# Patient Record
Sex: FEMALE | Race: WHITE | Employment: FULL TIME | URBAN - METROPOLITAN AREA
[De-identification: names, ages, dates, MRNs, and addresses within clinical notes are randomized per-mention and may not be internally consistent; named-entity substitution may affect disease eponyms.]

---

## 2017-05-09 ENCOUNTER — APPOINTMENT (OUTPATIENT)
Dept: ULTRASOUND IMAGING | Age: 67
End: 2017-05-09
Payer: COMMERCIAL

## 2017-05-09 ENCOUNTER — APPOINTMENT (OUTPATIENT)
Dept: MRI IMAGING | Age: 67
End: 2017-05-09
Payer: COMMERCIAL

## 2017-05-09 ENCOUNTER — OFFICE VISIT (OUTPATIENT)
Dept: PEDIATRICS | Age: 67
End: 2017-05-09

## 2017-05-09 ENCOUNTER — APPOINTMENT (OUTPATIENT)
Dept: CT IMAGING | Age: 67
End: 2017-05-09
Payer: COMMERCIAL

## 2017-05-09 ENCOUNTER — APPOINTMENT (OUTPATIENT)
Dept: GENERAL RADIOLOGY | Age: 67
End: 2017-05-09
Payer: COMMERCIAL

## 2017-05-09 ENCOUNTER — HOSPITAL ENCOUNTER (OUTPATIENT)
Age: 67
Setting detail: OBSERVATION
Discharge: HOME | End: 2017-05-10
Attending: STUDENT IN AN ORGANIZED HEALTH CARE EDUCATION/TRAINING PROGRAM | Admitting: FAMILY MEDICINE
Payer: COMMERCIAL

## 2017-05-09 VITALS
DIASTOLIC BLOOD PRESSURE: 80 MMHG | TEMPERATURE: 97.3 F | SYSTOLIC BLOOD PRESSURE: 120 MMHG | HEART RATE: 84 BPM | WEIGHT: 184 LBS | BODY MASS INDEX: 35.94 KG/M2 | OXYGEN SATURATION: 98 %

## 2017-05-09 DIAGNOSIS — H53.9 TRANSIENT VISION DISTURBANCE OF RIGHT EYE: Primary | ICD-10-CM

## 2017-05-09 DIAGNOSIS — H53.9 MONOCULAR VISUAL DISTURBANCE: Primary | ICD-10-CM

## 2017-05-09 DIAGNOSIS — G45.3 AMAUROSIS FUGAX: ICD-10-CM

## 2017-05-09 DIAGNOSIS — Z86.73 HISTORY OF TIA (TRANSIENT ISCHEMIC ATTACK): ICD-10-CM

## 2017-05-09 LAB
ABO/RH: NORMAL
ACETAMINOPHEN LEVEL: <15 UG/ML (ref 10–30)
ALBUMIN SERPL-MCNC: 4.4 G/DL (ref 3.9–4.9)
ALP BLD-CCNC: 136 U/L (ref 40–130)
ALT SERPL-CCNC: 14 U/L (ref 0–33)
AMPHETAMINE SCREEN, URINE: NORMAL
ANION GAP SERPL CALCULATED.3IONS-SCNC: 11 MEQ/L (ref 7–13)
ANTIBODY IDENTIFICATION: NORMAL
ANTIBODY SCREEN: NORMAL
APTT: 26.1 SEC (ref 21.6–35.4)
AST SERPL-CCNC: 19 U/L (ref 0–35)
BARBITURATE SCREEN URINE: NORMAL
BASOPHILS ABSOLUTE: 0 K/UL (ref 0–0.2)
BASOPHILS RELATIVE PERCENT: 0.3 %
BENZODIAZEPINE SCREEN, URINE: NORMAL
BILIRUB SERPL-MCNC: 0.4 MG/DL (ref 0–1.2)
BILIRUBIN URINE: NEGATIVE
BLOOD, URINE: NEGATIVE
BUN BLDV-MCNC: 15 MG/DL (ref 8–23)
C-REACTIVE PROTEIN, HIGH SENSITIVITY: 2 MG/L (ref 0–5)
CALCIUM SERPL-MCNC: 9.3 MG/DL (ref 8.6–10.2)
CANNABINOID SCREEN URINE: NORMAL
CHLORIDE BLD-SCNC: 105 MEQ/L (ref 98–107)
CHOLESTEROL, TOTAL: 238 MG/DL (ref 0–199)
CHP ED QC CHECK: YES
CLARITY: CLEAR
CO2: 24 MEQ/L (ref 22–29)
COCAINE METABOLITE SCREEN URINE: NORMAL
COLOR: YELLOW
CREAT SERPL-MCNC: 0.8 MG/DL (ref 0.5–0.9)
DAT POLYSPECIFIC: NORMAL
EOSINOPHILS ABSOLUTE: 0.1 K/UL (ref 0–0.7)
EOSINOPHILS RELATIVE PERCENT: 1 %
ETHANOL PERCENT: NORMAL G/DL
ETHANOL: <10 MG/DL (ref 0–0.08)
GFR AFRICAN AMERICAN: >60
GFR NON-AFRICAN AMERICAN: >60
GLOBULIN: 2.7 G/DL (ref 2.3–3.5)
GLUCOSE BLD-MCNC: 95 MG/DL
GLUCOSE BLD-MCNC: 95 MG/DL (ref 60–115)
GLUCOSE BLD-MCNC: 95 MG/DL (ref 74–109)
GLUCOSE URINE: NEGATIVE MG/DL
HCT VFR BLD CALC: 44.8 % (ref 37–47)
HDLC SERPL-MCNC: 40 MG/DL (ref 40–59)
HEMOGLOBIN: 15.1 G/DL (ref 12–16)
INR BLD: 0.9
KETONES, URINE: NEGATIVE MG/DL
LDL CHOLESTEROL CALCULATED: 157 MG/DL (ref 0–129)
LEUKOCYTE ESTERASE, URINE: NEGATIVE
LV EF: 60 %
LVEF MODALITY: NORMAL
LYMPHOCYTES ABSOLUTE: 3.1 K/UL (ref 1–4.8)
LYMPHOCYTES RELATIVE PERCENT: 41.1 %
Lab: NORMAL
MAGNESIUM: 2.4 MG/DL (ref 1.7–2.3)
MCH RBC QN AUTO: 29.1 PG (ref 27–31.3)
MCHC RBC AUTO-ENTMCNC: 33.6 % (ref 33–37)
MCV RBC AUTO: 86.6 FL (ref 82–100)
MONOCYTES ABSOLUTE: 0.5 K/UL (ref 0.2–0.8)
MONOCYTES RELATIVE PERCENT: 7 %
NEUTROPHILS ABSOLUTE: 3.8 K/UL (ref 1.4–6.5)
NEUTROPHILS RELATIVE PERCENT: 50.6 %
NITRITE, URINE: NEGATIVE
OPIATE SCREEN URINE: NORMAL
PDW BLD-RTO: 13.8 % (ref 11.5–14.5)
PERFORMED ON: NORMAL
PH UA: 6.5 (ref 5–9)
PHENCYCLIDINE SCREEN URINE: NORMAL
PLATELET # BLD: 256 K/UL (ref 130–400)
POTASSIUM SERPL-SCNC: 3.8 MEQ/L (ref 3.5–5.1)
PRO-BNP: 53 PG/ML
PROTEIN UA: NEGATIVE MG/DL
PROTHROMBIN TIME: 10.1 SEC (ref 8.1–13.7)
RBC # BLD: 5.17 M/UL (ref 4.2–5.4)
SALICYLATE, SERUM: <0.3 MG/DL (ref 15–30)
SODIUM BLD-SCNC: 140 MEQ/L (ref 132–144)
SPECIFIC GRAVITY UA: 1 (ref 1–1.03)
TOTAL CK: 125 U/L (ref 0–170)
TOTAL PROTEIN: 7.1 G/DL (ref 6.4–8.1)
TRIGL SERPL-MCNC: 207 MG/DL (ref 0–200)
TROPONIN: <0.01 NG/ML (ref 0–0.01)
URINE REFLEX TO CULTURE: NORMAL
UROBILINOGEN, URINE: 0.2 E.U./DL
WBC # BLD: 7.5 K/UL (ref 4.8–10.8)

## 2017-05-09 PROCEDURE — 6370000000 HC RX 637 (ALT 250 FOR IP): Performed by: PHYSICIAN ASSISTANT

## 2017-05-09 PROCEDURE — 80053 COMPREHEN METABOLIC PANEL: CPT

## 2017-05-09 PROCEDURE — G0378 HOSPITAL OBSERVATION PER HR: HCPCS

## 2017-05-09 PROCEDURE — 83880 ASSAY OF NATRIURETIC PEPTIDE: CPT

## 2017-05-09 PROCEDURE — 6370000000 HC RX 637 (ALT 250 FOR IP): Performed by: STUDENT IN AN ORGANIZED HEALTH CARE EDUCATION/TRAINING PROGRAM

## 2017-05-09 PROCEDURE — 86850 RBC ANTIBODY SCREEN: CPT

## 2017-05-09 PROCEDURE — 96372 THER/PROPH/DIAG INJ SC/IM: CPT

## 2017-05-09 PROCEDURE — 84484 ASSAY OF TROPONIN QUANT: CPT

## 2017-05-09 PROCEDURE — 82550 ASSAY OF CK (CPK): CPT

## 2017-05-09 PROCEDURE — 93005 ELECTROCARDIOGRAM TRACING: CPT

## 2017-05-09 PROCEDURE — 93880 EXTRACRANIAL BILAT STUDY: CPT

## 2017-05-09 PROCEDURE — 83735 ASSAY OF MAGNESIUM: CPT

## 2017-05-09 PROCEDURE — G0480 DRUG TEST DEF 1-7 CLASSES: HCPCS

## 2017-05-09 PROCEDURE — 85610 PROTHROMBIN TIME: CPT

## 2017-05-09 PROCEDURE — 86901 BLOOD TYPING SEROLOGIC RH(D): CPT

## 2017-05-09 PROCEDURE — 99213 OFFICE O/P EST LOW 20 MIN: CPT | Performed by: NURSE PRACTITIONER

## 2017-05-09 PROCEDURE — 2580000003 HC RX 258: Performed by: PHYSICIAN ASSISTANT

## 2017-05-09 PROCEDURE — 99285 EMERGENCY DEPT VISIT HI MDM: CPT

## 2017-05-09 PROCEDURE — 85730 THROMBOPLASTIN TIME PARTIAL: CPT

## 2017-05-09 PROCEDURE — A9579 GAD-BASE MR CONTRAST NOS,1ML: HCPCS | Performed by: RADIOLOGY

## 2017-05-09 PROCEDURE — 70450 CT HEAD/BRAIN W/O DYE: CPT

## 2017-05-09 PROCEDURE — 86141 C-REACTIVE PROTEIN HS: CPT

## 2017-05-09 PROCEDURE — 80307 DRUG TEST PRSMV CHEM ANLYZR: CPT

## 2017-05-09 PROCEDURE — 86870 RBC ANTIBODY IDENTIFICATION: CPT

## 2017-05-09 PROCEDURE — 86900 BLOOD TYPING SEROLOGIC ABO: CPT

## 2017-05-09 PROCEDURE — 70553 MRI BRAIN STEM W/O & W/DYE: CPT

## 2017-05-09 PROCEDURE — 80061 LIPID PANEL: CPT

## 2017-05-09 PROCEDURE — 81003 URINALYSIS AUTO W/O SCOPE: CPT

## 2017-05-09 PROCEDURE — 36415 COLL VENOUS BLD VENIPUNCTURE: CPT

## 2017-05-09 PROCEDURE — 93306 TTE W/DOPPLER COMPLETE: CPT

## 2017-05-09 PROCEDURE — 70544 MR ANGIOGRAPHY HEAD W/O DYE: CPT

## 2017-05-09 PROCEDURE — 85025 COMPLETE CBC W/AUTO DIFF WBC: CPT

## 2017-05-09 PROCEDURE — 6360000002 HC RX W HCPCS: Performed by: PHYSICIAN ASSISTANT

## 2017-05-09 PROCEDURE — 6360000004 HC RX CONTRAST MEDICATION: Performed by: RADIOLOGY

## 2017-05-09 PROCEDURE — 71010 XR CHEST PORTABLE: CPT

## 2017-05-09 PROCEDURE — 86880 COOMBS TEST DIRECT: CPT

## 2017-05-09 RX ORDER — ATORVASTATIN CALCIUM 20 MG/1
20 TABLET, FILM COATED ORAL NIGHTLY
Status: DISCONTINUED | OUTPATIENT
Start: 2017-05-09 | End: 2017-05-10

## 2017-05-09 RX ORDER — ASPIRIN 81 MG/1
324 TABLET, CHEWABLE ORAL ONCE
Status: COMPLETED | OUTPATIENT
Start: 2017-05-09 | End: 2017-05-09

## 2017-05-09 RX ORDER — ATORVASTATIN CALCIUM 40 MG/1
80 TABLET, FILM COATED ORAL NIGHTLY
Status: DISCONTINUED | OUTPATIENT
Start: 2017-05-09 | End: 2017-05-09 | Stop reason: SDUPTHER

## 2017-05-09 RX ORDER — SODIUM CHLORIDE 0.9 % (FLUSH) 0.9 %
10 SYRINGE (ML) INJECTION 2 TIMES DAILY
Status: DISCONTINUED | OUTPATIENT
Start: 2017-05-09 | End: 2017-05-10

## 2017-05-09 RX ORDER — TETRACAINE HYDROCHLORIDE 5 MG/ML
2 SOLUTION OPHTHALMIC ONCE
Status: COMPLETED | OUTPATIENT
Start: 2017-05-09 | End: 2017-05-09

## 2017-05-09 RX ORDER — SODIUM CHLORIDE 0.9 % (FLUSH) 0.9 %
10 SYRINGE (ML) INJECTION PRN
Status: DISCONTINUED | OUTPATIENT
Start: 2017-05-09 | End: 2017-05-10 | Stop reason: HOSPADM

## 2017-05-09 RX ORDER — ONDANSETRON 2 MG/ML
4 INJECTION INTRAMUSCULAR; INTRAVENOUS EVERY 6 HOURS PRN
Status: DISCONTINUED | OUTPATIENT
Start: 2017-05-09 | End: 2017-05-10 | Stop reason: HOSPADM

## 2017-05-09 RX ORDER — ASPIRIN 325 MG
325 TABLET ORAL DAILY
Status: DISCONTINUED | OUTPATIENT
Start: 2017-05-09 | End: 2017-05-10 | Stop reason: HOSPADM

## 2017-05-09 RX ORDER — ATORVASTATIN CALCIUM 40 MG/1
80 TABLET, FILM COATED ORAL NIGHTLY
Status: DISCONTINUED | OUTPATIENT
Start: 2017-05-09 | End: 2017-05-09

## 2017-05-09 RX ORDER — SODIUM CHLORIDE 0.9 % (FLUSH) 0.9 %
10 SYRINGE (ML) INJECTION EVERY 12 HOURS SCHEDULED
Status: DISCONTINUED | OUTPATIENT
Start: 2017-05-09 | End: 2017-05-10 | Stop reason: HOSPADM

## 2017-05-09 RX ORDER — ACETAMINOPHEN 325 MG/1
650 TABLET ORAL EVERY 4 HOURS PRN
Status: DISCONTINUED | OUTPATIENT
Start: 2017-05-09 | End: 2017-05-10 | Stop reason: HOSPADM

## 2017-05-09 RX ADMIN — GADOPENTETATE DIMEGLUMINE 15 ML: 469.01 INJECTION INTRAVENOUS at 19:22

## 2017-05-09 RX ADMIN — ENOXAPARIN SODIUM 40 MG: 40 INJECTION SUBCUTANEOUS at 16:02

## 2017-05-09 RX ADMIN — TETRACAINE HYDROCHLORIDE 2 DROP: 25 LIQUID OPHTHALMIC at 10:56

## 2017-05-09 RX ADMIN — ATORVASTATIN CALCIUM 20 MG: 20 TABLET, FILM COATED ORAL at 21:49

## 2017-05-09 RX ADMIN — ASPIRIN 81 MG 324 MG: 81 TABLET ORAL at 12:33

## 2017-05-09 RX ADMIN — ATORVASTATIN CALCIUM 80 MG: 40 TABLET, FILM COATED ORAL at 12:45

## 2017-05-09 RX ADMIN — SODIUM CHLORIDE, PRESERVATIVE FREE 10 ML: 5 INJECTION INTRAVENOUS at 21:57

## 2017-05-09 RX ADMIN — ASPIRIN 325 MG ORAL TABLET 325 MG: 325 PILL ORAL at 16:02

## 2017-05-09 ASSESSMENT — ENCOUNTER SYMPTOMS
PHOTOPHOBIA: 0
SINUS PRESSURE: 0
ABDOMINAL PAIN: 0
PHOTOPHOBIA: 0
WHEEZING: 0
FACIAL SWELLING: 0
ABDOMINAL PAIN: 0
EYE DISCHARGE: 0
VOMITING: 0
EYE REDNESS: 0
COUGH: 0
VOMITING: 0
SINUS PRESSURE: 0
BACK PAIN: 0
SORE THROAT: 0
EYE PAIN: 0
SHORTNESS OF BREATH: 0
EYE PAIN: 0
RHINORRHEA: 0
NAUSEA: 0
TROUBLE SWALLOWING: 0
COUGH: 0
DIARRHEA: 0
TROUBLE SWALLOWING: 0
SHORTNESS OF BREATH: 0
CHEST TIGHTNESS: 0

## 2017-05-09 ASSESSMENT — VISUAL ACUITY
OS: 20/50
OU: 20/25
OD: 20/25

## 2017-05-09 ASSESSMENT — PAIN SCALES - GENERAL: PAINLEVEL_OUTOF10: 0

## 2017-05-10 ENCOUNTER — APPOINTMENT (OUTPATIENT)
Dept: GENERAL RADIOLOGY | Age: 67
End: 2017-05-10
Payer: COMMERCIAL

## 2017-05-10 VITALS
BODY MASS INDEX: 36.32 KG/M2 | HEIGHT: 60 IN | RESPIRATION RATE: 16 BRPM | HEART RATE: 68 BPM | WEIGHT: 185 LBS | TEMPERATURE: 97.5 F | OXYGEN SATURATION: 97 % | DIASTOLIC BLOOD PRESSURE: 68 MMHG | SYSTOLIC BLOOD PRESSURE: 124 MMHG

## 2017-05-10 LAB
ALBUMIN SERPL-MCNC: 3.8 G/DL (ref 3.9–4.9)
ALP BLD-CCNC: 124 U/L (ref 40–130)
ALT SERPL-CCNC: 12 U/L (ref 0–33)
ANION GAP SERPL CALCULATED.3IONS-SCNC: 13 MEQ/L (ref 7–13)
AST SERPL-CCNC: 19 U/L (ref 0–35)
BASOPHILS ABSOLUTE: 0 K/UL (ref 0–0.2)
BASOPHILS RELATIVE PERCENT: 0.7 %
BILIRUB SERPL-MCNC: 0.4 MG/DL (ref 0–1.2)
BILIRUBIN DIRECT: 0 MG/DL (ref 0–0.3)
BILIRUBIN, INDIRECT: 0.4 MG/DL (ref 0–0.6)
BUN BLDV-MCNC: 16 MG/DL (ref 8–23)
CALCIUM SERPL-MCNC: 9.1 MG/DL (ref 8.6–10.2)
CHLORIDE BLD-SCNC: 106 MEQ/L (ref 98–107)
CHOLESTEROL, TOTAL: 226 MG/DL (ref 0–199)
CO2: 23 MEQ/L (ref 22–29)
CREAT SERPL-MCNC: 0.68 MG/DL (ref 0.5–0.9)
EOSINOPHILS ABSOLUTE: 0.1 K/UL (ref 0–0.7)
EOSINOPHILS RELATIVE PERCENT: 1.7 %
GFR AFRICAN AMERICAN: >60
GFR NON-AFRICAN AMERICAN: >60
GLUCOSE BLD-MCNC: 98 MG/DL (ref 74–109)
HCT VFR BLD CALC: 42.5 % (ref 37–47)
HDLC SERPL-MCNC: 38 MG/DL (ref 40–59)
HEMOGLOBIN: 14.4 G/DL (ref 12–16)
LDL CHOLESTEROL CALCULATED: 150 MG/DL (ref 0–129)
LYMPHOCYTES ABSOLUTE: 2.9 K/UL (ref 1–4.8)
LYMPHOCYTES RELATIVE PERCENT: 42.7 %
MAGNESIUM: 2.5 MG/DL (ref 1.7–2.3)
MCH RBC QN AUTO: 29.5 PG (ref 27–31.3)
MCHC RBC AUTO-ENTMCNC: 33.9 % (ref 33–37)
MCV RBC AUTO: 87.1 FL (ref 82–100)
MONOCYTES ABSOLUTE: 0.6 K/UL (ref 0.2–0.8)
MONOCYTES RELATIVE PERCENT: 8.2 %
NEUTROPHILS ABSOLUTE: 3.2 K/UL (ref 1.4–6.5)
NEUTROPHILS RELATIVE PERCENT: 46.7 %
PDW BLD-RTO: 14 % (ref 11.5–14.5)
PLATELET # BLD: 234 K/UL (ref 130–400)
POTASSIUM SERPL-SCNC: 4.1 MEQ/L (ref 3.5–5.1)
RBC # BLD: 4.88 M/UL (ref 4.2–5.4)
SODIUM BLD-SCNC: 142 MEQ/L (ref 132–144)
TOTAL PROTEIN: 6.3 G/DL (ref 6.4–8.1)
TRIGL SERPL-MCNC: 192 MG/DL (ref 0–200)
WBC # BLD: 6.8 K/UL (ref 4.8–10.8)

## 2017-05-10 PROCEDURE — G0378 HOSPITAL OBSERVATION PER HR: HCPCS

## 2017-05-10 PROCEDURE — 85025 COMPLETE CBC W/AUTO DIFF WBC: CPT

## 2017-05-10 PROCEDURE — 73030 X-RAY EXAM OF SHOULDER: CPT

## 2017-05-10 PROCEDURE — 83735 ASSAY OF MAGNESIUM: CPT

## 2017-05-10 PROCEDURE — 96372 THER/PROPH/DIAG INJ SC/IM: CPT

## 2017-05-10 PROCEDURE — 80076 HEPATIC FUNCTION PANEL: CPT

## 2017-05-10 PROCEDURE — 36415 COLL VENOUS BLD VENIPUNCTURE: CPT

## 2017-05-10 PROCEDURE — 80048 BASIC METABOLIC PNL TOTAL CA: CPT

## 2017-05-10 PROCEDURE — 2580000003 HC RX 258: Performed by: PHYSICIAN ASSISTANT

## 2017-05-10 PROCEDURE — 6360000002 HC RX W HCPCS: Performed by: PHYSICIAN ASSISTANT

## 2017-05-10 PROCEDURE — 80061 LIPID PANEL: CPT

## 2017-05-10 PROCEDURE — 6370000000 HC RX 637 (ALT 250 FOR IP): Performed by: PHYSICIAN ASSISTANT

## 2017-05-10 RX ORDER — ATORVASTATIN CALCIUM 40 MG/1
40 TABLET, FILM COATED ORAL NIGHTLY
Status: DISCONTINUED | OUTPATIENT
Start: 2017-05-10 | End: 2017-05-10 | Stop reason: HOSPADM

## 2017-05-10 RX ORDER — SIMVASTATIN 40 MG
40 TABLET ORAL EVERY EVENING
Qty: 90 TABLET | Refills: 3 | Status: SHIPPED | OUTPATIENT
Start: 2017-05-10 | End: 2017-05-10

## 2017-05-10 RX ORDER — LORAZEPAM 2 MG/ML
1 INJECTION INTRAMUSCULAR
Status: DISCONTINUED | OUTPATIENT
Start: 2017-05-10 | End: 2017-05-10 | Stop reason: HOSPADM

## 2017-05-10 RX ORDER — ASPIRIN 325 MG
325 TABLET ORAL DAILY
Qty: 30 TABLET | Refills: 3 | Status: SHIPPED | OUTPATIENT
Start: 2017-05-10 | End: 2019-01-09

## 2017-05-10 RX ORDER — SIMVASTATIN 40 MG
40 TABLET ORAL EVERY EVENING
Qty: 90 TABLET | Refills: 3 | Status: SHIPPED | OUTPATIENT
Start: 2017-05-10 | End: 2018-06-15 | Stop reason: SDUPTHER

## 2017-05-10 RX ORDER — MELOXICAM 15 MG/1
15 TABLET ORAL DAILY
Qty: 90 TABLET | Refills: 3 | Status: SHIPPED | OUTPATIENT
Start: 2017-05-10 | End: 2018-09-28 | Stop reason: SDUPTHER

## 2017-05-10 RX ORDER — MELOXICAM 15 MG/1
15 TABLET ORAL DAILY
Qty: 90 TABLET | Refills: 3 | Status: SHIPPED | OUTPATIENT
Start: 2017-05-10 | End: 2017-05-10

## 2017-05-10 RX ADMIN — ASPIRIN 325 MG ORAL TABLET 325 MG: 325 PILL ORAL at 07:51

## 2017-05-10 RX ADMIN — SODIUM CHLORIDE, PRESERVATIVE FREE 10 ML: 5 INJECTION INTRAVENOUS at 07:53

## 2017-05-10 RX ADMIN — ENOXAPARIN SODIUM 40 MG: 40 INJECTION SUBCUTANEOUS at 07:51

## 2017-05-10 ASSESSMENT — PAIN SCALES - GENERAL: PAINLEVEL_OUTOF10: 0

## 2017-05-11 LAB
EKG ATRIAL RATE: 79 BPM
EKG P AXIS: 45 DEGREES
EKG P-R INTERVAL: 144 MS
EKG Q-T INTERVAL: 394 MS
EKG QRS DURATION: 96 MS
EKG QTC CALCULATION (BAZETT): 451 MS
EKG R AXIS: 91 DEGREES
EKG T AXIS: 20 DEGREES
EKG VENTRICULAR RATE: 79 BPM

## 2017-05-13 LAB
BLOOD BANK DISPENSE STATUS: NORMAL
BLOOD BANK DISPENSE STATUS: NORMAL
BLOOD BANK PRODUCT CODE: NORMAL
BLOOD BANK PRODUCT CODE: NORMAL
BPU ID: NORMAL
BPU ID: NORMAL
DESCRIPTION BLOOD BANK: NORMAL
DESCRIPTION BLOOD BANK: NORMAL

## 2017-08-11 ENCOUNTER — HOSPITAL ENCOUNTER (OUTPATIENT)
Dept: GENERAL RADIOLOGY | Age: 67
Discharge: HOME | End: 2017-08-11

## 2017-08-11 DIAGNOSIS — R52 PAIN: ICD-10-CM

## 2017-08-11 PROCEDURE — 73562 X-RAY EXAM OF KNEE 3: CPT

## 2017-08-11 PROCEDURE — 73130 X-RAY EXAM OF HAND: CPT

## 2018-01-27 ENCOUNTER — OFFICE VISIT (OUTPATIENT)
Dept: FAMILY MEDICINE CLINIC | Age: 68
End: 2018-01-27
Payer: COMMERCIAL

## 2018-01-27 VITALS
RESPIRATION RATE: 16 BRPM | WEIGHT: 188 LBS | BODY MASS INDEX: 36.91 KG/M2 | SYSTOLIC BLOOD PRESSURE: 134 MMHG | TEMPERATURE: 98.1 F | HEART RATE: 89 BPM | DIASTOLIC BLOOD PRESSURE: 74 MMHG | HEIGHT: 60 IN

## 2018-01-27 DIAGNOSIS — M54.32 SCIATICA OF LEFT SIDE: Primary | ICD-10-CM

## 2018-01-27 PROCEDURE — 99213 OFFICE O/P EST LOW 20 MIN: CPT | Performed by: FAMILY MEDICINE

## 2018-01-27 RX ORDER — MELOXICAM 15 MG/1
15 TABLET ORAL DAILY
Qty: 30 TABLET | Refills: 3 | Status: SHIPPED | OUTPATIENT
Start: 2018-01-27 | End: 2018-09-28 | Stop reason: SDUPTHER

## 2018-01-27 RX ORDER — TIZANIDINE HYDROCHLORIDE 4 MG/1
4 CAPSULE, GELATIN COATED ORAL 3 TIMES DAILY
Qty: 40 CAPSULE | Refills: 1 | Status: SHIPPED | OUTPATIENT
Start: 2018-01-27 | End: 2018-01-30

## 2018-01-27 ASSESSMENT — ENCOUNTER SYMPTOMS
CHEST TIGHTNESS: 0
BACK PAIN: 1
EYES NEGATIVE: 1
RHINORRHEA: 0
GASTROINTESTINAL NEGATIVE: 1
RESPIRATORY NEGATIVE: 1
COUGH: 0

## 2018-01-27 NOTE — PROGRESS NOTES
Dispense Refill    aspirin 325 MG tablet Take 1 tablet by mouth daily 30 tablet 3    meloxicam (MOBIC) 15 MG tablet Take 1 tablet by mouth daily 90 tablet 3    simvastatin (ZOCOR) 40 MG tablet Take 1 tablet by mouth every evening 90 tablet 3    albuterol (PROVENTIL HFA) 108 (90 BASE) MCG/ACT inhaler Inhale 2 puffs into the lungs every 6 hours as needed for Wheezing. 1 Inhaler 0    ibuprofen (ADVIL;MOTRIN) 800 MG tablet Take 1 tablet by mouth 3 times daily as needed for Pain. 90 tablet 6    meclizine (ANTIVERT) 25 MG tablet Take 1 tablet by mouth every 6 hours as needed. 60 tablet 2     No current facility-administered medications on file prior to visit. Allergies   Allergen Reactions    Morphine Itching     Health Maintenance   Topic Date Due    Hepatitis C screen  1950    HIV screen  08/25/1967    DTaP/Tdap/Td vaccine (1 - Tdap) 08/25/1971    Colon cancer screen colonoscopy  08/25/2002    Breast cancer screen  06/13/2015    Cervical cancer screen  06/13/2016    Pneumococcal low/med risk (1 of 2 - PCV13) 08/25/2017    Flu vaccine (1) 09/01/2017    Lipid screen  05/10/2022    Zostavax vaccine  Addressed    DEXA (modify frequency per FRAX score)  Completed       Review of Systems    Review of Systems   Constitutional: Negative for activity change, appetite change, fatigue and fever. HENT: Negative for congestion and rhinorrhea. Eyes: Negative. Respiratory: Negative. Negative for cough and chest tightness. Cardiovascular: Negative. Gastrointestinal: Negative. Endocrine: Negative. Genitourinary: Negative. Musculoskeletal: Positive for back pain and myalgias. Skin: Negative. Neurological: Negative for dizziness, light-headedness and numbness. Hematological: Negative. Psychiatric/Behavioral: Negative.         Physical Exam  Vitals:    01/27/18 1146   BP: 134/74   Pulse: 89   Resp: 16   Temp: 98.1 °F (36.7 °C)   TempSrc: Tympanic   Weight: 188 lb (85.3 kg)

## 2018-01-30 RX ORDER — TIZANIDINE 4 MG/1
4 TABLET ORAL 3 TIMES DAILY
Qty: 40 TABLET | Refills: 1 | Status: SHIPPED | OUTPATIENT
Start: 2018-01-30 | End: 2022-07-15 | Stop reason: ALTCHOICE

## 2018-06-15 RX ORDER — SIMVASTATIN 40 MG
40 TABLET ORAL EVERY EVENING
Qty: 90 TABLET | Refills: 3 | Status: SHIPPED | OUTPATIENT
Start: 2018-06-15 | End: 2018-09-28 | Stop reason: SDUPTHER

## 2018-09-28 RX ORDER — SIMVASTATIN 40 MG
40 TABLET ORAL EVERY EVENING
Qty: 90 TABLET | Refills: 3 | Status: SHIPPED | OUTPATIENT
Start: 2018-09-28 | End: 2019-08-28 | Stop reason: SDUPTHER

## 2018-09-28 RX ORDER — MELOXICAM 15 MG/1
15 TABLET ORAL DAILY
Qty: 90 TABLET | Refills: 3 | Status: SHIPPED | OUTPATIENT
Start: 2018-09-28 | End: 2019-08-28 | Stop reason: SDUPTHER

## 2019-01-09 ENCOUNTER — OFFICE VISIT (OUTPATIENT)
Dept: INTERNAL MEDICINE CLINIC | Age: 69
End: 2019-01-09
Payer: COMMERCIAL

## 2019-01-09 VITALS
HEART RATE: 84 BPM | RESPIRATION RATE: 16 BRPM | OXYGEN SATURATION: 97 % | SYSTOLIC BLOOD PRESSURE: 134 MMHG | HEIGHT: 60 IN | TEMPERATURE: 97.6 F | BODY MASS INDEX: 36.99 KG/M2 | DIASTOLIC BLOOD PRESSURE: 82 MMHG | WEIGHT: 188.4 LBS

## 2019-01-09 DIAGNOSIS — J40 BRONCHITIS: Primary | ICD-10-CM

## 2019-01-09 DIAGNOSIS — Z12.11 SCREEN FOR COLON CANCER: ICD-10-CM

## 2019-01-09 DIAGNOSIS — Z12.31 VISIT FOR SCREENING MAMMOGRAM: ICD-10-CM

## 2019-01-09 PROCEDURE — G8417 CALC BMI ABV UP PARAM F/U: HCPCS | Performed by: NURSE PRACTITIONER

## 2019-01-09 PROCEDURE — G8399 PT W/DXA RESULTS DOCUMENT: HCPCS | Performed by: NURSE PRACTITIONER

## 2019-01-09 PROCEDURE — 3017F COLORECTAL CA SCREEN DOC REV: CPT | Performed by: NURSE PRACTITIONER

## 2019-01-09 PROCEDURE — G8484 FLU IMMUNIZE NO ADMIN: HCPCS | Performed by: NURSE PRACTITIONER

## 2019-01-09 PROCEDURE — 1101F PT FALLS ASSESS-DOCD LE1/YR: CPT | Performed by: NURSE PRACTITIONER

## 2019-01-09 PROCEDURE — 1090F PRES/ABSN URINE INCON ASSESS: CPT | Performed by: NURSE PRACTITIONER

## 2019-01-09 PROCEDURE — 1036F TOBACCO NON-USER: CPT | Performed by: NURSE PRACTITIONER

## 2019-01-09 PROCEDURE — G8427 DOCREV CUR MEDS BY ELIG CLIN: HCPCS | Performed by: NURSE PRACTITIONER

## 2019-01-09 PROCEDURE — 1123F ACP DISCUSS/DSCN MKR DOCD: CPT | Performed by: NURSE PRACTITIONER

## 2019-01-09 PROCEDURE — 4040F PNEUMOC VAC/ADMIN/RCVD: CPT | Performed by: NURSE PRACTITIONER

## 2019-01-09 PROCEDURE — 99214 OFFICE O/P EST MOD 30 MIN: CPT | Performed by: NURSE PRACTITIONER

## 2019-01-09 RX ORDER — ALBUTEROL SULFATE 90 UG/1
2 AEROSOL, METERED RESPIRATORY (INHALATION) EVERY 6 HOURS PRN
Qty: 1 INHALER | Refills: 0 | Status: SHIPPED | OUTPATIENT
Start: 2019-01-09 | End: 2022-07-15 | Stop reason: ALTCHOICE

## 2019-01-09 RX ORDER — DOXYCYCLINE HYCLATE 100 MG
100 TABLET ORAL 2 TIMES DAILY
Qty: 14 TABLET | Refills: 0 | Status: SHIPPED | OUTPATIENT
Start: 2019-01-09 | End: 2019-01-16

## 2019-01-09 ASSESSMENT — PATIENT HEALTH QUESTIONNAIRE - PHQ9
2. FEELING DOWN, DEPRESSED OR HOPELESS: 0
1. LITTLE INTEREST OR PLEASURE IN DOING THINGS: 0
SUM OF ALL RESPONSES TO PHQ QUESTIONS 1-9: 0
SUM OF ALL RESPONSES TO PHQ QUESTIONS 1-9: 0
SUM OF ALL RESPONSES TO PHQ9 QUESTIONS 1 & 2: 0

## 2019-01-09 ASSESSMENT — ENCOUNTER SYMPTOMS
WHEEZING: 1
SWOLLEN GLANDS: 0
SORE THROAT: 1
SHORTNESS OF BREATH: 0
COUGH: 1
DIARRHEA: 0
SINUS PAIN: 0
VOMITING: 0
ABDOMINAL PAIN: 0
FACIAL SWELLING: 0
TROUBLE SWALLOWING: 0
CHEST TIGHTNESS: 0
SINUS PRESSURE: 1
NAUSEA: 0
RHINORRHEA: 1

## 2019-08-28 ENCOUNTER — OFFICE VISIT (OUTPATIENT)
Dept: FAMILY MEDICINE CLINIC | Age: 69
End: 2019-08-28
Payer: COMMERCIAL

## 2019-08-28 ENCOUNTER — HOSPITAL ENCOUNTER (OUTPATIENT)
Dept: GENERAL RADIOLOGY | Age: 69
Discharge: HOME | End: 2019-08-30
Payer: COMMERCIAL

## 2019-08-28 VITALS
BODY MASS INDEX: 37.89 KG/M2 | SYSTOLIC BLOOD PRESSURE: 120 MMHG | HEIGHT: 60 IN | TEMPERATURE: 98.1 F | DIASTOLIC BLOOD PRESSURE: 86 MMHG | OXYGEN SATURATION: 98 % | WEIGHT: 193 LBS | RESPIRATION RATE: 14 BRPM | HEART RATE: 82 BPM

## 2019-08-28 DIAGNOSIS — M25.511 ACUTE PAIN OF RIGHT SHOULDER: Primary | ICD-10-CM

## 2019-08-28 DIAGNOSIS — M25.511 ACUTE PAIN OF RIGHT SHOULDER: ICD-10-CM

## 2019-08-28 PROCEDURE — 73030 X-RAY EXAM OF SHOULDER: CPT

## 2019-08-28 PROCEDURE — G8427 DOCREV CUR MEDS BY ELIG CLIN: HCPCS | Performed by: NURSE PRACTITIONER

## 2019-08-28 PROCEDURE — 1123F ACP DISCUSS/DSCN MKR DOCD: CPT | Performed by: NURSE PRACTITIONER

## 2019-08-28 PROCEDURE — 1036F TOBACCO NON-USER: CPT | Performed by: NURSE PRACTITIONER

## 2019-08-28 PROCEDURE — G8399 PT W/DXA RESULTS DOCUMENT: HCPCS | Performed by: NURSE PRACTITIONER

## 2019-08-28 PROCEDURE — 99213 OFFICE O/P EST LOW 20 MIN: CPT | Performed by: NURSE PRACTITIONER

## 2019-08-28 PROCEDURE — 3017F COLORECTAL CA SCREEN DOC REV: CPT | Performed by: NURSE PRACTITIONER

## 2019-08-28 PROCEDURE — 4040F PNEUMOC VAC/ADMIN/RCVD: CPT | Performed by: NURSE PRACTITIONER

## 2019-08-28 PROCEDURE — G8417 CALC BMI ABV UP PARAM F/U: HCPCS | Performed by: NURSE PRACTITIONER

## 2019-08-28 PROCEDURE — 1090F PRES/ABSN URINE INCON ASSESS: CPT | Performed by: NURSE PRACTITIONER

## 2019-08-28 RX ORDER — SIMVASTATIN 40 MG
40 TABLET ORAL EVERY EVENING
Qty: 90 TABLET | Refills: 3 | Status: SHIPPED | OUTPATIENT
Start: 2019-08-28 | End: 2020-09-03 | Stop reason: SDUPTHER

## 2019-08-28 RX ORDER — MELOXICAM 15 MG/1
15 TABLET ORAL DAILY
Qty: 90 TABLET | Refills: 3 | Status: SHIPPED | OUTPATIENT
Start: 2019-08-28 | End: 2020-09-03 | Stop reason: SDUPTHER

## 2019-08-28 ASSESSMENT — ENCOUNTER SYMPTOMS
VOMITING: 0
EYES NEGATIVE: 1
NAUSEA: 0
WHEEZING: 0
ABDOMINAL PAIN: 0
COLOR CHANGE: 0
SHORTNESS OF BREATH: 0
COUGH: 0

## 2019-08-28 NOTE — PROGRESS NOTES
name: Not on file    Number of children: Not on file    Years of education: Not on file    Highest education level: Not on file   Occupational History    Not on file   Social Needs    Financial resource strain: Not on file    Food insecurity:     Worry: Not on file     Inability: Not on file    Transportation needs:     Medical: Not on file     Non-medical: Not on file   Tobacco Use    Smoking status: Never Smoker    Smokeless tobacco: Never Used   Substance and Sexual Activity    Alcohol use: No    Drug use: No    Sexual activity: Not on file   Lifestyle    Physical activity:     Days per week: Not on file     Minutes per session: Not on file    Stress: Not on file   Relationships    Social connections:     Talks on phone: Not on file     Gets together: Not on file     Attends Rastafarian service: Not on file     Active member of club or organization: Not on file     Attends meetings of clubs or organizations: Not on file     Relationship status: Not on file    Intimate partner violence:     Fear of current or ex partner: Not on file     Emotionally abused: Not on file     Physically abused: Not on file     Forced sexual activity: Not on file   Other Topics Concern    Not on file   Social History Narrative    Not on file     Allergies:  Morphine    Review of Systems   Constitutional: Negative for chills, diaphoresis and fever. HENT: Negative. Eyes: Negative. Respiratory: Negative for cough, shortness of breath and wheezing. Cardiovascular: Negative for chest pain, palpitations and leg swelling. Gastrointestinal: Negative for abdominal pain, nausea and vomiting. Musculoskeletal: Positive for arthralgias, joint swelling and stiffness. Negative for gait problem. Skin: Negative for color change and wound. Neurological: Negative for dizziness, syncope, weakness, light-headedness and headaches.        Objective:    /86 (Site: Left Upper Arm, Position: Sitting, Cuff Size: Medium

## 2019-08-30 DIAGNOSIS — M25.511 ACUTE PAIN OF RIGHT SHOULDER: Primary | ICD-10-CM

## 2019-11-23 ENCOUNTER — HOSPITAL ENCOUNTER (OUTPATIENT)
Dept: WOMENS IMAGING | Age: 69
Discharge: HOME | End: 2019-11-25
Payer: COMMERCIAL

## 2019-11-23 DIAGNOSIS — Z12.31 VISIT FOR SCREENING MAMMOGRAM: ICD-10-CM

## 2019-11-23 PROCEDURE — 77067 SCR MAMMO BI INCL CAD: CPT

## 2019-12-05 ENCOUNTER — TELEPHONE (OUTPATIENT)
Dept: FAMILY MEDICINE CLINIC | Age: 69
End: 2019-12-05

## 2019-12-05 DIAGNOSIS — C80.1 CANCER (HCC): ICD-10-CM

## 2019-12-05 DIAGNOSIS — M25.511 ACUTE PAIN OF RIGHT SHOULDER: Primary | ICD-10-CM

## 2020-09-03 NOTE — TELEPHONE ENCOUNTER
Pt is requesting the following refills:    · meloxicam (MOBIC) 15 MG tablet  · Simvastatin (ZOCOR) 40 MG tablet    [place] [id number]  P - [phone]

## 2020-09-04 RX ORDER — MELOXICAM 15 MG/1
15 TABLET ORAL DAILY
Qty: 90 TABLET | Refills: 0 | Status: SHIPPED | OUTPATIENT
Start: 2020-09-04 | End: 2021-06-04 | Stop reason: SDUPTHER

## 2020-09-04 RX ORDER — SIMVASTATIN 40 MG
40 TABLET ORAL EVERY EVENING
Qty: 90 TABLET | Refills: 0 | Status: SHIPPED | OUTPATIENT
Start: 2020-09-04 | End: 2021-01-15 | Stop reason: SDUPTHER

## 2020-12-14 ENCOUNTER — VIRTUAL VISIT (OUTPATIENT)
Dept: FAMILY MEDICINE CLINIC | Age: 70
End: 2020-12-14
Payer: COMMERCIAL

## 2020-12-14 DIAGNOSIS — R05.9 COUGH: ICD-10-CM

## 2020-12-14 DIAGNOSIS — Z20.822 EXPOSURE TO COVID-19 VIRUS: ICD-10-CM

## 2020-12-14 PROCEDURE — 99441 PR PHYS/QHP TELEPHONE EVALUATION 5-10 MIN: CPT | Performed by: PHYSICIAN ASSISTANT

## 2020-12-14 RX ORDER — AMOXICILLIN AND CLAVULANATE POTASSIUM 875; 125 MG/1; MG/1
1 TABLET, FILM COATED ORAL 2 TIMES DAILY
Qty: 14 TABLET | Refills: 0 | Status: SHIPPED | OUTPATIENT
Start: 2020-12-14 | End: 2020-12-21

## 2020-12-14 ASSESSMENT — PATIENT HEALTH QUESTIONNAIRE - PHQ9
2. FEELING DOWN, DEPRESSED OR HOPELESS: NOT AT ALL
SUM OF ALL RESPONSES TO PHQ9 QUESTIONS 1 & 2: 0
1. LITTLE INTEREST OR PLEASURE IN DOING THINGS: NOT AT ALL
DEPRESSION UNABLE TO ASSESS: YES

## 2020-12-14 ASSESSMENT — ENCOUNTER SYMPTOMS
BACK PAIN: 0
CHEST TIGHTNESS: 0
COUGH: 0
DIARRHEA: 0
TROUBLE SWALLOWING: 0
SINUS PRESSURE: 0
ABDOMINAL PAIN: 0
VOMITING: 0
SHORTNESS OF BREATH: 0

## 2020-12-14 NOTE — PROGRESS NOTES
TELEHEALTH EVALUATION -- Audio/Visual (During ZPO-84 public health emergency)    -   Alysia Clark is a 78 y.o. female being evaluated by a Virtual Visit (video visit) encounter to address concerns as mentioned above. A caregiver was present when appropriate. Due to this being a TeleHealth encounter (During PRO-50 public health emergency), evaluation of the following organ systems was limited: Vitals/Constitutional/EENT/Resp/CV/GI//MS/Neuro/Skin/Heme-Lymph-Imm. Pursuant to the emergency declaration under the [address] [place] [address], [address] [place] [address] authority and the [employer] Act, this Virtual Visit was conducted with patient's (and/or legal guardian's) consent, to reduce the patient's risk of exposure to COVID-19 and provide necessary medical care. The patient (and/or legal guardian) has also been advised to contact this office for worsening conditions or problems, and seek emergency medical treatment and/or call 911 if deemed necessary. Patient was contacted and agreed to proceed with a virtual visit via Telephone Visit  The risks and benefits of converting to a virtual visit were discussed in light of the current infectious disease epidemic. Patient also understood that insurance coverage and co-pays are up to their individual insurance plans. Patient was located at their home. Provider was located at their office. 2020  Alysia Clark (:  1950) has requested an audio/video evaluation for the following concern(s):    HPI  78year old female who complains of a non productive dry cough for the past few days. She has had rhinorrhea. She feels body aches. The patient has no known exposure for COVID-19      Review of Systems   Constitutional: Negative for activity change, appetite change, chills, fever and unexpected weight change. HENT: Negative for drooling, ear pain, nosebleeds, sinus pressure and trouble swallowing. Respiratory: Negative for cough, chest tightness and shortness of breath. Cardiovascular: Negative for chest pain and leg swelling. Gastrointestinal: Negative for abdominal pain, diarrhea and vomiting. Endocrine: Negative for polydipsia and polyphagia. Genitourinary: Negative for dysuria, flank pain and frequency. Musculoskeletal: Negative for back pain and myalgias. Skin: Negative for pallor and rash. Neurological: Negative for syncope, weakness and headaches. Hematological: Does not bruise/bleed easily. All other systems reviewed and are negative. Prior to Visit Medications    Medication Sig Taking? Authorizing Provider   simvastatin (ZOCOR) 40 MG tablet Take 1 tablet by mouth every evening  SHARAD Russell CNP   meloxicam (MOBIC) 15 MG tablet Take 1 tablet by mouth daily  SHARAD Russell CNP   Handicap Placard MISC by Does not apply route Good for 5 years  Arnaud Ledbetter MD   albuterol sulfate  (90 Base) MCG/ACT inhaler Inhale 2 puffs into the lungs every 6 hours as needed for Wheezing  SHARAD Russell CNP   tiZANidine (ZANAFLEX) 4 MG tablet Take 1 tablet by mouth 3 times daily  Arnaud Ledbetter MD   ibuprofen (ADVIL;MOTRIN) 800 MG tablet Take 1 tablet by mouth 3 times daily as needed for Pain.   Arnaud Ledbetter MD       Past Medical History:   Diagnosis Date    Cancer [facility])     SKIN    Hyperlipidemia      Past Surgical History:   Procedure Laterality Date    CATARACT REMOVAL      CHOLECYSTECTOMY      SKIN BIOPSY      R LOWER LEG    TUBAL LIGATION       Social History     Socioeconomic History    Marital status:      Spouse name: Not on file    Number of children: Not on file    Years of education: Not on file    Highest education level: Not on file   Occupational History    Not on file   Social Needs    Financial resource strain: Not on file [x] Mouth/Throat: Mucous membranes are moist.     External Ears [x] Normal  [] Abnormal-     Neck: [x] No visualized mass     Pulmonary/Chest: [x] Respiratory effort normal.  [x] No visualized signs of difficulty breathing or respiratory distress        [] Abnormal-      Musculoskeletal:   [x] Normal gait with no signs of ataxia         [x] Normal range of motion of neck        [] Abnormal-       Neurological:       [x] No Facial Asymmetry (Cranial nerve 7 motor function) (limited exam to video visit)          [x] No gaze palsy        [] Abnormal-         Skin:        [x] No significant exanthematous lesions or discoloration noted on facial skin         [] Abnormal-            Psychiatric:       [x] Normal Affect [x] No Hallucinations        [] Abnormal-     Other pertinent observable physical exam findings-   Results for orders placed or performed during the hospital encounter of 05/09/17   Ethanol   Result Value Ref Range    Ethanol Lvl <10 mg/dL    Ethanol percent Not indicated G/dL   Drug screen multi urine   Result Value Ref Range    Amphetamine Screen, Urine Neg Negative <1000 ng/mL    Barbiturate Screen, Ur Neg Negative < 200 ng/mL    Benzodiazepine Screen, Urine Neg Negative < 200 ng/mL    Cannabinoid Scrn, Ur Neg Negative < 50 ng/mL    Cocaine Metabolite Screen, Urine Neg Negative < 300 ng/mL    Opiate Scrn, Ur Neg Negative < 300 ng/mL    PCP Screen, Urine Neg Negative < 25 ng/mL    Drug Screen Comment: see below    High sensitivity CRP   Result Value Ref Range    CRP High Sensitivity 2.0 0.0 - 5.0 mg/L   Comprehensive Metabolic Panel   Result Value Ref Range    Sodium 140 132 - 144 mEq/L    Potassium 3.8 3.5 - 5.1 mEq/L    Chloride 105 98 - 107 mEq/L    CO2 24 22 - 29 mEq/L    Anion Gap 11 7 - 13 mEq/L    Glucose 95 74 - 109 mg/dL    BUN 15 8 - 23 mg/dL    CREATININE 0.80 0.50 - 0.90 mg/dL    GFR Non-African American >60.0 >60    GFR  >60.0 >60    Calcium 9.3 8.6 - 10.2 mg/dL Total Protein 7.1 6.4 - 8.1 g/dL    Alb 4.4 3.9 - 4.9 g/dL    Total Bilirubin 0.4 0.0 - 1.2 mg/dL    Alkaline Phosphatase 136 (H) 40 - 130 U/L    ALT 14 0 - 33 U/L    AST 19 0 - 35 U/L    Globulin 2.7 2.3 - 3.5 g/dL   CK   Result Value Ref Range    Total  0 - 170 U/L   CBC Auto Differential   Result Value Ref Range    WBC 7.5 4.8 - 10.8 K/uL    RBC 5.17 4.20 - 5.40 M/uL    Hemoglobin 15.1 12.0 - 16.0 g/dL    Hematocrit 44.8 37.0 - 47.0 %    MCV 86.6 82.0 - 100.0 fL    MCH 29.1 27.0 - 31.3 pg    MCHC 33.6 33.0 - 37.0 %    RDW 13.8 11.5 - 14.5 %    Platelets 096 889 - 165 K/uL    Neutrophils % 50.6 %    Lymphocytes % 41.1 %    Monocytes % 7.0 %    Eosinophils % 1.0 %    Basophils % 0.3 %    Neutrophils Absolute 3.8 1.4 - 6.5 K/uL    Lymphocytes Absolute 3.1 1.0 - 4.8 K/uL    Monocytes Absolute 0.5 0.2 - 0.8 K/uL    Eosinophils Absolute 0.1 0.0 - 0.7 K/uL    Basophils Absolute 0.0 0.0 - 0.2 K/uL   Brain Natriuretic Peptide   Result Value Ref Range    Pro-BNP 53 pg/mL   APTT   Result Value Ref Range    aPTT 26.1 21.6 - 35.4 sec   Lipid Panel   Result Value Ref Range    Cholesterol, Total 238 (H) 0 - 199 mg/dL    Triglycerides 207 (H) 0 - 200 mg/dL    HDL 40 40 - 59 mg/dL    LDL Calculated 157 (H) 0 - 129 mg/dL   Magnesium   Result Value Ref Range    Magnesium 2.4 (H) 1.7 - 2.3 mg/dL   Protime-INR   Result Value Ref Range    Protime 10.1 8.1 - 13.7 sec    INR 0.9    Troponin   Result Value Ref Range    Troponin <0.010 0.000 - 0.010 ng/mL   Urine reflex to culture    Specimen: Urine, clean catch   Result Value Ref Range    Color, UA Yellow Straw/Yellow    Clarity, UA Clear Clear    Glucose, Ur Negative Negative mg/dL    Bilirubin Urine Negative Negative    Ketones, Urine Negative Negative mg/dL    Specific Gravity, UA 1.003 1.005 - 1.030    Blood, Urine Negative Negative    pH, UA 6.5 5.0 - 9.0    Protein, UA Negative Negative mg/dL    Urobilinogen, Urine 0.2 <2.0 E.U./dL    Nitrite, Urine Negative Negative Leukocyte Esterase, Urine Negative Negative    Urine Reflex to Culture Not Indicated    Acetaminophen level   Result Value Ref Range    Acetaminophen Level <15 10 - 30 ug/mL   Salicylate   Result Value Ref Range    Salicylate, Serum <3.1 (L) 15.0 - 30.0 mg/dL   Basic metabolic panel   Result Value Ref Range    Sodium 142 132 - 144 mEq/L    Potassium 4.1 3.5 - 5.1 mEq/L    Chloride 106 98 - 107 mEq/L    CO2 23 22 - 29 mEq/L    Anion Gap 13 7 - 13 mEq/L    Glucose 98 74 - 109 mg/dL    BUN 16 8 - 23 mg/dL    CREATININE 0.68 0.50 - 0.90 mg/dL    GFR Non-African American >60.0 >60    GFR  >60.0 >60    Calcium 9.1 8.6 - 10.2 mg/dL   Lipid panel - fasting   Result Value Ref Range    Cholesterol, Total 226 (H) 0 - 199 mg/dL    Triglycerides 192 0 - 200 mg/dL    HDL 38 (L) 40 - 59 mg/dL    LDL Calculated 150 (H) 0 - 129 mg/dL   CBC auto differential   Result Value Ref Range    WBC 6.8 4.8 - 10.8 K/uL    RBC 4.88 4.20 - 5.40 M/uL    Hemoglobin 14.4 12.0 - 16.0 g/dL    Hematocrit 42.5 37.0 - 47.0 %    MCV 87.1 82.0 - 100.0 fL    MCH 29.5 27.0 - 31.3 pg    MCHC 33.9 33.0 - 37.0 %    RDW 14.0 11.5 - 14.5 %    Platelets 123 014 - 303 K/uL    Neutrophils % 46.7 %    Lymphocytes % 42.7 %    Monocytes % 8.2 %    Eosinophils % 1.7 %    Basophils % 0.7 %    Neutrophils Absolute 3.2 1.4 - 6.5 K/uL    Lymphocytes Absolute 2.9 1.0 - 4.8 K/uL    Monocytes Absolute 0.6 0.2 - 0.8 K/uL    Eosinophils Absolute 0.1 0.0 - 0.7 K/uL    Basophils Absolute 0.0 0.0 - 0.2 K/uL   Hepatic function panel   Result Value Ref Range    Total Protein 6.3 (L) 6.4 - 8.1 g/dL    Alb 3.8 (L) 3.9 - 4.9 g/dL    Alkaline Phosphatase 124 40 - 130 U/L    ALT 12 0 - 33 U/L    AST 19 0 - 35 U/L    Total Bilirubin 0.4 0.0 - 1.2 mg/dL    Bilirubin, Direct 0.0 0.0 - 0.3 mg/dL    Bilirubin, Indirect 0.4 0.0 - 0.6 mg/dL   Magnesium   Result Value Ref Range    Magnesium 2.5 (H) 1.7 - 2.3 mg/dL   POCT Glucose   Result Value Ref Range    Glucose 95 mg/dL QC OK? yes    POCT Glucose   Result Value Ref Range    POC Glucose 95 60 - 115 mg/dl    Performed on ACCU-CHEK    EKG 12 Lead   Result Value Ref Range    Ventricular Rate 79 BPM    Atrial Rate 79 BPM    P-R Interval 144 ms    QRS Duration 96 ms    Q-T Interval 394 ms    QTc Calculation (Bazett) 451 ms    P Axis 45 degrees    R Axis 91 degrees    T Axis 20 degrees   Echocardiogram complete 2D with doppler with color   Result Value Ref Range    Left Ventricular Ejection Fraction 60     LVEF MODALITY ECHO    TYPE AND SCREEN   Result Value Ref Range    ABO/Rh A POS     Antibody Screen POS    Direct Antiglobulin Test   Result Value Ref Range    Polyspecific Denny NEG    Prepare RBC Crossmatch   Result Value Ref Range    Product Code Blood Bank      Description Blood Bank Red Blood Cells, Leuko-reduced     Unit Number [id number]     Dispense Status Blood Bank released     Product Code Blood Bank      Description Blood Bank Red Blood Cells, Leuko-reduced     Unit Number [id number]     Dispense Status Blood Bank released    Antibody Identification   Result Value Ref Range    Antibody ID POS, Cold Auto Agglutinin        ASSESSMENT/PLAN:  Assessment & Plan   There are no diagnoses linked to this encounter. No orders of the defined types were placed in this encounter. No orders of the defined types were placed in this encounter. There are no discontinued medications. No follow-ups on file. Reviewed with the patient: current clinical status, medications, activities and diet. Side effects, adverse effects of the medication prescribed today, as well as treatment plan/ rationale and result expectations have been discussed with the patient who expresses understanding and desires to proceed. Close follow up to evaluate treatment results and for coordination of care. I have reviewed the patient's medical history in detail and updated the computerized patient record. Patient identification was verified at the start of the visit: Yes    Total time spent on this encounter: Not billed by time      --SANDEEP Junior on 12/14/2020 at 11:47 AM    An electronic signature was used to authenticate this note.

## 2020-12-15 ENCOUNTER — TELEPHONE (OUTPATIENT)
Dept: FAMILY MEDICINE CLINIC | Age: 70
End: 2020-12-15

## 2020-12-15 NOTE — TELEPHONE ENCOUNTER
Pt called in asking for a letter notifying her job that she has to quarantine until Covid results come back.  She was tested yesterday 12/14/2020    Thanks

## 2020-12-17 LAB
SARS-COV-2: DETECTED
SOURCE: ABNORMAL

## 2020-12-29 ENCOUNTER — TELEPHONE (OUTPATIENT)
Dept: FAMILY MEDICINE CLINIC | Age: 70
End: 2020-12-29

## 2020-12-29 NOTE — LETTER
[facility]  [address] [id number]  Phone: [phone]  Fax: [phone]    Tess Garcia MD        2020    [facility] [address]   54    To whom it may concern:    Peterson Meredith may return to work 21. If you have any questions or concerns, please don't hesitate to call.     Sincerely,        Tess Garcia MD

## 2020-12-29 NOTE — TELEPHONE ENCOUNTER
Patient called because she wanted you to know that she tested positive for covid on 12/14. She said she is feeling much better. She wants to know if you think she should make a follow visit with you. She is also asking if you can provide a rtw letter for her. She would like to go back on 1/4.   The letter can be faxed to [phone]- Attn: Sunny Ames

## 2021-01-15 RX ORDER — SIMVASTATIN 40 MG
40 TABLET ORAL EVERY EVENING
Qty: 90 TABLET | Refills: 0 | Status: SHIPPED | OUTPATIENT
Start: 2021-01-15 | End: 2021-06-04 | Stop reason: SDUPTHER

## 2021-01-15 NOTE — TELEPHONE ENCOUNTER
Patient requesting medication refill. Please approve or deny this request.    Rx requested:  Requested Prescriptions     Pending Prescriptions Disp Refills    simvastatin (ZOCOR) 40 MG tablet 90 tablet 0     Sig: Take 1 tablet by mouth every evening         Last Office Visit:   Visit date not found      Next Visit Date:  No future appointments.

## 2021-01-22 ENCOUNTER — TELEPHONE (OUTPATIENT)
Dept: FAMILY MEDICINE CLINIC | Age: 71
End: 2021-01-22

## 2021-01-22 NOTE — TELEPHONE ENCOUNTER
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 Pt tested pos for COVID 12/14/2020 and unless  recommends, no test needed at this time. Her HR dept is requesting a letter stating what Dr Hao Hill recommends To quarantine. Her office policy is 14 days unless  recommends differently.      If for 14 days, RTW date 2/5/2021   Mikael Souza would like a returned call if possible to confirm 's approval date of RTW  Letter may be faxed to [phone]  Attn to [facility]

## 2021-06-04 DIAGNOSIS — M25.511 ACUTE PAIN OF RIGHT SHOULDER: ICD-10-CM

## 2021-06-04 RX ORDER — SIMVASTATIN 40 MG
40 TABLET ORAL EVERY EVENING
Qty: 90 TABLET | Refills: 0 | Status: SHIPPED | OUTPATIENT
Start: 2021-06-04 | End: 2021-09-28 | Stop reason: SDUPTHER

## 2021-06-04 RX ORDER — MELOXICAM 15 MG/1
15 TABLET ORAL DAILY
Qty: 90 TABLET | Refills: 0 | Status: SHIPPED | OUTPATIENT
Start: 2021-06-04 | End: 2021-09-28 | Stop reason: SDUPTHER

## 2021-06-04 NOTE — TELEPHONE ENCOUNTER
Pt calling in requesting the following medication refills:    PENDED PRESCRIPTION    · meloxicam (MOBIC) 15 MG tablet  · Simvastatin (ZOCOR) 40 MG tablet    PREFERRED PHARMACY    [facility] [place] on [address]      LOV:    12/19/2020

## 2021-09-22 DIAGNOSIS — J40 BRONCHITIS: ICD-10-CM

## 2021-09-22 RX ORDER — SIMVASTATIN 40 MG
40 TABLET ORAL EVERY EVENING
Qty: 90 TABLET | Refills: 0 | OUTPATIENT
Start: 2021-09-22

## 2021-09-22 RX ORDER — ALBUTEROL SULFATE 90 UG/1
2 AEROSOL, METERED RESPIRATORY (INHALATION) EVERY 6 HOURS PRN
Qty: 1 EACH | Refills: 3 | OUTPATIENT
Start: 2021-09-22

## 2021-09-22 NOTE — TELEPHONE ENCOUNTER
Joesph Desouza [facility] Clinical Staff  Subject: Refill Request     QUESTIONS   Name of Medication? simvastatin (ZOCOR) 40 MG tablet   Patient-reported dosage and instructions? 40mg tablet once daily   How many days do you have left? 0   Preferred Pharmacy? [facility]   Pharmacy phone number (if available)? [phone]   ---------------------------------------------------------------------------   --------------,   Name of Medication? albuterol sulfate  (90 Base) MCG/ACT inhaler   Patient-reported dosage and instructions? As needed   How many days do you have left? 0   Preferred Pharmacy? [facility]   Pharmacy phone number (if available)? [phone]   ---------------------------------------------------------------------------   --------------   Radha Carrier INFO   What is the best way for the office to contact you? OK to leave message on   voicemail   Preferred Call Back Phone Number?  [phone]

## 2021-09-27 NOTE — TELEPHONE ENCOUNTER
Patient calling to check on status of this medication request.    Patient has not been seen since 2019 and nothing was scheduled.     Virtual Appointment made for tomorrow 9/28 at 7:30 AM

## 2021-09-28 ENCOUNTER — VIRTUAL VISIT (OUTPATIENT)
Dept: FAMILY MEDICINE CLINIC | Age: 71
End: 2021-09-28
Payer: COMMERCIAL

## 2021-09-28 DIAGNOSIS — E78.2 MIXED HYPERLIPIDEMIA: Primary | ICD-10-CM

## 2021-09-28 DIAGNOSIS — M15.9 PRIMARY OSTEOARTHRITIS INVOLVING MULTIPLE JOINTS: ICD-10-CM

## 2021-09-28 PROCEDURE — 3017F COLORECTAL CA SCREEN DOC REV: CPT | Performed by: NURSE PRACTITIONER

## 2021-09-28 PROCEDURE — 99214 OFFICE O/P EST MOD 30 MIN: CPT | Performed by: NURSE PRACTITIONER

## 2021-09-28 PROCEDURE — G8399 PT W/DXA RESULTS DOCUMENT: HCPCS | Performed by: NURSE PRACTITIONER

## 2021-09-28 PROCEDURE — 4040F PNEUMOC VAC/ADMIN/RCVD: CPT | Performed by: NURSE PRACTITIONER

## 2021-09-28 PROCEDURE — G8427 DOCREV CUR MEDS BY ELIG CLIN: HCPCS | Performed by: NURSE PRACTITIONER

## 2021-09-28 PROCEDURE — 1090F PRES/ABSN URINE INCON ASSESS: CPT | Performed by: NURSE PRACTITIONER

## 2021-09-28 PROCEDURE — 1123F ACP DISCUSS/DSCN MKR DOCD: CPT | Performed by: NURSE PRACTITIONER

## 2021-09-28 RX ORDER — SIMVASTATIN 40 MG
40 TABLET ORAL EVERY EVENING
Qty: 90 TABLET | Refills: 3 | Status: SHIPPED | OUTPATIENT
Start: 2021-09-28 | End: 2022-10-25

## 2021-09-28 RX ORDER — MELOXICAM 15 MG/1
15 TABLET ORAL DAILY
Qty: 90 TABLET | Refills: 3 | Status: SHIPPED | OUTPATIENT
Start: 2021-09-28

## 2021-09-28 ASSESSMENT — PATIENT HEALTH QUESTIONNAIRE - PHQ9
SUM OF ALL RESPONSES TO PHQ QUESTIONS 1-9: 0
SUM OF ALL RESPONSES TO PHQ QUESTIONS 1-9: 0
2. FEELING DOWN, DEPRESSED OR HOPELESS: 0
1. LITTLE INTEREST OR PLEASURE IN DOING THINGS: 0
SUM OF ALL RESPONSES TO PHQ9 QUESTIONS 1 & 2: 0
SUM OF ALL RESPONSES TO PHQ QUESTIONS 1-9: 0

## 2021-09-28 ASSESSMENT — ENCOUNTER SYMPTOMS
RESPIRATORY NEGATIVE: 1
EYES NEGATIVE: 1
GASTROINTESTINAL NEGATIVE: 1

## 2021-09-28 NOTE — PROGRESS NOTES
Subjective:     Patient ID: Libby Gonzalez is a 73 y.o. female who presentstoday for:  Chief Complaint   Patient presents with    Hyperlipidemia    Medication Refill         TELEHEALTH EVALUATION -- Audio/Visual (During [facility]- public [facility] emergency)    -   Libby Gonzalez is a 73 y.o. female being evaluated by a Virtual Visit (video visit) encounter to address concerns as mentioned above. A caregiver was present when appropriate. Due to this being a TeleHealth encounter (During [facility]), evaluation of the following organ systems was limited: Vitals/Constitutional/EENT/Resp/CV/GI//MS/Neuro/Skin/Heme-Lymph-Imm. Pursuant to the emergency declaration under the [address] [place] [address], [address] [place] [address] authority and the [employer] Act, this Virtual Visit was conducted with patient's (and/or legal guardian's) consent, to reduce the patient's risk of exposure to COVID-19 and provide necessary medical care. The patient (and/or legal guardian) has also been advised to contact this office for worsening conditions or problems, and seek emergency medical treatment and/or call 911 if deemed necessary. Services were provided through a video synchronous discussion virtually to substitute for in-person clinic visit. Type of encounter was _x_ Doxy __ MyChart ___Facetime    Patient was located at their home. Provider was located at their ___ home or        __x__ office. --SHARAD Riojas - CNP on 9/28/2021 at 7:56 AM    An electronic signature was used to authenticate this note. Hyperlipidemia  This is a chronic problem. The current episode started more than 1 year ago. The problem is controlled. She has no history of chronic renal disease, diabetes, hypothyroidism, liver disease, obesity or nephrotic syndrome. Current antihyperlipidemic treatment includes statins.  The current treatment provides significant improvement of lipids. There are no compliance problems. Past Medical History:   Diagnosis Date    Cancer [facility])     SKIN    Hyperlipidemia      Current Outpatient Medications on File Prior to Visit   Medication Sig Dispense Refill    Handicap Placard MISC by Does not apply route Good for 5 years 1 each 0    albuterol sulfate  (90 Base) MCG/ACT inhaler Inhale 2 puffs into the lungs every 6 hours as needed for Wheezing 1 Inhaler 0    tiZANidine (ZANAFLEX) 4 MG tablet Take 1 tablet by mouth 3 times daily 40 tablet 1    ibuprofen (ADVIL;MOTRIN) 800 MG tablet Take 1 tablet by mouth 3 times daily as needed for Pain. 90 tablet 6     No current facility-administered medications on file prior to visit. Past Surgical History:   Procedure Laterality Date    CATARACT REMOVAL      CHOLECYSTECTOMY      SKIN BIOPSY      R LOWER LEG    TUBAL LIGATION          Family History   Problem Relation Age of Onset    Diabetes Mother      Social History     Socioeconomic History    Marital status:      Spouse name: Not on file    Number of children: Not on file    Years of education: Not on file    Highest education level: Not on file   Occupational History    Not on file   Tobacco Use    Smoking status: Never Smoker    Smokeless tobacco: Never Used   Substance and Sexual Activity    Alcohol use: No    Drug use: No    Sexual activity: Not on file   Other Topics Concern    Not on file   Social History Narrative    Not on file     Social Determinants of Health     Financial Resource Strain:     Difficulty of Paying Living Expenses:    Food Insecurity:     Worried About Running Out of Food in the Last Year:     Ran Out of Food in the Last Year:    Transportation Needs:     Lack of Transportation (Medical):      Lack of Transportation (Non-Medical):    Physical Activity:     Days of Exercise per Week:     Minutes of Exercise per Session:    Stress:     Feeling of Stress :    Social Connections:     Frequency of Communication with Friends and Family:     Frequency of Social Gatherings with Friends and Family:     Attends Scientologist Services:     Active Member of Clubs or Organizations:     Attends Club or Organization Meetings:     Marital Status:    Intimate Partner Violence:     Fear of Current or Ex-Partner:     Emotionally Abused:     Physically Abused:     Sexually Abused: Allergies:  Morphine    Review of Systems   Constitutional: Negative for chills, diaphoresis, fatigue and fever. HENT: Negative. Eyes: Negative. Respiratory: Negative. Cardiovascular: Negative. Gastrointestinal: Negative. Genitourinary: Negative. Musculoskeletal: Positive for arthralgias. Negative for gait problem, joint swelling, neck pain and neck stiffness. Skin: Negative. Neurological: Negative for dizziness, syncope, weakness, light-headedness and headaches. Psychiatric/Behavioral: Negative. Objective: There were no vitals taken for this visit. Physical Exam  Constitutional:       General: She is not in acute distress. Pulmonary:      Effort: No respiratory distress. Neurological:      Mental Status: She is alert and oriented to person, place, and time. Psychiatric:         Mood and Affect: Mood normal.         Behavior: Behavior normal.         Assessment & Plan:       Diagnosis Orders   1. Mixed hyperlipidemia  simvastatin (ZOCOR) 40 MG tablet    CBC Auto Differential    Comprehensive Metabolic Panel    Lipid Panel   2.  Primary osteoarthritis involving multiple joints  meloxicam (MOBIC) 15 MG tablet     Orders Placed This Encounter   Procedures    CBC Auto Differential     Standing Status:   Future     Standing Expiration Date:   9/28/2022    Comprehensive Metabolic Panel     Standing Status:   Future     Standing Expiration Date:   9/28/2022    Lipid Panel     Standing Status:   Future     Standing Expiration Date:   9/28/2022     Order Specific Question:   Is Patient Fasting?/# of Hours     Answer:   yes     Orders Placed This Encounter   Medications    simvastatin (ZOCOR) 40 MG tablet     Sig: Take 1 tablet by mouth every evening     Dispense:  90 tablet     Refill:  3    meloxicam (MOBIC) 15 MG tablet     Sig: Take 1 tablet by mouth daily     Dispense:  90 tablet     Refill:  3     Medications Discontinued During This Encounter   Medication Reason    simvastatin (ZOCOR) 40 MG tablet REORDER    meloxicam (MOBIC) 15 MG tablet REORDER     Return in about 6 months (around 3/28/2022). Reviewed with the patient: currentclinical status, medications, activities and diet. Side effects, adverse effects of the medicationprescribed today, as well as treatment plan/ rationale and result expectations havebeen discussed with the patient who expresses understanding and desires to proceed. Pt instructions reviewed and given to patient.     Close follow up to evaluate treatment resultsand for coordination of care. I have reviewed the patient's medical historyin detail and updated the computerized patient record.     Reji Canela, APRN - CNP

## 2021-11-08 ENCOUNTER — OFFICE VISIT (OUTPATIENT)
Dept: FAMILY MEDICINE CLINIC | Age: 71
End: 2021-11-08
Payer: COMMERCIAL

## 2021-11-08 VITALS
TEMPERATURE: 98.9 F | DIASTOLIC BLOOD PRESSURE: 88 MMHG | RESPIRATION RATE: 16 BRPM | HEIGHT: 60 IN | SYSTOLIC BLOOD PRESSURE: 130 MMHG | OXYGEN SATURATION: 98 % | HEART RATE: 80 BPM | WEIGHT: 192 LBS | BODY MASS INDEX: 37.69 KG/M2

## 2021-11-08 DIAGNOSIS — Z71.89 ADVICE GIVEN ABOUT COVID-19 VIRUS INFECTION: Primary | ICD-10-CM

## 2021-11-08 PROCEDURE — 1036F TOBACCO NON-USER: CPT | Performed by: NURSE PRACTITIONER

## 2021-11-08 PROCEDURE — G8399 PT W/DXA RESULTS DOCUMENT: HCPCS | Performed by: NURSE PRACTITIONER

## 2021-11-08 PROCEDURE — G8484 FLU IMMUNIZE NO ADMIN: HCPCS | Performed by: NURSE PRACTITIONER

## 2021-11-08 PROCEDURE — 4040F PNEUMOC VAC/ADMIN/RCVD: CPT | Performed by: NURSE PRACTITIONER

## 2021-11-08 PROCEDURE — G8427 DOCREV CUR MEDS BY ELIG CLIN: HCPCS | Performed by: NURSE PRACTITIONER

## 2021-11-08 PROCEDURE — G8417 CALC BMI ABV UP PARAM F/U: HCPCS | Performed by: NURSE PRACTITIONER

## 2021-11-08 PROCEDURE — 1090F PRES/ABSN URINE INCON ASSESS: CPT | Performed by: NURSE PRACTITIONER

## 2021-11-08 PROCEDURE — 99213 OFFICE O/P EST LOW 20 MIN: CPT | Performed by: NURSE PRACTITIONER

## 2021-11-08 PROCEDURE — 3017F COLORECTAL CA SCREEN DOC REV: CPT | Performed by: NURSE PRACTITIONER

## 2021-11-08 PROCEDURE — 1123F ACP DISCUSS/DSCN MKR DOCD: CPT | Performed by: NURSE PRACTITIONER

## 2021-11-08 SDOH — ECONOMIC STABILITY: FOOD INSECURITY: WITHIN THE PAST 12 MONTHS, YOU WORRIED THAT YOUR FOOD WOULD RUN OUT BEFORE YOU GOT MONEY TO BUY MORE.: NEVER TRUE

## 2021-11-08 SDOH — ECONOMIC STABILITY: FOOD INSECURITY: WITHIN THE PAST 12 MONTHS, THE FOOD YOU BOUGHT JUST DIDN'T LAST AND YOU DIDN'T HAVE MONEY TO GET MORE.: NEVER TRUE

## 2021-11-08 ASSESSMENT — SOCIAL DETERMINANTS OF HEALTH (SDOH): HOW HARD IS IT FOR YOU TO PAY FOR THE VERY BASICS LIKE FOOD, HOUSING, MEDICAL CARE, AND HEATING?: NOT HARD AT ALL

## 2021-11-08 NOTE — PROGRESS NOTES
Subjective:     Patient ID: Zeinab Sifuentes is a 73 y.o. female who presentstoday for:  Chief Complaint   Patient presents with    Immunizations     Pt. would like get to an opinion on the covid vaccine. Pt. states she got an exemption letter from work and doesn't know if she'd qualify. HPI  Patient here with some questions about covid and covid vaccine    Past Medical History:   Diagnosis Date    Cancer [facility])     SKIN    Hyperlipidemia      Current Outpatient Medications on File Prior to Visit   Medication Sig Dispense Refill    simvastatin (ZOCOR) 40 MG tablet Take 1 tablet by mouth every evening 90 tablet 3    meloxicam (MOBIC) 15 MG tablet Take 1 tablet by mouth daily 90 tablet 3    Handicap Placard MISC by Does not apply route Good for 5 years 1 each 0    albuterol sulfate  (90 Base) MCG/ACT inhaler Inhale 2 puffs into the lungs every 6 hours as needed for Wheezing 1 Inhaler 0    tiZANidine (ZANAFLEX) 4 MG tablet Take 1 tablet by mouth 3 times daily 40 tablet 1    ibuprofen (ADVIL;MOTRIN) 800 MG tablet Take 1 tablet by mouth 3 times daily as needed for Pain. 90 tablet 6     No current facility-administered medications on file prior to visit.      Past Surgical History:   Procedure Laterality Date    CATARACT REMOVAL      CHOLECYSTECTOMY      SKIN BIOPSY      R LOWER LEG    TUBAL LIGATION          Family History   Problem Relation Age of Onset    Diabetes Mother      Social History     Socioeconomic History    Marital status:      Spouse name: Not on file    Number of children: Not on file    Years of education: Not on file    Highest education level: Not on file   Occupational History    Not on file   Tobacco Use    Smoking status: Never Smoker    Smokeless tobacco: Never Used   Substance and Sexual Activity    Alcohol use: No    Drug use: No    Sexual activity: Not on file   Other Topics Concern    Not on file   Social History Narrative    Not on file     Social Determinants of Health     Financial Resource Strain: Low Risk     Difficulty of Paying Living Expenses: Not hard at all   Food Insecurity: No Food Insecurity    Worried About Running Out of Food in the Last Year: Never true    Ashlee of Food in the Last Year: Never true   Transportation Needs:     Lack of Transportation (Medical): Not on file    Lack of Transportation (Non-Medical): Not on file   Physical Activity:     Days of Exercise per Week: Not on file    Minutes of Exercise per Session: Not on file   Stress:     Feeling of Stress : Not on file   Social Connections:     Frequency of Communication with Friends and Family: Not on file    Frequency of Social Gatherings with Friends and Family: Not on file    Attends Buddhism Services: Not on file    Active Member of [employer] or Organizations: Not on file    Attends Club or Organization Meetings: Not on file    Marital Status: Not on file   Intimate Partner Violence:     Fear of Current or Ex-Partner: Not on file    Emotionally Abused: Not on file    Physically Abused: Not on file    Sexually Abused: Not on file   Housing Stability:     Unable to Pay for Housing in the Last Year: Not on file    Number of [place] in the Last Year: Not on file    Unstable Housing in the Last Year: Not on file     Allergies:  Morphine    Review of Systems    Objective:    /88 (Site: Left Upper Arm, Position: Sitting, Cuff Size: Large Adult)   Pulse 80   Temp 98.9 °F (37.2 °C) (Infrared)   Resp 16   Ht 5' (1.524 m)   Wt 192 lb (87.1 kg)   SpO2 98%   Breastfeeding No   BMI 37.50 kg/m²     Physical Exam  Constitutional:       General: She is not in acute distress. Cardiovascular:      Rate and Rhythm: Normal rate. Pulmonary:      Effort: Pulmonary effort is normal.   Neurological:      Mental Status: She is alert and oriented to person, place, and time. Assessment & Plan:       Diagnosis Orders   1.  Advice given about COVID-19 virus infection       I spent greater than 15 minutes in this visit, with more than 50 % of the time devoted to the patient counseling regarding patients concerns, evaluation, prognosis, explanation of diagnosis, risks, and benefits of treatments. No orders of the defined types were placed in this encounter. No orders of the defined types were placed in this encounter. There are no discontinued medications. No follow-ups on file. Reviewed with the patient: currentclinical status, medications, activities and diet. Side effects, adverse effects of the medicationprescribed today, as well as treatment plan/ rationale and result expectations havebeen discussed with the patient who expresses understanding and desires to proceed. Pt instructions reviewed and given to patient.     Close follow up to evaluate treatment resultsand for coordination of care. I have reviewed the patient's medical historyin detail and updated the computerized patient record.     SHARAD Kc - CNP

## 2022-06-20 ENCOUNTER — TELEPHONE (OUTPATIENT)
Dept: GASTROENTEROLOGY | Age: 72
End: 2022-06-20

## 2022-07-15 ENCOUNTER — OFFICE VISIT (OUTPATIENT)
Dept: FAMILY MEDICINE CLINIC | Age: 72
End: 2022-07-15
Payer: COMMERCIAL

## 2022-07-15 VITALS
HEIGHT: 60 IN | WEIGHT: 197.4 LBS | BODY MASS INDEX: 38.76 KG/M2 | SYSTOLIC BLOOD PRESSURE: 146 MMHG | HEART RATE: 93 BPM | OXYGEN SATURATION: 98 % | DIASTOLIC BLOOD PRESSURE: 90 MMHG | TEMPERATURE: 97 F

## 2022-07-15 DIAGNOSIS — Z12.31 SCREENING MAMMOGRAM FOR BREAST CANCER: ICD-10-CM

## 2022-07-15 DIAGNOSIS — R03.0 ELEVATED BLOOD PRESSURE READING: ICD-10-CM

## 2022-07-15 DIAGNOSIS — Z12.11 SCREENING FOR COLON CANCER: ICD-10-CM

## 2022-07-15 DIAGNOSIS — Z00.00 ANNUAL PHYSICAL EXAM: Primary | ICD-10-CM

## 2022-07-15 DIAGNOSIS — E78.2 MIXED HYPERLIPIDEMIA: ICD-10-CM

## 2022-07-15 PROCEDURE — 99397 PER PM REEVAL EST PAT 65+ YR: CPT | Performed by: STUDENT IN AN ORGANIZED HEALTH CARE EDUCATION/TRAINING PROGRAM

## 2022-07-15 RX ORDER — ASPIRIN 325 MG
325 TABLET ORAL DAILY
COMMUNITY

## 2022-07-15 ASSESSMENT — PATIENT HEALTH QUESTIONNAIRE - PHQ9
SUM OF ALL RESPONSES TO PHQ9 QUESTIONS 1 & 2: 0
SUM OF ALL RESPONSES TO PHQ QUESTIONS 1-9: 0
SUM OF ALL RESPONSES TO PHQ QUESTIONS 1-9: 0
2. FEELING DOWN, DEPRESSED OR HOPELESS: 0
SUM OF ALL RESPONSES TO PHQ QUESTIONS 1-9: 0
1. LITTLE INTEREST OR PLEASURE IN DOING THINGS: 0
SUM OF ALL RESPONSES TO PHQ QUESTIONS 1-9: 0

## 2022-07-15 ASSESSMENT — ENCOUNTER SYMPTOMS
COUGH: 0
ABDOMINAL PAIN: 0
WHEEZING: 0
VOMITING: 0
SORE THROAT: 0
RHINORRHEA: 0
SHORTNESS OF BREATH: 0
NAUSEA: 0
DIARRHEA: 0
EYE DISCHARGE: 0

## 2022-07-15 NOTE — PROGRESS NOTES
Subjective  Griselda Flores, 73 y.o. female presents today with:  Chief Complaint   Patient presents with    Annual Exam     States she is here for a physical today. Has results from [address] [employer] she had done with her today to review. Would like to know what kind of a diet she should follow for HTN. HPI    Patient with appointment for annual physical exam.  She is due for mammogram and colonoscopy. She has a history of mixed hyperlipidemia. She is on simvastatin. Patient was noted to have elevated blood pressure reading on recent screening test done through her insurance. She is slightly elevated in the office today. No chest pain, shortness of breath or palpitations. She is not currently on any medications for blood pressure. She does have a blood pressure cuff at home that she has not used. She states she will start doing that. She is interested in doing dietary and exercise changes first.  She has no other concerns at this time. Review of Systems   Constitutional:  Negative for chills, fatigue, fever and unexpected weight change. HENT:  Negative for congestion, rhinorrhea and sore throat. Eyes:  Negative for discharge. Respiratory:  Negative for cough, shortness of breath and wheezing. Cardiovascular:  Negative for chest pain, palpitations and leg swelling. Gastrointestinal:  Negative for abdominal pain, diarrhea, nausea and vomiting. Genitourinary:  Negative for difficulty urinating. Musculoskeletal:  Negative for arthralgias and joint swelling. Skin:  Negative for rash. Neurological:  Negative for weakness, light-headedness, numbness and headaches. Psychiatric/Behavioral:  Negative for confusion and suicidal ideas. The patient is not nervous/anxious.       Past Medical History:   Diagnosis Date    Cancer ([facility] [address]     SKIN    Hyperlipidemia      Past Surgical History:   Procedure Laterality Date    CATARACT REMOVAL      CHOLECYSTECTOMY      SKIN BIOPSY      R LOWER LEG    TUBAL LIGATION       Current Outpatient Medications   Medication Sig Dispense Refill    aspirin 325 MG tablet Take 325 mg by mouth in the morning. simvastatin (ZOCOR) 40 MG tablet Take 1 tablet by mouth every evening 90 tablet 3    meloxicam (MOBIC) 15 MG tablet Take 1 tablet by mouth daily 90 tablet 3    Handicap Placard MISC by Does not apply route Good for 5 years 1 each 0     No current facility-administered medications for this visit. PMH, Surgical Hx, Family Hx, and Social Hx reviewed and updated. Health Maintenance reviewed. Objective    Vitals:    07/15/22 1020 07/15/22 1106   BP: (!) 152/81 (!) 146/90   Pulse: (!) 104 93   Temp: 97 °F (36.1 °C)    SpO2: 98%    Weight: 197 lb 6.4 oz (89.5 kg)    Height: 5' (1.524 m)        Physical Exam  Vitals reviewed. Constitutional:       General: She is not in acute distress. HENT:      Head: Normocephalic and atraumatic. Eyes:      Conjunctiva/sclera: Conjunctivae normal.   Neck:      Thyroid: No thyromegaly or thyroid tenderness. Cardiovascular:      Rate and Rhythm: Normal rate and regular rhythm. Heart sounds: No murmur heard. No friction rub. No gallop. Pulmonary:      Effort: Pulmonary effort is normal.      Breath sounds: Normal breath sounds. No wheezing, rhonchi or rales. Abdominal:      General: Bowel sounds are normal. There is no distension. Palpations: Abdomen is soft. Tenderness: There is no abdominal tenderness. Musculoskeletal:         General: No swelling or deformity. Cervical back: Normal range of motion and neck supple. Right lower leg: No edema. Left lower leg: No edema. Lymphadenopathy:      Cervical: No cervical adenopathy. Skin:     General: Skin is warm and dry. Findings: No rash. Neurological:      General: No focal deficit present. Mental Status: She is alert. Gait: Gait is intact.    Psychiatric:         Mood and Affect: Mood normal.         Behavior: Behavior normal.          Assessment & Plan       1. Annual physical exam  Patient with appointment for annual physical exam.  She does have elevated blood pressure today. She is going to implement dietary changes. Labs were reviewed. Follow-up in 3 months. 2. Elevated blood pressure reading  Patient with elevated blood pressure reading today. We discussed dietary and lifestyle changes. She is going to implement these over the next 3 months. We discussed different ways she can incorporate healthy eating into her diet. She is going to try to do this. If she remains elevated at next visit, would start medication. Patient voiced understanding. All questions answered. Follow-up as scheduled. 3. Mixed hyperlipidemia  Stable, chronic. Continue simvastatin 40 mg daily. 4. Screening mammogram for breast cancer  Mammogram ordered. Call with results when returned  - [facility] DIGITAL SCREEN W OR WO CAD BILATERAL; Future    5. Screening for colon cancer  Colonoscopy ordered. - [facility] Yonatan Bess    Orders Placed This Encounter   Procedures    [facility] DIGITAL SCREEN W OR WO CAD BILATERAL     Standing Status:   Future     Standing Expiration Date:   9/15/2023     Order Specific Question:   Reason for exam:     Answer:   screening mammogram    [facility] Joan Bess     Referral Priority:   Routine     Referral Type:   Eval and Treat     Referral Reason:   Specialty Services Required     Requested Specialty:   Gastroenterology     Number of Visits Requested:   1       No orders of the defined types were placed in this encounter. Medications Discontinued During This Encounter   Medication Reason    albuterol sulfate  (90 Base) MCG/ACT inhaler Therapy completed    ibuprofen (ADVIL;MOTRIN) 800 MG tablet Therapy completed    tiZANidine (ZANAFLEX) 4 MG tablet Therapy completed     Return in about 3 months (around 10/15/2022) for follow up.       Reviewed with the patient: current clinical status,medications, activities and diet. Side effects, adverse effects of themedication prescribed today, as well as treatment plan/ rationale and result expectations have been discussed with the patient who expresses understanding and desires to proceed. Close follow up to evaluate treatment results and for coordination of care. I have reviewed the patient's medical history in detail and updated the computerized patient record. Please note, this report has been partially produced using speech recognition software and may cause  and /or contain errors related to that system including grammar, punctuation and spelling as well as words and phrases that may seem inappropriate. If there are questions or concerns please feel free to contact me to clarify.     Kamila Pal, DO

## 2022-07-19 RX ORDER — SOD SULF/POT CHLORIDE/MAG SULF 1.479 G
1 TABLET ORAL ONCE
Qty: 24 TABLET | Refills: 0 | Status: SHIPPED | OUTPATIENT
Start: 2022-07-19 | End: 2022-07-19

## 2022-08-03 ENCOUNTER — COMMUNITY OUTREACH (OUTPATIENT)
Dept: FAMILY MEDICINE CLINIC | Age: 72
End: 2022-08-03

## 2022-08-03 NOTE — PROGRESS NOTES
Patient's HM shows they are overdue for Colorectal Screening. Care Everywhere and  files searched. No results to attach to order nor HM updated.    Colonoscopy looks to be scheduled for 09-16-22

## 2022-08-04 ENCOUNTER — COMMUNITY OUTREACH (OUTPATIENT)
Dept: FAMILY MEDICINE CLINIC | Age: 72
End: 2022-08-04

## 2022-08-04 NOTE — PROGRESS NOTES
Patient's HM shows they are overdue for Mammogram Screening. [employer] and  files searched. No results to attach to order nor HM updated.

## 2022-09-15 ENCOUNTER — HOSPITAL ENCOUNTER (OUTPATIENT)
Dept: WOMENS IMAGING | Age: 72
Discharge: HOME | End: 2022-09-17
Payer: COMMERCIAL

## 2022-09-15 DIAGNOSIS — Z12.31 SCREENING MAMMOGRAM FOR BREAST CANCER: ICD-10-CM

## 2022-09-15 PROCEDURE — 77067 SCR MAMMO BI INCL CAD: CPT

## 2022-09-16 ENCOUNTER — HOSPITAL ENCOUNTER (OUTPATIENT)
Age: 72
Setting detail: OUTPATIENT SURGERY
Discharge: HOME | End: 2022-09-16
Attending: INTERNAL MEDICINE | Admitting: INTERNAL MEDICINE
Payer: COMMERCIAL

## 2022-09-16 ENCOUNTER — ANESTHESIA (OUTPATIENT)
Dept: ENDOSCOPY | Age: 72
End: 2022-09-16
Payer: COMMERCIAL

## 2022-09-16 ENCOUNTER — OFFICE VISIT (OUTPATIENT)
Dept: GASTROENTEROLOGY | Age: 72
End: 2022-09-16
Payer: COMMERCIAL

## 2022-09-16 ENCOUNTER — ANESTHESIA EVENT (OUTPATIENT)
Dept: ENDOSCOPY | Age: 72
End: 2022-09-16
Payer: COMMERCIAL

## 2022-09-16 VITALS
HEIGHT: 60 IN | OXYGEN SATURATION: 95 % | SYSTOLIC BLOOD PRESSURE: 138 MMHG | TEMPERATURE: 98.3 F | HEART RATE: 69 BPM | RESPIRATION RATE: 18 BRPM | DIASTOLIC BLOOD PRESSURE: 68 MMHG | WEIGHT: 186 LBS | BODY MASS INDEX: 36.52 KG/M2

## 2022-09-16 DIAGNOSIS — K63.89 COLONIC MASS: Primary | ICD-10-CM

## 2022-09-16 DIAGNOSIS — Z12.11 SPECIAL SCREENING FOR MALIGNANT NEOPLASMS, COLON: ICD-10-CM

## 2022-09-16 PROCEDURE — 45380 COLONOSCOPY AND BIOPSY: CPT | Performed by: INTERNAL MEDICINE

## 2022-09-16 PROCEDURE — 3700000001 HC ADD 15 MINUTES (ANESTHESIA): Performed by: INTERNAL MEDICINE

## 2022-09-16 PROCEDURE — 2709999900 HC NON-CHARGEABLE SUPPLY: Performed by: INTERNAL MEDICINE

## 2022-09-16 PROCEDURE — 1123F ACP DISCUSS/DSCN MKR DOCD: CPT | Performed by: NURSE PRACTITIONER

## 2022-09-16 PROCEDURE — 7100000010 HC PHASE II RECOVERY - FIRST 15 MIN: Performed by: INTERNAL MEDICINE

## 2022-09-16 PROCEDURE — 2500000003 HC RX 250 WO HCPCS: Performed by: NURSE ANESTHETIST, CERTIFIED REGISTERED

## 2022-09-16 PROCEDURE — 3609027000 HC COLONOSCOPY: Performed by: INTERNAL MEDICINE

## 2022-09-16 PROCEDURE — 7100000011 HC PHASE II RECOVERY - ADDTL 15 MIN: Performed by: INTERNAL MEDICINE

## 2022-09-16 PROCEDURE — 2580000003 HC RX 258: Performed by: INTERNAL MEDICINE

## 2022-09-16 PROCEDURE — 99213 OFFICE O/P EST LOW 20 MIN: CPT | Performed by: NURSE PRACTITIONER

## 2022-09-16 PROCEDURE — 88341 IMHCHEM/IMCYTCHM EA ADD ANTB: CPT

## 2022-09-16 PROCEDURE — 2580000003 HC RX 258

## 2022-09-16 PROCEDURE — 88305 TISSUE EXAM BY PATHOLOGIST: CPT

## 2022-09-16 PROCEDURE — 3700000000 HC ANESTHESIA ATTENDED CARE: Performed by: INTERNAL MEDICINE

## 2022-09-16 PROCEDURE — G8427 DOCREV CUR MEDS BY ELIG CLIN: HCPCS | Performed by: NURSE PRACTITIONER

## 2022-09-16 PROCEDURE — G8417 CALC BMI ABV UP PARAM F/U: HCPCS | Performed by: NURSE PRACTITIONER

## 2022-09-16 PROCEDURE — 88342 IMHCHEM/IMCYTCHM 1ST ANTB: CPT

## 2022-09-16 PROCEDURE — G8399 PT W/DXA RESULTS DOCUMENT: HCPCS | Performed by: NURSE PRACTITIONER

## 2022-09-16 PROCEDURE — 6360000002 HC RX W HCPCS: Performed by: NURSE ANESTHETIST, CERTIFIED REGISTERED

## 2022-09-16 PROCEDURE — 1036F TOBACCO NON-USER: CPT | Performed by: NURSE PRACTITIONER

## 2022-09-16 PROCEDURE — 3017F COLORECTAL CA SCREEN DOC REV: CPT | Performed by: NURSE PRACTITIONER

## 2022-09-16 PROCEDURE — 1090F PRES/ABSN URINE INCON ASSESS: CPT | Performed by: NURSE PRACTITIONER

## 2022-09-16 RX ORDER — LIDOCAINE HYDROCHLORIDE 20 MG/ML
INJECTION, SOLUTION EPIDURAL; INFILTRATION; INTRACAUDAL; PERINEURAL PRN
Status: DISCONTINUED | OUTPATIENT
Start: 2022-09-16 | End: 2022-09-16 | Stop reason: SDUPTHER

## 2022-09-16 RX ORDER — PROPOFOL 10 MG/ML
INJECTION, EMULSION INTRAVENOUS PRN
Status: DISCONTINUED | OUTPATIENT
Start: 2022-09-16 | End: 2022-09-16 | Stop reason: SDUPTHER

## 2022-09-16 RX ORDER — SODIUM CHLORIDE 9 MG/ML
INJECTION, SOLUTION INTRAVENOUS
Status: COMPLETED
Start: 2022-09-16 | End: 2022-09-16

## 2022-09-16 RX ORDER — SODIUM CHLORIDE 9 MG/ML
INJECTION, SOLUTION INTRAVENOUS
Status: DISCONTINUED
Start: 2022-09-16 | End: 2022-09-16 | Stop reason: HOSPADM

## 2022-09-16 RX ORDER — MAGNESIUM HYDROXIDE 1200 MG/15ML
LIQUID ORAL PRN
Status: DISCONTINUED | OUTPATIENT
Start: 2022-09-16 | End: 2022-09-16 | Stop reason: ALTCHOICE

## 2022-09-16 RX ORDER — SODIUM CHLORIDE 9 MG/ML
INJECTION, SOLUTION INTRAVENOUS CONTINUOUS
Status: DISCONTINUED | OUTPATIENT
Start: 2022-09-16 | End: 2022-09-16 | Stop reason: HOSPADM

## 2022-09-16 RX ADMIN — PROPOFOL 50 MG: 10 INJECTION, EMULSION INTRAVENOUS at 08:11

## 2022-09-16 RX ADMIN — SODIUM CHLORIDE: 9 INJECTION, SOLUTION INTRAVENOUS at 07:38

## 2022-09-16 RX ADMIN — PROPOFOL 130 MG: 10 INJECTION, EMULSION INTRAVENOUS at 08:02

## 2022-09-16 RX ADMIN — LIDOCAINE HYDROCHLORIDE 50 MG: 20 INJECTION, SOLUTION EPIDURAL; INFILTRATION; INTRACAUDAL; PERINEURAL at 08:02

## 2022-09-16 RX ADMIN — PROPOFOL 50 MG: 10 INJECTION, EMULSION INTRAVENOUS at 08:06

## 2022-09-16 ASSESSMENT — ENCOUNTER SYMPTOMS
SHORTNESS OF BREATH: 0
CONSTIPATION: 1
WHEEZING: 0
ANAL BLEEDING: 1
VOMITING: 0
EYE PAIN: 0
PHOTOPHOBIA: 0
CHEST TIGHTNESS: 0
DIARRHEA: 0
ABDOMINAL PAIN: 0
NAUSEA: 0
BLOOD IN STOOL: 0
RECTAL PAIN: 0
ABDOMINAL DISTENTION: 0
TROUBLE SWALLOWING: 0
EYE REDNESS: 0
VOICE CHANGE: 0
COLOR CHANGE: 0

## 2022-09-16 ASSESSMENT — PAIN - FUNCTIONAL ASSESSMENT: PAIN_FUNCTIONAL_ASSESSMENT: NONE - DENIES PAIN

## 2022-09-16 NOTE — ANESTHESIA PRE PROCEDURE
Department of Anesthesiology  Preprocedure Note       Name:  Cristhian Ojeda   Age:  81 y.o.  :  1950                                          MRN:  [id number]         Date:  2022      Surgeon: Opal Montero):  Darrick Carter MD    Procedure: Procedure(s):  COLORECTAL CANCER SCREENING, NOT HIGH RISK    Medications prior to admission:   Prior to Admission medications    Medication Sig Start Date End Date Taking? Authorizing Provider   aspirin 325 MG tablet Take 325 mg by mouth in the morning. Historical Provider, MD   simvastatin (ZOCOR) 40 MG tablet Take 1 tablet by mouth every evening 21   Waldo Duverney, APRN - CNP   meloxicam [facility]) 15 MG tablet Take 1 tablet by mouth daily 21   Waldo Duverney, APRN - CNP   Handicap Placard MISC by Does not apply route Good for 5 years 19   Rosanna Garcia MD       Current medications:    Current Facility-Administered Medications   Medication Dose Route Frequency Provider Last Rate Last Admin    sodium chloride 0.9 % infusion             sodium chloride 0.9 % infusion             0.9 % sodium chloride infusion   IntraVENous Continuous Darrick Carter MD        sterile water for irrigation    PRN Darrick Carter MD   1,000 mL at 22 0735       Allergies: Allergies   Allergen Reactions    Morphine Itching       Problem List:    Patient Active Problem List   Diagnosis Code    [facility] C[facility]    Hyperlipidemia E78.5    Elevated blood pressure reading R03.0       Past Medical History:        Diagnosis Date    Arthritis     [facility]     SKIN    Hyperlipidemia        Past Surgical History:        Procedure Laterality Date    CATARACT REMOVAL      CHOLECYSTECTOMY      SKIN BIOPSY      R LOWER LEG    TUBAL LIGATION         Social History:    Social History     Tobacco Use    Smoking status: Never    Smokeless tobacco: Never   Substance Use Topics    Alcohol use:  No                                Counseling given: Not Answered      Vital Signs (Current):   Vitals:    09/16/22 0733   BP: 138/67   Pulse: 82   Resp: 18   Temp: 36.8 °C (98.3 °F)   TempSrc: Temporal   SpO2: 95%   Weight: 186 lb (84.4 kg)   Height: 5' (1.524 m)                                              BP Readings from Last 3 Encounters:   09/16/22 138/67   07/15/22 (!) 146/90   11/08/21 130/88       NPO Status: Time of last liquid consumption: 0030                        Time of last solid consumption: 1600                        Date of last liquid consumption: 09/16/22                        Date of last solid food consumption: 09/14/22    BMI:   Wt Readings from Last 3 Encounters:   09/16/22 186 lb (84.4 kg)   07/15/22 197 lb 6.4 oz (89.5 kg)   11/08/21 192 lb (87.1 kg)     Body mass index is 36.33 kg/m². CBC:   Lab Results   Component Value Date/Time    WBC 6.8 05/10/2017 05:57 AM    RBC 4.88 05/10/2017 05:57 AM    HGB 14.4 05/10/2017 05:57 AM    HCT 42.5 05/10/2017 05:57 AM    MCV 87.1 05/10/2017 05:57 AM    RDW 14.0 05/10/2017 05:57 AM     05/10/2017 05:57 AM       CMP:   Lab Results   Component Value Date/Time     05/10/2017 05:57 AM    K 4.1 05/10/2017 05:57 AM     05/10/2017 05:57 AM    CO2 23 05/10/2017 05:57 AM    BUN 16 05/10/2017 05:57 AM    CREATININE 0.68 05/10/2017 05:57 AM    GFRAA >60.0 05/10/2017 05:57 AM    LABGLOM >60.0 05/10/2017 05:57 AM    GLUCOSE 98 05/10/2017 05:57 AM    PROT 6.3 05/10/2017 05:57 AM    CALCIUM 9.1 05/10/2017 05:57 AM    BILITOT 0.4 05/10/2017 05:57 AM    ALKPHOS 124 05/10/2017 05:57 AM    AST 19 05/10/2017 05:57 AM    ALT 12 05/10/2017 05:57 AM       POC Tests: No results for input(s): POCGLU, POCNA, POCK, POCCL, POCBUN, POCHEMO, POCHCT in the last 72 hours.     Coags:   Lab Results   Component Value Date/Time    PROTIME 10.1 05/09/2017 10:30 AM    INR 0.9 05/09/2017 10:30 AM    APTT 26.1 05/09/2017 10:30 AM       HCG (If Applicable): No results found for: PREGTESTUR, PREGSERUM, HCG, HCGQUANT     ABGs:

## 2022-09-16 NOTE — PROGRESS NOTES
Subjective:      Patient ID: Balwinder Rojas is a 81 y.o. female who presents today for:  Chief Complaint   Patient presents with    Follow-up       HPI  Patient came in as follow-up to colonoscopy, had out patient open access colonoscopy today notable for sigmoid mass. Likely Malignant tumor in the recto-sigmoid colon and at 15 cm proximal to the anus extending up to 18 cm. Biopsied. The mass is  infiltrative, ulcerated and non-obstructing . The mass was non-circumferential. The mass measured 3 cm (in length). The mass was friable and spontaneously oozing with blood noted on scope introduction Biopsies were taken with a cold forceps for histology. An 18 mm polyp was found at 10-12 cm form the anal verge not removed.(patient on high dose ASA)- blood noted on scope introduction related to the mass The polyp was semi-pedunculated. She reports recent unquantifiable wt loss, no abd pain, reports constipation and rectal bleeding.      Past Medical History:   Diagnosis Date    Arthritis     Cancer ([address]     SKIN    Hyperlipidemia      Past Surgical History:   Procedure Laterality Date    CATARACT REMOVAL      CHOLECYSTECTOMY      COLONOSCOPY N/A 9/16/2022    COLORECTAL CANCER SCREENING, NOT HIGH RISK performed by Bhargavi Ladd MD at [address]       Social History     Socioeconomic History    Marital status:      Spouse name: Not on file    Number of children: Not on file    Years of education: Not on file    Highest education level: Not on file   Occupational History    Not on file   Tobacco Use    Smoking status: Never    Smokeless tobacco: Never   Vaping Use    Vaping Use: Never used   Substance and Sexual Activity    Alcohol use: No    Drug use: No    Sexual activity: Not on file   Other Topics Concern    Not on file   Social History Narrative    Not on file     Social Determinants of Health     Financial Resource Strain: Low Risk     Difficulty of Paying Living Expenses: Not hard at all   Food Insecurity: No Food Insecurity    Worried About Running Out of Food in the Last Year: Never true    Ran Out of Food in the Last Year: Never true   Transportation Needs: Not on file   Physical Activity: Not on file   Stress: Not on file   Social Connections: Not on file   Intimate Partner Violence: Not on file   Housing Stability: Not on file     Family History   Problem Relation Age of Onset    Diabetes Mother     Breast Cancer Maternal Aunt     Colon Cancer Neg Hx      Allergies   Allergen Reactions    Morphine Itching         Review of Systems   Constitutional:  Positive for unexpected weight change. Negative for appetite change, chills and fever. HENT:  Negative for nosebleeds, tinnitus, trouble swallowing and voice change. Eyes:  Negative for photophobia, pain and redness. Respiratory:  Negative for chest tightness, shortness of breath and wheezing. Cardiovascular:  Negative for chest pain, palpitations and leg swelling. Gastrointestinal:  Positive for anal bleeding and constipation. Negative for abdominal distention, abdominal pain, blood in stool, diarrhea, nausea, rectal pain and vomiting. Endocrine: Negative for polydipsia, polyphagia and polyuria. Genitourinary:  Negative for difficulty urinating and hematuria. Skin:  Negative for color change, pallor and rash. Neurological:  Negative for dizziness, speech difficulty and headaches. Psychiatric/Behavioral:  Negative for confusion and suicidal ideas. Objective: There were no vitals taken for this visit. Physical Exam  Vitals reviewed. Constitutional:       General: She is not in acute distress. Appearance: Normal appearance. She is well-developed and well-groomed. HENT:      Head: Normocephalic and atraumatic. Nose: Nose normal.   Eyes:      General: No scleral icterus. Extraocular Movements: Extraocular movements intact.       Conjunctiva/sclera: Conjunctivae normal.      Pupils: Pupils are equal, round, and reactive to light. Cardiovascular:      Rate and Rhythm: Normal rate and regular rhythm. Pulses: Normal pulses. Heart sounds: Normal heart sounds. Pulmonary:      Effort: Pulmonary effort is normal. No respiratory distress. Breath sounds: Normal breath sounds. No wheezing or rales. Abdominal:      General: Abdomen is flat. Bowel sounds are normal. There is no distension. Palpations: Abdomen is soft. There is no hepatomegaly, splenomegaly or mass. Tenderness: There is no abdominal tenderness. There is no guarding or rebound. Musculoskeletal:         General: No tenderness or deformity. Normal range of motion. Cervical back: Neck supple. Right lower leg: No edema. Left lower leg: No edema. Skin:     General: Skin is warm and dry. Capillary Refill: Capillary refill takes less than 2 seconds. Coloration: Skin is not jaundiced. Findings: No erythema or rash. Neurological:      General: No focal deficit present. Mental Status: She is alert and oriented to person, place, and time.    Psychiatric:         Mood and Affect: Mood normal.         Behavior: Behavior normal.       Laboratory, Pathology, Radiology reviewed in detail with relevantimportant investigations summarized below:  Lab Results   Component Value Date/Time    WBC 6.8 05/10/2017 05:57 AM    WBC 7.5 05/09/2017 10:30 AM    WBC 9.2 10/28/2014 05:58 AM    WBC 6.7 10/27/2014 09:24 AM    WBC 10.9 10/26/2014 07:19 PM    HGB 14.4 05/10/2017 05:57 AM    HGB 15.1 05/09/2017 10:30 AM    HGB 13.9 10/28/2014 05:58 AM    HGB 14.5 10/27/2014 09:24 AM    HGB 14.6 10/26/2014 07:19 PM    HCT 42.5 05/10/2017 05:57 AM    HCT 44.8 05/09/2017 10:30 AM    HCT 42.1 10/28/2014 05:58 AM    HCT 42.8 10/27/2014 09:24 AM    HCT 44.8 10/26/2014 07:19 PM    MCV 87.1 05/10/2017 05:57 AM    MCV 86.6 05/09/2017 10:30 AM    MCV 91.1 10/28/2014 05:58 AM    MCV 90.2 10/27/2014 09:24 AM    MCV 90.1 10/26/2014 07:19 PM     05/10/2017 05:57 AM     05/09/2017 10:30 AM     10/28/2014 05:58 AM     10/27/2014 09:24 AM     10/26/2014 07:19 PM    .  Lab Results   Component Value Date/Time    ALT 12 05/10/2017 05:57 AM    ALT 14 05/09/2017 10:30 AM    ALT 83 10/27/2014 09:24 AM    AST 19 05/10/2017 05:57 AM    AST 19 05/09/2017 10:30 AM    AST 88 10/27/2014 09:24 AM    ALKPHOS 124 05/10/2017 05:57 AM    ALKPHOS 136 05/09/2017 10:30 AM    ALKPHOS 121 10/27/2014 09:24 AM    BILITOT 0.4 05/10/2017 05:57 AM    BILITOT 0.4 05/09/2017 10:30 AM    BILITOT 0.6 10/27/2014 09:24 AM       No results found. No results found for: IRON, TIBC, FERRITIN  Lab Results   Component Value Date/Time    INR 0.9 05/09/2017 10:30 AM    INR 0.9 10/26/2014 07:19 PM     No components found for: ACUTEHEPATITISSCREEN  No components found for: CELIACPANEL  No components found for: STOOLCULTURE, C.DIFF, STOOLOVAPARASITE, STOOLLEUCOCYTE        Assessment:       Diagnosis Orders   1.  Colonic mass  CT ABDOMEN PELVIS W IV CONTRAST Additional Contrast? None    CT CHEST W CONTRAST    CBC    Comprehensive Metabolic Panel    Protime-INR    CEA    Cintia Valdez MD, Colorectal Surgery, Evelia Vazquez MD, Oncology, Kiersten Damon            Plan:      Orders Placed This Encounter   Procedures    CT ABDOMEN PELVIS W IV CONTRAST Additional Contrast? None     Standing Status:   Future     Standing Expiration Date:   9/16/2023     Order Specific Question:   Additional Contrast?     Answer:   None     Order Specific Question:   STAT Creatinine as needed:     Answer:   No     Order Specific Question:   Reason for exam:     Answer:   colon mass evaluate mets    CT CHEST W CONTRAST     Standing Status:   Future     Standing Expiration Date:   9/16/2023     Order Specific Question:   STAT Creatinine as needed:     Answer:   No     Order Specific Question:   Reason for exam:     Answer: colon mass evaluate mets    CBC     Standing Status:   Future     Standing Expiration Date:   9/16/2023    Comprehensive Metabolic Panel     Standing Status:   Future     Standing Expiration Date:   9/16/2023    Protime-INR     Standing Status:   Future     Standing Expiration Date:   9/16/2023     Order Specific Question:   Daily Coumadin Dose? Answer:   none    CEA     Standing Status:   Future     Standing Expiration Date:   9/16/2023    Blaine Sauceda MD, Colorectal Surgery, [place]     Referral Priority:   Routine     Referral Type:   Eval and Treat     Referral Reason:   Specialty Services Required     Referred to Provider:   Venita Scott MD     Requested Specialty:   Colon and Rectal Surgery     Number of Visits Requested:   1    MIGUE - Leopold Crome, MD, Oncology, [place]     Referral Priority:   Routine     Referral Type:   Eval and Treat     Referral Reason:   Specialty Services Required     Referred to Provider:   Sneha Francisco MD     Requested Specialty:   Hematology and Oncology     Number of Visits Requested:   1     No orders of the defined types were placed in this encounter. Sigmoid mass  Colonoscopy noted friable sigmoid mass at 15 cm highly suggestive of malignancy, no recent blood work or imaging. Reports associated unquantifiable weight loss, rectal bleeding, and constipation.  -Referral to Dr. Elias Ramirez  -Referral to hematology oncology  -Obtain blood work, CBC, CMP, INR, tumor markers  -CT of the abdomen pelvis and chest  2. Associated medical conditions include but are not limited to. .  Chronic pain, hyperlipidemia      Return in about 8 weeks (around 11/11/2022), or if symptoms worsen or fail to improve.       Devendra Argueta, APRN - CNP

## 2022-09-16 NOTE — ANESTHESIA POSTPROCEDURE EVALUATION
Department of Anesthesiology  Postprocedure Note    Patient: Karen Thompson  MRN: [id number]  YOB: 1950  Date of evaluation: 9/16/2022      Procedure Summary     Date: 09/16/22 Room / Location: [facility] OR  / [facility]    Anesthesia Start: 3870 Anesthesia Stop: 8650    Procedure: COLORECTAL CANCER SCREENING, NOT HIGH RISK Diagnosis:       Special screening for malignant neoplasms, colon      (Screening)    Surgeons: Darlene Aguilar MD Responsible Provider: Madison Hatchet, APRN - CRNA    Anesthesia Type: MAC ASA Status: 2          Anesthesia Type: No value filed.     Jessi Phase I:      Jessi Phase II:        Anesthesia Post Evaluation    Patient location during evaluation: PACU  Level of consciousness: awake  Pain score: 0  Airway patency: patent  Nausea & Vomiting: no vomiting and no nausea  Complications: no  Cardiovascular status: hemodynamically stable  Respiratory status: acceptable  Hydration status: stable

## 2022-09-16 NOTE — H&P
Patient Name: Saloni Morley  : 1950  MRN: [id number]  DATE: 22      ENDOSCOPY  History and Physical    Procedure:    [] Diagnostic Colonoscopy       [x] Screening Colonoscopy  [] EGD      [] ERCP      [] EUS       [] Other    [x] Previous office notes/History and Physical reviewed from the patients chart. Please see EMR for further details of HPI. I have examined the patient's status immediately prior to the procedure and:      Indications/HPI:    []Abdominal Pain   []Cancer- GI/Lung  []Fhx of colon CA/polyps  []History of Polyps   []Hills   []Melena  []Abnormal Imaging   []Dysphagia    []Persistent Pneumonia  []Anemia   []Food Impaction  []History of Polyps  []GI Bleed   []Pulmonary nodule/Mass  []Change in bowel habits  []Heartburn/Reflux  []Rectal Bleed (BRBPR)  []Chest Pain - Non Cardiac  []Heme (+) Stool  []Ulcers  []Constipation   []Hemoptysis   []Varices  []Diarrhea   []Hypoxemia  []Nausea/Vomiting   [x]Screening   []Crohns/Colitis  []Other:    Anesthesia:   [x] MAC [] Moderate Sedation   [] General   [] None     ROS: 12 pt Review of Symptoms was negative unless mentioned above    Medications:   Prior to Admission medications    Medication Sig Start Date End Date Taking? Authorizing Provider   aspirin 325 MG tablet Take 325 mg by mouth in the morning. Historical Provider, MD   simvastatin (ZOCOR) 40 MG tablet Take 1 tablet by mouth every evening 21   SHARAD Hdz CNP   meloxicam [facility]) 15 MG tablet Take 1 tablet by mouth daily 21   SHARAD Hdz CNP   Handicap Placard MISC by Does not apply route Good for 5 years 19   Shaista Gibson MD       Allergies:    Allergies   Allergen Reactions    Morphine Itching        History of allergic reaction to anesthesia:  No    Past Medical History:  Past Medical History:   Diagnosis Date    Cancer ([address]     SKIN    Hyperlipidemia        Past Surgical History:  Past Surgical History:   Procedure Laterality Date CATARACT REMOVAL      CHOLECYSTECTOMY      SKIN BIOPSY      R LOWER LEG    TUBAL LIGATION         Social History:  Social History     Tobacco Use    Smoking status: Never    Smokeless tobacco: Never   Substance Use Topics    Alcohol use: No    Drug use: No       Vital Signs: There were no vitals filed for this visit. Physical Exam:  Cardiac:  [x]WNL  []Comments:  Pulmonary:  [x]WNL   []Comments:   Neuro/Mental Status:  [x]WNL  []Comments:  Abdominal:  [x]WNL    []Comments:  Other:   []WNL  []Comments:    Informed Consent:  The risks and benefits of the procedure have been discussed with either the patient or if they cannot consent, their representative. Assessment:  Patient examined and appropriate for planned sedation and procedure. Plan:  Proceed with planned sedation and procedure as above.     Darlene Aguilar MD  7:20 AM

## 2022-09-17 LAB
ALBUMIN SERPL-MCNC: 4.2 G/DL (ref 3.5–4.6)
ALP BLD-CCNC: 129 U/L (ref 40–130)
ALT SERPL-CCNC: 16 U/L (ref 0–33)
ANION GAP SERPL CALCULATED.3IONS-SCNC: 11 MEQ/L (ref 9–15)
AST SERPL-CCNC: 27 U/L (ref 0–35)
BILIRUB SERPL-MCNC: 0.4 MG/DL (ref 0.2–0.7)
BUN BLDV-MCNC: 14 MG/DL (ref 8–23)
CALCIUM SERPL-MCNC: 9.2 MG/DL (ref 8.5–9.9)
CARCINOEMBRYONIC ANTIGEN: 1.1 NG/ML
CHLORIDE BLD-SCNC: 105 MEQ/L (ref 95–107)
CO2: 27 MEQ/L (ref 20–31)
CREAT SERPL-MCNC: 0.77 MG/DL (ref 0.5–0.9)
GFR AFRICAN AMERICAN: >60
GFR NON-AFRICAN AMERICAN: >60
GLOBULIN: 2.8 G/DL (ref 2.3–3.5)
GLUCOSE BLD-MCNC: 94 MG/DL (ref 70–99)
HCT VFR BLD CALC: 43.2 % (ref 37–47)
HEMOGLOBIN: 14.2 G/DL (ref 12–16)
INR BLD: 1
MCH RBC QN AUTO: 28.9 PG (ref 27–31.3)
MCHC RBC AUTO-ENTMCNC: 32.9 % (ref 33–37)
MCV RBC AUTO: 87.6 FL (ref 82–100)
PDW BLD-RTO: 14.5 % (ref 11.5–14.5)
PLATELET # BLD: 302 K/UL (ref 130–400)
POTASSIUM SERPL-SCNC: 3.3 MEQ/L (ref 3.4–4.9)
PROTHROMBIN TIME: 12.9 SEC (ref 12.3–14.9)
RBC # BLD: 4.92 M/UL (ref 4.2–5.4)
SODIUM BLD-SCNC: 143 MEQ/L (ref 135–144)
TOTAL PROTEIN: 7 G/DL (ref 6.3–8)
WBC # BLD: 7.5 K/UL (ref 4.8–10.8)

## 2022-09-20 ENCOUNTER — OFFICE VISIT (OUTPATIENT)
Dept: SURGERY | Age: 72
End: 2022-09-20
Payer: COMMERCIAL

## 2022-09-20 VITALS
HEIGHT: 60 IN | BODY MASS INDEX: 36.52 KG/M2 | WEIGHT: 186 LBS | TEMPERATURE: 97.5 F | OXYGEN SATURATION: 98 % | HEART RATE: 75 BPM

## 2022-09-20 DIAGNOSIS — C19 RECTOSIGMOID CANCER (HCC): Primary | ICD-10-CM

## 2022-09-20 DIAGNOSIS — K62.1 RECTAL POLYP: ICD-10-CM

## 2022-09-20 PROCEDURE — G8417 CALC BMI ABV UP PARAM F/U: HCPCS | Performed by: COLON & RECTAL SURGERY

## 2022-09-20 PROCEDURE — 1036F TOBACCO NON-USER: CPT | Performed by: COLON & RECTAL SURGERY

## 2022-09-20 PROCEDURE — 1090F PRES/ABSN URINE INCON ASSESS: CPT | Performed by: COLON & RECTAL SURGERY

## 2022-09-20 PROCEDURE — 99204 OFFICE O/P NEW MOD 45 MIN: CPT | Performed by: COLON & RECTAL SURGERY

## 2022-09-20 PROCEDURE — 1123F ACP DISCUSS/DSCN MKR DOCD: CPT | Performed by: COLON & RECTAL SURGERY

## 2022-09-20 PROCEDURE — 3017F COLORECTAL CA SCREEN DOC REV: CPT | Performed by: COLON & RECTAL SURGERY

## 2022-09-20 PROCEDURE — G8399 PT W/DXA RESULTS DOCUMENT: HCPCS | Performed by: COLON & RECTAL SURGERY

## 2022-09-20 PROCEDURE — G8427 DOCREV CUR MEDS BY ELIG CLIN: HCPCS | Performed by: COLON & RECTAL SURGERY

## 2022-09-20 ASSESSMENT — ENCOUNTER SYMPTOMS
BLOOD IN STOOL: 1
CONSTIPATION: 0
DIARRHEA: 0
VOMITING: 0
ABDOMINAL PAIN: 0
COLOR CHANGE: 0
SHORTNESS OF BREATH: 0
CHEST TIGHTNESS: 0

## 2022-09-20 NOTE — PROGRESS NOTES
Subjective:      Patient ID: Romi Boles is a 81 y.o. female who presents for:  Chief Complaint   Patient presents with    New Patient       This is a 74-year-old female who recently had a screening colonoscopy. 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 There was a polyp in her mid to distal rectum which was not removed at the time of colonoscopy. She has had a CEA which is normal.    She has a CAT scan of the abdomen and chest scheduled for Thursday. Patient complained of some rectal bleeding intermittently over the past few months. I reviewed her colonoscopy report as well as the pathology reports.       Past Medical History:   Diagnosis Date    Arthritis     Cancer ([address]     SKIN    Hyperlipidemia      Past Surgical History:   Procedure Laterality Date    CATARACT REMOVAL      CHOLECYSTECTOMY      COLONOSCOPY N/A 9/16/2022    COLORECTAL CANCER SCREENING, NOT HIGH RISK performed by Sunitha Olsen MD at [address]       Social History     Socioeconomic History    Marital status:      Spouse name: Not on file    Number of children: Not on file    Years of education: Not on file    Highest education level: Not on file   Occupational History    Not on file   Tobacco Use    Smoking status: Never    Smokeless tobacco: Never   Vaping Use    Vaping Use: Never used   Substance and Sexual Activity    Alcohol use: No    Drug use: No    Sexual activity: Not on file   Other Topics Concern    Not on file   Social History Narrative    Not on file     Social Determinants of Health     Financial Resource Strain: Low Risk     Difficulty of Paying Living Expenses: Not hard at all   Food Insecurity: No Food Insecurity    Worried About Running Out of Food in the Last Year: Never true    Ran Out of Food in the Last Year: Never true   Transportation Needs: Not on file   Physical Activity: Not on file   Stress: Not on file Social Connections: Not on file   Intimate Partner Violence: Not on file   Housing Stability: Not on file     Family History   Problem Relation Age of Onset    Diabetes Mother     Breast Cancer Maternal Aunt     Colon Cancer Neg Hx      Allergies:  Morphine    Review of Systems   Constitutional:  Negative for activity change, appetite change, fatigue and fever. HENT:  Negative for congestion. Respiratory:  Negative for chest tightness and shortness of breath. Cardiovascular:  Negative for chest pain and palpitations. Gastrointestinal:  Positive for blood in stool. Negative for abdominal pain, constipation, diarrhea and vomiting. Genitourinary:  Negative for difficulty urinating. Musculoskeletal:  Negative for arthralgias. Skin:  Negative for color change. Neurological:  Negative for dizziness and headaches. Hematological:  Does not bruise/bleed easily. Psychiatric/Behavioral:  Negative for agitation. Objective:    Pulse 75   Temp 97.5 °F (36.4 °C) (Temporal)   Ht 5' (1.524 m)   Wt 186 lb (84.4 kg)   SpO2 98%   BMI 36.33 kg/m²     Physical Exam  Constitutional:       Appearance: She is well-developed. HENT:      Head: Normocephalic and atraumatic. Eyes:      Pupils: Pupils are equal, round, and reactive to light. Cardiovascular:      Rate and Rhythm: Normal rate and regular rhythm. Heart sounds: Normal heart sounds. Pulmonary:      Effort: Pulmonary effort is normal. No respiratory distress. Breath sounds: Normal breath sounds. No wheezing. Abdominal:      General: There is no distension. Palpations: There is no mass. Tenderness: There is no abdominal tenderness. There is no guarding or rebound. Hernia: No hernia is present. Comments: Previous laparoscopic cholecystectomy incisions healed. Abdomen nondistended. Musculoskeletal:         General: Normal range of motion. Cervical back: Normal range of motion and neck supple.    Skin: General: Skin is warm and dry. Coloration: Skin is not pale. Findings: No erythema or rash. Neurological:      Mental Status: She is alert and oriented to person, place, and time. Psychiatric:         Behavior: Behavior normal.         Judgment: Judgment normal.            Assessment/Plan:          Diagnosis Orders   1. Rectosigmoid cancer ([facility] [address]        2. Rectal polyp          I discussed reviewing her CT scan of the chest and abdomen once its performed. Important things to look for include potential metastatic disease. If none evident, can proceed with surgical treatment. I do need to remove that mid to distal rectal polyp prior to proceeding with definitive surgery. I have scheduled flexible sigmoidoscopy and snare polypectomy for Monday. Following completion of that polypectomy, risks and benefits of low anterior resection described. Risks of the surgery including infection, bleeding, anastomotic leak reoperation along with other complications such as arrhythmia heart attack stroke and death described. Perioperative course outlined. She is well-informed regarding risks. She wishes to proceed with all aspects of care discussed above. Proceed Monday with snare polypectomy in anticipation of low anterior resection based on imaging to be performed later this week. All questions answered. Time spent with patient including reviewing all pertinent data was 50 minutes. Further discussions will be included next week following completion of imaging. Please note this report has beenpartially produced using speech recognition software and may cause contain errors related to that system including grammar, punctuation and spelling as well as words and phrases that may seem inappropriate.  If there arequestions or concerns please feel free to contact me to clarify

## 2022-09-21 ENCOUNTER — TELEPHONE (OUTPATIENT)
Dept: SURGERY | Age: 72
End: 2022-09-21

## 2022-09-21 NOTE — TELEPHONE ENCOUNTER
Called [facility] and spoke with Guero Benson.     No Prior Auth is required for this surgery on 9/26/2022.     Reference #    [id number]

## 2022-09-22 ENCOUNTER — HOSPITAL ENCOUNTER (OUTPATIENT)
Dept: CT IMAGING | Age: 72
Discharge: HOME | End: 2022-09-24
Payer: COMMERCIAL

## 2022-09-22 DIAGNOSIS — K63.89 COLONIC MASS: ICD-10-CM

## 2022-09-22 PROCEDURE — 6360000004 HC RX CONTRAST MEDICATION: Performed by: NURSE PRACTITIONER

## 2022-09-22 PROCEDURE — A4641 RADIOPHARM DX AGENT NOC: HCPCS | Performed by: NURSE PRACTITIONER

## 2022-09-22 PROCEDURE — 74177 CT ABD & PELVIS W/CONTRAST: CPT

## 2022-09-22 PROCEDURE — 71260 CT THORAX DX C+: CPT

## 2022-09-22 RX ADMIN — BARIUM SULFATE 450 ML: 20 SUSPENSION ORAL at 15:08

## 2022-09-22 RX ADMIN — IOPAMIDOL 75 ML: 612 INJECTION, SOLUTION INTRAVENOUS at 15:08

## 2022-09-26 ENCOUNTER — ANESTHESIA (OUTPATIENT)
Dept: OPERATING ROOM | Age: 72
End: 2022-09-26
Payer: COMMERCIAL

## 2022-09-26 ENCOUNTER — HOSPITAL ENCOUNTER (OUTPATIENT)
Age: 72
Setting detail: OUTPATIENT SURGERY
Discharge: HOME | End: 2022-09-26
Attending: COLON & RECTAL SURGERY | Admitting: COLON & RECTAL SURGERY
Payer: COMMERCIAL

## 2022-09-26 ENCOUNTER — ANESTHESIA EVENT (OUTPATIENT)
Dept: OPERATING ROOM | Age: 72
End: 2022-09-26
Payer: COMMERCIAL

## 2022-09-26 VITALS
SYSTOLIC BLOOD PRESSURE: 147 MMHG | WEIGHT: 186 LBS | TEMPERATURE: 97.6 F | DIASTOLIC BLOOD PRESSURE: 67 MMHG | BODY MASS INDEX: 36.52 KG/M2 | RESPIRATION RATE: 17 BRPM | OXYGEN SATURATION: 96 % | HEART RATE: 71 BPM | HEIGHT: 60 IN

## 2022-09-26 DIAGNOSIS — K62.1 RECTAL POLYP: ICD-10-CM

## 2022-09-26 PROCEDURE — 6370000000 HC RX 637 (ALT 250 FOR IP): Performed by: COLON & RECTAL SURGERY

## 2022-09-26 PROCEDURE — 2580000003 HC RX 258: Performed by: ANESTHESIOLOGY

## 2022-09-26 PROCEDURE — 3700000000 HC ANESTHESIA ATTENDED CARE: Performed by: COLON & RECTAL SURGERY

## 2022-09-26 PROCEDURE — 6360000002 HC RX W HCPCS: Performed by: REGISTERED NURSE

## 2022-09-26 PROCEDURE — 3609027000 HC COLONOSCOPY: Performed by: COLON & RECTAL SURGERY

## 2022-09-26 PROCEDURE — 7100000010 HC PHASE II RECOVERY - FIRST 15 MIN: Performed by: COLON & RECTAL SURGERY

## 2022-09-26 PROCEDURE — 7100000001 HC PACU RECOVERY - ADDTL 15 MIN: Performed by: COLON & RECTAL SURGERY

## 2022-09-26 PROCEDURE — 2580000003 HC RX 258: Performed by: COLON & RECTAL SURGERY

## 2022-09-26 PROCEDURE — 7100000011 HC PHASE II RECOVERY - ADDTL 15 MIN: Performed by: COLON & RECTAL SURGERY

## 2022-09-26 PROCEDURE — 7100000000 HC PACU RECOVERY - FIRST 15 MIN: Performed by: COLON & RECTAL SURGERY

## 2022-09-26 PROCEDURE — 2709999900 HC NON-CHARGEABLE SUPPLY: Performed by: COLON & RECTAL SURGERY

## 2022-09-26 PROCEDURE — 88305 TISSUE EXAM BY PATHOLOGIST: CPT

## 2022-09-26 PROCEDURE — 45338 SIGMOIDOSCOPY W/TUMR REMOVE: CPT | Performed by: COLON & RECTAL SURGERY

## 2022-09-26 RX ORDER — ONDANSETRON 2 MG/ML
4 INJECTION INTRAMUSCULAR; INTRAVENOUS
Status: DISCONTINUED | OUTPATIENT
Start: 2022-09-26 | End: 2022-09-26 | Stop reason: HOSPADM

## 2022-09-26 RX ORDER — MAGNESIUM HYDROXIDE 1200 MG/15ML
LIQUID ORAL PRN
Status: DISCONTINUED | OUTPATIENT
Start: 2022-09-26 | End: 2022-09-26 | Stop reason: ALTCHOICE

## 2022-09-26 RX ORDER — SODIUM CHLORIDE 0.9 % (FLUSH) 0.9 %
5-40 SYRINGE (ML) INJECTION EVERY 12 HOURS SCHEDULED
Status: DISCONTINUED | OUTPATIENT
Start: 2022-09-26 | End: 2022-09-26 | Stop reason: HOSPADM

## 2022-09-26 RX ORDER — SODIUM CHLORIDE 9 MG/ML
25 INJECTION, SOLUTION INTRAVENOUS PRN
Status: DISCONTINUED | OUTPATIENT
Start: 2022-09-26 | End: 2022-09-26 | Stop reason: HOSPADM

## 2022-09-26 RX ORDER — SODIUM CHLORIDE 0.9 % (FLUSH) 0.9 %
5-40 SYRINGE (ML) INJECTION PRN
Status: DISCONTINUED | OUTPATIENT
Start: 2022-09-26 | End: 2022-09-26 | Stop reason: HOSPADM

## 2022-09-26 RX ORDER — PROPOFOL 10 MG/ML
INJECTION, EMULSION INTRAVENOUS PRN
Status: DISCONTINUED | OUTPATIENT
Start: 2022-09-26 | End: 2022-09-26 | Stop reason: SDUPTHER

## 2022-09-26 RX ORDER — SODIUM CHLORIDE, SODIUM LACTATE, POTASSIUM CHLORIDE, CALCIUM CHLORIDE 600; 310; 30; 20 MG/100ML; MG/100ML; MG/100ML; MG/100ML
INJECTION, SOLUTION INTRAVENOUS CONTINUOUS
Status: DISCONTINUED | OUTPATIENT
Start: 2022-09-26 | End: 2022-09-26 | Stop reason: HOSPADM

## 2022-09-26 RX ORDER — FENTANYL CITRATE 50 UG/ML
25 INJECTION, SOLUTION INTRAMUSCULAR; INTRAVENOUS EVERY 5 MIN PRN
Status: DISCONTINUED | OUTPATIENT
Start: 2022-09-26 | End: 2022-09-26 | Stop reason: HOSPADM

## 2022-09-26 RX ORDER — SODIUM PHOSPHATE, DIBASIC AND SODIUM PHOSPHATE, MONOBASIC 7; 19 G/133ML; G/133ML
1 ENEMA RECTAL
Status: DISCONTINUED | OUTPATIENT
Start: 2022-09-26 | End: 2022-09-26 | Stop reason: ALTCHOICE

## 2022-09-26 RX ORDER — LABETALOL HYDROCHLORIDE 5 MG/ML
10 INJECTION, SOLUTION INTRAVENOUS
Status: DISCONTINUED | OUTPATIENT
Start: 2022-09-26 | End: 2022-09-26 | Stop reason: HOSPADM

## 2022-09-26 RX ORDER — HYDRALAZINE HYDROCHLORIDE 20 MG/ML
10 INJECTION INTRAMUSCULAR; INTRAVENOUS
Status: DISCONTINUED | OUTPATIENT
Start: 2022-09-26 | End: 2022-09-26 | Stop reason: HOSPADM

## 2022-09-26 RX ADMIN — PROPOFOL 100 MG: 10 INJECTION, EMULSION INTRAVENOUS at 10:22

## 2022-09-26 RX ADMIN — PROPOFOL 100 MG: 10 INJECTION, EMULSION INTRAVENOUS at 10:19

## 2022-09-26 RX ADMIN — SODIUM PHOSPHATE, DIBASIC AND SODIUM PHOSPHATE, MONOBASIC 1 ENEMA: 7; 19 ENEMA RECTAL at 09:11

## 2022-09-26 RX ADMIN — PROPOFOL 50 MG: 10 INJECTION, EMULSION INTRAVENOUS at 10:28

## 2022-09-26 RX ADMIN — SODIUM CHLORIDE, POTASSIUM CHLORIDE, SODIUM LACTATE AND CALCIUM CHLORIDE: 600; 310; 30; 20 INJECTION, SOLUTION INTRAVENOUS at 10:17

## 2022-09-26 NOTE — FLOWSHEET NOTE
Discharge instruction given to family and patient. Verbalizes understanding with no further questions. VSS. Patient ambulated to bathroom. IV out, patient being discharged.

## 2022-09-26 NOTE — ANESTHESIA PRE PROCEDURE
Department of Anesthesiology  Preprocedure Note       Name:  Sonali Camargo   Age:  81 y.o.  :  1950                                          MRN:  [id number]         Date:  2022      Surgeon: Vic Cisneros):  Mary Beth Cummings MD    Procedure: Procedure(s): FLEXIBLE SIGMOIDOSCOPY WITH SNARE POLYPECTOMY FLEETS ENEMA ON ARRIVAL    Medications prior to admission:   Prior to Admission medications    Medication Sig Start Date End Date Taking? Authorizing Provider   aspirin 325 MG tablet Take 325 mg by mouth daily    Historical Provider, MD   simvastatin (ZOCOR) 40 MG tablet Take 1 tablet by mouth every evening 21   SHARAD Robbins CNP   meloxicam HEATHERLJ PRUITT [facility]) 15 MG tablet Take 1 tablet by mouth daily 21   SHARAD Robbins CNP   Handicap Placard MISC by Does not apply route Good for 5 years 19   Azeb Lewis MD       Current medications:    No current facility-administered medications for this visit. No current outpatient medications on file. Facility-Administered Medications Ordered in Other Visits   Medication Dose Route Frequency Provider Last Rate Last Admin    lactated ringers infusion   IntraVENous Continuous Yan Fatima MD           Allergies:     Allergies   Allergen Reactions    Morphine Itching       Problem List:    Patient Active Problem List   Diagnosis Code    Cancer ([facility] [address] C80.1    Hyperlipidemia E78.5    Elevated blood pressure reading R03.0    Special screening for malignant neoplasms, colon Z12.11    Colonic mass K63.89       Past Medical History:        Diagnosis Date    Arthritis     Cancer ([facility] [address]     SKIN    Hyperlipidemia        Past Surgical History:        Procedure Laterality Date    CATARACT REMOVAL      CHOLECYSTECTOMY      COLONOSCOPY N/A 2022    COLORECTAL CANCER SCREENING, NOT HIGH RISK performed by Nakia Morton MD at [address]    TUBAL LIGATION         Social History:    Social History     Tobacco Use    Smoking status: Never    Smokeless tobacco: Never   Substance Use Topics    Alcohol use: No                                Counseling given: Not Answered      Vital Signs (Current): There were no vitals filed for this visit. BP Readings from Last 3 Encounters:   09/26/22 136/62   09/16/22 138/68   07/15/22 (!) 146/90       NPO Status:                                                                                 BMI:   Wt Readings from Last 3 Encounters:   09/26/22 186 lb (84.4 kg)   09/20/22 186 lb (84.4 kg)   09/16/22 186 lb (84.4 kg)     There is no height or weight on file to calculate BMI.    CBC:   Lab Results   Component Value Date/Time    WBC 7.5 09/17/2022 10:13 AM    RBC 4.92 09/17/2022 10:13 AM    HGB 14.2 09/17/2022 10:13 AM    HCT 43.2 09/17/2022 10:13 AM    MCV 87.6 09/17/2022 10:13 AM    RDW 14.5 09/17/2022 10:13 AM     09/17/2022 10:13 AM       CMP:   Lab Results   Component Value Date/Time     09/17/2022 10:13 AM    K 3.3 09/17/2022 10:13 AM     09/17/2022 10:13 AM    CO2 27 09/17/2022 10:13 AM    BUN 14 09/17/2022 10:13 AM    CREATININE 0.77 09/17/2022 10:13 AM    GFRAA >60.0 09/17/2022 10:13 AM    LABGLOM >60.0 09/17/2022 10:13 AM    GLUCOSE 94 09/17/2022 10:13 AM    PROT 7.0 09/17/2022 10:13 AM    CALCIUM 9.2 09/17/2022 10:13 AM    BILITOT 0.4 09/17/2022 10:13 AM    ALKPHOS 129 09/17/2022 10:13 AM    AST 27 09/17/2022 10:13 AM    ALT 16 09/17/2022 10:13 AM       POC Tests: No results for input(s): POCGLU, POCNA, POCK, POCCL, POCBUN, POCHEMO, POCHCT in the last 72 hours.     Coags:   Lab Results   Component Value Date/Time    PROTIME 12.9 09/17/2022 10:13 AM    INR 1.0 09/17/2022 10:13 AM    APTT 26.1 05/09/2017 10:30 AM       HCG (If Applicable): No results found for: PREGTESTUR, PREGSERUM, HCG, HCGQUANT     ABGs: No results found for: PHART, PO2ART, ZVY5ABB, UNU4JCP, BEART, A1YSURVI     Type & Screen (If Applicable):  No results found for: LABABO, LABRH    Drug/Infectious Status (If Applicable):  No results found for: HIV, HEPCAB    COVID-19 Screening (If Applicable):   Lab Results   Component Value Date/Time    COVID19 Detected 12/14/2020 09:16 PM           Anesthesia Evaluation  Patient summary reviewed  Airway: Mallampati: II  TM distance: >3 FB   Neck ROM: full  Mouth opening: > = 3 FB   Dental:          Pulmonary:Negative Pulmonary ROS and normal exam                               Cardiovascular:  Exercise tolerance: no interval change,   (+) hyperlipidemia         Beta Blocker:  Not on Beta Blocker         Neuro/Psych:   Negative Neuro/Psych ROS  (+) TIA,             GI/Hepatic/Renal: Neg GI/Hepatic/Renal ROS            Endo/Other: Negative Endo/Other ROS                    Abdominal:             Vascular: negative vascular ROS. Other Findings:             Anesthesia Plan      MAC     ASA 2       Induction: intravenous. Anesthetic plan and risks discussed with patient.       Plan discussed with surgical team.                    Abbie Payne MD   9/26/2022

## 2022-09-28 ENCOUNTER — TELEPHONE (OUTPATIENT)
Dept: SURGERY | Age: 72
End: 2022-09-28

## 2022-09-28 NOTE — TELEPHONE ENCOUNTER
No Prior Auth Required for 10/3/22    [facility] and talked to Rosanne RAY    Ref # [id number]

## 2022-10-03 ENCOUNTER — ANESTHESIA (OUTPATIENT)
Dept: OPERATING ROOM | Age: 72
DRG: 330 | End: 2022-10-03
Payer: MEDICARE

## 2022-10-03 ENCOUNTER — ANESTHESIA EVENT (OUTPATIENT)
Dept: OPERATING ROOM | Age: 72
DRG: 330 | End: 2022-10-03
Payer: MEDICARE

## 2022-10-03 ENCOUNTER — HOSPITAL ENCOUNTER (INPATIENT)
Age: 72
LOS: 7 days | Discharge: NURSING, REHAB OR LONG-TERM CARE | DRG: 330 | End: 2022-10-10
Attending: COLON & RECTAL SURGERY | Admitting: COLON & RECTAL SURGERY
Payer: MEDICARE

## 2022-10-03 DIAGNOSIS — C20 RECTAL CANCER (HCC): ICD-10-CM

## 2022-10-03 LAB
ANION GAP SERPL CALCULATED.3IONS-SCNC: 10 MEQ/L (ref 9–15)
BUN BLDV-MCNC: 7 MG/DL (ref 8–23)
CALCIUM SERPL-MCNC: 8.6 MG/DL (ref 8.5–9.9)
CHLORIDE BLD-SCNC: 103 MEQ/L (ref 95–107)
CO2: 24 MEQ/L (ref 20–31)
CREAT SERPL-MCNC: 0.65 MG/DL (ref 0.5–0.9)
GFR AFRICAN AMERICAN: >60
GFR NON-AFRICAN AMERICAN: >60
GLUCOSE BLD-MCNC: 148 MG/DL (ref 70–99)
POTASSIUM REFLEX MAGNESIUM: 4.3 MEQ/L (ref 3.4–4.9)
SODIUM BLD-SCNC: 137 MEQ/L (ref 135–144)

## 2022-10-03 PROCEDURE — 80048 BASIC METABOLIC PNL TOTAL CA: CPT

## 2022-10-03 PROCEDURE — 6360000002 HC RX W HCPCS: Performed by: COLON & RECTAL SURGERY

## 2022-10-03 PROCEDURE — 88304 TISSUE EXAM BY PATHOLOGIST: CPT

## 2022-10-03 PROCEDURE — 2500000003 HC RX 250 WO HCPCS: Performed by: COLON & RECTAL SURGERY

## 2022-10-03 PROCEDURE — C1729 CATH, DRAINAGE: HCPCS | Performed by: COLON & RECTAL SURGERY

## 2022-10-03 PROCEDURE — 3600000004 HC SURGERY LEVEL 4 BASE: Performed by: COLON & RECTAL SURGERY

## 2022-10-03 PROCEDURE — 6360000002 HC RX W HCPCS: Performed by: STUDENT IN AN ORGANIZED HEALTH CARE EDUCATION/TRAINING PROGRAM

## 2022-10-03 PROCEDURE — 0DBP0ZZ EXCISION OF RECTUM, OPEN APPROACH: ICD-10-PCS | Performed by: COLON & RECTAL SURGERY

## 2022-10-03 PROCEDURE — 3700000001 HC ADD 15 MINUTES (ANESTHESIA): Performed by: COLON & RECTAL SURGERY

## 2022-10-03 PROCEDURE — 2580000003 HC RX 258: Performed by: COLON & RECTAL SURGERY

## 2022-10-03 PROCEDURE — 62325 NJX INTERLAMINAR CRV/THRC: CPT | Performed by: STUDENT IN AN ORGANIZED HEALTH CARE EDUCATION/TRAINING PROGRAM

## 2022-10-03 PROCEDURE — 2580000003 HC RX 258: Performed by: STUDENT IN AN ORGANIZED HEALTH CARE EDUCATION/TRAINING PROGRAM

## 2022-10-03 PROCEDURE — 3700000000 HC ANESTHESIA ATTENDED CARE: Performed by: COLON & RECTAL SURGERY

## 2022-10-03 PROCEDURE — 0D1B0Z4 BYPASS ILEUM TO CUTANEOUS, OPEN APPROACH: ICD-10-PCS | Performed by: COLON & RECTAL SURGERY

## 2022-10-03 PROCEDURE — 7100000001 HC PACU RECOVERY - ADDTL 15 MIN: Performed by: COLON & RECTAL SURGERY

## 2022-10-03 PROCEDURE — 0DJD8ZZ INSPECTION OF LOWER INTESTINAL TRACT, VIA NATURAL OR ARTIFICIAL OPENING ENDOSCOPIC: ICD-10-PCS | Performed by: COLON & RECTAL SURGERY

## 2022-10-03 PROCEDURE — 36415 COLL VENOUS BLD VENIPUNCTURE: CPT

## 2022-10-03 PROCEDURE — 2500000003 HC RX 250 WO HCPCS: Performed by: NURSE ANESTHETIST, CERTIFIED REGISTERED

## 2022-10-03 PROCEDURE — 44310 ILEOSTOMY/JEJUNOSTOMY: CPT | Performed by: COLON & RECTAL SURGERY

## 2022-10-03 PROCEDURE — 6370000000 HC RX 637 (ALT 250 FOR IP)

## 2022-10-03 PROCEDURE — 45111 PARTIAL REMOVAL OF RECTUM: CPT | Performed by: COLON & RECTAL SURGERY

## 2022-10-03 PROCEDURE — 1210000000 HC MED SURG R&B

## 2022-10-03 PROCEDURE — 2720000010 HC SURG SUPPLY STERILE: Performed by: COLON & RECTAL SURGERY

## 2022-10-03 PROCEDURE — 88309 TISSUE EXAM BY PATHOLOGIST: CPT

## 2022-10-03 PROCEDURE — 6360000002 HC RX W HCPCS: Performed by: NURSE ANESTHETIST, CERTIFIED REGISTERED

## 2022-10-03 PROCEDURE — 88342 IMHCHEM/IMCYTCHM 1ST ANTB: CPT

## 2022-10-03 PROCEDURE — 6370000000 HC RX 637 (ALT 250 FOR IP): Performed by: COLON & RECTAL SURGERY

## 2022-10-03 PROCEDURE — 3600000014 HC SURGERY LEVEL 4 ADDTL 15MIN: Performed by: COLON & RECTAL SURGERY

## 2022-10-03 PROCEDURE — 7100000000 HC PACU RECOVERY - FIRST 15 MIN: Performed by: COLON & RECTAL SURGERY

## 2022-10-03 PROCEDURE — 2709999900 HC NON-CHARGEABLE SUPPLY: Performed by: COLON & RECTAL SURGERY

## 2022-10-03 RX ORDER — OXYCODONE HYDROCHLORIDE 5 MG/1
10 TABLET ORAL PRN
Status: DISCONTINUED | OUTPATIENT
Start: 2022-10-03 | End: 2022-10-03 | Stop reason: HOSPADM

## 2022-10-03 RX ORDER — INDOCYANINE GREEN AND WATER 25 MG
KIT INJECTION PRN
Status: DISCONTINUED | OUTPATIENT
Start: 2022-10-03 | End: 2022-10-03 | Stop reason: SDUPTHER

## 2022-10-03 RX ORDER — HYDRALAZINE HYDROCHLORIDE 20 MG/ML
10 INJECTION INTRAMUSCULAR; INTRAVENOUS
Status: DISCONTINUED | OUTPATIENT
Start: 2022-10-03 | End: 2022-10-03 | Stop reason: HOSPADM

## 2022-10-03 RX ORDER — PROPOFOL 10 MG/ML
INJECTION, EMULSION INTRAVENOUS PRN
Status: DISCONTINUED | OUTPATIENT
Start: 2022-10-03 | End: 2022-10-03 | Stop reason: SDUPTHER

## 2022-10-03 RX ORDER — PROCHLORPERAZINE EDISYLATE 5 MG/ML
5 INJECTION INTRAMUSCULAR; INTRAVENOUS
Status: DISCONTINUED | OUTPATIENT
Start: 2022-10-03 | End: 2022-10-03 | Stop reason: HOSPADM

## 2022-10-03 RX ORDER — OXYCODONE HYDROCHLORIDE 5 MG/1
5 TABLET ORAL PRN
Status: DISCONTINUED | OUTPATIENT
Start: 2022-10-03 | End: 2022-10-03 | Stop reason: HOSPADM

## 2022-10-03 RX ORDER — SODIUM CHLORIDE 9 MG/ML
INJECTION, SOLUTION INTRAVENOUS CONTINUOUS
Status: DISCONTINUED | OUTPATIENT
Start: 2022-10-03 | End: 2022-10-10 | Stop reason: HOSPADM

## 2022-10-03 RX ORDER — SODIUM CHLORIDE 9 MG/ML
INJECTION, SOLUTION INTRAVENOUS PRN
Status: DISCONTINUED | OUTPATIENT
Start: 2022-10-03 | End: 2022-10-10 | Stop reason: HOSPADM

## 2022-10-03 RX ORDER — ONDANSETRON 2 MG/ML
4 INJECTION INTRAMUSCULAR; INTRAVENOUS
Status: DISCONTINUED | OUTPATIENT
Start: 2022-10-03 | End: 2022-10-03 | Stop reason: HOSPADM

## 2022-10-03 RX ORDER — DIPHENHYDRAMINE HYDROCHLORIDE 50 MG/ML
12.5 INJECTION INTRAMUSCULAR; INTRAVENOUS
Status: DISCONTINUED | OUTPATIENT
Start: 2022-10-03 | End: 2022-10-03 | Stop reason: HOSPADM

## 2022-10-03 RX ORDER — LIDOCAINE HYDROCHLORIDE 20 MG/ML
INJECTION, SOLUTION INTRAVENOUS PRN
Status: DISCONTINUED | OUTPATIENT
Start: 2022-10-03 | End: 2022-10-03 | Stop reason: SDUPTHER

## 2022-10-03 RX ORDER — SODIUM CHLORIDE 0.9 % (FLUSH) 0.9 %
5-40 SYRINGE (ML) INJECTION EVERY 12 HOURS SCHEDULED
Status: DISCONTINUED | OUTPATIENT
Start: 2022-10-03 | End: 2022-10-03 | Stop reason: HOSPADM

## 2022-10-03 RX ORDER — ONDANSETRON 2 MG/ML
INJECTION INTRAMUSCULAR; INTRAVENOUS PRN
Status: DISCONTINUED | OUTPATIENT
Start: 2022-10-03 | End: 2022-10-03 | Stop reason: SDUPTHER

## 2022-10-03 RX ORDER — ONDANSETRON 4 MG/1
4 TABLET, ORALLY DISINTEGRATING ORAL EVERY 8 HOURS PRN
Status: DISCONTINUED | OUTPATIENT
Start: 2022-10-03 | End: 2022-10-10 | Stop reason: HOSPADM

## 2022-10-03 RX ORDER — SODIUM CHLORIDE 0.9 % (FLUSH) 0.9 %
5-40 SYRINGE (ML) INJECTION EVERY 12 HOURS SCHEDULED
Status: DISCONTINUED | OUTPATIENT
Start: 2022-10-03 | End: 2022-10-10 | Stop reason: HOSPADM

## 2022-10-03 RX ORDER — METRONIDAZOLE 500 MG/100ML
500 INJECTION, SOLUTION INTRAVENOUS
Status: COMPLETED | OUTPATIENT
Start: 2022-10-03 | End: 2022-10-03

## 2022-10-03 RX ORDER — SODIUM CHLORIDE 0.9 % (FLUSH) 0.9 %
5-40 SYRINGE (ML) INJECTION PRN
Status: DISCONTINUED | OUTPATIENT
Start: 2022-10-03 | End: 2022-10-10 | Stop reason: HOSPADM

## 2022-10-03 RX ORDER — ROCURONIUM BROMIDE 10 MG/ML
INJECTION, SOLUTION INTRAVENOUS PRN
Status: DISCONTINUED | OUTPATIENT
Start: 2022-10-03 | End: 2022-10-03 | Stop reason: SDUPTHER

## 2022-10-03 RX ORDER — SODIUM CHLORIDE, SODIUM LACTATE, POTASSIUM CHLORIDE, CALCIUM CHLORIDE 600; 310; 30; 20 MG/100ML; MG/100ML; MG/100ML; MG/100ML
INJECTION, SOLUTION INTRAVENOUS CONTINUOUS
Status: DISCONTINUED | OUTPATIENT
Start: 2022-10-03 | End: 2022-10-06

## 2022-10-03 RX ORDER — ENOXAPARIN SODIUM 100 MG/ML
40 INJECTION SUBCUTANEOUS DAILY
Status: DISCONTINUED | OUTPATIENT
Start: 2022-10-03 | End: 2022-10-03 | Stop reason: CLARIF

## 2022-10-03 RX ORDER — ONDANSETRON 2 MG/ML
4 INJECTION INTRAMUSCULAR; INTRAVENOUS EVERY 6 HOURS PRN
Status: DISCONTINUED | OUTPATIENT
Start: 2022-10-03 | End: 2022-10-10 | Stop reason: HOSPADM

## 2022-10-03 RX ORDER — DEXAMETHASONE SODIUM PHOSPHATE 10 MG/ML
INJECTION INTRAMUSCULAR; INTRAVENOUS PRN
Status: DISCONTINUED | OUTPATIENT
Start: 2022-10-03 | End: 2022-10-03 | Stop reason: SDUPTHER

## 2022-10-03 RX ORDER — FENTANYL CITRATE 50 UG/ML
INJECTION, SOLUTION INTRAMUSCULAR; INTRAVENOUS PRN
Status: DISCONTINUED | OUTPATIENT
Start: 2022-10-03 | End: 2022-10-03 | Stop reason: SDUPTHER

## 2022-10-03 RX ORDER — LABETALOL HYDROCHLORIDE 5 MG/ML
10 INJECTION, SOLUTION INTRAVENOUS
Status: DISCONTINUED | OUTPATIENT
Start: 2022-10-03 | End: 2022-10-03 | Stop reason: HOSPADM

## 2022-10-03 RX ORDER — NALOXONE HYDROCHLORIDE 0.4 MG/ML
INJECTION, SOLUTION INTRAMUSCULAR; INTRAVENOUS; SUBCUTANEOUS PRN
Status: DISCONTINUED | OUTPATIENT
Start: 2022-10-03 | End: 2022-10-10 | Stop reason: HOSPADM

## 2022-10-03 RX ORDER — ENOXAPARIN SODIUM 100 MG/ML
40 INJECTION SUBCUTANEOUS DAILY
Status: DISCONTINUED | OUTPATIENT
Start: 2022-10-04 | End: 2022-10-10 | Stop reason: HOSPADM

## 2022-10-03 RX ORDER — SODIUM CHLORIDE 0.9 % (FLUSH) 0.9 %
5-40 SYRINGE (ML) INJECTION PRN
Status: DISCONTINUED | OUTPATIENT
Start: 2022-10-03 | End: 2022-10-03 | Stop reason: HOSPADM

## 2022-10-03 RX ORDER — SODIUM CHLORIDE 9 MG/ML
25 INJECTION, SOLUTION INTRAVENOUS PRN
Status: DISCONTINUED | OUTPATIENT
Start: 2022-10-03 | End: 2022-10-03 | Stop reason: HOSPADM

## 2022-10-03 RX ORDER — FENTANYL CITRATE 50 UG/ML
50 INJECTION, SOLUTION INTRAMUSCULAR; INTRAVENOUS EVERY 5 MIN PRN
Status: DISCONTINUED | OUTPATIENT
Start: 2022-10-03 | End: 2022-10-03 | Stop reason: HOSPADM

## 2022-10-03 RX ORDER — LIDOCAINE HYDROCHLORIDE 20 MG/ML
INJECTION, SOLUTION INFILTRATION; PERINEURAL PRN
Status: DISCONTINUED | OUTPATIENT
Start: 2022-10-03 | End: 2022-10-03 | Stop reason: SDUPTHER

## 2022-10-03 RX ORDER — MAGNESIUM HYDROXIDE 1200 MG/15ML
LIQUID ORAL CONTINUOUS PRN
Status: DISCONTINUED | OUTPATIENT
Start: 2022-10-03 | End: 2022-10-03 | Stop reason: HOSPADM

## 2022-10-03 RX ORDER — SODIUM CHLORIDE, SODIUM LACTATE, POTASSIUM CHLORIDE, CALCIUM CHLORIDE 600; 310; 30; 20 MG/100ML; MG/100ML; MG/100ML; MG/100ML
INJECTION, SOLUTION INTRAVENOUS CONTINUOUS
Status: DISCONTINUED | OUTPATIENT
Start: 2022-10-03 | End: 2022-10-07

## 2022-10-03 RX ORDER — FENTANYL CITRATE 50 UG/ML
25 INJECTION, SOLUTION INTRAMUSCULAR; INTRAVENOUS EVERY 5 MIN PRN
Status: DISCONTINUED | OUTPATIENT
Start: 2022-10-03 | End: 2022-10-03 | Stop reason: HOSPADM

## 2022-10-03 RX ORDER — MEPERIDINE HYDROCHLORIDE 25 MG/ML
12.5 INJECTION INTRAMUSCULAR; INTRAVENOUS; SUBCUTANEOUS EVERY 5 MIN PRN
Status: DISCONTINUED | OUTPATIENT
Start: 2022-10-03 | End: 2022-10-03 | Stop reason: HOSPADM

## 2022-10-03 RX ADMIN — ROPIVACAINE HYDROCHLORIDE: 5 INJECTION EPIDURAL; INFILTRATION; PERINEURAL at 22:35

## 2022-10-03 RX ADMIN — ROCURONIUM BROMIDE 50 MG: 10 INJECTION, SOLUTION INTRAVENOUS at 12:06

## 2022-10-03 RX ADMIN — SODIUM CHLORIDE, POTASSIUM CHLORIDE, SODIUM LACTATE AND CALCIUM CHLORIDE: 600; 310; 30; 20 INJECTION, SOLUTION INTRAVENOUS at 10:26

## 2022-10-03 RX ADMIN — ROPIVACAINE HYDROCHLORIDE 5 ML: 5 INJECTION EPIDURAL; INFILTRATION; PERINEURAL at 12:49

## 2022-10-03 RX ADMIN — METRONIDAZOLE 500 MG: 500 INJECTION, SOLUTION INTRAVENOUS at 12:13

## 2022-10-03 RX ADMIN — SUGAMMADEX 200 MG: 100 INJECTION, SOLUTION INTRAVENOUS at 14:24

## 2022-10-03 RX ADMIN — DEXAMETHASONE SODIUM PHOSPHATE 10 MG: 10 INJECTION INTRAMUSCULAR; INTRAVENOUS at 12:19

## 2022-10-03 RX ADMIN — CEFAZOLIN 2000 MG: 10 INJECTION, POWDER, FOR SOLUTION INTRAVENOUS at 11:58

## 2022-10-03 RX ADMIN — Medication 7.5 MG: at 13:18

## 2022-10-03 RX ADMIN — FENTANYL CITRATE 50 MCG: 50 INJECTION, SOLUTION INTRAMUSCULAR; INTRAVENOUS at 12:06

## 2022-10-03 RX ADMIN — SODIUM CHLORIDE: 9 INJECTION, SOLUTION INTRAVENOUS at 22:37

## 2022-10-03 RX ADMIN — FENTANYL CITRATE 50 MCG: 50 INJECTION, SOLUTION INTRAMUSCULAR; INTRAVENOUS at 12:20

## 2022-10-03 RX ADMIN — SODIUM CHLORIDE, POTASSIUM CHLORIDE, SODIUM LACTATE AND CALCIUM CHLORIDE 1000 ML: 600; 310; 30; 20 INJECTION, SOLUTION INTRAVENOUS at 15:50

## 2022-10-03 RX ADMIN — PROPOFOL 140 MG: 10 INJECTION, EMULSION INTRAVENOUS at 12:06

## 2022-10-03 RX ADMIN — ROPIVACAINE HYDROCHLORIDE 5 ML: 5 INJECTION EPIDURAL; INFILTRATION; PERINEURAL at 12:17

## 2022-10-03 RX ADMIN — SODIUM CHLORIDE, POTASSIUM CHLORIDE, SODIUM LACTATE AND CALCIUM CHLORIDE: 600; 310; 30; 20 INJECTION, SOLUTION INTRAVENOUS at 12:30

## 2022-10-03 RX ADMIN — LIDOCAINE HYDROCHLORIDE 70 MG: 20 INJECTION, SOLUTION INTRAVENOUS at 12:06

## 2022-10-03 RX ADMIN — ROCURONIUM BROMIDE 20 MG: 10 INJECTION, SOLUTION INTRAVENOUS at 13:07

## 2022-10-03 RX ADMIN — ONDANSETRON 4 MG: 2 INJECTION INTRAMUSCULAR; INTRAVENOUS at 12:19

## 2022-10-03 RX ADMIN — NALOXEGOL OXALATE 25 MG: 12.5 TABLET, FILM COATED ORAL at 17:03

## 2022-10-03 RX ADMIN — ROPIVACAINE HYDROCHLORIDE 500 ML: 5 INJECTION EPIDURAL; INFILTRATION; PERINEURAL at 14:37

## 2022-10-03 RX ADMIN — SODIUM CHLORIDE, POTASSIUM CHLORIDE, SODIUM LACTATE AND CALCIUM CHLORIDE: 600; 310; 30; 20 INJECTION, SOLUTION INTRAVENOUS at 13:40

## 2022-10-03 RX ADMIN — LIDOCAINE HYDROCHLORIDE 5 ML: 20 INJECTION, SOLUTION INFILTRATION; PERINEURAL at 13:01

## 2022-10-03 ASSESSMENT — PAIN DESCRIPTION - DESCRIPTORS: DESCRIPTORS: ACHING;SORE;THROBBING

## 2022-10-03 ASSESSMENT — PAIN SCALES - GENERAL
PAINLEVEL_OUTOF10: 5
PAINLEVEL_OUTOF10: 1
PAINLEVEL_OUTOF10: 3
PAINLEVEL_OUTOF10: 3

## 2022-10-03 ASSESSMENT — PAIN DESCRIPTION - LOCATION: LOCATION: ABDOMEN

## 2022-10-03 ASSESSMENT — PAIN DESCRIPTION - ORIENTATION: ORIENTATION: LEFT;MID

## 2022-10-03 ASSESSMENT — PAIN DESCRIPTION - PAIN TYPE: TYPE: SURGICAL PAIN

## 2022-10-03 ASSESSMENT — PAIN DESCRIPTION - FREQUENCY: FREQUENCY: INTERMITTENT

## 2022-10-03 ASSESSMENT — PAIN DESCRIPTION - ONSET: ONSET: PROGRESSIVE

## 2022-10-03 NOTE — PLAN OF CARE
Problem: Discharge Planning  Goal: Discharge to home or other facility with appropriate resources  Outcome: Progressing  Flowsheets (Taken 10/3/2022 1650)  Discharge to home or other facility with appropriate resources: Identify barriers to discharge with patient and caregiver     Problem: Pain  Goal: Verbalizes/displays adequate comfort level or baseline comfort level  Outcome: Progressing     Problem: Safety - Adult  Goal: Free from fall injury  Outcome: Progressing     Problem: ABCDS Injury Assessment  Goal: Absence of physical injury  Outcome: Progressing

## 2022-10-03 NOTE — ANESTHESIA POSTPROCEDURE EVALUATION
Department of Anesthesiology  Postprocedure Note    Patient: Ryne Boles  MRN: [id number]  YOB: 1950  Date of evaluation: 10/3/2022      Procedure Summary     Date: 10/03/22 Room / Location: [address]    Anesthesia Start: 5636 Anesthesia Stop: 8235    Procedure: LOW ANTERIOR RESECTION WITH LOOP ILEOSTOMY (Abdomen) Diagnosis:       Rectal cancer ([facility] [address]      (RECTAL CANCER)    Surgeons: Alejandra Jiménez MD Responsible Provider: Sunny Ayers MD    Anesthesia Type: general ASA Status: 2          Anesthesia Type: No value filed.     Jessi Phase I:      Jessi Phase II:        Anesthesia Post Evaluation    Patient location during evaluation: PACU  Patient participation: complete - patient participated  Level of consciousness: awake  Pain score: 0  Airway patency: patent  Nausea & Vomiting: no nausea and no vomiting  Complications: no  Cardiovascular status: hemodynamically stable  Respiratory status: acceptable  Hydration status: euvolemic

## 2022-10-03 NOTE — ANESTHESIA PRE PROCEDURE
Department of Anesthesiology  Preprocedure Note       Name:  Trish Prior   Age:  81 y.o.  :  1950                                          MRN:  [id number]         Date:  10/3/2022      Surgeon: Pérez Agustin):  Puma Olvera MD    Procedure: Procedure(s):  LOW ANTERIOR RESECTION POSSIBLE ILEOSTOMY (PAT ON ADMIT)  EPIDURAL    Medications prior to admission:   Prior to Admission medications    Medication Sig Start Date End Date Taking? Authorizing Provider   aspirin 325 MG tablet Take 325 mg by mouth daily    Historical Provider, MD   simvastatin (ZOCOR) 40 MG tablet Take 1 tablet by mouth every evening 21   SHARAD Rinaldi - CNP   meloxicam HEATHERLJ PRUITT [facility]) 15 MG tablet Take 1 tablet by mouth daily 21   SHARAD Rinaldi CNP   Handicap Placard MISC by Does not apply route Good for 5 years 19   Jose G Vicente MD       Current medications:    No current facility-administered medications for this encounter. Allergies:     Allergies   Allergen Reactions    Morphine Itching       Problem List:    Patient Active Problem List   Diagnosis Code    Cancer ([facility] [address] C80.1    Hyperlipidemia E78.5    Elevated blood pressure reading R03.0    Special screening for malignant neoplasms, colon Z12.11    Colonic mass K63.89    Rectal polyp K62.1       Past Medical History:        Diagnosis Date    Arthritis     Cancer ([facility] [address]     SKIN    Hyperlipidemia        Past Surgical History:        Procedure Laterality Date    CATARACT REMOVAL      CHOLECYSTECTOMY      COLONOSCOPY N/A 2022    COLORECTAL CANCER SCREENING, NOT HIGH RISK performed by Jenny James MD at [address] [facility] COLONOSCOPY N/A 2022    FLEXIBLE SIGMOIDOSCOPY WITH SNARE POLYPECTOMY performed by Puma Olvera MD at [address] LE[address]    TUBAL LIGATION         Social History:    Social History     Tobacco Use    Smoking status: Never    Smokeless tobacco: Never   Substance Use Topics  Alcohol use: No                                Counseling given: Not Answered      Vital Signs (Current):   Vitals:    10/03/22 0930   BP: (!) 143/65   Pulse: 80   Temp: 99 °F (37.2 °C)   TempSrc: Temporal   SpO2: 98%                                              BP Readings from Last 3 Encounters:   10/03/22 (!) 143/65   09/26/22 (!) 147/67   09/16/22 138/68       NPO Status: Time of last liquid consumption: 2300                        Time of last solid consumption: 1100                        Date of last liquid consumption: 10/02/22                        Date of last solid food consumption: 10/01/22    BMI:   Wt Readings from Last 3 Encounters:   09/26/22 186 lb (84.4 kg)   09/20/22 186 lb (84.4 kg)   09/16/22 186 lb (84.4 kg)     There is no height or weight on file to calculate BMI.    CBC:   Lab Results   Component Value Date/Time    WBC 7.5 09/17/2022 10:13 AM    RBC 4.92 09/17/2022 10:13 AM    HGB 14.2 09/17/2022 10:13 AM    HCT 43.2 09/17/2022 10:13 AM    MCV 87.6 09/17/2022 10:13 AM    RDW 14.5 09/17/2022 10:13 AM     09/17/2022 10:13 AM       CMP:   Lab Results   Component Value Date/Time     09/17/2022 10:13 AM    K 3.3 09/17/2022 10:13 AM     09/17/2022 10:13 AM    CO2 27 09/17/2022 10:13 AM    BUN 14 09/17/2022 10:13 AM    CREATININE 0.77 09/17/2022 10:13 AM    GFRAA >60.0 09/17/2022 10:13 AM    LABGLOM >60.0 09/17/2022 10:13 AM    GLUCOSE 94 09/17/2022 10:13 AM    PROT 7.0 09/17/2022 10:13 AM    CALCIUM 9.2 09/17/2022 10:13 AM    BILITOT 0.4 09/17/2022 10:13 AM    ALKPHOS 129 09/17/2022 10:13 AM    AST 27 09/17/2022 10:13 AM    ALT 16 09/17/2022 10:13 AM       POC Tests: No results for input(s): POCGLU, POCNA, POCK, POCCL, POCBUN, POCHEMO, POCHCT in the last 72 hours.     Coags:   Lab Results   Component Value Date/Time    PROTIME 12.9 09/17/2022 10:13 AM    INR 1.0 09/17/2022 10:13 AM    APTT 26.1 05/09/2017 10:30 AM       HCG (If Applicable): No results found for: PREGTESTUR, PREGSERUM, HCG, HCGQUANT     ABGs: No results found for: PHART, PO2ART, KRR9FYL, ROY4ENV, BEART, E1AIUOWK     Type & Screen (If Applicable):  No results found for: LABABO, LABRH    Drug/Infectious Status (If Applicable):  No results found for: HIV, HEPCAB    COVID-19 Screening (If Applicable):   Lab Results   Component Value Date/Time    COVID19 Detected 12/14/2020 09:16 PM           Anesthesia Evaluation  Patient summary reviewed and Nursing notes reviewed no history of anesthetic complications:   Airway: Mallampati: II  TM distance: >3 FB   Neck ROM: full  Mouth opening: > = 3 FB   Dental: normal exam         Pulmonary:Negative Pulmonary ROS and normal exam                               Cardiovascular:Negative CV ROS  Exercise tolerance: good (>4 METS),   (+) hypertension:, hyperlipidemia      ECG reviewed               Beta Blocker:  Not on Beta Blocker         Neuro/Psych:   Negative Neuro/Psych ROS              GI/Hepatic/Renal: Neg GI/Hepatic/Renal ROS            Endo/Other: Negative Endo/Other ROS   (+) malignancy/cancer. Pt had PAT visit. ROS comment: Colon CA Abdominal:             Vascular: negative vascular ROS. Other Findings:           Anesthesia Plan      general     ASA 2     (ETT)  Induction: intravenous. MIPS: Postoperative opioids intended and Prophylactic antiemetics administered. Anesthetic plan and risks discussed with patient. Plan discussed with CRNA.     Attending anesthesiologist reviewed and agrees with Pre Eval content      Post-op pain plan if not by surgeon: continuous epidural            Emilia Zamarripa MD   10/3/2022

## 2022-10-03 NOTE — PROGRESS NOTES
Patient admitted into room 485. VSS. A&Ox4. Patient tolerated sip of liquid and eating ice chips. Pain 3 out of 10. On epidural for pain. Abd dressing CDI, Ileostomy present. Ken drain. Compressed. SCDs on. Fall precautions are in place. Family in the room. Call light within reach. Will continue to monitor.

## 2022-10-03 NOTE — ANESTHESIA PROCEDURE NOTES
Epidural Block    Patient location during procedure: pre-op  Start time: 10/3/2022 11:01 AM  End time: 10/3/2022 11:09 AM  Reason for block: post-op pain management  Staffing  Performed: anesthesiologist   Anesthesiologist: Flo Kasper MD  Epidural  Patient position: sitting  Prep: Betadine  Patient monitoring: cardiac monitor, continuous pulse ox and frequent blood pressure checks  Approach: midline  Location: T8-9  Injection technique: ETHAN air and ETHAN saline  Provider prep: mask and sterile gloves  Needle  Needle type: Tuohy   Needle gauge: 17 G  Needle length: 3.5 in  Needle insertion depth: 9 cm  Catheter type: end hole  Catheter size: 18 G  Catheter at skin depth: 17 cm  Test dose: negativeCatheter Secured: tegaderm and tape  Assessment  Hemodynamics: stable  Attempts: 1  Outcomes: uncomplicated  Preanesthetic Checklist  Completed: patient identified, IV checked, site marked, risks and benefits discussed, surgical/procedural consents, equipment checked, pre-op evaluation, timeout performed, anesthesia consent given, oxygen available and monitors applied/VS acknowledged

## 2022-10-03 NOTE — PROGRESS NOTES
Dr. Jason Vasquez made aware of pts. Low blood pressure of 83/49.  Instructed to open IV wide open and give bolus of 300 ml. RH

## 2022-10-03 NOTE — BRIEF OP NOTE
Brief Postoperative Note      Patient: Oscar York  YOB: 1950  MRN: [id number]    Date of Procedure: 10/3/2022    Pre-Op Diagnosis: RECTAL CANCER    Post-Op Diagnosis: Same       Procedure(s):  LOW ANTERIOR RESECTION, diverting loop ileostomy    Surgeon(s):  Oscar Chaparro MD    Assistant:  First Assistant: Alvarez Gonzalez    Anesthesia: General    Estimated Blood Loss (mL): 595     Complications: None    Specimens:   ID Type Source Tests Collected by Time Destination   A : RECTO-SIGMOID COLON Tissue Sigmoid Colon SURGICAL PATHOLOGY Oscar Chaparro MD 10/3/2022 1314    B : PROXIMAL DONUT Tissue Sigmoid Colon SURGICAL PATHOLOGY Oscar Chaparro MD 10/3/2022 1332        Implants:  * No implants in log *      Drains:   Closed/Suction Drain Medial;Left LLQ Bulb (Active)       NG/OG/NJ/NE Tube Orogastric 16 fr Left mouth (Active)       Ileostomy/Jejunostomy RUQ Loop ileostomy (Active)       Urinary Catheter 10/03/22 Balbuena-Temperature (Active)       Findings: Proximal rectal cancer, low anterior resection with diverting loop ileostomy    Electronically signed by Sarah Valverde MD on 10/3/2022 at 2:31 PM

## 2022-10-04 LAB
GLUCOSE BLD-MCNC: 112 MG/DL (ref 70–99)
GLUCOSE BLD-MCNC: 120 MG/DL (ref 70–99)
PERFORMED ON: ABNORMAL
PERFORMED ON: ABNORMAL

## 2022-10-04 PROCEDURE — 99024 POSTOP FOLLOW-UP VISIT: CPT | Performed by: COLON & RECTAL SURGERY

## 2022-10-04 PROCEDURE — 51702 INSERT TEMP BLADDER CATH: CPT

## 2022-10-04 PROCEDURE — 6360000002 HC RX W HCPCS: Performed by: COLON & RECTAL SURGERY

## 2022-10-04 PROCEDURE — 1210000000 HC MED SURG R&B

## 2022-10-04 PROCEDURE — 6370000000 HC RX 637 (ALT 250 FOR IP): Performed by: FAMILY MEDICINE

## 2022-10-04 PROCEDURE — 99212 OFFICE O/P EST SF 10 MIN: CPT

## 2022-10-04 PROCEDURE — 2580000003 HC RX 258: Performed by: COLON & RECTAL SURGERY

## 2022-10-04 PROCEDURE — 6370000000 HC RX 637 (ALT 250 FOR IP): Performed by: COLON & RECTAL SURGERY

## 2022-10-04 RX ORDER — ATORVASTATIN CALCIUM 20 MG/1
20 TABLET, FILM COATED ORAL DAILY
Refills: 3 | Status: DISCONTINUED | OUTPATIENT
Start: 2022-10-04 | End: 2022-10-10 | Stop reason: HOSPADM

## 2022-10-04 RX ADMIN — ATORVASTATIN CALCIUM 20 MG: 20 TABLET, FILM COATED ORAL at 08:43

## 2022-10-04 RX ADMIN — ONDANSETRON 4 MG: 2 INJECTION INTRAMUSCULAR; INTRAVENOUS at 22:57

## 2022-10-04 RX ADMIN — NALOXEGOL OXALATE 25 MG: 12.5 TABLET, FILM COATED ORAL at 08:43

## 2022-10-04 RX ADMIN — ONDANSETRON 4 MG: 2 INJECTION INTRAMUSCULAR; INTRAVENOUS at 16:41

## 2022-10-04 RX ADMIN — SODIUM CHLORIDE: 9 INJECTION, SOLUTION INTRAVENOUS at 06:03

## 2022-10-04 RX ADMIN — ENOXAPARIN SODIUM 40 MG: 100 INJECTION SUBCUTANEOUS at 21:05

## 2022-10-04 RX ADMIN — ROPIVACAINE HYDROCHLORIDE: 5 INJECTION EPIDURAL; INFILTRATION; PERINEURAL at 11:36

## 2022-10-04 RX ADMIN — SODIUM CHLORIDE, POTASSIUM CHLORIDE, SODIUM LACTATE AND CALCIUM CHLORIDE: 600; 310; 30; 20 INJECTION, SOLUTION INTRAVENOUS at 16:02

## 2022-10-04 ASSESSMENT — PAIN - FUNCTIONAL ASSESSMENT: PAIN_FUNCTIONAL_ASSESSMENT: PREVENTS OR INTERFERES SOME ACTIVE ACTIVITIES AND ADLS

## 2022-10-04 ASSESSMENT — PAIN SCALES - GENERAL
PAINLEVEL_OUTOF10: 3
PAINLEVEL_OUTOF10: 3

## 2022-10-04 ASSESSMENT — PAIN DESCRIPTION - PAIN TYPE: TYPE: ACUTE PAIN

## 2022-10-04 ASSESSMENT — PAIN DESCRIPTION - LOCATION: LOCATION: ABDOMEN

## 2022-10-04 ASSESSMENT — PAIN DESCRIPTION - ORIENTATION: ORIENTATION: LOWER;LEFT

## 2022-10-04 ASSESSMENT — PAIN DESCRIPTION - DESCRIPTORS: DESCRIPTORS: DISCOMFORT

## 2022-10-04 NOTE — PROGRESS NOTES
Patient resting in bed. No complaints of pain. Mild discomfort in lower abdomen. Writer heard flatus being expelled. Patient felt a little less discomfort. Tolerating ice chips. No further needs. Call light in reach.

## 2022-10-04 NOTE — OP NOTE
[address]                      [facility], [address] [place] [address]                                OPERATIVE REPORT    PATIENT NAME: Cedric Hudson                     :        1950  MED REC NO:   [id number]                            ROOM:       O068  ACCOUNT NO:   [de-identified]                           ADMIT DATE: 10/03/2022  PROVIDER:     Boris Posada MD    DATE OF PROCEDURE:  10/03/2022    PREOPERATIVE DIAGNOSIS:  Proximal rectal carcinoma. POSTOPERATIVE DIAGNOSIS:  Proximal rectal carcinoma. PROCEDURES:  1. Partial proctectomy with total mesorectal excision and primary  anastomosis. 2.  Diverting loop ileostomy. SURGEON:  Boris Posada MD    ASSISTANT:  Ms. Dougie Mckeon. ANESTHESIA:  1. General endotracheal anesthesia. 2.  Epidural.    SPECIMENS:  1. Rectosigmoid. 2.  Proximal anastomotic ring. ESTIMATED BLOOD LOSS:  200 mL. COMPLICATIONS:  None. INDICATIONS:  A 72-year-old female who has a proximal rectal carcinoma,  biopsy proven endoscopically. Risks and benefits of resection with diverting ileostomy possible  described. Risks of the surgery including infection, bleeding,  anastomotic leak, reoperation, and ostomy complications all discussed. Other complications such as arrhythmia, heart attack, stroke, and death  described as well. CT scans preoperatively showed no evidence of metastatic disease. Bowel  prep given. OPERATIVE PROCEDURE:  She was taken to the operating room, placed in the  supine position. General endotracheal anesthesia was administered. Epidural placed preoperatively. She was placed in [address] [place] [address]. All  pressure points properly padded. Balbuena catheter and orogastric tube  were placed. Ancef and Flagyl given preoperatively. A time-out was  taken for appropriate verification. An incision was made from her umbilicus to her symphysis pubis.    Subcutaneous tissues were incised to the fascia which was divided in the  midline. The posterior fascia and peritoneum were entered without  difficulty. A Gayla retractor was used for exposure. The sigmoid colon was freed up along the line of Toldt with cautery. It  was freed up to the splenic flexure. The left ureter was identified. The presacral space was entered. The midsigmoid was transected with a  contour stapler. The mesentery was clamped, cut and ligated using a  combination of LigaSure as well as 0 Vicryl suture for the superior  hemorrhoidal vessels in a high fashion. I was able to get to the lower portion of the rectum on all four sides  including incising Denonvilliers' fascia. I was able to feel the tumor  and get a contour stapler below a 3 cm. After the stapling, the  rectosigmoid was removed. It was opened on the back table with an  adequate distal margin. ICG was given intravenously and using the [employer] AIM perfusion  equipment, the entire proximal stump was well perfused. A 65  pursestring device was used to anchor a 29 EEA anvil. The stapler was  inserted transanally to the distal staple line. The spike was opened,  the connection made, closed and fired. The anastomotic rings were  intact. The proximal was sent as specimen. There was no tension. The corners were reinforced with 3-0 silk suture. Flexible sigmoidoscopy was performed three times with the anastomosis  submerged and there was no leak present. The ring was intact  circumferentially. A 19-Marshallese round Wynema Horse drain was placed in the pelvis and brought out  laterally and sutured to the skin. The small bowel was run from the  ligament of Treitz to the ileocecal valve. Given the low nature of the  anastomosis, I elected to proceed with a diverting loop ileostomy. A site was created on the right abdominal wall down through the fascia  in a cruciate manner. The distal ileum was brought up and suspended  with a 20-Marshallese chest tube.   Afferent and efferent limbs were marked. The omentum was draped back over the intestine. Lap, needle,  instrument, and towel counts were all correct. The fascia was closed with a combination of 0 Vicryl and 0 Prolene  suture. Skin was closed with staples. The ileostomy was matured in a Ellen fashion using 3-0 chromic suture. An ostomy appliance was placed. Dressings were applied on the midline. Following closure, lap, needle, instrument, and towel counts were all  correct. The resection was done for cure. A total mesorectal excision  was performed down to the pelvic floor. The patient was placed back in supine position. She was extubated and  taken to recovery for postop monitoring.         Mark De La Rosa MD    D: 10/03/2022 14:42:10       T: 10/03/2022 14:45:59     [id number]  Job#: [id number]     Doc#: [id number]    CC:

## 2022-10-04 NOTE — PROGRESS NOTES
Wound Ostomy Continence Nurse                                                                                Ostomy Referral Progress Note      NAME:  [address] RECORD NUMBER:  [id number]  AGE: 81 y.o. GENDER:  female  :  1950  TODAY'S DATE:  10/4/2022    Subjective     Wally Catrina is a 81 y.o. female referred by:   [x] Physician  [] Nursing  [] Other:      Diverting loop ileostomy and New    PAST MEDICAL HISTORY:        Diagnosis Date    Arthritis     Cancer ([facility] [address]     SKIN    Hyperlipidemia        MEDICATIONS:    No current facility-administered medications on file prior to encounter. Current Outpatient Medications on File Prior to Encounter   Medication Sig Dispense Refill    aspirin 325 MG tablet Take 325 mg by mouth daily      simvastatin (ZOCOR) 40 MG tablet Take 1 tablet by mouth every evening 90 tablet 3    meloxicam (MOBIC) 15 MG tablet Take 1 tablet by mouth daily 90 tablet 3    Handicap Placard MISC by Does not apply route Good for 5 years 1 each 0       ALLERGIES:    Allergies   Allergen Reactions    Morphine Itching       PAST SURGICAL HISTORY:    Past Surgical History:   Procedure Laterality Date    CATARACT REMOVAL      CHOLECYSTECTOMY      COLONOSCOPY N/A 2022    COLORECTAL CANCER SCREENING, NOT HIGH RISK performed by Isaias Castro MD at [facility]    COLONOSCOPY N/A 2022    FLEXIBLE SIGMOIDOSCOPY WITH SNARE POLYPECTOMY performed by Fiona Burroughs MD at [facility] LEG    TUBAL LIGATION         FAMILY HISTORY:    family history includes Breast Cancer in her maternal aunt; Diabetes in her mother.     SOCIAL HISTORY:    Social History     Tobacco Use    Smoking status: Never    Smokeless tobacco: Never   Vaping Use    Vaping Use: Never used   Substance Use Topics    Alcohol use: No    Drug use: No       LABS:  WBC:    Lab Results   Component Value Date/Time    WBC 7.5 2022 10:13 AM morning. Intake/Output Summary (Last 24 hours) at 10/4/2022 1230  Last data filed at 10/4/2022 1013  Gross per 24 hour   Intake 2353.2 ml   Output 2810 ml   Net -456.8 ml       DATE OF LAST BM=  pre-operatively     Plan   Plan for Ostomy Care: New ileostomy education lesson tomorrow morning      Ostomy Plan of Care  [] Supplies/Instructions left in room  [] Patient using home supplies  [x] Brand/supplies at bedside - will be provided tomorrow    Current Diet: ADULT DIET;  Clear Liquid  Dietician consult:  N/A    Discharge Plan:  Placement for patient upon discharge: home with support    Outpatient visit plan No  Supplies given N/A   Samples requested N/A    Referrals:  []   [] [address] [place] [facility]  [] Supplies  [] Other      Patient/Caregiver Teaching:  Written Instructions given to patient/family - Patient  Teaching provided:  [x] Reviewed GI and A&P        [] Supplies  [] Pouch emptying      [] Manipulate closure  [] Routine Care         [] Comment  [] Pouch maintenance           Level of patient/caregiver understanding able to:  [] Indicates understanding       [x] Needs reinforcement  [] Unsuccessful      [x] Verbal Understanding  [] Demonstrated understanding       [] No evidence of learning  [] Refused teaching         [] N/A    Electronically signed by ROSALIA Villalta, RN, [facility] on 10/4/2022 at 12:57 PM

## 2022-10-04 NOTE — PROGRESS NOTES
Pharmacy Note    Sunni Chau is a 81 y.o. female on epidural drip started earlier today. New order received for Lovenox 40mg/day . Patient Active Problem List   Diagnosis    Cancer ([address]    Hyperlipidemia    Elevated blood pressure reading    Special screening for malignant neoplasms, colon    Colonic mass    Rectal polyp    Rectal cancer (HCC)       Allergies:  Morphine      Height: 5' (152.4 cm), Weight: 194 lb (88 kg), Body mass index is 37.89 kg/m². Per Roxana Maier [facility], [place] to start Lovenox 40mg/day in AM on 10-4-22.     Osmani Pitts [facility]  10/03/22  9:13 PM

## 2022-10-04 NOTE — PROGRESS NOTES
Pt Name: Kandace Urrutia Record Number: [id number]  Date of Birth 1950   Admit date 10/3/2022  9:19 AM  Today's Date: 10/4/2022     ASSESSMENT  1. Hospital day # 1  2 postop day #1 low anterior resection with diverting ileostomy  3. Epidural working well  4.  BRENDAN serosanguineous  5. Ileostomy beginning to function    PLAN  1. Ms. Merchant Sadiqsalvador consultation for ostomy care and education  2. Continue epidural  3. Clear liquids  4. Out of bed    SUBJECTIVE  Chief complaint: Follow-up low anterior resection  Afebrile, vital signs are stable. She denies any nausea or vomiting, has not passed flatus or had a bowel movement. She is tolerating a Diet NPO Exceptions are: Ice Chips, Sips of Water with Meds, Popsicles. Her pain is well controlled on current medications. has a past medical history of Arthritis, Cancer ([address] and Hyperlipidemia. CURRENT MEDS  Scheduled Meds:   atorvastatin  20 mg Oral Daily    sodium chloride flush  5-40 mL IntraVENous 2 times per day    naloxegol  25 mg Oral Daily    enoxaparin  40 mg SubCUTAneous Daily     Continuous Infusions:   lactated ringers      fentanyl epidural builder 6 mL/hr at 10/03/22 2235    lactated ringers 1,000 mL (10/03/22 1550)    sodium chloride      sodium chloride 100 mL/hr at 10/04/22 0603     PRN Meds:.naloxone, ondansetron, sodium chloride flush, sodium chloride, ondansetron **OR** ondansetron    OBJECTIVE  CURRENT VITALS:  height is 5' (1.524 m) and weight is 194 lb (88 kg). Her axillary temperature is 99.5 °F (37.5 °C). Her blood pressure is 126/66 and her pulse is 96. Her respiration is 17 and oxygen saturation is 96%.    Temperature Range (24h):Temp: 99.5 °F (37.5 °C) Temp  Av.5 °F (36.9 °C)  Min: 97.4 °F (36.3 °C)  Max: 99.5 °F (37.5 °C)  BP Range (82J): Systolic (27BFE), RBD:561 , Min:68 , LXK:469     Diastolic (08WSG), ERE:37, Min:42, Max:71    Pulse Range (24h): Pulse  Av.8  Min: 56  Max: 100  Respiration Range (24h): Resp  Avg: 15.3 Min: 9  Max: 20    GENERAL: alert, no distress  LUNGS: clear to ausculation, without wheezes, rales or rhonci  HEART: normal rate and regular rhythm  ABDOMEN: non-distended, ileostomy patent and pink, BRENDAN serosanguineous, dressings dry, bowel sounds present in all 4 quadrants, and no guarding or peritoneal signs  EXTERMITY: no cyanosis, clubbing or edema  In: 4178.2 [P.O.:75; I.V.:4103.2]  Out: 2660 [Urine:2350; Drains:110]  Date 10/04/22 0000 - 10/04/22 2359   Shift 4384-4435 0341-6008 4976-8658 24 Hour Total   INTAKE   P.O.(mL/kg/hr) 75   75   I. V.(mL/kg) 1603. 2(18.2)   1603. 2(18.2)   Shift Total(mL/kg) 1678.2(19.1)   1678.2(19.1)   OUTPUT   Urine(mL/kg/hr) 450   450   Drains(mL/kg) 20(0.2)   20(0.2)   Shift Total(mL/kg) 470(5.3)   470(5.3)   Weight (kg) 88 88 88 88       LABS  Recent Labs     10/03/22  1901      K 4.3      CO2 24   BUN 7*   CREATININE 0.65   CALCIUM 8.6      No results for input(s): PTT, INR in the last 72 hours. Invalid input(s): PT  No results for input(s): AST, ALT, BILITOT, BILIDIR, AMYLASE, LIPASE, LDH, LACTA in the last 72 hours. RADIOLOGY  CT CHEST W CONTRAST    Result Date: 9/26/2022  EXAMINATION: CT OF THE CHEST WITH CONTRAST 9/22/2022 3:06 pm TECHNIQUE: CT of the chest was performed with the administration of intravenous contrast. Multiplanar reformatted images are provided for review. Automated exposure control, iterative reconstruction, and/or weight based adjustment of the mA/kV was utilized to reduce the radiation dose to as low as reasonably achievable. COMPARISON: None. HISTORY: ORDERING SYSTEM PROVIDED HISTORY: Colonic mass TECHNOLOGIST PROVIDED HISTORY: STAT Creatinine as needed:->No Reason for exam:->colon mass evaluate mets What reading provider will be dictating this exam?->CRC FINDINGS: The lung fields reveal no evidence of any nodules, consolidation or atelectasis. Minimal lymph nodes noted adjacent to the aortic arch and the pretracheal area.   No pleural effusion or pneumothorax. No definitive filling defects seen within the primary trunk or pulmonary arteries including the segmental and subsegmental branches to suggest PE. The aorta is unremarkable. Negative CT scan of the chest except for minimal lymph nodes adjacent to the aorta of aortic arch and pretracheal area. CT ABDOMEN PELVIS W IV CONTRAST Additional Contrast? None    Result Date: 9/23/2022  EXAMINATION: CT OF THE ABDOMEN AND PELVIS WITH CONTRAST 9/22/2022 3:06 pm TECHNIQUE: CT of the abdomen and pelvis was performed with the administration of intravenous contrast.  PO contrast was administered. Multiplanar reformatted images are provided for review. Automated exposure control, iterative reconstruction, and/or weight based adjustment of the mA/kV was utilized to reduce the radiation dose to as low as reasonably achievable. COMPARISON: None. HISTORY: ORDERING SYSTEM PROVIDED HISTORY: Colonic mass TECHNOLOGIST PROVIDED HISTORY: Additional Contrast?->None STAT Creatinine as needed:->No Reason for exam:->colon mass evaluate mets What reading provider will be dictating this exam?->CRC FINDINGS: Evaluation for metastases is limited on non IV contrast study. Lower Chest: Bilateral lung bases are clear. Organs: Evaluation of solid organs limited by lack of intravenous contrast liver, spleen, pancreas, bilateral adrenal gland, gallbladder are within normal limits. No evidence of obstructive nephrolithiasis or hydronephrosis. GI/Bowel: Evaluation of bowel limited by lack of oral contrast.  No pathological dilatation of small-bowel loops. Appendix is within normal limits. There is diverticulosis without diverticulitis. Pelvis: Urinary bladder is minimally distended and appears grossly unremarkable. An intrauterine device is in place. Uterus and adnexa are grossly unremarkable. Peritoneum/Retroperitoneum: Abdominal aorta is normal in course and caliber. No evidence of lymphadenopathy.   No free fluid or free intra-abdominal air. Bones/Soft Tissues: Normal     Limited non IV contrast study. 1. No imaging evidence of acute intra-abdominal or pelvic process. PEDRO DIGITAL SCREEN W OR WO CAD BILATERAL    Result Date: 9/18/2022  EXAMINATION: SCREENING DIGITAL BILATERAL MAMMOGRAM WITH TOMOSYNTHESIS, 9/15/2022 1:54 pm TECHNIQUE: Screening mammography of the bilateral breasts was performed with tomosynthesis. 2D standard and 3D tomosynthesis combination imaging performed through both breasts in the MLO and CC projection. Computer aided detection was utilized in the interpretation of this exam. COMPARISON: 11/23/2019 HISTORY: Screening. FINDINGS: There are scattered areas of fibroglandular density. There is no new dominant mass, suspicious microcalcification, or area of architectural distortion. No mammographic evidence for malignancy. BIRADS: BIRADS - CATEGORY 1 Negative, no evidence of malignancy. Normal interval follow-up is recommended in 12 months. OVERALL ASSESSMENT - NEGATIVE A letter of notification will be sent to the patient regarding the results. The [address] [place] [address] St of [facility] recommends annual mammograms for women 40 years and older.     Electronically signed by Anjali Alexander MD on 10/4/2022 at 7:40 AM

## 2022-10-04 NOTE — CONSULTS
Hospitalist Consult    Admit Date: 10/3/2022  PCP: Bess Posey DO    CHIEF COMPLAINT:  abnormal colonoscopy    HISTORY OF PRESENT ILLNESS:    The patient is a 81 y.o. 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 She was admitted for scheduled polypectomy and partial resection. She is post op day 0, resting in bed. She denies cp, sob.     Past Medical History:        Diagnosis Date    Arthritis     Cancer ([address]     SKIN    Hyperlipidemia      Past Surgical History:        Procedure Laterality Date    CATARACT REMOVAL      CHOLECYSTECTOMY      COLONOSCOPY N/A 9/16/2022    COLORECTAL CANCER SCREENING, NOT HIGH RISK performed by Mariangel East MD at [facility]    COLONOSCOPY N/A 9/26/2022    FLEXIBLE SIGMOIDOSCOPY WITH SNARE POLYPECTOMY performed by Daquan Payton MD at [address] [place] [address] LOWER LEG    TUBAL LIGATION       Social History:   Social History     Socioeconomic History    Marital status:      Spouse name: Not on file    Number of children: Not on file    Years of education: Not on file    Highest education level: Not on file   Occupational History    Not on file   Tobacco Use    Smoking status: Never    Smokeless tobacco: Never   Vaping Use    Vaping Use: Never used   Substance and Sexual Activity    Alcohol use: No    Drug use: No    Sexual activity: Not on file   Other Topics Concern    Not on file   Social History Narrative    Not on file     Social Determinants of Health     Financial Resource Strain: Low Risk     Difficulty of Paying Living Expenses: Not hard at all   Food Insecurity: No Food Insecurity    Worried About Running Out of Food in the Last Year: Never true    Ran Out of Food in the Last Year: Never true   Transportation Needs: Not on file   Physical Activity: Not on file   Stress: Not on file   Social Connections: Not on file   Intimate Partner Violence: Not on file   Housing Stability: Not on file     Family History:   Family History   Problem Relation Age of Onset    Diabetes Mother     Breast Cancer Maternal Aunt     Colon Cancer Neg Hx      Medications Prior to Admission:    Prior to Admission medications    Medication Sig Start Date End Date Taking? Authorizing Provider   aspirin 325 MG tablet Take 325 mg by mouth daily    Historical Provider, MD   simvastatin (ZOCOR) 40 MG tablet Take 1 tablet by mouth every evening 21   Jose Tino, APRN - CNP   meloxicam [facility]) 15 MG tablet Take 1 tablet by mouth daily 21   Jose Tino, APRN - CNP   Handicap Placard MISC by Does not apply route Good for 5 years 19   Gus Speaker, MD       Allergies:  Morphine    REVIEW OF SYSTEMS:  Negative except for what is in HPI and 10 pt systems reviewed and negative. PHYSICAL EXAM:  Vitals:  /66   Pulse 96   Temp 99.5 °F (37.5 °C) (Axillary)   Resp 17   Ht 5' (1.524 m)   Wt 194 lb (88 kg)   SpO2 96%   BMI 37.89 kg/m²   Pulse Ox: SpO2  Av.4 %  Min: 96 %  Max: 100 %  Supplemental O2: O2 Flow Rate (L/min): 0 L/min    CONSTITUTIONAL:  awake, alert, cooperative, no apparent distress, and appears stated age  HEENT: Normocephalic, PERRLA  NECK: no JVD, no LAD  HEART: RRR, no murmurs, gallops, or rubs  LUNGS: clear to auscultation bilaterally, no wheezes, crackles, or rhonchi.   ABDOMEN: soft/NT/ND, positive BS  MUSCULOSKELETAL: negative for edema, +2 pulses  SKIN: intact without rash or jaundice  NEURO:  CN ii-xii intact and no focal deficits    DATA:  Recent Results (from the past 24 hour(s))   Basic Metabolic Panel w/ Reflex to MG    Collection Time: 10/03/22  7:01 PM   Result Value Ref Range    Sodium 137 135 - 144 mEq/L    Potassium reflex Magnesium 4.3 3.4 - 4.9 mEq/L    Chloride 103 95 - 107 mEq/L    CO2 24 20 - 31 mEq/L    Anion Gap 10 9 - 15 mEq/L    Glucose 148 (H) 70 - 99 mg/dL    BUN 7 (L) 8 - 23 mg/dL    Creatinine 0. 65 0.50 - 0.90 mg/dL    GFR Non-African American >60.0 >60    GFR  >60.0 >60    Calcium 8.6 8.5 - 9.9 mg/dL         ASSESSMENT AND PLAN:  1)  HLD       Cont statin when taking po    2)  Rectosigmoid cancer       Post op day 0, post op care, pt/ot, dvt proph, pain control.         Code status: Full Code    Electronically signed by Jeremy Morin MD on 10/4/22 at 1:56 AM EDT

## 2022-10-04 NOTE — PROGRESS NOTES
Hospitalist Daily Progress Note  Name: Wally Fritz  Age: 81 y.o. Gender: female  CodeStatus: Full Code  Allergies: Morphine    Chief Complaint:No chief complaint on file. Primary Care Provider: Telly Morgan DO    InpatientTreatment Team: Treatment Team: Attending Provider: Fiona Burroughs MD; Surgeon: Fiona Burroughs MD; Consulting Physician: Reeta Frankel, MD; Patient Care Tech: Mukundtrish Fabian; Registered Nurse: Letitia Cervantes RN; : Hilda Whitt RN; Utilization Reviewer: Kathleen Resendiz RN; Registered Nurse: Aiden Shipley RN    Admission Date: 10/3/2022      Subjective: No chest pain, sob, nausea. Resting in bed. Physical Exam  Vitals and nursing note reviewed. Constitutional:       Appearance: Normal appearance. Cardiovascular:      Rate and Rhythm: Normal rate and regular rhythm. Pulmonary:      Effort: Pulmonary effort is normal.      Breath sounds: Normal breath sounds. Abdominal:      General: Bowel sounds are normal.      Palpations: Abdomen is soft. Musculoskeletal:         General: Normal range of motion. Skin:     General: Skin is warm and dry. Neurological:      Mental Status: She is alert and oriented to person, place, and time. Mental status is at baseline. Medications:  Reviewed    Infusion Medications:    lactated ringers      fentanyl epidural builder 6 mL/hr at 10/03/22 2235    lactated ringers 1,000 mL (10/03/22 1550)    sodium chloride      sodium chloride 100 mL/hr at 10/04/22 0603     Scheduled Medications:    atorvastatin  20 mg Oral Daily    sodium chloride flush  5-40 mL IntraVENous 2 times per day    naloxegol  25 mg Oral Daily    enoxaparin  40 mg SubCUTAneous Daily     PRN Meds: naloxone, ondansetron, sodium chloride flush, sodium chloride, ondansetron **OR** ondansetron    Labs:   No results for input(s): WBC, HGB, HCT, PLT in the last 72 hours.   Recent Labs     10/03/22  1901      K 4.3      CO2 24 BUN 7*   CREATININE 0.65   CALCIUM 8.6     No results for input(s): AST, ALT, BILIDIR, BILITOT, ALKPHOS in the last 72 hours. No results for input(s): INR in the last 72 hours. No results for input(s): Nory Fuse in the last 72 hours. Urinalysis:   Lab Results   Component Value Date/Time    NITRU Negative 05/09/2017 10:30 AM    BLOODU Negative 05/09/2017 10:30 AM    SPECGRAV 1.003 05/09/2017 10:30 AM    GLUCOSEU Negative 05/09/2017 10:30 AM       Radiology:   Most recent    Chest CT      WITH CONTRAST:Results for orders placed during the hospital encounter of 09/22/22    CT CHEST W CONTRAST    Narrative  EXAMINATION:  CT OF THE CHEST WITH CONTRAST 9/22/2022 3:06 pm    TECHNIQUE:  CT of the chest was performed with the administration of intravenous  contrast. Multiplanar reformatted images are provided for review. Automated  exposure control, iterative reconstruction, and/or weight based adjustment of  the mA/kV was utilized to reduce the radiation dose to as low as reasonably  achievable. COMPARISON:  None. HISTORY:  ORDERING SYSTEM PROVIDED HISTORY: Colonic mass  TECHNOLOGIST PROVIDED HISTORY:  STAT Creatinine as needed:->No  Reason for exam:->colon mass evaluate mets  What reading provider will be dictating this exam?->CRC    FINDINGS:  The lung fields reveal no evidence of any nodules, consolidation or  atelectasis. Minimal lymph nodes noted adjacent to the aortic arch and the  pretracheal area. No pleural effusion or pneumothorax. No definitive  filling defects seen within the primary trunk or pulmonary arteries including  the segmental and subsegmental branches to suggest PE. The aorta is  unremarkable. Impression  Negative CT scan of the chest except for minimal lymph nodes adjacent to the  aorta of aortic arch and pretracheal area. WITHOUT CONTRAST: No results found for this or any previous visit.       CXR      2-view: No results found for this or any previous visit. Portable: Results for orders placed during the hospital encounter of 05/09/17    XR Chest Portable    Narrative  PORTABLE CHEST: 5/9/2017    CLINICAL HISTORY: Vision loss in right eye. COMPARISON: 10/27/2014. A portable upright AP radiograph of the chest was obtained. FINDINGS:    The lungs are clear. The cardiac and mediastinal silhouettes are within normal limits for the patient's age group and technique. There is no significant pleural effusion, vascular congestion, pneumothorax, or displaced fractures identified. Impression  NO EVIDENCE OF ACTIVE CARDIOPULMONARY DISEASE. Echo No results found for this or any previous visit. Assessment/Plan:    Active Hospital Problems    Diagnosis Date Noted    Rectal cancer ([facility] [C20] 10/03/2022     Priority: Medium     ASSESSMENT AND PLAN:  1)  HLD       Cont statin when taking po     2)  Rectosigmoid cancer       Post op day 0, post op care, pt/ot, dvt proph, pain control.     Electronically signed by Juan C Yeager MD on 10/4/2022 at 11:21 AM

## 2022-10-04 NOTE — PROGRESS NOTES
1020: Assessment complete. Alert and oriented, calm and cooperative. VSS 2L NC for comfort. Epidural providing adequate pain relief per pt-- dressing SDI. Attempt to get pt out of bed at this time, stating she wished to try again later. Education provided regarding ambulation benefits, with pt stating she will sit in chair for lunch. Dressing to midline abd CDI. Ostomy red and moist with scant liquid brown stool present in bag. Ken drain to compressed bulb. Repositioned. No complaints. Resting comfortably at this time. SCDs in place. Bed alarm on.     1145: IS provided for pt. Epidural bag changed--waste with [employer]. 1345: Pt resting in bed at this time with no complaints. Repositioned. Spoke with pt regarding ambulation stating \"I dont have it in me today. \" Education provided regarding bnefits of ambulation post-op with no evidence of learning. 1650: Pt states she is hot and nauseous-- Zofran given per MAR. Temp taken 98.2. Pt request SCDs be removed. Cool rag applied to forehead for comfort. Dr. Gina Johnson aware of episode of nausea. Pt reports adequate pain relief from epidural. 300mL brown, liquid stool emptied form ostomy. 10mL red/bloody output emptied from drain. 700mL clear/yellow urine from farris this shift. 1800: Pt had bilious ~10mL episode of emesis. Pt educated to stick to ice chips to keep mucous membranes moist and to keep RN notified if nausea persists. Pt states she feels better following small episode of emesis. Family at bedside with update given.      Electronically signed by Starr Colby RN on 10/4/2022 at 10:25 AM

## 2022-10-05 PROCEDURE — 6370000000 HC RX 637 (ALT 250 FOR IP): Performed by: FAMILY MEDICINE

## 2022-10-05 PROCEDURE — 2580000003 HC RX 258: Performed by: COLON & RECTAL SURGERY

## 2022-10-05 PROCEDURE — 6370000000 HC RX 637 (ALT 250 FOR IP): Performed by: COLON & RECTAL SURGERY

## 2022-10-05 PROCEDURE — 1210000000 HC MED SURG R&B

## 2022-10-05 PROCEDURE — 6360000002 HC RX W HCPCS: Performed by: COLON & RECTAL SURGERY

## 2022-10-05 PROCEDURE — 99024 POSTOP FOLLOW-UP VISIT: CPT | Performed by: COLON & RECTAL SURGERY

## 2022-10-05 PROCEDURE — 99213 OFFICE O/P EST LOW 20 MIN: CPT

## 2022-10-05 PROCEDURE — 2700000000 HC OXYGEN THERAPY PER DAY

## 2022-10-05 RX ADMIN — NALOXEGOL OXALATE 25 MG: 12.5 TABLET, FILM COATED ORAL at 09:28

## 2022-10-05 RX ADMIN — ROPIVACAINE HYDROCHLORIDE: 5 INJECTION EPIDURAL; INFILTRATION; PERINEURAL at 16:09

## 2022-10-05 RX ADMIN — ATORVASTATIN CALCIUM 20 MG: 20 TABLET, FILM COATED ORAL at 09:28

## 2022-10-05 RX ADMIN — ENOXAPARIN SODIUM 40 MG: 100 INJECTION SUBCUTANEOUS at 20:35

## 2022-10-05 RX ADMIN — ROPIVACAINE HYDROCHLORIDE: 5 INJECTION EPIDURAL; INFILTRATION; PERINEURAL at 01:34

## 2022-10-05 RX ADMIN — SODIUM CHLORIDE: 9 INJECTION, SOLUTION INTRAVENOUS at 01:37

## 2022-10-05 RX ADMIN — SODIUM CHLORIDE: 9 INJECTION, SOLUTION INTRAVENOUS at 14:24

## 2022-10-05 RX ADMIN — ONDANSETRON 4 MG: 2 INJECTION INTRAMUSCULAR; INTRAVENOUS at 19:14

## 2022-10-05 RX ADMIN — ONDANSETRON 4 MG: 2 INJECTION INTRAMUSCULAR; INTRAVENOUS at 06:11

## 2022-10-05 ASSESSMENT — PAIN SCALES - GENERAL
PAINLEVEL_OUTOF10: 0

## 2022-10-05 NOTE — PROGRESS NOTES
Wound Ostomy Continence Nurse  Ostomy Referral Follow-up Progress Note      NAME:  [address] RECORD NUMBER:  [id number]  AGE: 81 y.o. GENDER:  female  :  1950  TODAY'S DATE:  10/5/2022    Subjective: Nelson Gomez is a 81 y.o. female referred by:   [x] Physician  [] Nursing  [] Other:     Loop ileostomy and New    Objective    /74   Pulse 95   Temp 97.4 °F (36.3 °C) (Oral)   Resp 18   Ht 5' (1.524 m)   Wt 194 lb (88 kg)   SpO2 99%   BMI 37.89 kg/m²     Kenneth Risk Score Kenneth Scale Score: 20    Assessment   Surgeon - Dr. Cole Pickard       Ileostomy/Jejunostomy RLQ Loop ileostomy (Active)   Stomal Appliance 1 piece;Clean, dry & intact 10/05/22 1000   Flange Size (inches) 3 Inches 10/05/22 1000   Stoma  Assessment Red;Moist;Protrudes 10/05/22 1000   Peristomal Assessment GERA 10/05/22 1000   Stool Appearance Watery 10/05/22 1000   Stool Color Brown 10/05/22 1000   Stool Amount Medium 10/05/22 1000   Output (mL) 80 ml 10/05/22 0944   Number of days: 1     Patient sitting up in chair at this time with her cousin Raad Warner at the bedside. Patient's sister is unable to come in for new ileostomy education, but patient's cousin Raad Ibrahimalmita, a retired nurse, is going to be help patient when she is discharged home. Patient had some episodes of emesis yesterday evening and is having a lot nausea this morning - this [id number]  Nurse came in earlier this morning and stressed importance of ambulating and getting up to chair, which patient did. Patient still having a lot of nausea, but agrees for some education this morning. No change in stoma appearance, stoma aby still has tensions, not ready to be removed today. Ileostomy is functioning, emptied earlier today for about 200 ml of brown watery effluent. New ileostomy education lesson provided at this time. Patient and patient's cousin asked appropriate questions.  Towards the end of the lesson, patient looked very tired, agreed for hands on portion of the lesson tomorrow morning and patient's cousin is able to come in for that portion as well. Intake/Output Summary (Last 24 hours) at 10/5/2022 1058  Last data filed at 10/5/2022 0944  Gross per 24 hour   Intake 3456.83 ml   Output 2980 ml   Net 476.83 ml       Plan:   Plan for Ostomy Care: See above    Ostomy Plan of Care  [x] Supplies/Instructions left in room  [] Patient using home supplies  [x] Brand/supplies at bedside - [employer]    Current Diet: ADULT DIET;  Clear Liquid  Dietician consult:  N/A    Discharge Plan:  Placement for patient upon discharge: home with support    Outpatient visit plan N/A  Supplies given N/A   Samples requested N/A    Referrals:  []   [] [address] [place] [address]  [] Supplies  [] Other      Patient/Caregiver Teaching:  Written Instructions given to patient/family: patient and patient's cousin   Teaching provided:  [x] Reviewed GI and A&P        [x] Supplies  [] Pouch emptying      [x] Manipulate closure  [] Routine Care         [] Comment  [x] Pouch maintenance           Level of patient/caregiver understanding able to:  [] Indicates understanding       [] Needs reinforcement  [] Unsuccessful      [x] Verbal Understanding  [] Demonstrated understanding       [] No evidence of learning  [] Refused teaching         [] N/A    Electronically signed by GÓMEZ ChaidezN, RN, CWOCN on 10/5/2022 at 11:15 AM

## 2022-10-05 NOTE — PROGRESS NOTES
Pt Name: Ash Keita Record Number: [id number]  Date of Birth 1950   Admit date 10/3/2022  9:19 AM  Today's Date: 10/5/2022     ASSESSMENT  1. Hospital day # 2  2 postop day #2 low anterior resection with diverting ileostomy    PLAN  1. Clear liquids  2. Out of bed  3. Ostomy education    SUBJECTIVE  Chief complaint: Follow-up laparotomy  Afebrile, vital signs are stable. Vomiting last night, none since has ileostomy work. She is tolerating a ADULT DIET; Clear Liquid. Her pain is well controlled on current medications. has a past medical history of Arthritis, Cancer ([address] and Hyperlipidemia. CURRENT MEDS  Scheduled Meds:   atorvastatin  20 mg Oral Daily    sodium chloride flush  5-40 mL IntraVENous 2 times per day    naloxegol  25 mg Oral Daily    enoxaparin  40 mg SubCUTAneous Daily     Continuous Infusions:   lactated ringers      fentanyl epidural builder 6 mL/hr at 10/05/22 0134    lactated ringers 100 mL/hr at 10/04/22 1602    sodium chloride      sodium chloride 100 mL/hr at 10/05/22 1424     PRN Meds:.naloxone, ondansetron, sodium chloride flush, sodium chloride, ondansetron **OR** ondansetron    OBJECTIVE  CURRENT VITALS:  height is 5' (1.524 m) and weight is 194 lb (88 kg). Her oral temperature is 97.4 °F (36.3 °C). Her blood pressure is 107/74 and her pulse is 95. Her respiration is 18 and oxygen saturation is 99%.    Temperature Range (24h):Temp: 97.4 °F (36.3 °C) Temp  Av.1 °F (36.7 °C)  Min: 97.4 °F (36.3 °C)  Max: 99 °F (37.2 °C)  BP Range (27H): Systolic (16ENI), PDD:854 , Min:107 , EFU:685     Diastolic (83KCZ), RKF:48, Min:49, Max:74    Pulse Range (24h): Pulse  Av.3  Min: 80  Max: 95  Respiration Range (24h): Resp  Av  Min: 16  Max: 18    GENERAL: alert, no distress  LUNGS: clear to ausculation, without wheezes, rales or rhonci  HEART: normal rate and regular rhythm  ABDOMEN: non-distended, BRENDAN serosanguineous, ileostomy functioning dressings dry, bowel sounds present in all 4 quadrants, and no guarding or peritoneal signs  EXTERMITY: no cyanosis, clubbing or edema  In: 3631.8 [P.O.:565; I.V.:3066.8]  Out: 3130 [Urine:1050; Drains:290]  Date 10/05/22 0000 - 10/05/22 2359   Shift 9452-2782 7326-5774 7774-9090 24 Hour Total   INTAKE   P.O.(mL/kg/hr) 40(0.1)   40   I. V.(mL/kg) 1342. 8(15.3)   1342. 8(15.3)   Shift Total(mL/kg) 1382. 8(15.7)   1382. 8(15.7)   OUTPUT   Urine(mL/kg/hr) 350(0.5)   350   Emesis/NG output(mL/kg) 500(5.7)   500(5.7)   Drains(mL/kg) 120(1.4) 60(0.7)  180(2)   Stool(mL/kg) 200(2.3) 80(0.9)  280(3.2)   Shift Total(mL/kg) 1170(13.3) 140(1.6)  1310(14.9)   Weight (kg) 88 88 88 88       LABS  Recent Labs     10/03/22  1901      K 4.3      CO2 24   BUN 7*   CREATININE 0.65   CALCIUM 8.6      No results for input(s): PTT, INR in the last 72 hours. Invalid input(s): PT  No results for input(s): AST, ALT, BILITOT, BILIDIR, AMYLASE, LIPASE, LDH, LACTA in the last 72 hours. RADIOLOGY  CT CHEST W CONTRAST    Result Date: 9/26/2022  EXAMINATION: CT OF THE CHEST WITH CONTRAST 9/22/2022 3:06 pm TECHNIQUE: CT of the chest was performed with the administration of intravenous contrast. Multiplanar reformatted images are provided for review. Automated exposure control, iterative reconstruction, and/or weight based adjustment of the mA/kV was utilized to reduce the radiation dose to as low as reasonably achievable. COMPARISON: None. HISTORY: ORDERING SYSTEM PROVIDED HISTORY: Colonic mass TECHNOLOGIST PROVIDED HISTORY: STAT Creatinine as needed:->No Reason for exam:->colon mass evaluate mets What reading provider will be dictating this exam?->CRC FINDINGS: The lung fields reveal no evidence of any nodules, consolidation or atelectasis. Minimal lymph nodes noted adjacent to the aortic arch and the pretracheal area. No pleural effusion or pneumothorax.   No definitive filling defects seen within the primary trunk or pulmonary arteries including the segmental and subsegmental branches to suggest PE. The aorta is unremarkable. Negative CT scan of the chest except for minimal lymph nodes adjacent to the aorta of aortic arch and pretracheal area. CT ABDOMEN PELVIS W IV CONTRAST Additional Contrast? None    Result Date: 9/23/2022  EXAMINATION: CT OF THE ABDOMEN AND PELVIS WITH CONTRAST 9/22/2022 3:06 pm TECHNIQUE: CT of the abdomen and pelvis was performed with the administration of intravenous contrast.  PO contrast was administered. Multiplanar reformatted images are provided for review. Automated exposure control, iterative reconstruction, and/or weight based adjustment of the mA/kV was utilized to reduce the radiation dose to as low as reasonably achievable. COMPARISON: None. HISTORY: ORDERING SYSTEM PROVIDED HISTORY: Colonic mass TECHNOLOGIST PROVIDED HISTORY: Additional Contrast?->None STAT Creatinine as needed:->No Reason for exam:->colon mass evaluate mets What reading provider will be dictating this exam?->CRC FINDINGS: Evaluation for metastases is limited on non IV contrast study. Lower Chest: Bilateral lung bases are clear. Organs: Evaluation of solid organs limited by lack of intravenous contrast liver, spleen, pancreas, bilateral adrenal gland, gallbladder are within normal limits. No evidence of obstructive nephrolithiasis or hydronephrosis. GI/Bowel: Evaluation of bowel limited by lack of oral contrast.  No pathological dilatation of small-bowel loops. Appendix is within normal limits. There is diverticulosis without diverticulitis. Pelvis: Urinary bladder is minimally distended and appears grossly unremarkable. An intrauterine device is in place. Uterus and adnexa are grossly unremarkable. Peritoneum/Retroperitoneum: Abdominal aorta is normal in course and caliber. No evidence of lymphadenopathy. No free fluid or free intra-abdominal air. Bones/Soft Tissues: Normal     Limited non IV contrast study.  1. No imaging evidence of acute intra-abdominal or pelvic process. PEDRO DIGITAL SCREEN W OR WO CAD BILATERAL    Result Date: 9/18/2022  EXAMINATION: SCREENING DIGITAL BILATERAL MAMMOGRAM WITH TOMOSYNTHESIS, 9/15/2022 1:54 pm TECHNIQUE: Screening mammography of the bilateral breasts was performed with tomosynthesis. 2D standard and 3D tomosynthesis combination imaging performed through both breasts in the MLO and CC projection. Computer aided detection was utilized in the interpretation of this exam. COMPARISON: 11/23/2019 HISTORY: Screening. FINDINGS: There are scattered areas of fibroglandular density. There is no new dominant mass, suspicious microcalcification, or area of architectural distortion. No mammographic evidence for malignancy. BIRADS: BIRADS - CATEGORY 1 Negative, no evidence of malignancy. Normal interval follow-up is recommended in 12 months. OVERALL ASSESSMENT - NEGATIVE A letter of notification will be sent to the patient regarding the results. The [employer] recommends annual mammograms for women 40 years and older.     Electronically signed by Sheridan King MD on 10/5/2022 at 3:16 PM

## 2022-10-05 NOTE — CARE COORDINATION
MET WITH PATIENT, FREEDOM OF CHOICE OFFERED. PLAN IS DC HOME WHEN STABLE. PATIENT C/O NAUSEA. ENCOURAGED TO AMBULATE IN HALLWAY. EPIDURAL MAY ALSO CAUSE NAUSEA. WILL FOLLOW.

## 2022-10-05 NOTE — PROGRESS NOTES
Patient resting in bed. Had complaints of discomfort. Vomited around 2100. Patient stated she was feeling better afterwards. Writer medicated with Zofran. Patient instructed to call writer if she had anymore vomit. Patient stated that her pain was under control. Tolerating ice chips well. No further needs. Call light in reach. 0600 patient vomited 500cc. Medicated per MAR.

## 2022-10-06 PROCEDURE — 99214 OFFICE O/P EST MOD 30 MIN: CPT

## 2022-10-06 PROCEDURE — 1210000000 HC MED SURG R&B

## 2022-10-06 PROCEDURE — 6370000000 HC RX 637 (ALT 250 FOR IP): Performed by: COLON & RECTAL SURGERY

## 2022-10-06 PROCEDURE — 6360000002 HC RX W HCPCS: Performed by: COLON & RECTAL SURGERY

## 2022-10-06 PROCEDURE — 99024 POSTOP FOLLOW-UP VISIT: CPT | Performed by: COLON & RECTAL SURGERY

## 2022-10-06 PROCEDURE — 2700000000 HC OXYGEN THERAPY PER DAY

## 2022-10-06 PROCEDURE — 2580000003 HC RX 258: Performed by: COLON & RECTAL SURGERY

## 2022-10-06 PROCEDURE — 6370000000 HC RX 637 (ALT 250 FOR IP): Performed by: FAMILY MEDICINE

## 2022-10-06 RX ADMIN — SODIUM CHLORIDE: 9 INJECTION, SOLUTION INTRAVENOUS at 12:02

## 2022-10-06 RX ADMIN — ENOXAPARIN SODIUM 40 MG: 100 INJECTION SUBCUTANEOUS at 20:49

## 2022-10-06 RX ADMIN — NALOXEGOL OXALATE 25 MG: 12.5 TABLET, FILM COATED ORAL at 09:07

## 2022-10-06 RX ADMIN — HYDROMORPHONE HYDROCHLORIDE 1 MG: 1 INJECTION, SOLUTION INTRAMUSCULAR; INTRAVENOUS; SUBCUTANEOUS at 18:37

## 2022-10-06 RX ADMIN — HYDROMORPHONE HYDROCHLORIDE 0.5 MG: 1 INJECTION, SOLUTION INTRAMUSCULAR; INTRAVENOUS; SUBCUTANEOUS at 12:01

## 2022-10-06 RX ADMIN — ATORVASTATIN CALCIUM 20 MG: 20 TABLET, FILM COATED ORAL at 09:07

## 2022-10-06 RX ADMIN — ONDANSETRON 4 MG: 2 INJECTION INTRAMUSCULAR; INTRAVENOUS at 12:01

## 2022-10-06 ASSESSMENT — PAIN SCALES - GENERAL
PAINLEVEL_OUTOF10: 7
PAINLEVEL_OUTOF10: 2
PAINLEVEL_OUTOF10: 5
PAINLEVEL_OUTOF10: 0
PAINLEVEL_OUTOF10: 0
PAINLEVEL_OUTOF10: 5

## 2022-10-06 ASSESSMENT — PAIN DESCRIPTION - LOCATION
LOCATION: ABDOMEN

## 2022-10-06 NOTE — PROGRESS NOTES
Wound Ostomy Continence Nurse  Ostomy Referral Follow-up Progress Note      NAME:  [address] RECORD NUMBER:  [id number]  AGE: 81 y.o. GENDER:  female  :  1950  TODAY'S DATE:  10/6/2022    Subjective: Lilliana Boone is a 81 y.o. female referred by:   [x] Physician  [] Nursing  [] Other:     Loop ileostomy and New    Objective    /66   Pulse 85   Temp 97.4 °F (36.3 °C) (Oral)   Resp 18   Ht 5' (1.524 m)   Wt 194 lb (88 kg)   SpO2 98%   BMI 37.89 kg/m²     Kenneth Risk Score Kenneth Scale Score: 19    Assessment   Surgeon - Dr. Rosamaria Craft       Ileostomy/Jejunostomy RLQ Loop ileostomy (Active)   Stomal Appliance 1 piece; Changed 10/06/22 0800   Flange Size (inches) 3 Inches 10/06/22 0800   Stoma  Assessment Red;Retracted;Protrudes 10/06/22 0800   Peristomal Assessment Intact; Clean 10/06/22 0800   Treatment Bag change;Site care 10/06/22 0800   Stool Appearance Watery 10/06/22 0800   Stool Color Green 10/06/22 0800   Stool Amount Medium 10/06/22 0800   Output (mL) 400 ml 10/06/22 0800   Number of days: 2     Patient is sitting in bed at this time with [facility]. Patient states she is still having nausea and dry heaves, no vomiting since yesterday. Ileostomy has good output. Patient still on clear liquid diet. Patient was out of bed ambulating in the room, which she states she feels \"very weak. \"     Completed new ileostomy education lesson with patient and patient's cousin at this time. Current appliance removed. Stoma aby with no tension, so removed without incident. Stoma is red, moist and protrudes. Mucocutaneous junction is intact. Stoma is located directly in a skin fold, which may cause issues with appliance wear time in the future. One piece, Sensura Dominic appliance with light convexity appliance and barrier ring.  Will use a one piece with light convexity in the immediate post-op period, but patient will need deep convex appliance when discharge home - this will help improve wear time with the stoma location in a skin fold. Demonstration of appliance change provided - patient's cousin participated, but patient did not. Patient would benefit from [facility] at discharge. After new appliance was applied. Demonstration of emptying her appliance performed and a return demonstration from patient and patient's cousin were successful. Patient encouraged to empty her appliance with the supervision of nursing staff during the remainder of this hospitalization. Patient specific supplies, supplies list and ordering of samples will be provided by the end of this day. The Covering Wound Ostomy nurse will be in tomorrow to check on patient and answer any questions. Intake/Output Summary (Last 24 hours) at 10/6/2022 1001  Last data filed at 10/6/2022 0800  Gross per 24 hour   Intake --   Output 2435 ml   Net -2435 ml       Plan:   Plan for Ostomy Care: See above      Ostomy Plan of Care  [x] Supplies/Instructions left in room  [] Patient using home supplies  [x] Brand/supplies at bedside - Coloplast Sensura [place] light convex with protective seal     Current Diet: ADULT DIET;  Clear Liquid  Dietician consult:  N/A    Discharge Plan:  Placement for patient upon discharge: home with support    Outpatient visit plan No  Supplies given Yes   Samples requested Yes    Referrals:  []   [x] [facility]  [] Supplies  [] Other    Patient/Caregiver Teaching:  Written Instructions given to patient/family- patient and patient's cousin  Teaching provided:  [] Reviewed GI and A&P        [x] Supplies  [x] Pouch emptying      [x] Manipulate closure  [x] Routine Care         [] Comment  [x] Pouch maintenance           Level of patient/caregiver understanding able to:  [] Indicates understanding       [x] Needs reinforcement  [] Unsuccessful      [x] Verbal Understanding  [x] Demonstrated understanding       [] No evidence of learning  [] Refused teaching         [] N/A    Electronically signed by GÓMEZ RileyN, RN, CWOCN on 10/6/2022 at 10:15 AM

## 2022-10-06 NOTE — PROGRESS NOTES
Patient seen and examined    Present with her daughter    Tolerating liquids with minimal nausea. Ileostomy working well. BRENDAN drain serosanguineous    Epidural stopped today DC Balbuena in a.m. Continue with physical therapy and ostomy education.     Pathology pending

## 2022-10-06 NOTE — PROGRESS NOTES
Hospitalist Daily Progress Note  Name: Branden De La Torre  Age: 81 y.o. Gender: female  CodeStatus: Full Code  Allergies: Morphine    Chief Complaint:No chief complaint on file. Primary Care Provider: Agustin Fowler DO    InpatientTreatment Team: Treatment Team: Attending Provider: Val Delgado MD; Surgeon: Val Delgado MD; Consulting Physician: Promise Anthony MD; Haydee Barahona Nurse: Oliverio Tinoco, RN; Nursing Student: Siva Jain; Utilization Reviewer: Ambrocio Dahl RN; Registered Nurse: Baron Busch RN    Admission Date: 10/3/2022      Subjective: No chest pain, sob, nausea. Resting in bed. Physical Exam  Vitals and nursing note reviewed. Constitutional:       Appearance: Normal appearance. Cardiovascular:      Rate and Rhythm: Normal rate and regular rhythm. Pulmonary:      Effort: Pulmonary effort is normal.      Breath sounds: Normal breath sounds. Abdominal:      General: Bowel sounds are normal.      Palpations: Abdomen is soft. Musculoskeletal:         General: Normal range of motion. Skin:     General: Skin is warm and dry. Neurological:      Mental Status: She is alert and oriented to person, place, and time. Mental status is at baseline. Medications:  Reviewed    Infusion Medications:    lactated ringers      fentanyl epidural builder 6 mL/hr at 10/05/22 1609    lactated ringers 100 mL/hr at 10/04/22 1602    sodium chloride      sodium chloride 100 mL/hr at 10/05/22 1424     Scheduled Medications:    atorvastatin  20 mg Oral Daily    sodium chloride flush  5-40 mL IntraVENous 2 times per day    naloxegol  25 mg Oral Daily    enoxaparin  40 mg SubCUTAneous Daily     PRN Meds: naloxone, ondansetron, sodium chloride flush, sodium chloride, ondansetron **OR** ondansetron    Labs:   No results for input(s): WBC, HGB, HCT, PLT in the last 72 hours.   Recent Labs     10/03/22  1901      K 4.3      CO2 24   BUN 7*   CREATININE 0.65   CALCIUM 8.6 No results for input(s): AST, ALT, BILIDIR, BILITOT, ALKPHOS in the last 72 hours. No results for input(s): INR in the last 72 hours. No results for input(s): Seymour Jevon in the last 72 hours. Urinalysis:   Lab Results   Component Value Date/Time    NITRU Negative 05/09/2017 10:30 AM    BLOODU Negative 05/09/2017 10:30 AM    SPECGRAV 1.003 05/09/2017 10:30 AM    GLUCOSEU Negative 05/09/2017 10:30 AM       Radiology:   Most recent    Chest CT      WITH CONTRAST:Results for orders placed during the hospital encounter of 09/22/22    CT CHEST W CONTRAST    Narrative  EXAMINATION:  CT OF THE CHEST WITH CONTRAST 9/22/2022 3:06 pm    TECHNIQUE:  CT of the chest was performed with the administration of intravenous  contrast. Multiplanar reformatted images are provided for review. Automated  exposure control, iterative reconstruction, and/or weight based adjustment of  the mA/kV was utilized to reduce the radiation dose to as low as reasonably  achievable. COMPARISON:  None. HISTORY:  ORDERING SYSTEM PROVIDED HISTORY: Colonic mass  TECHNOLOGIST PROVIDED HISTORY:  STAT Creatinine as needed:->No  Reason for exam:->colon mass evaluate mets  What reading provider will be dictating this exam?->CRC    FINDINGS:  The lung fields reveal no evidence of any nodules, consolidation or  atelectasis. Minimal lymph nodes noted adjacent to the aortic arch and the  pretracheal area. No pleural effusion or pneumothorax. No definitive  filling defects seen within the primary trunk or pulmonary arteries including  the segmental and subsegmental branches to suggest PE. The aorta is  unremarkable. Impression  Negative CT scan of the chest except for minimal lymph nodes adjacent to the  aorta of aortic arch and pretracheal area. WITHOUT CONTRAST: No results found for this or any previous visit. CXR      2-view: No results found for this or any previous visit.        Portable: Results for orders placed during the hospital encounter of 05/09/17    XR Chest Portable    Narrative  PORTABLE CHEST: 5/9/2017    CLINICAL HISTORY: Vision loss in right eye. COMPARISON: 10/27/2014. A portable upright AP radiograph of the chest was obtained. FINDINGS:    The lungs are clear. The cardiac and mediastinal silhouettes are within normal limits for the patient's age group and technique. There is no significant pleural effusion, vascular congestion, pneumothorax, or displaced fractures identified. Impression  NO EVIDENCE OF ACTIVE CARDIOPULMONARY DISEASE. Echo No results found for this or any previous visit. Assessment/Plan:    Active Hospital Problems    Diagnosis Date Noted    Rectal cancer ([facility] [address] [C20] 10/03/2022     Priority: Medium     ASSESSMENT AND PLAN:  1)  HLD       Cont statin when taking po     2)  Rectosigmoid cancer       Post op day 0, post op care, pt/ot, dvt proph, pain control. 10/5 - cont post op care, pod 1, diet per general surgery.     Electronically signed by Timo Martinez MD on 10/6/2022 at 12:54 AM

## 2022-10-07 PROCEDURE — 6370000000 HC RX 637 (ALT 250 FOR IP): Performed by: COLON & RECTAL SURGERY

## 2022-10-07 PROCEDURE — 6370000000 HC RX 637 (ALT 250 FOR IP): Performed by: NURSE PRACTITIONER

## 2022-10-07 PROCEDURE — 1210000000 HC MED SURG R&B

## 2022-10-07 PROCEDURE — 2700000000 HC OXYGEN THERAPY PER DAY

## 2022-10-07 PROCEDURE — 99024 POSTOP FOLLOW-UP VISIT: CPT | Performed by: COLON & RECTAL SURGERY

## 2022-10-07 PROCEDURE — 6360000002 HC RX W HCPCS: Performed by: COLON & RECTAL SURGERY

## 2022-10-07 PROCEDURE — 6370000000 HC RX 637 (ALT 250 FOR IP): Performed by: FAMILY MEDICINE

## 2022-10-07 RX ORDER — TRAMADOL HYDROCHLORIDE 50 MG/1
50 TABLET ORAL EVERY 6 HOURS PRN
Status: DISCONTINUED | OUTPATIENT
Start: 2022-10-07 | End: 2022-10-10 | Stop reason: HOSPADM

## 2022-10-07 RX ADMIN — ENOXAPARIN SODIUM 40 MG: 100 INJECTION SUBCUTANEOUS at 21:57

## 2022-10-07 RX ADMIN — ATORVASTATIN CALCIUM 20 MG: 20 TABLET, FILM COATED ORAL at 09:30

## 2022-10-07 RX ADMIN — NALOXEGOL OXALATE 25 MG: 12.5 TABLET, FILM COATED ORAL at 09:31

## 2022-10-07 RX ADMIN — TRAMADOL HYDROCHLORIDE 50 MG: 50 TABLET, COATED ORAL at 21:59

## 2022-10-07 RX ADMIN — ONDANSETRON 4 MG: 2 INJECTION INTRAMUSCULAR; INTRAVENOUS at 12:45

## 2022-10-07 RX ADMIN — HYDROMORPHONE HYDROCHLORIDE 1 MG: 1 INJECTION, SOLUTION INTRAMUSCULAR; INTRAVENOUS; SUBCUTANEOUS at 12:44

## 2022-10-07 RX ADMIN — HYDROMORPHONE HYDROCHLORIDE 1 MG: 1 INJECTION, SOLUTION INTRAMUSCULAR; INTRAVENOUS; SUBCUTANEOUS at 17:09

## 2022-10-07 RX ADMIN — HYDROMORPHONE HYDROCHLORIDE 1 MG: 1 INJECTION, SOLUTION INTRAMUSCULAR; INTRAVENOUS; SUBCUTANEOUS at 09:30

## 2022-10-07 RX ADMIN — ONDANSETRON 4 MG: 4 TABLET, ORALLY DISINTEGRATING ORAL at 21:59

## 2022-10-07 RX ADMIN — HYDROMORPHONE HYDROCHLORIDE 1 MG: 1 INJECTION, SOLUTION INTRAMUSCULAR; INTRAVENOUS; SUBCUTANEOUS at 03:03

## 2022-10-07 ASSESSMENT — PAIN DESCRIPTION - DESCRIPTORS
DESCRIPTORS: ACHING;SHOOTING
DESCRIPTORS: ACHING;SORE
DESCRIPTORS: ACHING
DESCRIPTORS: ACHING;CRAMPING

## 2022-10-07 ASSESSMENT — PAIN DESCRIPTION - LOCATION
LOCATION: ABDOMEN

## 2022-10-07 ASSESSMENT — PAIN - FUNCTIONAL ASSESSMENT
PAIN_FUNCTIONAL_ASSESSMENT: PREVENTS OR INTERFERES SOME ACTIVE ACTIVITIES AND ADLS
PAIN_FUNCTIONAL_ASSESSMENT: PREVENTS OR INTERFERES WITH MANY ACTIVE NOT PASSIVE ACTIVITIES
PAIN_FUNCTIONAL_ASSESSMENT: PREVENTS OR INTERFERES SOME ACTIVE ACTIVITIES AND ADLS

## 2022-10-07 ASSESSMENT — PAIN SCALES - GENERAL
PAINLEVEL_OUTOF10: 6
PAINLEVEL_OUTOF10: 7
PAINLEVEL_OUTOF10: 6
PAINLEVEL_OUTOF10: 7

## 2022-10-07 ASSESSMENT — PAIN DESCRIPTION - ORIENTATION
ORIENTATION: MID
ORIENTATION: LOWER
ORIENTATION: OTHER (COMMENT)

## 2022-10-07 NOTE — PROGRESS NOTES
Assessment completed as documented. VSS Ileostomy appliance intact with a small amount of liquid dark green stool. BS active x 4 quadrants. Balbuena catheter removed without difficulty. Will monitor urinary status. Patient reports that she was continent prior to surgery. IVF infusing as ordered, peripheral site without complications. Encouraged patient to get out of bed and ambulate this am.  She declines at this time and states she will after lunch. Education provided regarding high risk for DVT.

## 2022-10-07 NOTE — PROGRESS NOTES
Hospitalist Daily Progress Note  Name: Miguel Landry  Age: 81 y.o. Gender: female  CodeStatus: Full Code  Allergies: Morphine    Chief Complaint:No chief complaint on file. Primary Care Provider: Keely Morataya DO    InpatientTreatment Team: Treatment Team: Attending Provider: Jimenez Maloney MD; Surgeon: Jimenez Maloney MD; Consulting Physician: Herber Campa MD; Mikayla Rojas Nurse: Sandrine Martins, RN; Nursing Student: Antonella Youssef; Utilization Reviewer: Kiel Ma RN; : Мария Smith, [place]; Registered Nurse: Malgorzata Thompson RN    Admission Date: 10/3/2022      Subjective: No chest pain, sob, nausea. Resting in bed. Physical Exam  Vitals and nursing note reviewed. Constitutional:       Appearance: Normal appearance. Cardiovascular:      Rate and Rhythm: Normal rate and regular rhythm. Pulmonary:      Effort: Pulmonary effort is normal.      Breath sounds: Normal breath sounds. Abdominal:      General: Bowel sounds are normal.      Palpations: Abdomen is soft. Musculoskeletal:         General: Normal range of motion. Skin:     General: Skin is warm and dry. Neurological:      Mental Status: She is alert and oriented to person, place, and time. Mental status is at baseline. Medications:  Reviewed    Infusion Medications:    lactated ringers 100 mL/hr at 10/04/22 1602    sodium chloride      sodium chloride 100 mL/hr at 10/06/22 1202     Scheduled Medications:    atorvastatin  20 mg Oral Daily    sodium chloride flush  5-40 mL IntraVENous 2 times per day    naloxegol  25 mg Oral Daily    enoxaparin  40 mg SubCUTAneous Daily     PRN Meds: HYDROmorphone **OR** HYDROmorphone, naloxone, ondansetron, sodium chloride flush, sodium chloride, ondansetron **OR** ondansetron    Labs:   No results for input(s): WBC, HGB, HCT, PLT in the last 72 hours. No results for input(s): NA, K, CL, CO2, BUN, CREATININE, CALCIUM, PHOS in the last 72 hours.     Invalid input(s): MAGNES    No results for input(s): AST, ALT, BILIDIR, BILITOT, ALKPHOS in the last 72 hours. No results for input(s): INR in the last 72 hours. No results for input(s): [place] in the last 72 hours. Urinalysis:   Lab Results   Component Value Date/Time    NITRU Negative 05/09/2017 10:30 AM    BLOODU Negative 05/09/2017 10:30 AM    SPECGRAV 1.003 05/09/2017 10:30 AM    GLUCOSEU Negative 05/09/2017 10:30 AM       Radiology:   Most recent    Chest CT      WITH CONTRAST:Results for orders placed during the hospital encounter of 09/22/22    CT CHEST W CONTRAST    Narrative  EXAMINATION:  CT OF THE CHEST WITH CONTRAST 9/22/2022 3:06 pm    TECHNIQUE:  CT of the chest was performed with the administration of intravenous  contrast. Multiplanar reformatted images are provided for review. Automated  exposure control, iterative reconstruction, and/or weight based adjustment of  the mA/kV was utilized to reduce the radiation dose to as low as reasonably  achievable. COMPARISON:  None. HISTORY:  ORDERING SYSTEM PROVIDED HISTORY: Colonic mass  TECHNOLOGIST PROVIDED HISTORY:  STAT Creatinine as needed:->No  Reason for exam:->colon mass evaluate mets  What reading provider will be dictating this exam?->CRC    FINDINGS:  The lung fields reveal no evidence of any nodules, consolidation or  atelectasis. Minimal lymph nodes noted adjacent to the aortic arch and the  pretracheal area. No pleural effusion or pneumothorax. No definitive  filling defects seen within the primary trunk or pulmonary arteries including  the segmental and subsegmental branches to suggest PE. The aorta is  unremarkable. Impression  Negative CT scan of the chest except for minimal lymph nodes adjacent to the  aorta of aortic arch and pretracheal area. WITHOUT CONTRAST: No results found for this or any previous visit. CXR      2-view: No results found for this or any previous visit.        Portable: Results for orders placed during the hospital encounter of 05/09/17    XR Chest Portable    Narrative  PORTABLE CHEST: 5/9/2017    CLINICAL HISTORY: Vision loss in right eye. COMPARISON: 10/27/2014. A portable upright AP radiograph of the chest was obtained. FINDINGS:    The lungs are clear. The cardiac and mediastinal silhouettes are within normal limits for the patient's age group and technique. There is no significant pleural effusion, vascular congestion, pneumothorax, or displaced fractures identified. Impression  NO EVIDENCE OF ACTIVE CARDIOPULMONARY DISEASE. Echo No results found for this or any previous visit. Assessment/Plan:    Active Hospital Problems    Diagnosis Date Noted    Rectal cancer ([facility] [address] [C20] 10/03/2022     Priority: Medium     ASSESSMENT AND PLAN:  1)  HLD       Cont statin when taking po     2)  Rectosigmoid cancer       Post op day 0, post op care, pt/ot, dvt proph, pain control. 10/5 - cont post op care, pod 1, diet per general surgery. 10/6 - pod2, tolerating clears, epidural out today, cont post op care      10/7 - pod3, diet advancing, tolerating.     Electronically signed by Rudi eWathers MD on 10/7/2022 at 9:02 AM

## 2022-10-07 NOTE — PROGRESS NOTES
Patient seen and examined. Postop day #4 low anterior resection/diverting ileostomy    Nausea improved. She wants to start on a full liquid diet. Ileostomy working. BRENDAN drain remains serosanguineous. Incision looks fine in the midline. Current management.

## 2022-10-07 NOTE — CARE COORDINATION
Met with pt at bedside to discuss discharge planning. D/C plan remains home with son and sister. Pt still nauseated on clear liquid diet, per nurse pt has only ambulated in room today and needs to more in [place].

## 2022-10-07 NOTE — FLOWSHEET NOTE
Covering for Wound/Ostomy RN    Followed up with patient for ostomy teaching. Patient alert, oriented, sitting up in a chair for this visit. Patient reporting some intermittent nausea but agreeable to this visit. Patients sister was present for this visit. Patients sister states that she has emptied the ostomy recently and is comfortable with the procedure. She states also that she watched the nurse change the bag and was confident she could repeat that procedure as well if needed. She states that she and patients son will be providing 24/7 care for patient after she is discharged and that she feels confident she can teach patients son how to empty the ostomy at home. Reviewed process of pouch emptying verbally with patient and sister. Current appliance is intact with small amount of dark green liquid drainage. Patient does not feel up to attempting to empty pouch at this time due to persistent nausea.     Matt Salcido RN (covering Wound/Ostomy Nurse)

## 2022-10-07 NOTE — PROGRESS NOTES
Hospitalist Daily Progress Note  Name: Curtistine Brittle  Age: 81 y.o. Gender: female  CodeStatus: Full Code  Allergies: Morphine    Chief Complaint:No chief complaint on file. Primary Care Provider: Yomaira Clarke DO    InpatientTreatment Team: Treatment Team: Attending Provider: Michaela Yancey MD; Surgeon: Michaela Yancey MD; Consulting Physician: Ramsey Mendez MD; Peter Zaldivar Nurse: Ciera Cast, RN; Nursing Student: Galo Jensen; Utilization Reviewer: Salima Villagran RN; : Tejinder Reyes RN; Registered Nurse: Jarad Rutherford RN    Admission Date: 10/3/2022      Subjective: No chest pain, sob, nausea. Resting in bed. Physical Exam  Vitals and nursing note reviewed. Constitutional:       Appearance: Normal appearance. Cardiovascular:      Rate and Rhythm: Normal rate and regular rhythm. Pulmonary:      Effort: Pulmonary effort is normal.      Breath sounds: Normal breath sounds. Abdominal:      General: Bowel sounds are normal.      Palpations: Abdomen is soft. Musculoskeletal:         General: Normal range of motion. Skin:     General: Skin is warm and dry. Neurological:      Mental Status: She is alert and oriented to person, place, and time. Mental status is at baseline. Medications:  Reviewed    Infusion Medications:    lactated ringers 100 mL/hr at 10/04/22 1602    sodium chloride      sodium chloride 100 mL/hr at 10/06/22 1202     Scheduled Medications:    atorvastatin  20 mg Oral Daily    sodium chloride flush  5-40 mL IntraVENous 2 times per day    naloxegol  25 mg Oral Daily    enoxaparin  40 mg SubCUTAneous Daily     PRN Meds: HYDROmorphone **OR** HYDROmorphone, naloxone, ondansetron, sodium chloride flush, sodium chloride, ondansetron **OR** ondansetron    Labs:   No results for input(s): WBC, HGB, HCT, PLT in the last 72 hours. No results for input(s): NA, K, CL, CO2, BUN, CREATININE, CALCIUM, PHOS in the last 72 hours.     Invalid input(s): MAGNES    No results for input(s): AST, ALT, BILIDIR, BILITOT, ALKPHOS in the last 72 hours. No results for input(s): INR in the last 72 hours. No results for input(s): Aj Beets in the last 72 hours. Urinalysis:   Lab Results   Component Value Date/Time    NITRU Negative 05/09/2017 10:30 AM    BLOODU Negative 05/09/2017 10:30 AM    SPECGRAV 1.003 05/09/2017 10:30 AM    GLUCOSEU Negative 05/09/2017 10:30 AM       Radiology:   Most recent    Chest CT      WITH CONTRAST:Results for orders placed during the hospital encounter of 09/22/22    CT CHEST W CONTRAST    Narrative  EXAMINATION:  CT OF THE CHEST WITH CONTRAST 9/22/2022 3:06 pm    TECHNIQUE:  CT of the chest was performed with the administration of intravenous  contrast. Multiplanar reformatted images are provided for review. Automated  exposure control, iterative reconstruction, and/or weight based adjustment of  the mA/kV was utilized to reduce the radiation dose to as low as reasonably  achievable. COMPARISON:  None. HISTORY:  ORDERING SYSTEM PROVIDED HISTORY: Colonic mass  TECHNOLOGIST PROVIDED HISTORY:  STAT Creatinine as needed:->No  Reason for exam:->colon mass evaluate mets  What reading provider will be dictating this exam?->CRC    FINDINGS:  The lung fields reveal no evidence of any nodules, consolidation or  atelectasis. Minimal lymph nodes noted adjacent to the aortic arch and the  pretracheal area. No pleural effusion or pneumothorax. No definitive  filling defects seen within the primary trunk or pulmonary arteries including  the segmental and subsegmental branches to suggest PE. The aorta is  unremarkable. Impression  Negative CT scan of the chest except for minimal lymph nodes adjacent to the  aorta of aortic arch and pretracheal area. WITHOUT CONTRAST: No results found for this or any previous visit. CXR      2-view: No results found for this or any previous visit.        Portable: Results for orders placed during the hospital encounter of 05/09/17    XR Chest Portable    Narrative  PORTABLE CHEST: 5/9/2017    CLINICAL HISTORY: Vision loss in right eye. COMPARISON: 10/27/2014. A portable upright AP radiograph of the chest was obtained. FINDINGS:    The lungs are clear. The cardiac and mediastinal silhouettes are within normal limits for the patient's age group and technique. There is no significant pleural effusion, vascular congestion, pneumothorax, or displaced fractures identified. Impression  NO EVIDENCE OF ACTIVE CARDIOPULMONARY DISEASE. Echo No results found for this or any previous visit. Assessment/Plan:    Active Hospital Problems    Diagnosis Date Noted    Rectal cancer ([facility] [address] [C20] 10/03/2022     Priority: Medium     ASSESSMENT AND PLAN:  1)  HLD       Cont statin when taking po     2)  Rectosigmoid cancer       Post op day 0, post op care, pt/ot, dvt proph, pain control. 10/5 - cont post op care, pod 1, diet per general surgery.       10/6 - pod2, tolerating clears, epidural out today, cont post op care    Electronically signed by Yonas Jesus MD on 10/7/2022 at 6:43 AM

## 2022-10-07 NOTE — PROGRESS NOTES
Spiritual Care Services     Summary of Visit:  Pt has a strong mackenzie, good family support. She is struggling with suffering this day, open to support. Spiritual Assessment/Intervention/Outcomes:    Encounter Summary  Encounter Overview/Reason : Initial Encounter  Service Provided For[de-identified] Patient  Referral/Consult From[de-identified] Rounding  Support System: Children, Family members  Complexity of Encounter: Moderate  Encounter   Type: Initial Screen/Assessment     Spiritual/Emotional needs  Type: Spiritual Support                    Values / Beliefs  Do You Have Any Ethnic, Cultural, Sacramental, or Spiritual [employer] Needs You Would Like Us To Be Aware of While You Are in the Hospital : No    Care Plan:    Ongoing support. Spiritual Care Services   Electronically signed by Amol Miles on 10/7/22 at 2:09 PM EDT     To reach a  for emotional and spiritual support, place an [facility] consult request.   If a  is needed immediately, dial 0 and ask to page the on-call .

## 2022-10-08 LAB
ANISOCYTOSIS: ABNORMAL
BANDED NEUTROPHILS RELATIVE PERCENT: 10 % (ref 5–11)
BASOPHILS ABSOLUTE: 0 K/UL (ref 0–0.2)
BASOPHILS RELATIVE PERCENT: 0.1 %
EOSINOPHILS ABSOLUTE: 0 K/UL (ref 0–0.7)
EOSINOPHILS RELATIVE PERCENT: 0.1 %
HCT VFR BLD CALC: 43.1 % (ref 37–47)
HEMATOLOGY PATH CONSULT: YES
HEMOGLOBIN: 14.2 G/DL (ref 12–16)
LYMPHOCYTES ABSOLUTE: 0.8 K/UL (ref 1–4.8)
LYMPHOCYTES RELATIVE PERCENT: 6 %
MCH RBC QN AUTO: 28.9 PG (ref 27–31.3)
MCHC RBC AUTO-ENTMCNC: 33 % (ref 33–37)
MCV RBC AUTO: 87.7 FL (ref 82–100)
MONOCYTES ABSOLUTE: 1.9 K/UL (ref 0.2–0.8)
MONOCYTES RELATIVE PERCENT: 14 %
NEUTROPHILS ABSOLUTE: 10.6 K/UL (ref 1.4–6.5)
NEUTROPHILS RELATIVE PERCENT: 70 %
PDW BLD-RTO: 14 % (ref 11.5–14.5)
PLATELET # BLD: 435 K/UL (ref 130–400)
PLATELET SLIDE REVIEW: ABNORMAL
POLYCHROMASIA: ABNORMAL
RBC # BLD: 4.92 M/UL (ref 4.2–5.4)
WBC # BLD: 13.3 K/UL (ref 4.8–10.8)

## 2022-10-08 PROCEDURE — 1210000000 HC MED SURG R&B

## 2022-10-08 PROCEDURE — 85025 COMPLETE CBC W/AUTO DIFF WBC: CPT

## 2022-10-08 PROCEDURE — 6360000002 HC RX W HCPCS: Performed by: COLON & RECTAL SURGERY

## 2022-10-08 PROCEDURE — 6370000000 HC RX 637 (ALT 250 FOR IP): Performed by: FAMILY MEDICINE

## 2022-10-08 PROCEDURE — 6370000000 HC RX 637 (ALT 250 FOR IP): Performed by: COLON & RECTAL SURGERY

## 2022-10-08 PROCEDURE — 2580000003 HC RX 258: Performed by: INTERNAL MEDICINE

## 2022-10-08 PROCEDURE — 2580000003 HC RX 258: Performed by: COLON & RECTAL SURGERY

## 2022-10-08 PROCEDURE — 2700000000 HC OXYGEN THERAPY PER DAY

## 2022-10-08 PROCEDURE — 99024 POSTOP FOLLOW-UP VISIT: CPT | Performed by: COLON & RECTAL SURGERY

## 2022-10-08 PROCEDURE — 36415 COLL VENOUS BLD VENIPUNCTURE: CPT

## 2022-10-08 RX ORDER — 0.9 % SODIUM CHLORIDE 0.9 %
500 INTRAVENOUS SOLUTION INTRAVENOUS ONCE
Status: COMPLETED | OUTPATIENT
Start: 2022-10-08 | End: 2022-10-08

## 2022-10-08 RX ORDER — METOCLOPRAMIDE HYDROCHLORIDE 5 MG/ML
5 INJECTION INTRAMUSCULAR; INTRAVENOUS EVERY 6 HOURS PRN
Status: DISCONTINUED | OUTPATIENT
Start: 2022-10-08 | End: 2022-10-10 | Stop reason: HOSPADM

## 2022-10-08 RX ORDER — 0.9 % SODIUM CHLORIDE 0.9 %
500 INTRAVENOUS SOLUTION INTRAVENOUS ONCE
Status: COMPLETED | OUTPATIENT
Start: 2022-10-09 | End: 2022-10-09

## 2022-10-08 RX ADMIN — ONDANSETRON 4 MG: 2 INJECTION INTRAMUSCULAR; INTRAVENOUS at 11:45

## 2022-10-08 RX ADMIN — HYDROMORPHONE HYDROCHLORIDE 1 MG: 1 INJECTION, SOLUTION INTRAMUSCULAR; INTRAVENOUS; SUBCUTANEOUS at 17:23

## 2022-10-08 RX ADMIN — ATORVASTATIN CALCIUM 20 MG: 20 TABLET, FILM COATED ORAL at 09:44

## 2022-10-08 RX ADMIN — SODIUM CHLORIDE: 9 INJECTION, SOLUTION INTRAVENOUS at 15:17

## 2022-10-08 RX ADMIN — ONDANSETRON 4 MG: 2 INJECTION INTRAMUSCULAR; INTRAVENOUS at 18:27

## 2022-10-08 RX ADMIN — NALOXEGOL OXALATE 25 MG: 12.5 TABLET, FILM COATED ORAL at 09:44

## 2022-10-08 RX ADMIN — SODIUM CHLORIDE 500 ML: 9 INJECTION, SOLUTION INTRAVENOUS at 20:33

## 2022-10-08 RX ADMIN — ENOXAPARIN SODIUM 40 MG: 100 INJECTION SUBCUTANEOUS at 20:34

## 2022-10-08 ASSESSMENT — PAIN SCALES - GENERAL
PAINLEVEL_OUTOF10: 7
PAINLEVEL_OUTOF10: 8

## 2022-10-08 NOTE — PROGRESS NOTES
At change of shift pt's IV infiltrated day shift nurse attempted to start IV twice unsuccessfully. Pt at the time denied any needs for pain or nausea meds. Attempt was made again by other nurse on floor to start IV which was unsuccessful. Pt then declined anymore attempt to start IV for now. Pt did complain of nausea and pain around 2100, NP was perfect served for oral pain med due to no IV access, ultram 50mg po was given at 2159 along with po zofran. Pt did have a emesis of about 100 ml about 30 mins after taking meds. Pt stated she did not want to take anymore meds for now. Pt was still not agreeable on another attempt for an IV. Pt had gotten up to bedside commode twice over night but then purewick was placed. 0600 pt did have another episode of emesis (100ml ), advised pt the need for IV, attempt was made again which was again unsuccessful. Pt currently resting in bed with eyes closed.

## 2022-10-08 NOTE — PROGRESS NOTES
Hospitalist Daily Progress Note  Name: Aida Macdonald  Age: 81 y.o. Gender: female  CodeStatus: Full Code  Allergies: Morphine    Chief Complaint:No chief complaint on file. Primary Care Provider: Ace Toscano DO    InpatientTreatment Team: Treatment Team: Attending Provider: Efrain Casey MD; Surgeon: Efrain Casey MD; Consulting Physician: Sammy Porras MD; Tasneem Padilla Nurse: Jeremiah Velazquez, RN; Nursing Student: Erin Marquez; Registered Nurse: Xiomara Figueroa RN; : Emelyn Corona RN    Admission Date: 10/3/2022      Subjective: No chest pain, sob, nausea. Resting in bed. Physical Exam  Vitals and nursing note reviewed. Constitutional:       Appearance: Normal appearance. Cardiovascular:      Rate and Rhythm: Normal rate and regular rhythm. Pulmonary:      Effort: Pulmonary effort is normal.      Breath sounds: Normal breath sounds. Abdominal:      General: Bowel sounds are normal.      Palpations: Abdomen is soft. Musculoskeletal:         General: Normal range of motion. Skin:     General: Skin is warm and dry. Neurological:      Mental Status: She is alert and oriented to person, place, and time. Mental status is at baseline. Medications:  Reviewed    Infusion Medications:    sodium chloride      sodium chloride 50 mL/hr at 10/08/22 1517     Scheduled Medications:    atorvastatin  20 mg Oral Daily    sodium chloride flush  5-40 mL IntraVENous 2 times per day    naloxegol  25 mg Oral Daily    enoxaparin  40 mg SubCUTAneous Daily     PRN Meds: traMADol, HYDROmorphone **OR** HYDROmorphone, naloxone, ondansetron, sodium chloride flush, sodium chloride, ondansetron **OR** ondansetron    Labs:   No results for input(s): WBC, HGB, HCT, PLT in the last 72 hours. No results for input(s): NA, K, CL, CO2, BUN, CREATININE, CALCIUM, PHOS in the last 72 hours.     Invalid input(s): MAGNES    No results for input(s): AST, ALT, BILIDIR, BILITOT, ALKPHOS in the last 72 hours. No results for input(s): INR in the last 72 hours. No results for input(s): Corky Stallion in the last 72 hours. Urinalysis:   Lab Results   Component Value Date/Time    NITRU Negative 05/09/2017 10:30 AM    BLOODU Negative 05/09/2017 10:30 AM    SPECGRAV 1.003 05/09/2017 10:30 AM    GLUCOSEU Negative 05/09/2017 10:30 AM       Radiology:   Most recent    Chest CT      WITH CONTRAST:Results for orders placed during the hospital encounter of 09/22/22    CT CHEST W CONTRAST    Narrative  EXAMINATION:  CT OF THE CHEST WITH CONTRAST 9/22/2022 3:06 pm    TECHNIQUE:  CT of the chest was performed with the administration of intravenous  contrast. Multiplanar reformatted images are provided for review. Automated  exposure control, iterative reconstruction, and/or weight based adjustment of  the mA/kV was utilized to reduce the radiation dose to as low as reasonably  achievable. COMPARISON:  None. HISTORY:  ORDERING SYSTEM PROVIDED HISTORY: Colonic mass  TECHNOLOGIST PROVIDED HISTORY:  STAT Creatinine as needed:->No  Reason for exam:->colon mass evaluate mets  What reading provider will be dictating this exam?->CRC    FINDINGS:  The lung fields reveal no evidence of any nodules, consolidation or  atelectasis. Minimal lymph nodes noted adjacent to the aortic arch and the  pretracheal area. No pleural effusion or pneumothorax. No definitive  filling defects seen within the primary trunk or pulmonary arteries including  the segmental and subsegmental branches to suggest PE. The aorta is  unremarkable. Impression  Negative CT scan of the chest except for minimal lymph nodes adjacent to the  aorta of aortic arch and pretracheal area. WITHOUT CONTRAST: No results found for this or any previous visit. CXR      2-view: No results found for this or any previous visit.        Portable: Results for orders placed during the hospital encounter of 05/09/17    XR Chest Portable    Narrative  PORTABLE CHEST: 5/9/2017    CLINICAL HISTORY: Vision loss in right eye. COMPARISON: 10/27/2014. A portable upright AP radiograph of the chest was obtained. FINDINGS:    The lungs are clear. The cardiac and mediastinal silhouettes are within normal limits for the patient's age group and technique. There is no significant pleural effusion, vascular congestion, pneumothorax, or displaced fractures identified. Impression  NO EVIDENCE OF ACTIVE CARDIOPULMONARY DISEASE. Echo No results found for this or any previous visit. Assessment/Plan:    Active Hospital Problems    Diagnosis Date Noted    Rectal cancer ([facility] [address] [C20] 10/03/2022     Priority: Medium     ASSESSMENT AND PLAN:  1)  HLD       Cont statin when taking po     2)  Rectosigmoid cancer       Post op day 0, post op care, pt/ot, dvt proph, pain control. 10/5 - cont post op care, pod 1, diet per general surgery. 10/6 - pod2, tolerating clears, epidural out today, cont post op care      10/7 - pod3, diet advancing, tolerating.       10/8 - pod4, mumtaz drain out, snf planning    Electronically signed by Adina Reno MD on 10/8/2022 at 5:48 PM

## 2022-10-08 NOTE — PROGRESS NOTES
Pt Name: Kamilah Cisneros Record Number: [id number]  Date of Birth 1950   Admit date 10/3/2022  9:19 AM  Today's Date: 10/8/2022     ASSESSMENT  1. Hospital day # 5  2 postop day #5 low anterior resection with diverting ileostomy  3. Ileostomy working, periodic nausea, no vomiting  4. Difficulty with mobility    PLAN  1. Discuss skilled nursing facility upon discharge  2.  DC BRENDAN drain    SUBJECTIVE  Chief complaint: Follow-up partial proctectomy  Afebrile, vital signs are stable. Intermittent nausea, ileostomy functioning. She is tolerating a ADULT DIET; Full Liquid. Her pain is well controlled on current medications. Mobility has been an issue      has a past medical history of Arthritis, Cancer ([address] and Hyperlipidemia. CURRENT MEDS  Scheduled Meds:   atorvastatin  20 mg Oral Daily    sodium chloride flush  5-40 mL IntraVENous 2 times per day    naloxegol  25 mg Oral Daily    enoxaparin  40 mg SubCUTAneous Daily     Continuous Infusions:   sodium chloride      sodium chloride 50 mL/hr at 10/07/22 1707     PRN Meds:.traMADol, HYDROmorphone **OR** HYDROmorphone, naloxone, ondansetron, sodium chloride flush, sodium chloride, ondansetron **OR** ondansetron    OBJECTIVE  CURRENT VITALS:  height is 5' (1.524 m) and weight is 194 lb (88 kg). Her oral temperature is 97.5 °F (36.4 °C). Her blood pressure is 131/62 and her pulse is 112 (abnormal). Her respiration is 18 and oxygen saturation is 96%.    Temperature Range (24h):Temp: 97.5 °F (36.4 °C) Temp  Av.6 °F (36.4 °C)  Min: 97.5 °F (36.4 °C)  Max: 97.9 °F (36.6 °C)  BP Range (99D): Systolic (35OPP), LAVERN:299 , Min:117 , DAJ:941     Diastolic (41NOW), QDI:69, Min:59, Max:67    Pulse Range (24h): Pulse  Av.5  Min: 98  Max: 112  Respiration Range (24h): Resp  Av.8  Min: 18  Max: 20    GENERAL: alert, no distress  LUNGS: clear to ausculation, without wheezes, rales or rhonci  HEART: normal rate and regular rhythm  ABDOMEN: non-distended, incision looks fine, BRENDAN serous, ileostomy functioning, bowel sounds present in all 4 quadrants, and no guarding or peritoneal signs  EXTERMITY: no cyanosis, clubbing or edema  In: 910 [P.O.:910]  Out: 3240 [Urine:700; Drains:190]  Date 10/08/22 0000 - 10/08/22 2359   Shift 1588-4922 9786-5532 5479-9949 24 Hour Total   INTAKE   P.O.(mL/kg/hr)  200  200   Shift Total(mL/kg)  200(2.3)  200(2.3)   OUTPUT   Urine(mL/kg/hr) 300(0.4)   300   Emesis/NG output(mL/kg) 100(1.1)   100(1.1)   Drains(mL/kg) 40(0.5)   40(0.5)   Stool(mL/kg) 1200(13.6) 800(9.1)  2000(22.7)   Shift Total(mL/kg) 1640(18.6) 800(9.1)  2440(27.7)   Weight (kg) 88 88 88 88       LABS  No results for input(s): WBC, HGB, HCT, PLT, NA, K, CL, CO2, BUN, CREATININE, MG, PHOS, CALCIUM in the last 72 hours. No results for input(s): PTT, INR in the last 72 hours. Invalid input(s): PT  No results for input(s): AST, ALT, BILITOT, BILIDIR, AMYLASE, LIPASE, LDH, LACTA in the last 72 hours. RADIOLOGY  CT CHEST W CONTRAST    Result Date: 9/26/2022  EXAMINATION: CT OF THE CHEST WITH CONTRAST 9/22/2022 3:06 pm TECHNIQUE: CT of the chest was performed with the administration of intravenous contrast. Multiplanar reformatted images are provided for review. Automated exposure control, iterative reconstruction, and/or weight based adjustment of the mA/kV was utilized to reduce the radiation dose to as low as reasonably achievable. COMPARISON: None. HISTORY: ORDERING SYSTEM PROVIDED HISTORY: Colonic mass TECHNOLOGIST PROVIDED HISTORY: STAT Creatinine as needed:->No Reason for exam:->colon mass evaluate mets What reading provider will be dictating this exam?->CRC FINDINGS: The lung fields reveal no evidence of any nodules, consolidation or atelectasis. Minimal lymph nodes noted adjacent to the aortic arch and the pretracheal area. No pleural effusion or pneumothorax.   No definitive filling defects seen within the primary trunk or pulmonary arteries including the segmental and subsegmental branches to suggest PE. The aorta is unremarkable. Negative CT scan of the chest except for minimal lymph nodes adjacent to the aorta of aortic arch and pretracheal area. CT ABDOMEN PELVIS W IV CONTRAST Additional Contrast? None    Result Date: 9/23/2022  EXAMINATION: CT OF THE ABDOMEN AND PELVIS WITH CONTRAST 9/22/2022 3:06 pm TECHNIQUE: CT of the abdomen and pelvis was performed with the administration of intravenous contrast.  PO contrast was administered. Multiplanar reformatted images are provided for review. Automated exposure control, iterative reconstruction, and/or weight based adjustment of the mA/kV was utilized to reduce the radiation dose to as low as reasonably achievable. COMPARISON: None. HISTORY: ORDERING SYSTEM PROVIDED HISTORY: Colonic mass TECHNOLOGIST PROVIDED HISTORY: Additional Contrast?->None STAT Creatinine as needed:->No Reason for exam:->colon mass evaluate mets What reading provider will be dictating this exam?->CRC FINDINGS: Evaluation for metastases is limited on non IV contrast study. Lower Chest: Bilateral lung bases are clear. Organs: Evaluation of solid organs limited by lack of intravenous contrast liver, spleen, pancreas, bilateral adrenal gland, gallbladder are within normal limits. No evidence of obstructive nephrolithiasis or hydronephrosis. GI/Bowel: Evaluation of bowel limited by lack of oral contrast.  No pathological dilatation of small-bowel loops. Appendix is within normal limits. There is diverticulosis without diverticulitis. Pelvis: Urinary bladder is minimally distended and appears grossly unremarkable. An intrauterine device is in place. Uterus and adnexa are grossly unremarkable. Peritoneum/Retroperitoneum: Abdominal aorta is normal in course and caliber. No evidence of lymphadenopathy. No free fluid or free intra-abdominal air. Bones/Soft Tissues: Normal     Limited non IV contrast study.  1. No imaging evidence of acute intra-abdominal or pelvic process. PEDRO DIGITAL SCREEN W OR WO CAD BILATERAL    Result Date: 9/18/2022  EXAMINATION: SCREENING DIGITAL BILATERAL MAMMOGRAM WITH TOMOSYNTHESIS, 9/15/2022 1:54 pm TECHNIQUE: Screening mammography of the bilateral breasts was performed with tomosynthesis. 2D standard and 3D tomosynthesis combination imaging performed through both breasts in the MLO and CC projection. Computer aided detection was utilized in the interpretation of this exam. COMPARISON: 11/23/2019 HISTORY: Screening. FINDINGS: There are scattered areas of fibroglandular density. There is no new dominant mass, suspicious microcalcification, or area of architectural distortion. No mammographic evidence for malignancy. BIRADS: BIRADS - CATEGORY 1 Negative, no evidence of malignancy. Normal interval follow-up is recommended in 12 months. OVERALL ASSESSMENT - NEGATIVE A letter of notification will be sent to the patient regarding the results. The [employer] recommends annual mammograms for women 40 years and older.     Electronically signed by Tanisha Mattson MD on 10/8/2022 at 10:48 AM

## 2022-10-08 NOTE — CARE COORDINATION
MET WITH PATIENT AND DTR, FREEDOM OF CHOICE OFFERED. REFERRAL CALLED TO [facility]. PAULINE SUPERVISOR WILL REVIEW AND RETURN CALL.

## 2022-10-08 NOTE — PROGRESS NOTES
8783: [address] notified of multiple RNs on floor unsuccessful IV attempts. Per Aguilar Ram will call for a possible ICU or ER nurse to attempt. 2396: Assessment completed. VSS. A&Ox4. Pt dry heaving. Denies need for Zofran at this time. Medications given per MAR. Surgical incision with staples is JOSE MARTIN. Ileostomy in RUQ is draining watery green output. SCDs are in place. Pt encouraged to ambulate and get up to chair. Pt refuses at this time. Safety maintained in room. Comfort measures provided     1230: New IV placed- Patient medicated with PRN zofran per MAR. BRENDAN drain dc per orders. Tolerated well. Dry dressing applied. Patient up to chair at this time with moderate assist with walker. Patient bathed at this time. Chair alarm on    1415: Patient returned back to bed. Safety maintained. Bed alarm on.     1723: Patient c/o of 8/10 pain--medicated with PRN Dilaudid at this time. Denies further needs. HOB elevated at 30 degrees. Bed alarm on.     1824: Perfect-serve sent to Dr. Oquendo Miss \"I just wanted to let you know that patient has been c/o of nausea all day and patient has thrown upx2. First one was 300 ml and second one was half a cup a little bit ago'\". Awaiting response.      Electronically signed by Kerrie North RN on 10/8/22 at 5:52 PM EDT

## 2022-10-09 LAB
ALBUMIN SERPL-MCNC: 3 G/DL (ref 3.5–4.6)
ALP BLD-CCNC: 88 U/L (ref 40–130)
ALT SERPL-CCNC: 14 U/L (ref 0–33)
ANION GAP SERPL CALCULATED.3IONS-SCNC: 12 MEQ/L (ref 9–15)
AST SERPL-CCNC: 19 U/L (ref 0–35)
BASOPHILS ABSOLUTE: 0 K/UL (ref 0–0.2)
BASOPHILS RELATIVE PERCENT: 0.3 %
BILIRUB SERPL-MCNC: 0.5 MG/DL (ref 0.2–0.7)
BUN BLDV-MCNC: 36 MG/DL (ref 8–23)
CALCIUM SERPL-MCNC: 8.6 MG/DL (ref 8.5–9.9)
CHLORIDE BLD-SCNC: 96 MEQ/L (ref 95–107)
CO2: 24 MEQ/L (ref 20–31)
CREAT SERPL-MCNC: 1.29 MG/DL (ref 0.5–0.9)
EOSINOPHILS ABSOLUTE: 0.1 K/UL (ref 0–0.7)
EOSINOPHILS RELATIVE PERCENT: 0.7 %
GFR AFRICAN AMERICAN: 49.4
GFR NON-AFRICAN AMERICAN: 40.8
GLOBULIN: 2.8 G/DL (ref 2.3–3.5)
GLUCOSE BLD-MCNC: 120 MG/DL (ref 70–99)
HCT VFR BLD CALC: 39.5 % (ref 37–47)
HEMOGLOBIN: 13.1 G/DL (ref 12–16)
LYMPHOCYTES ABSOLUTE: 1.9 K/UL (ref 1–4.8)
LYMPHOCYTES RELATIVE PERCENT: 20.2 %
MCH RBC QN AUTO: 28.7 PG (ref 27–31.3)
MCHC RBC AUTO-ENTMCNC: 33.1 % (ref 33–37)
MCV RBC AUTO: 86.8 FL (ref 82–100)
MONOCYTES ABSOLUTE: 1.4 K/UL (ref 0.2–0.8)
MONOCYTES RELATIVE PERCENT: 14.9 %
NEUTROPHILS ABSOLUTE: 6 K/UL (ref 1.4–6.5)
NEUTROPHILS RELATIVE PERCENT: 63.9 %
PDW BLD-RTO: 13.9 % (ref 11.5–14.5)
PLATELET # BLD: 384 K/UL (ref 130–400)
POTASSIUM SERPL-SCNC: 3.6 MEQ/L (ref 3.4–4.9)
RBC # BLD: 4.55 M/UL (ref 4.2–5.4)
SODIUM BLD-SCNC: 132 MEQ/L (ref 135–144)
TOTAL PROTEIN: 5.8 G/DL (ref 6.3–8)
WBC # BLD: 9.5 K/UL (ref 4.8–10.8)

## 2022-10-09 PROCEDURE — 1210000000 HC MED SURG R&B

## 2022-10-09 PROCEDURE — 6370000000 HC RX 637 (ALT 250 FOR IP): Performed by: COLON & RECTAL SURGERY

## 2022-10-09 PROCEDURE — 80053 COMPREHEN METABOLIC PANEL: CPT

## 2022-10-09 PROCEDURE — 6360000002 HC RX W HCPCS: Performed by: COLON & RECTAL SURGERY

## 2022-10-09 PROCEDURE — 2500000003 HC RX 250 WO HCPCS: Performed by: INTERNAL MEDICINE

## 2022-10-09 PROCEDURE — 6370000000 HC RX 637 (ALT 250 FOR IP): Performed by: FAMILY MEDICINE

## 2022-10-09 PROCEDURE — 99024 POSTOP FOLLOW-UP VISIT: CPT | Performed by: COLON & RECTAL SURGERY

## 2022-10-09 PROCEDURE — 2580000003 HC RX 258: Performed by: COLON & RECTAL SURGERY

## 2022-10-09 PROCEDURE — 6370000000 HC RX 637 (ALT 250 FOR IP): Performed by: NURSE PRACTITIONER

## 2022-10-09 PROCEDURE — 36415 COLL VENOUS BLD VENIPUNCTURE: CPT

## 2022-10-09 PROCEDURE — 2580000003 HC RX 258: Performed by: INTERNAL MEDICINE

## 2022-10-09 PROCEDURE — 85025 COMPLETE CBC W/AUTO DIFF WBC: CPT

## 2022-10-09 RX ORDER — MAGNESIUM HYDROXIDE 1200 MG/15ML
1000 LIQUID ORAL ONCE
Status: COMPLETED | OUTPATIENT
Start: 2022-10-09 | End: 2022-10-09

## 2022-10-09 RX ADMIN — TRAMADOL HYDROCHLORIDE 50 MG: 50 TABLET, COATED ORAL at 00:06

## 2022-10-09 RX ADMIN — HYDROMORPHONE HYDROCHLORIDE 0.5 MG: 1 INJECTION, SOLUTION INTRAMUSCULAR; INTRAVENOUS; SUBCUTANEOUS at 19:51

## 2022-10-09 RX ADMIN — NALOXEGOL OXALATE 25 MG: 12.5 TABLET, FILM COATED ORAL at 08:24

## 2022-10-09 RX ADMIN — ENOXAPARIN SODIUM 40 MG: 100 INJECTION SUBCUTANEOUS at 21:50

## 2022-10-09 RX ADMIN — SODIUM CHLORIDE 500 ML: 9 INJECTION, SOLUTION INTRAVENOUS at 00:06

## 2022-10-09 RX ADMIN — MICONAZOLE NITRATE: 2 POWDER TOPICAL at 19:52

## 2022-10-09 RX ADMIN — MICONAZOLE NITRATE: 2 POWDER TOPICAL at 08:24

## 2022-10-09 RX ADMIN — WATER 1000 ML: 1 IRRIGANT IRRIGATION at 04:47

## 2022-10-09 RX ADMIN — ATORVASTATIN CALCIUM 20 MG: 20 TABLET, FILM COATED ORAL at 08:24

## 2022-10-09 RX ADMIN — METOCLOPRAMIDE 5 MG: 5 INJECTION, SOLUTION INTRAMUSCULAR; INTRAVENOUS at 08:34

## 2022-10-09 RX ADMIN — ONDANSETRON 4 MG: 2 INJECTION INTRAMUSCULAR; INTRAVENOUS at 19:02

## 2022-10-09 RX ADMIN — HYDROMORPHONE HYDROCHLORIDE 1 MG: 1 INJECTION, SOLUTION INTRAMUSCULAR; INTRAVENOUS; SUBCUTANEOUS at 16:10

## 2022-10-09 RX ADMIN — SODIUM CHLORIDE, PRESERVATIVE FREE 10 ML: 5 INJECTION INTRAVENOUS at 19:51

## 2022-10-09 RX ADMIN — ONDANSETRON 4 MG: 2 INJECTION INTRAMUSCULAR; INTRAVENOUS at 04:18

## 2022-10-09 RX ADMIN — HYDROMORPHONE HYDROCHLORIDE 1 MG: 1 INJECTION, SOLUTION INTRAMUSCULAR; INTRAVENOUS; SUBCUTANEOUS at 04:19

## 2022-10-09 ASSESSMENT — PAIN DESCRIPTION - LOCATION
LOCATION: ABDOMEN

## 2022-10-09 ASSESSMENT — PAIN SCALES - GENERAL
PAINLEVEL_OUTOF10: 8
PAINLEVEL_OUTOF10: 5
PAINLEVEL_OUTOF10: 10
PAINLEVEL_OUTOF10: 4
PAINLEVEL_OUTOF10: 7

## 2022-10-09 ASSESSMENT — PAIN DESCRIPTION - ORIENTATION: ORIENTATION: ANTERIOR;LOWER

## 2022-10-09 ASSESSMENT — PAIN - FUNCTIONAL ASSESSMENT: PAIN_FUNCTIONAL_ASSESSMENT: PREVENTS OR INTERFERES SOME ACTIVE ACTIVITIES AND ADLS

## 2022-10-09 ASSESSMENT — PAIN DESCRIPTION - DESCRIPTORS: DESCRIPTORS: ACHING;THROBBING

## 2022-10-09 NOTE — PROGRESS NOTES
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 Feels well today. Tolerating p.o. intake.     Physical Examination:      Vitals:  /67   Pulse 87   Temp 98.2 °F (36.8 °C) (Oral)   Resp 18   Ht 5' (1.524 m)   Wt 194 lb (88 kg)   SpO2 99%   BMI 37.89 kg/m²   Temp (24hrs), Av.7 °F (37.1 °C), Min:98.2 °F (36.8 °C), Max:99.1 °F (37.3 °C)      General appearance: alert, cooperative and no distress  Mental Status: oriented to person, place and time and normal affect  Lungs: clear to auscultation bilaterally, normal effort  Heart: regular rate and rhythm, no murmur  Abdomen: soft, nondistended, bowel sounds present, no masses  Extremities: no edema, redness, tenderness in the calves  Skin: no gross lesions, rashes    Data:     Labs:  Recent Labs     10/08/22  2023 10/09/22  0535   WBC 13.3* 9.5   HGB 14.2 13.1   * 384     Recent Labs     10/09/22  0535   *   K 3.6   CL 96   CO2 24   BUN 36*   CREATININE 1.29*   GLUCOSE 120*     Recent Labs     10/09/22  0535   AST 19   ALT 14   BILITOT 0.5   ALKPHOS 88       Current Facility-Administered Medications   Medication Dose Route Frequency Provider Last Rate Last Admin    miconazole (MICOTIN) 2 % powder   Topical BID Giles Medina MD   Given at 10/09/22 0824    metoclopramide (REGLAN) injection 5 mg  5 mg IntraVENous Q6H PRN Bladimir Bernal MD   5 mg at 10/09/22 0834    traMADol (ULTRAM) tablet 50 mg  50 mg Oral Q6H PRN Nicoletto Bolzan-Roche, APRN - CNP   50 mg at 10/09/22 0006    HYDROmorphone (DILAUDID) injection 0.5 mg  0.5 mg IntraVENous Q3H PRN Roc Urrutia MD   0.5 mg at 10/06/22 1201    Or    HYDROmorphone (DILAUDID) injection 1 mg  1 mg IntraVENous Q3H PRN Roc Urrutia MD   1 mg at 10/09/22 0419    atorvastatin (LIPITOR) tablet 20 mg  20 mg Oral Daily Matthew Ruiz MD   20 mg at 10/09/22 0824    naloxone 0.4 mg in 10 mL sodium chloride syringe   IntraVENous PRN Roc Urrutia MD        ondansetron [facility]) injection 4 mg  4 mg IntraVENous Q6H PRN Roc Urrutia MD   4 mg at 10/09/22 0418    sodium chloride flush 0.9 % injection 5-40 mL  5-40 mL IntraVENous 2 times per day Roc Urrutia MD        sodium chloride flush 0.9 % injection 5-40 mL  5-40 mL IntraVENous PRN Roc Urrutia MD        0.9 % sodium chloride infusion   IntraVENous PRN Roc Urrutia MD        ondansetron (ZOFRAN-ODT) disintegrating tablet 4 mg  4 mg Oral Q8H PRN Roc Urrutia MD   4 mg at 10/07/22 2159    Or    ondansetron (ZOFRAN) injection 4 mg  4 mg IntraVENous Q6H PRN Roc Urrutia MD   4 mg at 10/05/22 1914    0.9 % sodium chloride infusion   IntraVENous Continuous Roc Urrutia MD 50 mL/hr at 10/08/22 1517 New Bag at 10/08/22 1517    enoxaparin (LOVENOX) injection 40 mg  40 mg SubCUTAneous Daily Roc Urrutia MD   40 mg at 10/08/22 2034       Additional work up or/and treatment plan may be added today or then after based on clinical progression. I am managing a portion of pt care. Some medical issues are handled by other specialists. Additional work up and treatment should be done in out pt setting by pt PCP and other out pt providers. In addition to examining and evaluating pt, I spent additional time explaining care, normaland abnormal findings, and treatment plan.  All of pt questions were answered. Counseling, diet and education were provided. Case will be discussed with nursing staff when appropriate. Family will be updated if and when appropriate.        Electronically signed by Sandhya Owen DO on 10/9/2022 at 1:22 PM

## 2022-10-09 NOTE — PROGRESS NOTES
Shift assessments completed and documented, see flowsheets. A&OX4. Medications administered per MAR. Ileostomy putting out moderate amounts of liquid, needing to be emptied approximately every other hour. Call light within reach. No further needs verbalized at this time by pt.     2003: On assessment, this RN noted BP 95/57 with . Dr Caesar Gibson notified via [facility] and orders acknowledged. See MAR for medication administration. On reassessment at 2225, BP was 114/66.    2333: This RN was notified by PCA that pt bag was leaking again. On re-assessment, pt's ostomy bag has leaked green mucous-like stool into incision and into pt groin area. Pt BP also low again at 109/50, temp 99.2. Dr London Lou notified via [facility] and spoken to on the phone-- orders placed and acknowledged, see MAR for administrations. Dr London Lou stated to manage bag with changes as needed for now. Ostomy bag changed and pt cleaned up, rinsed with normal saline to cleanse incision of stool. Incision still closed with staples, noted to be slightly red and irritated-- left open to air but with gauze on one side to shield from any possible stool leak. Pt c/o 8/10 pain, see MAR for administrations. Call light within reach. No further needs verbalized at this time. 1: Dr Caesar Gibson notified via [employer] of pt WBC resulting 13.3. No new orders at this time. 0400: Dr Caesar Gibson at bedside assessing pt incision and noted another leak in ostomy. Orders obtained and acknowledged, see MAR and orders for updates. Ostomy and incision cleansed and changed. Small ulcer noted at the left bottom of pt abdominal pannus, red and purulent, tunneling approximately 2-3cm deep. Dr Caesar Gibson also notified of this and came to assess. Orders obtained for micotin powder for moisture in folds. Ulcer dressed wet-to-dry. Pt resting in bed comfortably, even respirations. No further needs verbalized at this time.     9832: Dr London Lou notified via [facility] of WBC 13.3, ostomy leaking again, irrigation, ulcer, and new orders put in by Dr Tamara Hernández. No new orders at this time. 0715: This RN discussed ulcer with dayshift RN and Francisco stated it is possible it's in the same quadrant the BRENDAN drain was pulled and she will assess.

## 2022-10-09 NOTE — PROGRESS NOTES
1074: Assessment completed. VSS. Medications given per MAR. Patient ambulated to commode and then to chair using walker with stand by assist. Tolerated fairly well. Encouraged to try to ambulate in halls today. Pt c/o of nausea--being medicated with PRN emar. Patient denies pain at this time. ABD incision is JOSE MARTIN and skin is pink around staples. Ileostomy is in place draining dark green output. Dressing from previous BRENDAN site on LLQ is CDI. Family at bedside. Denies further needs at this time. Safety maintained in room. Comfort measures provided     1610: Patient c/o of 7/10 pain--medicated with PRN Dilaudid at this time. Patient resting in bed. States nausea is ok at this time. Denies further needs. 1815: Patient up to chair x2 today. Patient resting in bed with eyes closed at this time. Denies further needs. Safety maintained. Comfort measures provided.     Electronically signed by Marda Oppenheim, RN on 10/9/22 at 6:19 PM EDT

## 2022-10-09 NOTE — CARE COORDINATION
MET WITH PATIENT AND DAUGHTER AGAIN TODAY, NOTIFIED THEY HAVE BED AT [facility], BUT WILL NEED TO HAVE PT/OT ASSESSMENTS. PATIENT AWARE. WILL FOLLOW.

## 2022-10-09 NOTE — PROGRESS NOTES
Pt Name: Radha Shay Record Number: [id number]  Date of Birth 1950   Admit date 10/3/2022  9:19 AM  Today's Date: 10/9/2022     ASSESSMENT  1. Hospital day # 6  2 postop day #6 low anterior resection with diverting ileostomy  3. Ileostomy started working well last night. 4.  Ms. Rachel Carr to assist with better seating of appliance tomorrow  5. Acute kidney injury being treated with bolus normal saline as well as continuous infusion    PLAN  1. Out of bed is much as possible  2. [facility]  Chief complaint: Follow-up partial proctectomy  Afebrile, vital signs are stable. She denies any nausea or vomiting, ileostomy working well She is tolerating a ADULT DIET; Full Liquid. Her pain is well controlled on current medications. has a past medical history of Arthritis, Cancer ([address] and Hyperlipidemia. CURRENT MEDS  Scheduled Meds:   miconazole   Topical BID    atorvastatin  20 mg Oral Daily    sodium chloride flush  5-40 mL IntraVENous 2 times per day    enoxaparin  40 mg SubCUTAneous Daily     Continuous Infusions:   sodium chloride      sodium chloride 50 mL/hr at 10/08/22 1517     PRN Meds:.metoclopramide, traMADol, HYDROmorphone **OR** HYDROmorphone, naloxone, ondansetron, sodium chloride flush, sodium chloride, ondansetron **OR** ondansetron    OBJECTIVE  CURRENT VITALS:  height is 5' (1.524 m) and weight is 194 lb (88 kg). Her oral temperature is 98.2 °F (36.8 °C). Her blood pressure is 125/67 and her pulse is 87. Her respiration is 18 and oxygen saturation is 99%.    Temperature Range (24h):Temp: 98.2 °F (36.8 °C) Temp  Av.7 °F (37.1 °C)  Min: 98.2 °F (36.8 °C)  Max: 99.1 °F (37.3 °C)  BP Range (20A): Systolic (59AAE), JYB:785 , Min:89 , RY     Diastolic (56BUJ), FRW:43, Min:50, Max:67    Pulse Range (24h): Pulse  Av.6  Min: 87  Max: 122  Respiration Range (24h): Resp  Av  Min: 18  Max: 18    GENERAL: alert, no distress  LUNGS: clear to ausculation, without wheezes, rales or rhonci  HEART: normal rate and regular rhythm  ABDOMEN: soft, non-tender, non-distended, incision looks fine, previous BRENDAN drain site covered, bowel sounds present in all 4 quadrants, and no guarding or peritoneal signs  EXTERMITY: no cyanosis, clubbing or edema  In: 957 [P.O.:800; I.V.:157]  Out: 4850 [Urine:1200]  Date 10/09/22 0000 - 10/09/22 2359   Shift 1528-8992 7076-0458 3763-3538 24 Hour Total   INTAKE   P.O.(mL/kg/hr)  200  200   Shift Total(mL/kg)  200(2.3)  200(2.3)   OUTPUT   Urine(mL/kg/hr)  300  300   Emesis/NG output(mL/kg)  0(0)  0(0)   Other(mL/kg)  0(0)  0(0)   Stool(mL/kg) 200(2.3) 550(6.3)  750(8.5)   Blood(mL/kg)  0(0)  0(0)   Shift Total(mL/kg) 200(2.3) 850(9.7)  1050(11.9)   Weight (kg) 88 88 88 88       LABS  Recent Labs     10/08/22  2023 10/09/22  0535   WBC 13.3* 9.5   HGB 14.2 13.1   HCT 43.1 39.5   * 384   NA  --  132*   K  --  3.6   CL  --  96   CO2  --  24   BUN  --  36*   CREATININE  --  1.29*   CALCIUM  --  8.6      No results for input(s): PTT, INR in the last 72 hours. Invalid input(s): PT  Recent Labs     10/09/22  0535   AST 19   ALT 14   BILITOT 0.5       RADIOLOGY  CT CHEST W CONTRAST    Result Date: 9/26/2022  EXAMINATION: CT OF THE CHEST WITH CONTRAST 9/22/2022 3:06 pm TECHNIQUE: CT of the chest was performed with the administration of intravenous contrast. Multiplanar reformatted images are provided for review. Automated exposure control, iterative reconstruction, and/or weight based adjustment of the mA/kV was utilized to reduce the radiation dose to as low as reasonably achievable. COMPARISON: None.  HISTORY: ORDERING SYSTEM PROVIDED HISTORY: Colonic mass TECHNOLOGIST PROVIDED HISTORY: STAT Creatinine as needed:->No Reason for exam:->colon mass evaluate mets What reading provider will be dictating this exam?->CRC FINDINGS: The lung fields reveal no evidence of any nodules, consolidation or atelectasis. Minimal lymph nodes noted adjacent to the aortic arch and the pretracheal area. No pleural effusion or pneumothorax. No definitive filling defects seen within the primary trunk or pulmonary arteries including the segmental and subsegmental branches to suggest PE. The aorta is unremarkable. Negative CT scan of the chest except for minimal lymph nodes adjacent to the aorta of aortic arch and pretracheal area. CT ABDOMEN PELVIS W IV CONTRAST Additional Contrast? None    Result Date: 9/23/2022  EXAMINATION: CT OF THE ABDOMEN AND PELVIS WITH CONTRAST 9/22/2022 3:06 pm TECHNIQUE: CT of the abdomen and pelvis was performed with the administration of intravenous contrast.  PO contrast was administered. Multiplanar reformatted images are provided for review. Automated exposure control, iterative reconstruction, and/or weight based adjustment of the mA/kV was utilized to reduce the radiation dose to as low as reasonably achievable. COMPARISON: None. HISTORY: ORDERING SYSTEM PROVIDED HISTORY: Colonic mass TECHNOLOGIST PROVIDED HISTORY: Additional Contrast?->None STAT Creatinine as needed:->No Reason for exam:->colon mass evaluate mets What reading provider will be dictating this exam?->CRC FINDINGS: Evaluation for metastases is limited on non IV contrast study. Lower Chest: Bilateral lung bases are clear. Organs: Evaluation of solid organs limited by lack of intravenous contrast liver, spleen, pancreas, bilateral adrenal gland, gallbladder are within normal limits. No evidence of obstructive nephrolithiasis or hydronephrosis. GI/Bowel: Evaluation of bowel limited by lack of oral contrast.  No pathological dilatation of small-bowel loops. Appendix is within normal limits. There is diverticulosis without diverticulitis. Pelvis: Urinary bladder is minimally distended and appears grossly unremarkable. An intrauterine device is in place. Uterus and adnexa are grossly unremarkable. Peritoneum/Retroperitoneum: Abdominal aorta is normal in course and caliber. No evidence of lymphadenopathy. No free fluid or free intra-abdominal air. Bones/Soft Tissues: Normal     Limited non IV contrast study. 1. No imaging evidence of acute intra-abdominal or pelvic process. PEDRO DIGITAL SCREEN W OR WO CAD BILATERAL    Result Date: 9/18/2022  EXAMINATION: SCREENING DIGITAL BILATERAL MAMMOGRAM WITH TOMOSYNTHESIS, 9/15/2022 1:54 pm TECHNIQUE: Screening mammography of the bilateral breasts was performed with tomosynthesis. 2D standard and 3D tomosynthesis combination imaging performed through both breasts in the MLO and CC projection. Computer aided detection was utilized in the interpretation of this exam. COMPARISON: 11/23/2019 HISTORY: Screening. FINDINGS: There are scattered areas of fibroglandular density. There is no new dominant mass, suspicious microcalcification, or area of architectural distortion. No mammographic evidence for malignancy. BIRADS: BIRADS - CATEGORY 1 Negative, no evidence of malignancy. Normal interval follow-up is recommended in 12 months. OVERALL ASSESSMENT - NEGATIVE A letter of notification will be sent to the patient regarding the results. The [employer] recommends annual mammograms for women 40 years and older.     Electronically signed by Nicholas Marquez MD on 10/9/2022 at 10:21 AM

## 2022-10-10 ENCOUNTER — HOSPITAL ENCOUNTER (INPATIENT)
Age: 72
LOS: 14 days | Discharge: HOME HEALTH | DRG: 949 | End: 2022-10-24
Attending: INTERNAL MEDICINE | Admitting: INTERNAL MEDICINE
Payer: COMMERCIAL

## 2022-10-10 VITALS
TEMPERATURE: 97.7 F | WEIGHT: 194 LBS | DIASTOLIC BLOOD PRESSURE: 51 MMHG | SYSTOLIC BLOOD PRESSURE: 122 MMHG | RESPIRATION RATE: 16 BRPM | HEIGHT: 60 IN | BODY MASS INDEX: 38.09 KG/M2 | HEART RATE: 92 BPM | OXYGEN SATURATION: 97 %

## 2022-10-10 DIAGNOSIS — C20 MALIGNANT NEOPLASM OF RECTUM (HCC): Primary | ICD-10-CM

## 2022-10-10 LAB
HEMATOLOGY PATH CONSULT: NORMAL
SARS-COV-2, NAAT: NOT DETECTED

## 2022-10-10 PROCEDURE — 2580000003 HC RX 258: Performed by: COLON & RECTAL SURGERY

## 2022-10-10 PROCEDURE — 87635 SARS-COV-2 COVID-19 AMP PRB: CPT

## 2022-10-10 PROCEDURE — 1200000002 HC SEMI PRIVATE SWING BED

## 2022-10-10 PROCEDURE — 97166 OT EVAL MOD COMPLEX 45 MIN: CPT

## 2022-10-10 PROCEDURE — 99024 POSTOP FOLLOW-UP VISIT: CPT | Performed by: COLON & RECTAL SURGERY

## 2022-10-10 PROCEDURE — 6360000002 HC RX W HCPCS: Performed by: COLON & RECTAL SURGERY

## 2022-10-10 PROCEDURE — 99213 OFFICE O/P EST LOW 20 MIN: CPT

## 2022-10-10 PROCEDURE — 2500000003 HC RX 250 WO HCPCS: Performed by: COLON & RECTAL SURGERY

## 2022-10-10 PROCEDURE — 97162 PT EVAL MOD COMPLEX 30 MIN: CPT

## 2022-10-10 PROCEDURE — 6370000000 HC RX 637 (ALT 250 FOR IP): Performed by: FAMILY MEDICINE

## 2022-10-10 RX ORDER — ENOXAPARIN SODIUM 100 MG/ML
40 INJECTION SUBCUTANEOUS DAILY
Status: DISCONTINUED | OUTPATIENT
Start: 2022-10-10 | End: 2022-10-24 | Stop reason: HOSPADM

## 2022-10-10 RX ORDER — NALOXONE HYDROCHLORIDE 0.4 MG/ML
INJECTION, SOLUTION INTRAMUSCULAR; INTRAVENOUS; SUBCUTANEOUS PRN
Status: CANCELLED | OUTPATIENT
Start: 2022-10-10

## 2022-10-10 RX ORDER — ONDANSETRON 4 MG/1
4 TABLET, ORALLY DISINTEGRATING ORAL EVERY 8 HOURS PRN
Status: CANCELLED | OUTPATIENT
Start: 2022-10-10

## 2022-10-10 RX ORDER — NALOXONE HYDROCHLORIDE 0.4 MG/ML
INJECTION, SOLUTION INTRAMUSCULAR; INTRAVENOUS; SUBCUTANEOUS PRN
Status: DISCONTINUED | OUTPATIENT
Start: 2022-10-10 | End: 2022-10-11

## 2022-10-10 RX ORDER — SODIUM CHLORIDE 0.9 % (FLUSH) 0.9 %
5-40 SYRINGE (ML) INJECTION EVERY 12 HOURS SCHEDULED
Status: CANCELLED | OUTPATIENT
Start: 2022-10-10

## 2022-10-10 RX ORDER — SODIUM CHLORIDE 9 MG/ML
INJECTION, SOLUTION INTRAVENOUS CONTINUOUS
Status: CANCELLED | OUTPATIENT
Start: 2022-10-10

## 2022-10-10 RX ORDER — SODIUM CHLORIDE 9 MG/ML
INJECTION, SOLUTION INTRAVENOUS CONTINUOUS
Status: DISCONTINUED | OUTPATIENT
Start: 2022-10-10 | End: 2022-10-10

## 2022-10-10 RX ORDER — ONDANSETRON 4 MG/1
4 TABLET, ORALLY DISINTEGRATING ORAL EVERY 8 HOURS PRN
Status: DISCONTINUED | OUTPATIENT
Start: 2022-10-10 | End: 2022-10-24 | Stop reason: HOSPADM

## 2022-10-10 RX ORDER — ONDANSETRON 2 MG/ML
4 INJECTION INTRAMUSCULAR; INTRAVENOUS EVERY 6 HOURS PRN
Status: DISCONTINUED | OUTPATIENT
Start: 2022-10-10 | End: 2022-10-11

## 2022-10-10 RX ORDER — TRAMADOL HYDROCHLORIDE 50 MG/1
50 TABLET ORAL EVERY 6 HOURS PRN
Status: CANCELLED | OUTPATIENT
Start: 2022-10-10

## 2022-10-10 RX ORDER — METOCLOPRAMIDE HYDROCHLORIDE 5 MG/ML
5 INJECTION INTRAMUSCULAR; INTRAVENOUS EVERY 6 HOURS PRN
Status: CANCELLED | OUTPATIENT
Start: 2022-10-10

## 2022-10-10 RX ORDER — SODIUM CHLORIDE 0.9 % (FLUSH) 0.9 %
5-40 SYRINGE (ML) INJECTION EVERY 12 HOURS SCHEDULED
Status: DISCONTINUED | OUTPATIENT
Start: 2022-10-10 | End: 2022-10-24 | Stop reason: HOSPADM

## 2022-10-10 RX ORDER — SODIUM CHLORIDE 9 MG/ML
INJECTION, SOLUTION INTRAVENOUS PRN
Status: CANCELLED | OUTPATIENT
Start: 2022-10-10

## 2022-10-10 RX ORDER — SODIUM CHLORIDE 0.9 % (FLUSH) 0.9 %
5-40 SYRINGE (ML) INJECTION PRN
Status: CANCELLED | OUTPATIENT
Start: 2022-10-10

## 2022-10-10 RX ORDER — ENOXAPARIN SODIUM 100 MG/ML
40 INJECTION SUBCUTANEOUS DAILY
Status: CANCELLED | OUTPATIENT
Start: 2022-10-10

## 2022-10-10 RX ORDER — ATORVASTATIN CALCIUM 20 MG/1
20 TABLET, FILM COATED ORAL DAILY
Status: CANCELLED | OUTPATIENT
Start: 2022-10-11

## 2022-10-10 RX ORDER — ONDANSETRON 2 MG/ML
4 INJECTION INTRAMUSCULAR; INTRAVENOUS EVERY 6 HOURS PRN
Status: CANCELLED | OUTPATIENT
Start: 2022-10-10

## 2022-10-10 RX ORDER — SODIUM CHLORIDE 0.9 % (FLUSH) 0.9 %
5-40 SYRINGE (ML) INJECTION PRN
Status: DISCONTINUED | OUTPATIENT
Start: 2022-10-10 | End: 2022-10-11

## 2022-10-10 RX ORDER — SODIUM CHLORIDE 9 MG/ML
INJECTION, SOLUTION INTRAVENOUS PRN
Status: DISCONTINUED | OUTPATIENT
Start: 2022-10-10 | End: 2022-10-24 | Stop reason: HOSPADM

## 2022-10-10 RX ORDER — TRAMADOL HYDROCHLORIDE 50 MG/1
50 TABLET ORAL EVERY 6 HOURS PRN
Status: COMPLETED | OUTPATIENT
Start: 2022-10-10 | End: 2022-10-11

## 2022-10-10 RX ORDER — ONDANSETRON 2 MG/ML
4 INJECTION INTRAMUSCULAR; INTRAVENOUS EVERY 6 HOURS PRN
Status: DISCONTINUED | OUTPATIENT
Start: 2022-10-10 | End: 2022-10-24 | Stop reason: HOSPADM

## 2022-10-10 RX ORDER — METOCLOPRAMIDE HYDROCHLORIDE 5 MG/ML
5 INJECTION INTRAMUSCULAR; INTRAVENOUS EVERY 6 HOURS PRN
Status: DISCONTINUED | OUTPATIENT
Start: 2022-10-10 | End: 2022-10-24 | Stop reason: HOSPADM

## 2022-10-10 RX ORDER — ATORVASTATIN CALCIUM 10 MG/1
20 TABLET, FILM COATED ORAL DAILY
Status: DISCONTINUED | OUTPATIENT
Start: 2022-10-11 | End: 2022-10-24 | Stop reason: HOSPADM

## 2022-10-10 RX ADMIN — ATORVASTATIN CALCIUM 20 MG: 20 TABLET, FILM COATED ORAL at 08:23

## 2022-10-10 RX ADMIN — HYDROMORPHONE HYDROCHLORIDE 1 MG: 1 INJECTION, SOLUTION INTRAMUSCULAR; INTRAVENOUS; SUBCUTANEOUS at 13:24

## 2022-10-10 RX ADMIN — MICONAZOLE NITRATE: 2 POWDER TOPICAL at 08:23

## 2022-10-10 RX ADMIN — Medication 10 ML: at 21:28

## 2022-10-10 RX ADMIN — SODIUM CHLORIDE: 9 INJECTION, SOLUTION INTRAVENOUS at 08:27

## 2022-10-10 RX ADMIN — MICONAZOLE NITRATE: 2 POWDER TOPICAL at 21:38

## 2022-10-10 RX ADMIN — ENOXAPARIN SODIUM 40 MG: 100 INJECTION SUBCUTANEOUS at 21:28

## 2022-10-10 RX ADMIN — ONDANSETRON 4 MG: 2 INJECTION INTRAMUSCULAR; INTRAVENOUS at 11:52

## 2022-10-10 ASSESSMENT — PAIN DESCRIPTION - LOCATION: LOCATION: BACK

## 2022-10-10 ASSESSMENT — PAIN SCALES - GENERAL
PAINLEVEL_OUTOF10: 7
PAINLEVEL_OUTOF10: 0

## 2022-10-10 ASSESSMENT — PAIN - FUNCTIONAL ASSESSMENT: PAIN_FUNCTIONAL_ASSESSMENT: NONE - DENIES PAIN

## 2022-10-10 NOTE — PROGRESS NOTES
Wound Ostomy Continence Nurse  Ostomy Referral Follow-up Progress Note      NAME:  [address] RECORD NUMBER:  [id number]  AGE: 81 y.o. GENDER:  female  :  1950  TODAY'S DATE:  10/10/2022    Subjective: Armin Pyle is a 81 y.o. female referred by:   [x] Physician  [] Nursing  [] Other:     Loop ileostomy and New    Objective    BP (!) 122/51   Pulse 92   Temp 97.7 °F (36.5 °C) (Oral)   Resp 16   Ht 5' (1.524 m)   Wt 194 lb (88 kg)   SpO2 97%   BMI 37.89 kg/m²     Kenneth Risk Score Kenneth Scale Score: 18    Assessment   Surgeon - Dr. Allyson Lopez       Ileostomy/Jejunostomy RLQ Loop ileostomy (Active)   Stomal Appliance 1 piece; Changed 10/10/22 1330   Flange Size (inches) 1.5 Inches 10/10/22 1330   Stoma  Assessment Red;Moist;Protrudes 10/10/22 1330   Peristomal Assessment Intact 10/10/22 1330   Treatment Bag change;Site care 10/10/22 1330   Stool Appearance Loose 10/10/22 1330   Stool Color Green 10/10/22 1330   Stool Amount Small 10/10/22 1330   Output (mL) 100 ml 10/10/22 1330   Number of days: 7     Patient had some issues with ostomy appliance leaking over the weekend - stoma assessed today, still healthy looking and the liberty wound skin is intact. Staples have a little erythema surrounding, but is likely from the leaking of ileostomy content over the weekend. Current appliance has a small leaked. [facility] noting, the patient specific supplies I placed in the patient's room last week had not been used at all, so possibly the wrong type of appliance was being used. Today new appliance applied. Patient rested in bed through the appliance change, states she was too tired to participate. Use of a One piece DEEP convex appliance today with a barrier ring. Site care preformed with warm water and 4x4s. A small piece of strip paste used to fill the skin creased at o'clock on the liberty stomal surface - to help provide a flat pouching surface.

## 2022-10-10 NOTE — PROGRESS NOTES
Physical Therapy Med Surg Initial Assessment  Facility/Department: [facility]  Room: [place] [id number]-       NAME: Wally Fritz  : 1950 (81 y.o.)  MRN: [id number]  CODE STATUS: Full Code    Date of Service: 10/10/2022    Patient Diagnosis(es): Rectal cancer ([facility] [C20]   No chief complaint on file.     Patient Active Problem List    Diagnosis Date Noted    Rectal cancer ([facility] 10/03/2022    Rectal polyp 2022    Special screening for malignant neoplasms, colon 2022    Colonic mass 2022    Elevated blood pressure reading 07/15/2022    Cancer ([facility]     Hyperlipidemia 2003        Past Medical History:   Diagnosis Date    Arthritis     Cancer ([facility]     SKIN    Hyperlipidemia      Past Surgical History:   Procedure Laterality Date    CATARACT REMOVAL      CHOLECYSTECTOMY      COLECTOMY N/A 10/3/2022    LOW ANTERIOR RESECTION WITH LOOP ILEOSTOMY performed by Fiona Burroughs MD at [address] [place] [address] 200 N/A 2022    COLORECTAL CANCER SCREENING, NOT HIGH RISK performed by Isaias Castro MD at [facility]    COLONOSCOPY N/A 2022    FLEXIBLE SIGMOIDOSCOPY WITH SNARE POLYPECTOMY performed by Fiona Burroughs MD at [facility]         Patient assessed for rehabilitation services?: Yes  Family / Caregiver Present: No    Restrictions:  Restrictions/Precautions: Fall Risk (high salinas score)     SUBJECTIVE:    \"My stomach is upset\"    Pain   0/10    Prior Level of Function:  Social/Functional History  Lives With: [facility]  Home Layout: One level, Laundry in basement  Home Access: Stairs to enter with rails  Entrance Stairs - Number of Steps: 4  Bathroom Shower/Tub: Tub/Shower unit  Home Equipment: [place], Rollator  ADL Assistance: Independent  Homemaking Assistance: Independent  Ambulation Assistance: Independent (no AD)  Transfer Assistance: Independent  Active : Yes  Occupation: Full time employment  Type of Occupation: Pt works for [employer]    OBJECTIVE:   Vision  Vision: Impaired  Vision Exceptions: Wears glasses for reading  H[facility]  Hearing Exceptions: Hard of hearing/hearing concerns; No hearing aid    Cognition:  Overall Orientation Status: Within Functional Limits  Orientation Level: Oriented X4  Follows Commands: Within Functional Limits  Overall Cognitive Status: WFL    Observation/Palpation  Posture: Fair  Observation: flexed posture; pt c/o nausea and L lower leg pain- RN made aware    ROM:  RLE AROM: WFL  LLE AROM : WFL    Strength:  Strength RLE  Comment: grossly 4/5  Strength LLE  Comment: grossly 4/5    Neuro:  Balance  Sitting - Static: Good  Sitting - Dynamic: Good;-  Standing - Static: Fair;-  Standing - Dynamic: Fair    Sensation: Intact    Bed mobility  Supine to Sit: Moderate assistance  Sit to Supine: Moderate assistance  Bed Mobility Comments: HOB elevated    Transfers  Sit to Stand: Contact guard assistance  Stand to Sit: Contact guard assistance  Bed to Chair: Contact guard assistance    Ambulation  Surface: Level tile  Device: No Device;Hand-Held Assist  Assistance: Contact guard assistance  Gait Deviations: Slow Alina; Increased KATHRINE; Decreased step length;Decreased step height  Distance: 5 steps from bed to bedside chair and return to bed    Stairs/Curb  Stairs?: No    Activity Tolerance  Activity Tolerance: Patient limited by fatigue;Patient limited by endurance  Activity Tolerance Comments: pt limited by strength and balance deficits    Patient Education  Education Given To: Patient  Education Provided: Role of Therapy;Plan of Care;Transfer Training  Education Method: Verbal  Education Outcome: Continued education needed       ASSESSMENT:   Body Structures, Functions, Activity Limitations Requiring Skilled Therapeutic Intervention: Decreased functional mobility ; Decreased strength;Decreased endurance;Decreased balance  Decision Making: Medium Complexity  History: high  Exam: med  Clinical Presentation: med    Therapy Prognosis: Good    DISCHARGE RECOMMENDATIONS:  Discharge Recommendations: Continue to assess pending progress, Patient would benefit from continued therapy after discharge    Assessment: Pt is 81 y.o. female dx with rectal CA. Pt exhibits decreased strength, activity tolerance and balance and requiring assistance for mobility. Pt was indep and worked at [employer]. Continued PT is required to progress pt to indep for safe return to PLOF. Requires PT Follow-Up: Yes      PLAN OF CARE:  Physcial Therapy Plan  General Plan: 1 time a day 3-6 times a week  Current Treatment Recommendations: Strengthening, ROM, Balance training, Functional mobility training, Transfer training, Endurance training, Gait training, Stair training, Neuromuscular re-education, Pain management, Home exercise program, Safety education & training, Patient/Caregiver education & training, Equipment evaluation, education, & procurement, Positioning, Therapeutic activities    Safety Devices  Type of Devices: Call light within reach, Bed alarm in place, Left in bed  Restraints  Restraints Initially in Place: No    Goals:  Short Term Goals  Short Term Goal 1: Pt will demonstrate HEP indep. Long Term Goals  Long Term Goal 1: Pt will demonstrate bed mobility indep  Long Term Goal 2: Pt will demonstrate transfers indep  Long Term Goal 3: Pt will demonstrate amb >/= 150ft indep  Long Term Goal 4: Pt will demonstrate weems balance assessment >/= 50/56 for decreased risk for falls    [facility] (6 CLICK) BASIC MOBILITY  AM-PAC Inpatient Mobility Raw Score : 14     Therapy Time:   Individual   Time In 1100   Time Out 1111   Minutes 11       Eval X 11 min    Marva Tolbert PT, 10/10/22 at 11:33 AM         Definitions for assistance levels  Independent = pt does not require any physical supervision or assistance from another person for activity completion. Device may be needed.   Stand by assistance = pt requires verbal cues or instructions from another person, close to but not touching, to perform the activity  Minimal assistance= pt performs 75% or more of the activity; assistance is required to complete the activity  Moderate assistance= pt performs 50% of the activity; assistance is required to complete the activity  Maximal assistance = pt performs 25% of the activity; assistance is required to complete the activity  Dependent = pt requires total physical assistance to accomplish the task

## 2022-10-10 NOTE — PROGRESS NOTES
Pt Name: Jazmin De Anda Record Number: [id number]  Date of Birth 1950   Admit date 10/3/2022  9:19 AM  Today's Date: 10/10/2022     ASSESSMENT  1. Hospital day # 7  2 postop day #1 low anterior resection with diverting ileostomy    PLAN  1. To [facility] today  2. Physical therapy  3. Ostomy education and maintenance    SUBJECTIVE  Chief complaint: Follow-up proctectomy  Afebrile, vital signs are stable. She denies any nausea or vomiting, ileostomy working She is tolerating a ADULT DIET; Full Liquid. Her pain is well controlled on current medications. He is working with physical therapy      has a past medical history of Arthritis, Cancer ([address] and Hyperlipidemia. CURRENT MEDS  Scheduled Meds:   miconazole   Topical BID    atorvastatin  20 mg Oral Daily    sodium chloride flush  5-40 mL IntraVENous 2 times per day    enoxaparin  40 mg SubCUTAneous Daily     Continuous Infusions:   sodium chloride      sodium chloride 50 mL/hr at 10/10/22 0827     PRN Meds:.metoclopramide, traMADol, HYDROmorphone **OR** HYDROmorphone, naloxone, ondansetron, sodium chloride flush, sodium chloride, ondansetron **OR** ondansetron    OBJECTIVE  CURRENT VITALS:  height is 5' (1.524 m) and weight is 194 lb (88 kg). Her oral temperature is 97.7 °F (36.5 °C). Her blood pressure is 122/51 (abnormal) and her pulse is 92. Her respiration is 16 and oxygen saturation is 97%.    Temperature Range (24h):Temp: 97.7 °F (36.5 °C) Temp  Av.8 °F (36.6 °C)  Min: 97.7 °F (36.5 °C)  Max: 97.9 °F (36.6 °C)  BP Range (02E): Systolic (04DCH), RQQ:581 , Min:122 , BSY:023     Diastolic (53ZMR), ERC:60, Min:51, Max:58    Pulse Range (24h): Pulse  Av  Min: 88  Max: 92  Respiration Range (24h): Resp  Av.3  Min: 16  Max: 18    GENERAL: alert, no distress  LUNGS: clear to ausculation, without wheezes, rales or rhonci  HEART: normal rate and regular rhythm  ABDOMEN: soft, non-tender, non-distended, incision clean, ileostomy working, bowel sounds present in all 4 quadrants, and no guarding or peritoneal signs  EXTERMITY: no cyanosis, clubbing or edema  In: 5847.2 [P.O.:750; I.V.:4097.2]  Out: 1400 [Urine:300]  Date 10/10/22 0000 - 10/10/22 2359   Shift 7992-6366 6560-7228 0657-7613 24 Hour Total   INTAKE   I.V.(mL/kg) 596. 1(6.8)   596. 1(6.8)   Shift Total(mL/kg) 596. 1(6.8)   596. 1(6.8)   OUTPUT   Stool(mL/kg) 150(1.7)   150(1.7)   Shift Total(mL/kg) 150(1.7)   150(1.7)   Weight (kg) 88 88 88 88       LABS  Recent Labs     10/08/22  2023 10/09/22  0535   WBC 13.3* 9.5   HGB 14.2 13.1   HCT 43.1 39.5   * 384   NA  --  132*   K  --  3.6   CL  --  96   CO2  --  24   BUN  --  36*   CREATININE  --  1.29*   CALCIUM  --  8.6      No results for input(s): PTT, INR in the last 72 hours. Invalid input(s): PT  Recent Labs     10/09/22  0535   AST 19   ALT 14   BILITOT 0.5       RADIOLOGY  CT CHEST W CONTRAST    Result Date: 9/26/2022  EXAMINATION: CT OF THE CHEST WITH CONTRAST 9/22/2022 3:06 pm TECHNIQUE: CT of the chest was performed with the administration of intravenous contrast. Multiplanar reformatted images are provided for review. Automated exposure control, iterative reconstruction, and/or weight based adjustment of the mA/kV was utilized to reduce the radiation dose to as low as reasonably achievable. COMPARISON: None. HISTORY: ORDERING SYSTEM PROVIDED HISTORY: Colonic mass TECHNOLOGIST PROVIDED HISTORY: STAT Creatinine as needed:->No Reason for exam:->colon mass evaluate mets What reading provider will be dictating this exam?->CRC FINDINGS: The lung fields reveal no evidence of any nodules, consolidation or atelectasis. Minimal lymph nodes noted adjacent to the aortic arch and the pretracheal area. No pleural effusion or pneumothorax. No definitive filling defects seen within the primary trunk or pulmonary arteries including the segmental and subsegmental branches to suggest PE. The aorta is unremarkable.      Negative CT scan of the chest except for minimal lymph nodes adjacent to the aorta of aortic arch and pretracheal area. CT ABDOMEN PELVIS W IV CONTRAST Additional Contrast? None    Result Date: 9/23/2022  EXAMINATION: CT OF THE ABDOMEN AND PELVIS WITH CONTRAST 9/22/2022 3:06 pm TECHNIQUE: CT of the abdomen and pelvis was performed with the administration of intravenous contrast.  PO contrast was administered. Multiplanar reformatted images are provided for review. Automated exposure control, iterative reconstruction, and/or weight based adjustment of the mA/kV was utilized to reduce the radiation dose to as low as reasonably achievable. COMPARISON: None. HISTORY: ORDERING SYSTEM PROVIDED HISTORY: Colonic mass TECHNOLOGIST PROVIDED HISTORY: Additional Contrast?->None STAT Creatinine as needed:->No Reason for exam:->colon mass evaluate mets What reading provider will be dictating this exam?->CRC FINDINGS: Evaluation for metastases is limited on non IV contrast study. Lower Chest: Bilateral lung bases are clear. Organs: Evaluation of solid organs limited by lack of intravenous contrast liver, spleen, pancreas, bilateral adrenal gland, gallbladder are within normal limits. No evidence of obstructive nephrolithiasis or hydronephrosis. GI/Bowel: Evaluation of bowel limited by lack of oral contrast.  No pathological dilatation of small-bowel loops. Appendix is within normal limits. There is diverticulosis without diverticulitis. Pelvis: Urinary bladder is minimally distended and appears grossly unremarkable. An intrauterine device is in place. Uterus and adnexa are grossly unremarkable. Peritoneum/Retroperitoneum: Abdominal aorta is normal in course and caliber. No evidence of lymphadenopathy. No free fluid or free intra-abdominal air. Bones/Soft Tissues: Normal     Limited non IV contrast study. 1. No imaging evidence of acute intra-abdominal or pelvic process.      PEDRO DIGITAL SCREEN W OR WO CAD BILATERAL    Result Date: 9/18/2022  EXAMINATION: SCREENING DIGITAL BILATERAL MAMMOGRAM WITH TOMOSYNTHESIS, 9/15/2022 1:54 pm TECHNIQUE: Screening mammography of the bilateral breasts was performed with tomosynthesis. 2D standard and 3D tomosynthesis combination imaging performed through both breasts in the MLO and CC projection. Computer aided detection was utilized in the interpretation of this exam. COMPARISON: 11/23/2019 HISTORY: Screening. FINDINGS: There are scattered areas of fibroglandular density. There is no new dominant mass, suspicious microcalcification, or area of architectural distortion. No mammographic evidence for malignancy. BIRADS: BIRADS - CATEGORY 1 Negative, no evidence of malignancy. Normal interval follow-up is recommended in 12 months. OVERALL ASSESSMENT - NEGATIVE A letter of notification will be sent to the patient regarding the results. The [employer] recommends annual mammograms for women 40 years and older.     Electronically signed by Sarah Valverde MD on 10/10/2022 at 2:01 PM

## 2022-10-10 NOTE — CARE COORDINATION
Spoke with Gary at [facility], will review pt/ot notes and return call. PER GARY, WILL ACCEPT TO [facility] TODAY. DR. Duron April NOTIFIED. DC COMPLETED, IMM REVIEW AND SIGNATURE OBTAINED. TRANSPORT SCHEDULED FOR 7P AND GARY AT [facility].

## 2022-10-10 NOTE — PROGRESS NOTES
for [address] [facility] @ [address]. Patient can admit when ready for discharge to [facility]. Thank you.

## 2022-10-10 NOTE — PROGRESS NOTES
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 Feels well today. Tolerating p.o. intake.     Physical Examination:      Vitals:  BP (!) 122/51   Pulse 92   Temp 97.7 °F (36.5 °C) (Oral)   Resp 16   Ht 5' (1.524 m)   Wt 194 lb (88 kg)   SpO2 97%   BMI 37.89 kg/m²   Temp (24hrs), Av.8 °F (36.6 °C), Min:97.7 °F (36.5 °C), Max:97.9 °F (36.6 °C)      General appearance: alert, cooperative and no distress  Mental Status: oriented to person, place and time and normal affect  Lungs: clear to auscultation bilaterally, normal effort  Heart: regular rate and rhythm, no murmur  Abdomen: soft, nondistended, bowel sounds present, no masses  Extremities: no edema, redness, tenderness in the calves  Skin: no gross lesions, rashes    Data:     Labs:  Recent Labs     10/08/22  2023 10/09/22  0535   WBC 13.3* 9.5   HGB 14.2 13.1   * 384     Recent Labs     10/09/22  0535   *   K 3.6   CL 96   CO2 24   BUN 36*   CREATININE 1.29*   GLUCOSE 120*     Recent Labs     10/09/22  0535   AST 19   ALT 14   BILITOT 0.5   ALKPHOS 88       Current Facility-Administered Medications   Medication Dose Route Frequency Provider Last Rate Last Admin    miconazole (MICOTIN) 2 % powder   Topical BID Mavis Amin MD   Given at 10/10/22 5110    metoclopramide (REGLAN) injection 5 mg  5 mg IntraVENous Q6H PRN Michaela Yancey MD 5 mg at 10/09/22 0834    traMADol (ULTRAM) tablet 50 mg  50 mg Oral Q6H PRN Nicoletto Bolzan-Roche, APRN - CNP   50 mg at 10/09/22 0006    HYDROmorphone (DILAUDID) injection 0.5 mg  0.5 mg IntraVENous Q3H PRN Richard Trujillo MD   0.5 mg at 10/09/22 1951    Or    HYDROmorphone (DILAUDID) injection 1 mg  1 mg IntraVENous Q3H PRN Richard Trujillo MD   1 mg at 10/09/22 1610    atorvastatin (LIPITOR) tablet 20 mg  20 mg Oral Daily Aby Rogel MD   20 mg at 10/10/22 1923    naloxone 0.4 mg in 10 mL sodium chloride syringe   IntraVENous PRN Richard Trujillo MD        ondans[facility]ron [facility]) injection 4 mg  4 mg IntraVENous Q6H PRN Richard Trujillo MD   4 mg at 10/10/22 1152    sodium chloride flush 0.9 % injection 5-40 mL  5-40 mL IntraVENous 2 times per day Richard Trujillo MD   10 mL at 10/09/22 1951    sodium chloride flush 0.9 % injection 5-40 mL  5-40 mL IntraVENous PRN Richard Trujillo MD        0.9 % sodium chloride infusion   IntraVENous PRN Richard Trujillo MD        ondansetron (ZOFRAN-ODT) disintegrating tablet 4 mg  4 mg Oral Q8H PRN Richard Trujillo MD   4 mg at 10/07/22 2159    Or    ondansetron (ZOFRAN) injection 4 mg  4 mg IntraVENous Q6H PRN Richard Trujillo MD   4 mg at 10/05/22 1914    0.9 % sodium chloride infusion   IntraVENous Continuous Richard Trujillo MD 50 mL/hr at 10/10/22 0827 New Bag at 10/10/22 0827    enoxaparin (LOVENOX) injection 40 mg  40 mg SubCUTAneous Daily Richard Trujillo MD   40 mg at 10/09/22 2150       Additional work up or/and treatment plan may be added today or then after based on clinical progression. I am managing a portion of pt care. Some medical issues are handled by other specialists. Additional work up and treatment should be done in out pt setting by pt PCP and other out pt providers. In addition to examining and evaluating pt, I spent additional time explaining care, normaland abnormal findings, and treatment plan. All of pt questions were answered. Counseling, diet and education were provided. Case will be discussed with nursing staff when appropriate. Family will be updated if and when appropriate.        Electronically signed by Sandra Torres DO on 10/10/2022 at 1:20 PM

## 2022-10-10 NOTE — PROGRESS NOTES
0238: Assessment complete. VSS on room air. Patient alert, oriented, calm, and cooperative. Ileostomy draining green fluid-250 mL's emptied this morning. Incision to mid abdoment closed with sutures and JOSE MARTIN. +1 swelling present to BLE. Pt ambulating to bedside commode as needed. Pt up to chair for breakfast. Safety maintained, call light within reach. No complaints. 1500: Ileostomy leaking. Wound/Ostomy nurse at bedside replacing ostomy bag.      Electronically signed by Chasidy Edwards RN on 10/10/22 at 2:48 PM EDT

## 2022-10-10 NOTE — PROGRESS NOTES
[facility] OCCUPATIONAL THERAPY EVALUATION - ACUTE     NAME: Gilda Alegre  : 1950 (81 y.o.)  MRN: [id number]  CODE STATUS: Full Code  Room: [place] [address]    Date of Service: 10/10/2022    Patient Diagnosis(es): Rectal cancer [facility]) [address]   Patient Active Problem List    Diagnosis Date Noted    Rectal cancer ([facility] 10/03/2022    Rectal polyp 2022    Special screening for malignant neoplasms, colon 2022    Colonic mass 2022    Elevated blood pressure reading 07/15/2022    Cancer ([facility] [address]     Hyperlipidemia 2003        Past Medical History:   Diagnosis Date    Arthritis     Cancer ([facility] [address]     SKIN    Hyperlipidemia      Past Surgical History:   Procedure Laterality Date    CATARACT REMOVAL      CHOLECYSTECTOMY      COLECTOMY N/A 10/3/2022    LOW ANTERIOR RESECTION WITH LOOP ILEOSTOMY performed by Christina Young MD at [address] [place] [address] N/A 2022    COLORECTAL CANCER SCREENING, NOT HIGH RISK performed by Lizzeth Mcgovern MD at [facility]    COLONOSCOPY N/A 2022    FLEXIBLE SIGMOIDOSCOPY WITH SNARE POLYPECTOMY performed by Christina Young MD at [place] [address]          Restrictions:Fall, ileostomy                 Safety Devices: Safety Devices  Type of Devices: Call light within reach; Left in bed     Patient's date of birth confirmed: Yes    General:       Subjective:Pt pleasant and cooperative.           Pain at start of treatment: No    Pain at end of treatment: No    Location:   Nursing notified: Not Applicable  RN:   Intervention: Repositioned    Prior Level of Function:  Social/Functional History  Lives With: Son (bruno)  Home Layout: One level, Laundry in basement  Home Access: Stairs to enter with rails  Entrance Stairs - Number of Steps: 4  Bathroom Shower/Tub: Tub/Shower unit  Home Equipment: [place], Rollator  Has the patient had two or more falls in the past year or any fall with injury in the past year?: No  ADL Assistance: Independent  Homemaking Assistance: Independent  Ambulation Assistance: Independent  Transfer Assistance: Independent  Active : Yes  Occupation: Full time employment  Type of Occupation: Pt works for [employer]    OBJECTIVE:     Orientation Status:  Orientation  Overall Orientation Status: Within Functional Limits  Orientation Level: Oriented X4    Observation:  Observation/Palpation  Posture: Fair  Observation: flexed posture    Cognition Status:  Cognition  Overall Cognitive Status: WFL    Perception Status:  Perception  Overall Perceptual Status: WFL    Vision and Hearing Status:  Vision  Vision Exceptions: Wears glasses for reading  Hearing  Hearing: Exceptions to [facility]  Hearing Exceptions: Hard of hearing/hearing concerns; No hearing aid   Vision - Basic Assessment  Prior Vision: Wears glasses only for reading  Visual History: Cataracts;Surgical intervention  Patient Visual Report: No visual complaint reported. Visual Field Cut: No  Oculo Motor Control: WNL    GROSS ASSESSMENT AROM/PROM:  AROM: Within functional limits       ROM:   LUE AROM (degrees)  LUE AROM : WFL  Left Hand AROM (degrees)  Left Hand AROM: WFL  RUE AROM (degrees)  RUE AROM : WFL  Right Hand AROM (degrees)  Right Hand AROM: WFL    UE STRENGTH:  Strength: Within functional limits (4/5)    UE COORDINATION:  Coordination: Within functional limits    UE TONE:  Tone: Normal    UE SENSATION:  Sensation: Intact    Hand Dominance:  Hand Dominance  Hand Dominance: Right    ADL Status:  ADL  Feeding: Setup  Grooming: Setup; Increased time to complete  UE Bathing: Minimal assistance; Increased time to complete  LE Bathing: Moderate assistance; Increased time to complete  UE Dressing: Minimal assistance; Increased time to complete  LE Dressing: Moderate assistance; Increased time to complete  Toileting: Unable to assess(comment)    Functional Mobility:    Transfers  Sit to stand: Stand by assistance  Stand to sit: Stand by assistance    Bed Mobility  Bed mobility  Supine to Sit: Minimal assistance  Sit to Supine: Moderate assistance  Scooting: Minimal assistance    Seated and Standing Balance:  Balance  Sitting: Intact  Standing:  (CGA)    Functional Endurance:  Activity Tolerance  Activity Tolerance: Patient limited by fatigue;Treatment limited secondary to medical complications (free text)    D/C Recommendations:  OT D/C RECOMMENDATIONS  REQUIRES OT FOLLOW-UP: Yes    Equipment Recommendations:       OT Education:        OT Follow Up:   OT D/C RECOMMENDATIONS  REQUIRES OT FOLLOW-UP: Yes       Assessment/Discharge Disposition:     Performance deficits / Impairments: Decreased functional mobility , Decreased balance, Decreased ADL status, Decreased endurance, Decreased high-level IADLs, Decreased strength  Prognosis: Good  Discharge Recommendations: Continue to assess pending progress  Decision Making: Medium Complexity  History: 3 compelxities  Exam: 6 deficits  Assistance / Modification:  Mod A    AMPAC (Six Click) Self care Score    How much help for putting on and taking off regular lower body clothing?: A Lot  How much help for Bathing?: A Lot  How much help for Toileting?: None  How much help for putting on and taking off regular upper body clothing?: A Little  How much help for taking care of personal grooming?: None  How much help for eating meals?: None  AM-[facility] Inpatient Daily Activity Raw Score: 19  AM-[facility]AC Inpatient ADL T-Scale Score : 40.22  ADL Inpatient CMS 0-100% Score: 42.8    Therapy key for assistance levels -   Independent/Mod I = Pt. is able to perform task with no assistance but may require a device   Stand by assistance = Pt. does not perform task at an independent level but does not need physical assistance, requires verbal cues  Minimal, Moderate, Maximal Assistance = Pt. requires physical assistance (25%, 50%, 75% assist from helper) for task but is able to actively participate in task   Dependent = Pt. requires total assistance with task and is not able to actively participate with task completion     Plan:  Occupational Therapy Plan  Times Per Week: 1-4x/wk  Current Treatment Recommendations: Strengthening, Balance training, Functional mobility training, Neuromuscular re-education, Endurance training, Self-Care / ADL    Goals:   Patient will:    - Improve functional endurance to tolerate/complete 15-20 mins of ADL's  - Be independent in UB ADLs   - Be SBA in LB ADLs  - Be independent in ADL transfers without LOB  - Be independent in toileting tasks  - Improve bilateral UE strength and endurance to Fair in order to participate in self-care activities as projected.     Patient Goal: Patient goals : TO return to home when ready      Discussed and agreed upon: Yes Comments:       Therapy Time:   Individual   Time In 0950   Time Out 1013   Minutes 23          Eval: 23 minutes     Electronically signed by:    KATIE Frances/ANKITA,   56/28/2884, 10:26 AM Electronically signed by JUDITH Frances on 47/49/79 at 10:27 AM EDT

## 2022-10-10 NOTE — DISCHARGE SUMMARY
Physician Discharge Summary     Patient ID:  Sherryle Comfort  [id number]  81 y.o.  1950    Admit date: 10/3/2022    Discharge date and time: 10/10/2022    Admitting Physician: Samantha Roman MD     Discharge Physician: Same    Admission Diagnoses: Rectal cancer [facility]) [address]    Discharge Diagnoses: Same    Admission Condition: good    Discharged Condition: good    Indication for Admission: Rectal cancer    Hospital Course: Patient was taken to the operating room on 10/3/2022 for partial proctectomy with primary anastomosis and diverting ileostomy, the details of which can be found in the operative report. She was taken to the floor postoperatively. Pain control was an epidural for the first 3 postoperative days and then converted to traditional pain medication. DVT prophylaxis and ostomy education was given. The hospitalist service was consulted for assistance with her medical issues, the details of which can be found in their consult report. Patient had some nausea for the first 72 hours but with resumption of ileostomy activity, her nausea improved significantly. She was started on clear liquids on postop day #80 and converted to full liquids. BRENDAN drain was removed left lower quadrant on postop day #5. Patient had some issues with hypotension secondary to high ileostomy output as well as acute kidney injury mild treated with hydration and monitoring. Physical therapy was consulted for assistance with deconditioning. Histology had not returned to date. Her blood work showed a normal white blood cell count upon discharge. She received perioperative antibiotics only. On postop day #7, arrangements were made for her to go to [facility] for physical therapy and rehab. Discharge/readmit orders placed. Notes on ostomy education and maintenance can be found in the chart.     Consults: Hospitalist    Significant Diagnostic Studies: labs: CBC, BMP    Treatments: antibiotics: Ancef and metronidazole and surgery: Partial proctectomy with diverting ileostomy    Discharge Exam:  See exam dated 10/10/2022    Disposition: [facility]    In process/preliminary results:  Outstanding Order Results       Date and Time Order Name Status Description    10/3/2022  8:34 AM Surgical Pathology In process             Patient Instructions:   Current Discharge Medication List        CONTINUE these medications which have NOT CHANGED    Details   aspirin 325 MG tablet Take 325 mg by mouth daily      simvastatin (ZOCOR) 40 MG tablet Take 1 tablet by mouth every evening  Qty: 90 tablet, Refills: 3    Associated Diagnoses: Mixed hyperlipidemia      meloxicam (MOBIC) 15 MG tablet Take 1 tablet by mouth daily  Qty: 90 tablet, Refills: 3    Associated Diagnoses: Primary osteoarthritis involving multiple joints      Handicap Placard MISC by Does not apply route Good for 5 years  Qty: 1 each, Refills: 0    Associated Diagnoses: Cancer ([address]           Activity: activity as tolerated  Diet:  Full liquid diet, can advance to regular food as tolerated  Wound Care: keep wound clean and dry    Follow-up with Janina Daniel in 10 days.     Jorgito Pena  10/10/2022  2:05 PM

## 2022-10-10 NOTE — PROGRESS NOTES
Assessment documented. Vital signs stable. Meds taken well. Medicated for pain this shift with (+) results. Abdominal surgical incision is well approximated with staples. Ileostomy producing green liquid stool. Call light within reach.     BP (!) 131/58   Pulse 88   Temp 97.9 °F (36.6 °C) (Oral)   Resp 18   Ht 5' (1.524 m)   Wt 194 lb (88 kg)   SpO2 93%   BMI 37.89 kg/m²

## 2022-10-10 NOTE — DISCHARGE INSTR - COC
Continuity of Care Form    Patient Name: Curtistine Brittle   :  1950  MRN:  [id number]    Admit date:  10/3/2022  Discharge date:  10/10/2022    Code Status Order: Full Code   Advance Directives:   Advance Care Flowsheet Documentation       Date/Time Healthcare Directive Type of Healthcare Directive Copy in [address] Agent's Name Healthcare Agent's Phone Number    10/03/22 0933 No, patient does not have an advance directive for healthcare treatment -- -- -- -- --            Admitting Physician:  Michaela Yancey MD  PCP: Yomaira Clarke DO    Discharging Nurse: [facility] Unit/Room#: L937/L080-42  Discharging Unit Phone Number: [phone]    Emergency Contact:   Extended Emergency Contact Information  Primary Emergency Contact: Luisa Rhiannon [facility] [address] [facility] Phone: [phone]  Mobile Phone: [phone]  Relation: Child  Secondary Emergency Contact: Juan F Perkins   [address] Phone: [phone]  Relation: Child    Past Surgical History:  Past Surgical History:   Procedure Laterality Date    CATARACT REMOVAL      CHOLECYSTECTOMY      COLECTOMY N/A 10/3/2022    LOW ANTERIOR RESECTION WITH LOOP ILEOSTOMY performed by Michaela Yancey MD at [place] [address] N/A 2022    COLORECTAL CANCER SCREENING, NOT HIGH RISK performed by Kaden Gómez MD at [facility]    COLONOSCOPY N/A 2022    FLEXIBLE SIGMOIDOSCOPY WITH SNARE POLYPECTOMY performed by Michaela Yancey MD at [facility] 2 LEG    TUBAL LIGATION         Immunization History:   Immunization History   Administered Date(s) Administered    COVID-19, J&J, (age 18y+), IM, 0.5 mL 2021       Active Problems:  Patient Active Problem List   Diagnosis Code    Cancer ([facility] [address] C80.1    Hyperlipidemia E78.5    Elevated blood pressure reading R03.0    Special screening for malignant neoplasms, colon Z12.11    Colonic mass M07.57    Rectal polyp K62.1    Rectal cancer ([facility] [address] C20       Isolation/Infection:   Isolation            No Isolation          Patient Infection Status       Infection Onset Added Last Indicated Last Indicated By Review Planned Expiration Resolved Resolved By    None active    Resolved    COVID-19 20 COVID-19 Ambulatory   20     COVID-19 (Rule Out) 20 COVID-19 Ambulatory (Ordered)   20 Rule-Out Test Resulted            Nurse Assessment:  Last Vital Signs: BP (!) 122/51   Pulse 92   Temp 97.7 °F (36.5 °C) (Oral)   Resp 16   Ht 5' (1.524 m)   Wt 194 lb (88 kg)   SpO2 97%   BMI 37.89 kg/m²     Last documented pain score (0-10 scale): Pain Level: 7  Last Weight:   Wt Readings from Last 1 Encounters:   10/03/22 194 lb (88 kg)     Mental Status:  oriented and alert    IV Access:  20 to the Right Wrist     Nursing Mobility/ADLs:  Walking   Assisted  Transfer  Assisted  Bathing  Assisted  Dressing  Assisted  Toileting  Assisted  Feeding  Independent  Med Admin  Assisted  Med Delivery   whole    Wound Care Documentation and Therapy:  Wound 10/09/22 Abdomen Anterior; Left (Active)   Dressing Status Other (Comment) 10/09/22 1920   Number of days: 1       Incision 10/03/22 Abdomen Medial (Active)   Dressing Status Other (Comment) 10/09/22 1920   Dressing/Treatment Open to air 10/09/22 1920   Incision Assessment Erythema 10/09/22 1920   Drainage Amount None 10/09/22 1920   Odor None 10/09/22 0026   Number of days: 7        Elimination:  Continence:    Bowel: Ileostomy   Bladder: No  Urinary Catheter: None   Colostomy/Ileostomy/Ileal Conduit: Yes  Ileostomy/Jejunostomy RLQ Loop ileostomy-Stomal Appliance: 1 piece  Ileostomy/Jejunostomy RLQ Loop ileostomy-Flange Size (inches): 3 Inches  Ileostomy/Jejunostomy RLQ Loop ileostomy-Stoma  Assessment: Red, Moist, Protrudes  Ileostomy/Jejunostomy RLQ Loop ileostomy-Peristomal Assessment: Clean, dry & intact  Ileostomy/Jejunostomy RLQ Loop ileostomy-Treatment: Bag change  Ileostomy/Jejunostomy RLQ Loop ileostomy-Stool Appearance: Watery  Ileostomy/Jejunostomy RLQ Loop ileostomy-Stool Color: Green  Ileostomy/Jejunostomy RLQ Loop ileostomy-Stool Amount: Medium  Ileostomy/Jejunostomy RLQ Loop ileostomy-Output (mL): 150 ml    Date of Last BM: 10/10/2022     Intake/Output Summary (Last 24 hours) at 10/10/2022 1449  Last data filed at 10/10/2022 0622  Gross per 24 hour   Intake 5247.21 ml   Output 350 ml   Net 4897.21 ml     I/O last 3 completed shifts: In: 5847.2 [P.O.:750; I.V.:4097.2; IV Piggyback:1000]  Out: 1800 [Urine:300; Stool:1500]    Safety Concerns: At Risk for Falls    Impairments/Disabilities:      None    Nutrition Therapy:  Current Nutrition Therapy:   - Oral Diet:  General    Routes of Feeding: Oral  Liquids: No Restrictions  Daily Fluid Restriction: no  Last Modified Barium Swallow with Video (Video Swallowing Test): not done    Treatments at the Time of Hospital Discharge:   Respiratory Treatments: N/A  Oxygen Therapy:  is not on home oxygen therapy.   Ventilator:    - No ventilator support    Rehab Therapies: Physical Therapy and Occupational Therapy  Weight Bearing Status/Restrictions: No weight bearing restrictions  Other Medical Equipment (for information only, NOT a DME order):  walker and bedside commode  Other Treatments: N/A    Patient's personal belongings (please select all that are sent with patient):  Jass    RN SIGNATURE:  Electronically signed by Alissa Toribio RN on 10/10/22 at 6:01 PM EDT    CASE MANAGEMENT/SOCIAL WORK SECTION    Inpatient Status Date: ***    Readmission Risk Assessment Score:  Readmission Risk              Risk of Unplanned Readmission:  9           Discharging to Facility/ Agency   Name: Josephine Arboleda  Address:  Phone: [phone]  Fax:    Dialysis Facility (if applicable)   Name:  Address:  Dialysis Schedule:  Phone:  Fax:    / signature: Electronically signed by MIYA Rodas, PRESTON on 10/10/22 at 2:50 PM EDT    PHYSICIAN SECTION    Prognosis: {Prognosis:6155135038}    Condition at Discharge: Trell8 Amarilis Davis Patient Condition:790374028}    Rehab Potential (if transferring to Rehab): {Prognosis:6756669713}    Recommended Labs or Other Treatments After Discharge: ***    Physician Certification: I certify the above information and transfer of [place] Gentle  is necessary for the continuing treatment of the diagnosis listed and that she requires {Admit to Appropriate Level of Care:00458} for {GREATER/LESS:964520752} 30 days.      Update Admission H&P: {CHP DME Changes in HOT:427360254}    PHYSICIAN SIGNATURE:  {Esignature:[id number]}

## 2022-10-10 NOTE — PROGRESS NOTES
1830: Report called to [facility] Lowery at this time. They would like pt to be discharged with IV access. Pt has a 20 to the right wrist that will remain in place.      Electronically signed by Jose Cramer RN on 10/10/22 at 6:35 PM EDT

## 2022-10-11 PROBLEM — C20 MALIGNANT NEOPLASM OF RECTUM (HCC): Status: ACTIVE | Noted: 2022-10-11

## 2022-10-11 LAB
BASOPHILS ABSOLUTE: 0 K/UL (ref 0–0.1)
BASOPHILS RELATIVE PERCENT: 0.4 % (ref 0.1–1.2)
EOSINOPHILS ABSOLUTE: 0.2 K/UL (ref 0–0.4)
EOSINOPHILS RELATIVE PERCENT: 1.6 % (ref 0.7–5.8)
HCT VFR BLD CALC: 36.7 % (ref 37–47)
HEMOGLOBIN: 12.1 G/DL (ref 11.2–15.7)
IMMATURE GRANULOCYTES #: 0.1 K/UL
IMMATURE GRANULOCYTES %: 0.9 %
LYMPHOCYTES ABSOLUTE: 2.2 K/UL (ref 1.2–3.7)
LYMPHOCYTES RELATIVE PERCENT: 21.6 %
MCH RBC QN AUTO: 28.7 PG (ref 25.6–32.2)
MCHC RBC AUTO-ENTMCNC: 33 % (ref 32.2–35.5)
MCV RBC AUTO: 87 FL (ref 79.4–94.8)
MONOCYTES ABSOLUTE: 1.1 K/UL (ref 0.2–0.9)
MONOCYTES RELATIVE PERCENT: 10.7 % (ref 4.7–12.5)
NEUTROPHILS ABSOLUTE: 6.6 K/UL (ref 1.6–6.1)
NEUTROPHILS RELATIVE PERCENT: 64.8 % (ref 34–71.1)
PDW BLD-RTO: 13.5 % (ref 11.7–14.4)
PLATELET # BLD: 412 K/UL (ref 182–369)
RBC # BLD: 4.22 M/UL (ref 3.93–5.22)
WBC # BLD: 10.2 K/UL (ref 4–10)

## 2022-10-11 PROCEDURE — 2580000003 HC RX 258: Performed by: COLON & RECTAL SURGERY

## 2022-10-11 PROCEDURE — 85025 COMPLETE CBC W/AUTO DIFF WBC: CPT

## 2022-10-11 PROCEDURE — 97116 GAIT TRAINING THERAPY: CPT

## 2022-10-11 PROCEDURE — 97530 THERAPEUTIC ACTIVITIES: CPT

## 2022-10-11 PROCEDURE — 6360000002 HC RX W HCPCS: Performed by: COLON & RECTAL SURGERY

## 2022-10-11 PROCEDURE — 6370000000 HC RX 637 (ALT 250 FOR IP): Performed by: COLON & RECTAL SURGERY

## 2022-10-11 PROCEDURE — 1200000002 HC SEMI PRIVATE SWING BED

## 2022-10-11 PROCEDURE — 97166 OT EVAL MOD COMPLEX 45 MIN: CPT

## 2022-10-11 PROCEDURE — 36415 COLL VENOUS BLD VENIPUNCTURE: CPT

## 2022-10-11 PROCEDURE — 97162 PT EVAL MOD COMPLEX 30 MIN: CPT

## 2022-10-11 RX ADMIN — ENOXAPARIN SODIUM 40 MG: 100 INJECTION SUBCUTANEOUS at 20:27

## 2022-10-11 RX ADMIN — Medication 10 ML: at 20:31

## 2022-10-11 RX ADMIN — ATORVASTATIN CALCIUM 20 MG: 10 TABLET, FILM COATED ORAL at 08:38

## 2022-10-11 RX ADMIN — METOCLOPRAMIDE 5 MG: 5 INJECTION, SOLUTION INTRAMUSCULAR; INTRAVENOUS at 20:30

## 2022-10-11 RX ADMIN — Medication 5 ML: at 11:48

## 2022-10-11 RX ADMIN — MICONAZOLE NITRATE: 2 POWDER TOPICAL at 20:29

## 2022-10-11 RX ADMIN — ONDANSETRON 4 MG: 2 INJECTION INTRAMUSCULAR; INTRAVENOUS at 13:31

## 2022-10-11 RX ADMIN — TRAMADOL HYDROCHLORIDE 50 MG: 50 TABLET ORAL at 20:36

## 2022-10-11 RX ADMIN — MICONAZOLE NITRATE: 2 POWDER TOPICAL at 08:38

## 2022-10-11 ASSESSMENT — PAIN DESCRIPTION - LOCATION
LOCATION: ABDOMEN
LOCATION: ABDOMEN

## 2022-10-11 ASSESSMENT — PAIN SCALES - GENERAL
PAINLEVEL_OUTOF10: 5
PAINLEVEL_OUTOF10: 4
PAINLEVEL_OUTOF10: 3
PAINLEVEL_OUTOF10: 4

## 2022-10-11 ASSESSMENT — PAIN DESCRIPTION - DESCRIPTORS: DESCRIPTORS: CRAMPING;ACHING

## 2022-10-11 ASSESSMENT — PAIN - FUNCTIONAL ASSESSMENT: PAIN_FUNCTIONAL_ASSESSMENT: PREVENTS OR INTERFERES SOME ACTIVE ACTIVITIES AND ADLS

## 2022-10-11 ASSESSMENT — PAIN DESCRIPTION - ORIENTATION: ORIENTATION: MID

## 2022-10-11 NOTE — PLAN OF CARE
Problem: Discharge Planning  Goal: Discharge to home or other facility with appropriate resources  Outcome: Progressing  Flowsheets (Taken 10/10/2022 2050)  Discharge to home or other facility with appropriate resources:   Identify barriers to discharge with patient and caregiver   Identify discharge learning needs (meds, wound care, etc)   Refer to discharge planning if patient needs post-hospital services based on physician order or complex needs related to functional status, cognitive ability or social support system   Arrange for needed discharge resources and transportation as appropriate   Arrange for interpreters to assist at discharge as needed     Problem: Safety - Adult  Goal: Free from fall injury  Outcome: Progressing     Problem: ABCDS Injury Assessment  Goal: Absence of physical injury  Outcome: Progressing     Problem: Skin/Tissue Integrity  Goal: Absence of new skin breakdown  Description: 1. Monitor for areas of redness and/or skin breakdown  2. Assess vascular access sites hourly  3. Every 4-6 hours minimum:  Change oxygen saturation probe site  4. Every 4-6 hours:  If on nasal continuous positive airway pressure, respiratory therapy assess nares and determine need for appliance change or resting period.   Outcome: Progressing

## 2022-10-11 NOTE — PROGRESS NOTES
Facility/Department: [facility] SUBACUTE UNIT  Occupational Therapy Initial Assessment    Name: Ryne Boles  : 1950  MRN: [id number]  Date of Service: 10/11/2022    Discharge Recommendations:  Continue to assess pending progress, Home with assist PRN, Home with Home health OT  OT Equipment Recommendations  Equipment Needed: Yes  Other: shower chair and BSC       Patient Diagnosis(es): There were no encounter diagnoses. Past Medical History:  has a past medical history of Arthritis, Cancer ([address] and Hyperlipidemia. Past Surgical History:  has a past surgical history that includes Cataract removal; skin biopsy; Tubal ligation; Cholecystectomy; Colonoscopy (N/A, 2022); Colonoscopy (N/A, 2022); and colectomy (N/A, 10/3/2022). Treatment Diagnosis: Decreased ADL performance d/t recent ileostomy. 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 Pt's PLOF is I and she now requires increased level of assist for ADLs/IADLs. Recommend skilled OT intervention during subacute stay to increase safety and independence during ADLs. Treatment Diagnosis: Decreased ADL performance d/t recent ileostomy. Prognosis: Good  REQUIRES OT FOLLOW-UP: Yes  Activity Tolerance  Activity Tolerance: Patient limited by fatigue;Patient limited by pain (Limited by nausea)        Plan   Occupational Therapy Plan  Times Per Week: 4-7  Times Per Day:  Once a day  Current Treatment Recommendations: Strengthening, Balance training, Functional mobility training, Neuromuscular re-education, Endurance training, Self-Care / ADL, Safety education & training, Patient/Caregiver education & training Restrictions  Restrictions/Precautions  Restrictions/Precautions: Fall Risk  Required Braces or Orthoses?: No    Subjective   General  Chart Reviewed: Yes, History and Physical  Patient assessed for rehabilitation services?: Yes  Response to previous treatment: Patient reporting fatigue but able to participate  Family / Caregiver Present: No  Referring Practitioner: Dr. Nyla Gonzalez  Diagnosis: Pt is a 81year old female I PLOF who lives with her son was adm to [address] [place] [address] following an acute hosp stay at [facility] [place] during which time a colonoscopy was performed which revealed rectosigmoid CA and pt underwent partial proctectomy with primary anastomosis and diverting ileostomy on Oct 3rd. Pt assessed for decreased ADL performance as a result of the above. Denies pain but is nauseated.   Social/Functional History  Social/Functional History  Lives With: Son (Granddaughter)  Type of Home: House  Home Layout: Laundry in basement, Two level, Performs ADL's on one level  Home Access: Stairs to enter without rails  Entrance Stairs - Number of Steps: 4 (very steep steps per pt) however pt reports 3-4 steps in back of home with HR she may use initially when D/C home  Entrance Stairs - Rails: None  Bathroom Shower/Tub: Tub/Shower unit  Bathroom Toilet: Standard  Bathroom Accessibility: Accessible  Home Equipment: Cane, Rollator  Has the patient had two or more falls in the past year or any fall with injury in the past year?: No  ADL Assistance: Independent  Homemaking Assistance: Independent  Homemaking Responsibilities: Yes  Ambulation Assistance: Independent  Transfer Assistance: Independent  Active : Yes  Mode of Transportation: Car  Occupation: Full time employment  Type of Occupation: Pt works for [employer] [address]: reading       Objective   Heart Rate: 85  Heart Rate Source: Monitor  BP: 135/62  BP Location: Left upper arm  MAP (Calculated): 86.33  Resp: 18  SpO2: 98 %  O2 Device: None (Room air) Observation/Palpation  Posture: Fair (Slightly kyphotic d/t abdominal pain and discomfort)  Observation: IV R UE, ileostomy, staples in place abd inicsion site, purewick  Safety Devices  Type of Devices: Call light within reach; Patient at risk for falls; Bed alarm in place; Left in bed  Restraints  Restraints Initially in Place: No  Bed Mobility Training  Bed Mobility Training: Yes  Supine to Sit: Minimum assistance  Sit to Supine: Minimum assistance  Balance  Sitting: Intact  Standing: With support  Transfer Training  Transfer Training: Yes  Overall Level of Assistance: Minimum assistance  Sit to Stand: Minimum assistance  Stand to Sit: Minimum assistance  Toilet Transfer: Minimum assistance     AROM: Within functional limits  Strength: Within functional limits  Coordination: Within functional limits  Tone: Normal  Sensation: Intact  ADL  Feeding: Independent  Grooming: Contact guard assistance  UE Bathing: Minimal assistance  LE Bathing: Moderate assistance  UE Dressing: Stand by assistance  LE Dressing: Moderate assistance  Toileting: Moderate assistance              Vision - Basic Assessment  Prior Vision: Wears glasses only for reading  Visual History: Cataracts  Visual Field Cut: No  Oculo Motor Control: WNL  Vision  Vision: Impaired  Vision Exceptions: Wears glasses for reading  Hearing  Hearing: Exceptions to [facility]  Hearing Exceptions: Hard of hearing/hearing concerns; No hearing aid  Cognition  Overall Cognitive Status: WNL  Orientation  Overall Orientation Status: Within Normal Limits  Orientation Level: Oriented X4  Perception  Overall Perceptual Status: WFL               Education Given To: Patient  Education Provided: Role of Therapy; ADL Adaptive Strategies;Transfer Training;Equipment; Energy Conservation; Fall Prevention Strategies  Education Method: Demonstration;Verbal  Barriers to Learning: None  Education Outcome: Verbalized understanding;Demonstrated understanding  LUE AROM (degrees)  LUE AROM : WNL  RUE AROM (degrees)  RUE AROM : WNL     L Fingers  Left Finger Strength Comment: 5/5 grasp strength, opposition intact  R Fingers  Right Finger Strength Comment: 5/5 grasp strength, opposition intact  Hand Dominance  Hand Dominance: Right      Goals  Short Term Goals  Time Frame for Short Term Goals: 3-5 days  Short Term Goal 1: Doff/senia UB/LB using compensatory strategies SBA with mod vc  Short Term Goal 2: Bathe UB/LB using compensatory strategies SBA with mod vc  Short Term Goal 3: Complete toilet routine CGA  Short Term Goal 4: Complete sinkside grooming in LRE and proper body mechanics SBA  Short Term Goal 5: Complete functional transfers and functional mobility using LRE and good safety awareness SBA  Long Term Goals  Time Frame for Long Term Goals : 7-14  Long Term Goal 1: Doff/senia UB/LB using compensatory strategies Mod I  Long Term Goal 2: Bathe UB/LB using compensatory strategies Mod I  Long Term Goal 3: Complete toilet routine Mod I  Long Term Goal 4: Complete sinkside grooming in LRE and proper body mechanics Mod I  Long Term Goal 5: Complete functional transfers and functional mobility using LRE and good safety awareness Mod I  Additional Goals?: Yes  Long Term Goal 6: Demo Mod I implementing compensatory and adaptive strategies to manage abdominal incision pain  Long Term Goal 7: Tolerate continuous functional activity for >10 min demo'ing good safety awareness and use of pacing  Patient Goals   Patient goals :  To return home       Therapy Time   Individual Concurrent Group Co-treatment   Time In  1:45pm         Time Out  2:30pm         Minutes  [place] , [id number]

## 2022-10-11 NOTE — PROGRESS NOTES
Facility/Department: [facility] UNIT  Physical Therapy Initial Assessment    Name: Tacos Mays  : 1950  MRN: [id number]  Date of Service: 10/11/2022    Discharge Recommendations:  Continue to assess pending progress, Home with Home health PT          Patient Diagnosis(es): There were no encounter diagnoses. Past Medical History:  has a past medical history of Arthritis, Cancer ([address] and Hyperlipidemia. Past Surgical History:  has a past surgical history that includes Cataract removal; skin biopsy; Tubal ligation; Cholecystectomy; Colonoscopy (N/A, 2022); Colonoscopy (N/A, 2022); and colectomy (N/A, 10/3/2022). Assessment   Body Structures, Functions, Activity Limitations Requiring Skilled Therapeutic Intervention: Decreased functional mobility ; Decreased strength  Assessment: 81year old female I PLOF who lives with her son was adm to [address] [place] [address] following an acute hosp stay at [facility] [place] during which time a colonoscopy was performed which revealed rectosigmoid CA and pt underwent partial proctectomy with primary anastomosis and diverting ileostomy on Oct 3rd. Pt currently requires assist with bed mobility, transfers and ambulation also exhibiting LE weakness and decreased endurance. pt would benefit from 1-2 weeks [facility] rehab to address impairments, improve functional mobility to allow safe D/C home.   Treatment Diagnosis: difficulty walking weakness  Therapy Prognosis: Good  Requires PT Follow-Up: Yes     Plan   Physcial Therapy Plan  General Plan: 5-7 times per week (1-2 x per day)  Current Treatment Recommendations: Strengthening, ROM, Safety education & training, Functional mobility training, Transfer training, Patient/Caregiver education & training, Gait training, Endurance training, Balance training, Stair training, Home exercise program, Neuromuscular re-education  Safety Devices  Type of Devices: Call light within reach, Chair alarm in place, Patient at risk for falls, Left in chair, Nurse notified     Restrictions  Restrictions/Precautions  Restrictions/Precautions: Fall Risk (high salinas score)     Subjective   General  Chart Reviewed: Yes  Subjective  Subjective: Pt in bed and agreeable to participate in therapy         Social/Functional History  Social/Functional History  Lives With: Son (bruno)  Type of Home: House  Home Layout: Laundry in basement, Two level, Performs ADL's on one level (pt normally does laundry however son can help with laundry; pt's bedroom on 2nd floor however reports bed on first floor she can use and full bathroom on 1st and 2nd level)  Home Access: Stairs to enter without rails  Entrance Stairs - Number of Steps: 4 (very steep steps per pt) however pt reports 3-4 steps in back of home with HR she may use initially when D/C home  Entrance Stairs - Rails: None  Bathroom Shower/Tub: Tub/Shower unit  Bathroom Toilet: Standard  Bathroom Accessibility: Accessible  Home Equipment: Cane, Rollator  ADL Assistance: Independent  Homemaking Assistance: Independent  Homemaking Responsibilities: Yes  Ambulation Assistance: Independent (no AD)  Transfer Assistance: Independent  Active : Yes  Mode of Transportation: Car  Occupation: Full time employment  Type of Occupation: Pt works for [employer]  [address]: reading  [address]: Impaired  Vision Exceptions: Wears glasses for reading  Hearing  Hearing: Exceptions to [facility]  Hearing Exceptions: Hard of hearing/hearing concerns; No hearing aid    Cognition     WFL    Objective    Observation/Palpation  Observation: IV R UE, ileostomy, staples in place abd inicsion site        AROM RLE (degrees)  RLE AROM: WFL  AROM LLE (degrees)  LLE AROM : WFL  Strength RLE  Comment: knee strength grossly 4- to 4/5, ankle df 4/5, hip strength not formally assessed due to increased abd pain during AROM of hip  Strength LLE  Comment: knee strength grossly 4- to 4/5, ankle df 4/5, hip strength not formally assessed due to increased abd pain during AROM of hip      Bed mobility  Supine to Sit: Minimal assistance; Moderate assistance (HOB fully elevated and use of bed rail)  Scooting: Stand by assistance  Transfers  Sit to Stand: Minimal Assistance (from elevated bed)  Stand to Sit: Contact guard assistance  Comment: vcs for hand placement during sit to stand and stand to sit transfers  Ambulation  Device: [address]: Contact guard assistance  Quality of Gait: very slow pace short step length bilat, wide KATHRINE, flexed posture flexed at spine and hips, downward gaze  Gait Deviations: Slow Alina; Increased KATHRINE; Decreased step height;Decreased step length  Distance: 10 feet bed to bathroom, 22 feet bed to chair  Comments: vcs for forward gaze and improved posture as tolerated;  Complaints of 5-6/10 abd pain during sit to stand transfer and during ambulation which decreased once sitting in recliner with LEs elevated     Balance  Sitting - Static: Good  Standing - Static: Fair  Standing - Dynamic: Fair         Goals  Short Term Goals  Short Term Goal 1: bed mobility with SBA  Short Term Goal 2: Transfers with SBA/CGA  Short Term Goal 3: Pt ambulate 50 feet with ww SBA  Long Term Goals  Long Term Goal 1: I bed mobility  Long Term Goal 2: Transfers mod I  Long Term Goal 3: Pt ambulate 150 feet with ww mod I to allow safe I amb at home  Long Term Goal 4: Pt ascend and descend 3-4 steps with HR with SBA to allow safe entrance into home  Long Term Goal 5: I with HEP       Education  Patient Education  Education Provided: Role of Therapy;Plan of Care;Transfer Training  Education Provided Comments: vcs for hand placement during transfers      Therapy Time   Individual Concurrent Group Co-treatment   Time In  835         Time Out  935         Minutes  [address] [place], [place], Javier Zavala

## 2022-10-11 NOTE — H&P
Hospital Medicine History & Physical      PCP: Marlee Rowe DO    Date of Admission: 10/10/2022    Date of Service: 10/11/22      Chief Complaint:  weakness, pain      History Of Present Illness:  81 y.o. 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 After completing her acute treatment she was transferred to [facility] for post acute rehabilitation/supportive care       Past Medical History:          Diagnosis Date    Arthritis     Cancer ([address]     SKIN    Hyperlipidemia        Past Surgical History:          Procedure Laterality Date    CATARACT REMOVAL      CHOLECYSTECTOMY      COLECTOMY N/A 10/3/2022    LOW ANTERIOR RESECTION WITH LOOP ILEOSTOMY performed by Lashonda Blount MD at [place] [id number] N/A 9/16/2022    COLORECTAL CANCER SCREENING, NOT HIGH RISK performed by Cuca Williamson MD at [facility]    COLONOSCOPY N/A 9/26/2022    FLEXIBLE SIGMOIDOSCOPY WITH SNARE POLYPECTOMY performed by Lashonda Blount MD at [facility]         Medications Prior to Admission:      Prior to Admission medications    Medication Sig Start Date End Date Taking? Authorizing Provider   aspirin 325 MG tablet Take 325 mg by mouth daily    Historical Provider, MD   simvastatin (ZOCOR) 40 MG tablet Take 1 tablet by mouth every evening 9/28/21   SHARAD Whitaker CNP   meloxicam [facility]) 15 MG tablet Take 1 tablet by mouth daily 9/28/21   SHARAD Whitaker CNP   Handicap Placard MISC by Does not apply route Good for 5 years 12/5/19   Trang Elizondo MD       Allergies:  Morphine    Social History:      The patient currently lives home    TOBACCO:   reports that she has never smoked.  She has never used smokeless tobacco.  ETOH:   reports no history of alcohol use. Family History:       Reviewed in detail and negative for DM, CAD, Cancer, CVA. Positive as follows:        Problem Relation Age of Onset    Diabetes Mother     Breast Cancer Maternal Aunt     Colon Cancer Neg Hx        REVIEW OF SYSTEMS:   Pertinent positives as noted in the HPI. All other systems reviewed and negative. PHYSICAL EXAM:    /62   Pulse 85   Temp 97.9 °F (36.6 °C) (Oral)   Resp 18   Ht 4' 11\" (1.499 m)   Wt 185 lb 6.4 oz (84.1 kg)   SpO2 97%   BMI 37.45 kg/m²     General appearance:  No apparent distress, appears stated age and cooperative. HEENT:  Normal cephalic, atraumatic without obvious deformity. Pupils equal, round, and reactive to light. Extra ocular muscles intact. Conjunctivae/corneas clear. Neck: Supple, with full range of motion. No jugular venous distention. Trachea midline. Respiratory:  Normal respiratory effort. Clear to auscultation, bilaterally without Rales/Wheezes/Rhonchi. Cardiovascular:  Regular rate and rhythm with normal S1/S2 without murmurs, rubs or gallops. Abdomen: colostomy in place, liquid stool in bag, postoperative staples in place   Musculoskeletal:  No clubbing, cyanosis or edema bilaterally. Full range of motion without deformity. Skin: Skin color, texture, turgor normal.  No rashes or lesions. Neurologic:  Neurovascularly intact without any focal sensory/motor deficits.  Cranial nerves: II-XII intact, grossly non-focal.  Psychiatric:  Alert and oriented, thought content appropriate, normal insight  Capillary Refill: Brisk,< 3 seconds   Peripheral Pulses: +2 palpable, equal bilaterally       Labs:     Recent Labs     10/08/22  2023 10/09/22  0535 10/11/22  0551   WBC 13.3* 9.5 10.2*   HGB 14.2 13.1 12.1   HCT 43.1 39.5 36.7*   * 384 412*     Recent Labs     10/09/22  0535   *   K 3.6   CL 96   CO2 24   BUN 36*   CREATININE 1.29*   CALCIUM 8.6     Recent Labs     10/09/22  0535   AST 19   ALT 14   BILITOT 0.5 ALKPHOS 88     No results for input(s): INR in the last 72 hours. No results for input(s): [place] in the last 72 hours. Urinalysis:      Lab Results   Component Value Date/Time    NITRU Negative 05/09/2017 10:30 AM    BLOODU Negative 05/09/2017 10:30 AM    SPECGRAV 1.003 05/09/2017 10:30 AM    GLUCOSEU Negative 05/09/2017 10:30 AM       Radiology:     CXR: I have reviewed the CXR with the following interpretation:   EKG:  I have reviewed the EKG with the following interpretation:     No orders to display       ASSESSMENT:    Active Hospital Problems    Diagnosis Date Noted    Malignant neoplasm of rectum ([address] Hanna Massing 10/11/2022     Priority: Medium       PLAN:        DVT Prophylaxis: lovenox  Diet: ADULT DIET; Regular  Code Status: Full Code    PT/OT Eval Status: pending    Dispo - Rectosigmoid CA- post surgical repair, colostomy placement- pain controlled, colostomy functioning well- prolong hospital stay/weakness- PT on case  Obesity with BMI 37%- supportive care  Medically stable for skilled admission at Greg Combs MD    Thank you Mert Birmingham DO for the opportunity to be involved in this patient's care. If you have any questions or concerns please feel free to contact me.

## 2022-10-11 NOTE — PROGRESS NOTES
Pt admitted to room 246 from [facility]. Pt denies any pain at this time. Pt call light in reach. Pt ostomy bag is clean, dry, and intact. Pt has a purewick in place due to incontinence. Pt is alert and oriented. Pt bed alarm is on. Will continue to monitor pt.   Electronically signed by Felipe Garza RN on 10/10/2022 at 10:26 PM

## 2022-10-12 PROCEDURE — 97110 THERAPEUTIC EXERCISES: CPT

## 2022-10-12 PROCEDURE — 97116 GAIT TRAINING THERAPY: CPT

## 2022-10-12 PROCEDURE — 97530 THERAPEUTIC ACTIVITIES: CPT

## 2022-10-12 PROCEDURE — 6370000000 HC RX 637 (ALT 250 FOR IP): Performed by: COLON & RECTAL SURGERY

## 2022-10-12 PROCEDURE — 6360000002 HC RX W HCPCS: Performed by: COLON & RECTAL SURGERY

## 2022-10-12 PROCEDURE — 2580000003 HC RX 258: Performed by: COLON & RECTAL SURGERY

## 2022-10-12 PROCEDURE — 1200000002 HC SEMI PRIVATE SWING BED

## 2022-10-12 PROCEDURE — 6370000000 HC RX 637 (ALT 250 FOR IP): Performed by: INTERNAL MEDICINE

## 2022-10-12 PROCEDURE — 99213 OFFICE O/P EST LOW 20 MIN: CPT

## 2022-10-12 RX ORDER — TRAMADOL HYDROCHLORIDE 50 MG/1
50 TABLET ORAL EVERY 6 HOURS PRN
Status: DISCONTINUED | OUTPATIENT
Start: 2022-10-12 | End: 2022-10-24 | Stop reason: HOSPADM

## 2022-10-12 RX ADMIN — ONDANSETRON 4 MG: 2 INJECTION INTRAMUSCULAR; INTRAVENOUS at 16:03

## 2022-10-12 RX ADMIN — MICONAZOLE NITRATE: 2 POWDER TOPICAL at 09:46

## 2022-10-12 RX ADMIN — Medication 10 ML: at 09:46

## 2022-10-12 RX ADMIN — ONDANSETRON 4 MG: 2 INJECTION INTRAMUSCULAR; INTRAVENOUS at 22:02

## 2022-10-12 RX ADMIN — TRAMADOL HYDROCHLORIDE 50 MG: 50 TABLET ORAL at 16:04

## 2022-10-12 RX ADMIN — ATORVASTATIN CALCIUM 20 MG: 10 TABLET, FILM COATED ORAL at 09:46

## 2022-10-12 RX ADMIN — Medication 10 ML: at 22:02

## 2022-10-12 RX ADMIN — MICONAZOLE NITRATE: 2 POWDER TOPICAL at 22:02

## 2022-10-12 RX ADMIN — ENOXAPARIN SODIUM 40 MG: 100 INJECTION SUBCUTANEOUS at 20:30

## 2022-10-12 RX ADMIN — TRAMADOL HYDROCHLORIDE 50 MG: 50 TABLET ORAL at 22:01

## 2022-10-12 ASSESSMENT — PAIN - FUNCTIONAL ASSESSMENT: PAIN_FUNCTIONAL_ASSESSMENT: ACTIVITIES ARE NOT PREVENTED

## 2022-10-12 ASSESSMENT — PAIN SCALES - GENERAL
PAINLEVEL_OUTOF10: 5
PAINLEVEL_OUTOF10: 8

## 2022-10-12 ASSESSMENT — PAIN DESCRIPTION - LOCATION
LOCATION: ABDOMEN
LOCATION: ABDOMEN

## 2022-10-12 ASSESSMENT — PAIN DESCRIPTION - DESCRIPTORS
DESCRIPTORS: ACHING
DESCRIPTORS: ACHING;STABBING;THROBBING

## 2022-10-12 ASSESSMENT — PAIN DESCRIPTION - ORIENTATION
ORIENTATION: MID;LOWER
ORIENTATION: MID

## 2022-10-12 NOTE — PROGRESS NOTES
Facility/Department: [facility] UNIT  Daily Treatment Note -AM tx   NAME: Sunni Chau  : 1950  MRN: [id number]    Date of Service: 10/12/2022    Discharge Recommendations:  Continue to assess pending progress, Home with Home health PT        Patient Diagnosis(es): s/p ileostomy     Assessment   Assessment: Pt reports no pain at beginning of tx session and was in bed upon arrival. PT assisted pt OOB needing verbal and tactile cues and then to the restroom and assisted with pericare as pt was unable to bend forward to complete task. Once her hands were washed, pt ambulated with a very slow, cautious gait pattern to the recliner. Pt was then positioned for comfort with her call light in reach. Pt reported being very fatigued but not being in increased pain. Continue to progress as able with pts strength and mobilty. Activity Tolerance: Patient limited by fatigue;Patient limited by endurance     Plan  5-7 days per week         Restrictions  Restrictions/Precautions  Restrictions/Precautions: Fall Risk  Required Braces or Orthoses?: No     Subjective    Subjective  Subjective: Pt reports no having any pain currently. Pain: Denies pain but is nauseated. Objective   Vitals     Bed Mobility Training  Bed Mobility Training: Yes  Supine to Sit: Minimum assistance; Moderate assistance  Balance  Sitting: Intact  Standing: With support  Transfer Training  Transfer Training: Yes  Overall Level of Assistance: Minimum assistance;Contact-guard assistance  Sit to Stand: Minimum assistance  Stand to Sit: Minimum assistance  Toilet Transfer: Minimum assistance (Needed assistance with bathroom care)  Gait Training  Gait Training: Yes  Right Side Weight Bearing: As tolerated  Left Side Weight Bearing: As tolerated  Gait  Overall Level of Assistance: Contact-guard assistance  Interventions: Verbal cues  Base of Support: Narrowed  Speed/Alina: Slow  Step Length: Right shortened;Left shortened  Gait Abnormalities: Shuffling gait  Distance (ft): 35 Feet (12'x 1; 35'x 1)  Assistive Device: Walker, rolling                   Goals  Short Term Goals  Short Term Goal 1: bed mobility with SBA  Short Term Goal 2: Transfers with SBA/CGA  Short Term Goal 3: Pt ambulate 50 feet with ww SBA  Long Term Goals  Long Term Goal 1: I bed mobility  Long Term Goal 2: Transfers mod I  Long Term Goal 3: Pt ambulate 150 feet with ww mod I to allow safe I amb at home  Long Term Goal 4: Pt ascend and descend 3-4 steps with HR with SBA to allow safe entrance into home  Long Term Goal 5: I with HEP       Therapy Time   Individual Concurrent Group Co-treatment   Time In  8:40am          Time Out  9:30am          50 min        Emmanuel Gamez, RANDY#[id number]

## 2022-10-12 NOTE — PROGRESS NOTES
Hospitalist Progress Note      PCP: Richard Cueva DO    Date of Admission: 10/10/2022    Chief Complaint: weakness, nausea    Subjective: pt awake, had breakfast today AM    Medications:  Reviewed    Infusion Medications    sodium chloride       Scheduled Medications    sodium chloride flush  5-40 mL IntraVENous 2 times per day    enoxaparin  40 mg SubCUTAneous Daily    atorvastatin  20 mg Oral Daily    miconazole   Topical BID     PRN Meds: sodium chloride, ondansetron **OR** ondansetron, metoclopramide      Intake/Output Summary (Last 24 hours) at 10/12/2022 0849  Last data filed at 10/11/2022 2040  Gross per 24 hour   Intake 480 ml   Output 1200 ml   Net -720 ml       Exam:    /65   Pulse 78   Temp 97.7 °F (36.5 °C) (Oral)   Resp 18   Ht 4' 11\" (1.499 m)   Wt 185 lb 6.4 oz (84.1 kg)   SpO2 95%   BMI 37.45 kg/m²     General appearance: No apparent distress, appears stated age and cooperative. HEENT: Pupils equal, round, and reactive to light. Conjunctivae/corneas clear. Neck: Supple, with full range of motion. No jugular venous distention. Trachea midline. Respiratory:  Normal respiratory effort. Clear to auscultation, bilaterally without Rales/Wheezes/Rhonchi. Cardiovascular: Regular rate and rhythm with normal S1/S2 without murmurs, rubs or gallops. Abdomen:colostoma with liquid stool in place  Soft, non-tender, non-distended with normal bowel sounds. Musculoskeletal: No clubbing, cyanosis or edema bilaterally. Full range of motion without deformity. Skin: Skin color, texture, turgor normal.  No rashes or lesions. Neurologic:  Neurovascularly intact without any focal sensory/motor deficits.  Cranial nerves: II-XII intact, grossly non-focal.  Psychiatric: Alert and oriented, thought content appropriate, normal insight  Capillary Refill: Brisk,< 3 seconds   Peripheral Pulses: +2 palpable, equal bilaterally       Labs:   Recent Labs     10/11/22  0551   WBC 10.2*   HGB 12.1   HCT 36.7* *     No results for input(s): NA, K, CL, CO2, BUN, CREATININE, CALCIUM, PHOS in the last 72 hours. Invalid input(s): MAGNES  No results for input(s): AST, ALT, BILIDIR, BILITOT, ALKPHOS in the last 72 hours. No results for input(s): INR in the last 72 hours. No results for input(s): Donzella Rands in the last 72 hours. Urinalysis:      Lab Results   Component Value Date/Time    NITRU Negative 05/09/2017 10:30 AM    BLOODU Negative 05/09/2017 10:30 AM    SPECGRAV 1.003 05/09/2017 10:30 AM    GLUCOSEU Negative 05/09/2017 10:30 AM       Radiology:  No orders to display           Assessment/Plan:    Active Hospital Problems    Diagnosis Date Noted    Malignant neoplasm of rectum ([facility] [place] 10/11/2022     Priority: Medium         DVT Prophylaxis: lovenox  Diet: ADULT DIET;  Regular  Code Status: Full Code    PT/OT Eval Status: done    Dispo -  Rectosigmoid CA- post surgical repair, colostomy placement- pain controlled, colostomy functioning well- prolong hospital stay/weakness- PT on case  Obesity with BMI 37%- supportive care  Nausea- pt tolerated PO intake, follow up clinically   Medically stable for skilled admission at [facility]       Electronically signed by Jael Mills MD on 10/12/2022 at 8:49 AM

## 2022-10-12 NOTE — PLAN OF CARE
Nutrition Problem #1: Inadequate oral intake, In context of acute illness or injury  Intervention: Food and/or Nutrient Delivery: Continue Current Diet, Start Oral Nutrition Supplement

## 2022-10-12 NOTE — PROGRESS NOTES
Comprehensive Nutrition Assessment    Type and Reason for Visit:  Initial    Nutrition Recommendations/Plan:   Encourage PO  intake to >75% Meals  Encourage adequate fluid intake  Oral supplement once daily     Malnutrition Assessment:  Malnutrition Status: At risk for malnutrition (Comment) (r/t current suboptimal PO intake and dx of rectosigmoid cancer.) (10/12/22 8993)        Nutrition Assessment:    Nutritional status appears to be fair. Does not present as malnourished. No weight loss at this time. However, patient PO intake is suboptimal at this time. Patient reports eating less and going slow with PO intake because she \"does not want to over do it\" post ileostomy. Educated on recommended foods post procedure. Will order clear liquid oral supplement once daily (clear liquid supplement per patient preference) to help improve kcal/pro intake. Nutrition Related Findings:    81 y.o. 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 After completing her acute treatment she was transferred to [facility] for post acute rehabilitation/supportive care. PMH includes arthritis, HLD. Weight reviewed per weight history in EMR, weight generally 185-195#, patient verifies accuracy of this and reports no recent sig weight changes. Edema +1 to LUE and RLE/LLE trace edema noted- may see weight changes as edema improves. BMI 37.5 obese. Diet is regular-  patient reports no chewing/swallowing difficulty, appetite reported as good but is generally nauseous after eating. Reports she has been eating less on purpose due to ileostomy- \"does not want to over do it\". Discussed recommended foods for post ileostomy, encouraged adequate PO and fluid intake.  Patient reports she generally does not like nutrition supplements but is willing to try short term for healing and adequate kcal/pro intake. Labs reviewed- BUN/creatinine elevated- encouraged adequate fluid intake. Last BM 10/10 (ileostomy). Wound Type: Surgical Incision       Current Nutrition Intake & Therapies:    Average Meal Intake: 1-25%  Average Supplements Intake: None Ordered  ADULT DIET; Regular    Anthropometric Measures:  Height: 4' 11\" (149.9 cm)  Ideal Body Weight (IBW): 95 lbs (43 kg)       Current Body Weight: 185 lb (83.9 kg) (10/10/22),  Weight Source: Bed Scale  Current BMI (kg/m2): 37.3  Usual Body Weight: 185 lb (83.9 kg) (per patient and weight history review)  % Weight Change (Calculated): 0  Weight Adjustment For: No Adjustment  BMI Categories: Obese Class 2 (BMI 35.0 -39.9)    Estimated Daily Nutrient Needs:  Energy Requirements Based On: Kcal/kg  Weight Used for Energy Requirements: Current  Energy (kcal/day):    Weight Used for Protein Requirements: Current  Protein (g/day):    Method Used for Fluid Requirements: 1 ml/kcal  Fluid (ml/day): 3873    Nutrition Diagnosis:   Inadequate oral intake, In context of acute illness or injury related to catabolic illness as evidenced by intake 0-25%, GI abnormality    Nutrition Interventions:   Food and/or Nutrient Delivery: Continue Current Diet, Start Oral Nutrition Supplement  Nutrition Education/Counseling: Education initiated  Coordination of Nutrition Care: Continue to monitor while inpatient  Plan of Care discussed with: patient    Goals:     Goals: PO intake 75% or greater       Nutrition Monitoring and Evaluation:      Food/Nutrient Intake Outcomes: Food and Nutrient Intake, Supplement Intake  Physical Signs/Symptoms Outcomes: Weight, Meal Time Behavior, Nausea or Vomiting    Discharge Planning:     Too soon to determine     Obinna Ward RD

## 2022-10-12 NOTE — PROGRESS NOTES
Wound Ostomy Continence Nurse                                                                                Ostomy Referral Progress Note      NAME:  [address] RECORD NUMBER:  [id number]  AGE: 81 y.o. GENDER:  female  :  1950  TODAY'S DATE:  10/12/2022    Hillary Rangel is a 81 y.o. female referred by:   [x] Physician  [] Nursing  [] Other:      Loop Ileostomy and New    PAST MEDICAL HISTORY:        Diagnosis Date    Arthritis     Cancer ([facility] [address]     SKIN    Hyperlipidemia        MEDICATIONS:    No current facility-administered medications on file prior to encounter. Current Outpatient Medications on File Prior to Encounter   Medication Sig Dispense Refill    aspirin 325 MG tablet Take 325 mg by mouth daily      simvastatin (ZOCOR) 40 MG tablet Take 1 tablet by mouth every evening 90 tablet 3    meloxicam (MOBIC) 15 MG tablet Take 1 tablet by mouth daily 90 tablet 3    Handicap Placard MISC by Does not apply route Good for 5 years 1 each 0       ALLERGIES:    Allergies   Allergen Reactions    Morphine Itching       PAST SURGICAL HISTORY:    Past Surgical History:   Procedure Laterality Date    CATARACT REMOVAL      CHOLECYSTECTOMY      COLECTOMY N/A 10/3/2022    LOW ANTERIOR RESECTION WITH LOOP ILEOSTOMY performed by Isabel Campos MD at [address] [place] [address] N/A 2022    COLORECTAL CANCER SCREENING, NOT HIGH RISK performed by Carol Reed MD at [facility]    COLONOSCOPY N/A 2022    FLEXIBLE SIGMOIDOSCOPY WITH SNARE POLYPECTOMY performed by Isabel Campos MD at [facility] 2 LEG    TUBAL LIGATION         FAMILY HISTORY:    family history includes Breast Cancer in her maternal aunt; Diabetes in her mother. SOCIAL HISTORY:    Social History     Tobacco Use    Smoking status: Never    Smokeless tobacco: Never   Vaping Use    Vaping Use: Never used   Substance Use Topics    Alcohol use:  No Drug use: No       LABS:  WBC:    Lab Results   Component Value Date/Time    WBC 10.2 10/11/2022 05:51 AM     H/H:    Lab Results   Component Value Date/Time    HGB 12.1 10/11/2022 05:51 AM    HCT 36.7 10/11/2022 05:51 AM     BMP:    Lab Results   Component Value Date/Time     10/09/2022 05:35 AM    K 3.6 10/09/2022 05:35 AM    K 4.3 10/03/2022 07:01 PM    CL 96 10/09/2022 05:35 AM    CO2 24 10/09/2022 05:35 AM    BUN 36 10/09/2022 05:35 AM    LABALBU 3.0 10/09/2022 05:35 AM    CREATININE 1.29 10/09/2022 05:35 AM    CALCIUM 8.6 10/09/2022 05:35 AM    GFRAA 49.4 10/09/2022 05:35 AM    LABGLOM 40.8 10/09/2022 05:35 AM    GLUCOSE 120 10/09/2022 05:35 AM     PTT:    Lab Results   Component Value Date/Time    APTT 26.1 05/09/2017 10:30 AM   [APTT}  PT/INR:    Lab Results   Component Value Date/Time    PROTIME 12.9 09/17/2022 10:13 AM    INR 1.0 09/17/2022 10:13 AM       Objective    /65   Pulse 78   Temp 97.7 °F (36.5 °C) (Oral)   Resp 18   Ht 4' 11\" (1.499 m)   Wt 185 lb 6.4 oz (84.1 kg)   SpO2 95%   BMI 37.45 kg/m²     Kenneth Risk Score Kenneth Scale Score: 18    Assessment   Surgeon - Dr. Tami Pressley       Ileostomy/Jejunostomy RLQ Loop ileostomy (Active)   Stomal Appliance 1 piece;Clean, dry & intact 10/12/22 1337   Flange Size (inches) 1.2 Inches 10/12/22 1337   Stoma  Assessment Retracted;Moist;Protrudes 10/12/22 1337   Peristomal Assessment GERA 10/12/22 1337   Treatment Bag change;Site care 10/10/22 1330   Stool Appearance Loose 10/12/22 1337   Stool Color Brown;Green 10/12/22 1337   Stool Amount Small 10/12/22 1337   Output (mL) 200 ml 10/12/22 0954   Number of days: 8     Patient is up in chair, resting at this time. Patient states she is \"feeling good, but a little tired for the therapy. \" Patient states she has been eating better, but still taking it easy as she does not want to over do it and get sick. Patient denies nausea as well.  The [facility] [address] [id number] [facility] applied on Monday afternoon, Coloplast Sensura Jamey Deep Convex with barrier ring and strip paste, worked well for patient, no leaking reported. Per nursing staff, patient still has not worked with her ostomy bag yet. This [id number] Nurse worked with patient at this time. Patient is able to demonstrate emptying appliance by the end of the education lesson. Patient encouraged to empty her own appliance with supervision of nursing staff for the remainder of her stay at [facility]. Supplies provided at bedside and should be used when appliance needs changed - communicated with nursing. This [id number]  Nurse will be out early next week to check on patient's progress and address any additional education needs. Stoma is healthy looking - red, moist and protrudes. Mucocutaneous junction is intact. Ileostomy is functioning well, patient is tolerated a general low-fiber diet. Appliance is clean and intact, no signs of seal leak noted. Intake/Output Summary (Last 24 hours) at 10/12/2022 1338  Last data filed at 10/12/2022 0954  Gross per 24 hour   Intake 360 ml   Output 550 ml   Net -190 ml       DATE OF LAST BM=  ileostomy is functioning    Plan   Plan for Ostomy Care: See above    Ostomy Plan of Care  [x] Supplies/Instructions left in room  [] Patient using home supplies  [x] Brand/supplies at bedside - [facility], thin protective seal, strip paste (to provide a flat pouching surface) and ostomy belt for JAMEY    Current Diet: ADULT DIET;  Regular  ADULT ORAL NUTRITION SUPPLEMENT; Lunch; Clear Liquid Oral Supplement  Dietician consult:  Yes    Discharge Plan:  Placement for patient upon discharge: home with support    Outpatient visit plan No  Supplies given Yes   Samples requested Yes - mariam states he son received this at home today    [facility]:  []   [facility]  [] Supplies  [] Other    Patient/Caregiver Teaching:  Written Instructions given to patient/family- Patient   Teaching provided:  [] Reviewed GI and A&P        [x] Supplies  [x] Pouch emptying      [x] Manipulate closure  [] Routine Care         [] Comment  [x] Pouch maintenance           Level of patient/caregiver understanding able to:  [] Indicates understanding       [x] Needs reinforcement  [] Unsuccessful      [x] Verbal Understanding  [x] Demonstrated understanding       [] No evidence of learning  [] Refused teaching         [] N/A    Electronically signed by GÓMEZ OrtizN, RN, CWOCN on 10/12/2022 at 1:50 PM

## 2022-10-12 NOTE — PROGRESS NOTES
Physical Therapy  Facility/Department: [facility] MED SURG UNIT  Daily Treatment Note  NAME: Lilliana Boone  : 1950  MRN: [id number]    Date of Service: 10/12/2022    Discharge Recommendations:  (P) Continue to assess pending progress, Home with Home health PT        Patient Diagnosis(es): There were no encounter diagnoses. Assessment   Assessment: (P) Pt. reports no pain at rest and after ambulating short distance in room pt. c/o 5/10 abdominal soreness and weakness. pt. very slow and steady with ambulating short distance in room with FWW. Pt. needs assistance to reposition self to scoot back in chair. One verbal cues needed to improve safety awareness with hand placement with using FWW> Good response following vc's. Pt. tolerated 10 reps of seated LE ther ex with alternating LE's. Pt. limited by pain and weakness. Activity Tolerance: (P) Patient limited by fatigue;Patient limited by endurance; Patient limited by pain     Plan    Physcial Therapy Plan  General Plan: (P) 5-7 times per week  Current Treatment Recommendations: (P) Strengthening;ROM;Safety education & training;Functional mobility training;Transfer training;Patient/Caregiver education & training;Gait training; Endurance training;Balance training;Stair training;Home exercise program;Neuromuscular re-education     Restrictions  Restrictions/Precautions  Restrictions/Precautions: Fall Risk  Required Braces or Orthoses?: No     Subjective    Subjective  Subjective: I am a little wobbly when I walk but I was told I did okay this morning. I do have arthritis in both of my knees. I also have a hard tiome getting in and out of the bed and need help right now. Pain: Denies pain but is nauseated.      Objective   Vitals     Bed Mobility Training  Bed Mobility Training: (P) No    Balance  Sitting: Intact  Standing: With support  Transfer Training  Transfer Training: (P) Yes  Overall Level of Assistance: Minimum assistance;Contact-guard assistance  Sit to Stand: (P) Minimum assistance  Stand to Sit: (P) Minimum assistance  Gait Training  Gait Training: (P) Yes  Right Side Weight Bearing: (P) As tolerated  Left Side Weight Bearing: (P) As tolerated  Gait  Overall Level of Assistance: (P) Contact-guard assistance  Interventions: (P) Verbal cues  Base of Support: (P) Narrowed  Speed/Alian: (P) Slow  Step Length: (P) Right shortened;Left shortened  Gait Abnormalities: (P) Shuffling gait  Distance (ft): (P)  (36)  Assistive Device: (P) Walker, rolling     PT Exercises  A/AROM Exercises: (P) Seated AP x 20', LAQ alternating x 10', hip flexion march x 10', hip adduction x 10' with mild resistance, hip abduction x 10' with mild manual resistance.              Goals  Short Term Goals  Short Term Goal 1: bed mobility with SBA  Short Term Goal 2: Transfers with SBA/CGA  Short Term Goal 3: Pt ambulate 50 feet with ww SBA  Long Term Goals  Long Term Goal 1: I bed mobility  Long Term Goal 2: Transfers mod I  Long Term Goal 3: Pt ambulate 150 feet with ww mod I to allow safe I amb at home  Long Term Goal 4: Pt ascend and descend 3-4 steps with HR with SBA to allow safe entrance into home  Long Term Goal 5: I with HEP    Education       Therapy Time   Individual Concurrent Group Co-treatment   Time In  130         Time Out  215         Minutes  [place], [place] .[address]

## 2022-10-12 NOTE — PROGRESS NOTES
Occupational Therapy  Facility/Department: [facility] MED SURG UNIT  Rehabilitation Occupational Therapy Daily Treatment Note    Date: 10/12/22  Patient Name: Thomas Warner       Room: 8553/2233-50  MRN: [id number]  Account: [de-identified]   : 1950  (81 y.o.) Gender: female      Discharge Recommendations:  Continue to assess pending progress, Home with assist PRN, Home with Home health OT  OT Equipment Recommendations  Equipment Needed: Yes  Other: shower chair and BSC        Treatment Diagnosis: Decreased ADL performance d/t recent ileostomy. Past Medical History:  has a past medical history of Arthritis, Cancer ([facility] [address] and Hyperlipidemia. Past Surgical History:   has a past surgical history that includes Cataract removal; skin biopsy; Tubal ligation; Cholecystectomy; Colonoscopy (N/A, 2022); Colonoscopy (N/A, 2022); and colectomy (N/A, 10/3/2022). Restrictions   Restrictions/Precautions  Restrictions/Precautions: Fall Risk  Required Braces or Orthoses?: No    Subjective   Pt. Was agreeable to OT. Objective             UB exercises with BUE in all planes and directions to increase UB strength 20 reps each with RB in between  Bicep curls, chest presses, wrist flexion and extension,shoulder adduction and abduction, shoulder flexion and extension, and wrist pronation and supination   Pt. Does slow movements due to fatigue and increased weakness   Min A for re-positioning pt. In the recliner for comfort                    Assessment  Assessment  Activity Tolerance: Patient limited by fatigue;Patient limited by endurance   Pt. Was polite and cooperative. Pt. Demonstrated UB exercises in all planes and directions to increase UB strength with slow movements and RB in between. Assisted pt. In re-positioning in the recliner for comfort. Pt. Already washed up and got dressed for the day. Educated pt. In compensation techniques and safety techniques for home.  Answered pt.'s questions and concerns. Patient Education   On UB exercises with correct techniques and arm positioning     Plan     Occupational Therapy Plan  Times Per Week: 4-7  Times Per Day: Once a day  Current Treatment Recommendations: Strengthening, Balance training, Functional mobility training, Neuromuscular re-education, Endurance training, Self-Care / ADL, Safety education & training, Patient/Caregiver education & training      Goals   Short Term Goals  Time Frame for Short Term Goals: 3-5 days  Short Term Goal 1: Doff/senia UB/LB using compensatory strategies SBA with mod vc  Short Term Goal 2: Bathe UB/LB using compensatory strategies SBA with mod vc  Short Term Goal 3: Complete toilet routine CGA  Short Term Goal 4: Complete sinkside grooming in LRE and proper body mechanics SBA  Short Term Goal 5: Complete functional transfers and functional mobility using LRE and good safety awareness SBA  Long Term Goals  Time Frame for Long Term Goals : 7-14  Long Term Goal 1: Doff/senia UB/LB using compensatory strategies Mod I  Long Term Goal 2: Bathe UB/LB using compensatory strategies Mod I  Long Term Goal 3: Complete toilet routine Mod I  Long Term Goal 4: Complete sinkside grooming in LRE and proper body mechanics Mod I  Long Term Goal 5: Complete functional transfers and functional mobility using LRE and good safety awareness Mod I  Additional Goals?: Yes  Long Term Goal 6: Demo Mod I implementing compensatory and adaptive strategies to manage abdominal incision pain  Long Term Goal 7: Tolerate continuous functional activity for >10 min demo'ing good safety awareness and use of pacing  Patient Goals   Patient goals :  To return home                   Therapy Time   Individual Concurrent Group Co-treatment   Time In  11:30 am         Time Out  12:00 pm          Margo Olivo

## 2022-10-13 LAB
ANION GAP SERPL CALCULATED.3IONS-SCNC: 9 MEQ/L (ref 9–15)
BASOPHILS ABSOLUTE: 0.1 K/UL (ref 0–0.1)
BASOPHILS RELATIVE PERCENT: 0.5 % (ref 0.1–1.2)
BUN BLDV-MCNC: 24 MG/DL (ref 8–23)
CALCIUM SERPL-MCNC: 9 MG/DL (ref 8.5–9.9)
CHLORIDE BLD-SCNC: 97 MEQ/L (ref 95–107)
CO2: 30 MEQ/L (ref 20–31)
CREAT SERPL-MCNC: 0.96 MG/DL (ref 0.5–0.9)
EOSINOPHILS ABSOLUTE: 0.2 K/UL (ref 0–0.4)
EOSINOPHILS RELATIVE PERCENT: 1.5 % (ref 0.7–5.8)
GFR AFRICAN AMERICAN: >60
GFR NON-AFRICAN AMERICAN: 57.4
GLUCOSE BLD-MCNC: 104 MG/DL (ref 70–99)
HCT VFR BLD CALC: 37.1 % (ref 37–47)
HEMOGLOBIN: 12 G/DL (ref 11.2–15.7)
IMMATURE GRANULOCYTES #: 0.1 K/UL
IMMATURE GRANULOCYTES %: 1.2 %
LYMPHOCYTES ABSOLUTE: 3.2 K/UL (ref 1.2–3.7)
LYMPHOCYTES RELATIVE PERCENT: 28.4 %
MCH RBC QN AUTO: 28.1 PG (ref 25.6–32.2)
MCHC RBC AUTO-ENTMCNC: 32.3 % (ref 32.2–35.5)
MCV RBC AUTO: 86.9 FL (ref 79.4–94.8)
MONOCYTES ABSOLUTE: 1.1 K/UL (ref 0.2–0.9)
MONOCYTES RELATIVE PERCENT: 9.7 % (ref 4.7–12.5)
NEUTROPHILS ABSOLUTE: 6.6 K/UL (ref 1.6–6.1)
NEUTROPHILS RELATIVE PERCENT: 58.7 % (ref 34–71.1)
PDW BLD-RTO: 13.4 % (ref 11.7–14.4)
PLATELET # BLD: 412 K/UL (ref 182–369)
POTASSIUM SERPL-SCNC: 3.6 MEQ/L (ref 3.4–4.9)
RBC # BLD: 4.27 M/UL (ref 3.93–5.22)
SODIUM BLD-SCNC: 136 MEQ/L (ref 135–144)
WBC # BLD: 11.2 K/UL (ref 4–10)

## 2022-10-13 PROCEDURE — 6370000000 HC RX 637 (ALT 250 FOR IP): Performed by: COLON & RECTAL SURGERY

## 2022-10-13 PROCEDURE — 1200000002 HC SEMI PRIVATE SWING BED

## 2022-10-13 PROCEDURE — 6360000002 HC RX W HCPCS: Performed by: COLON & RECTAL SURGERY

## 2022-10-13 PROCEDURE — 80048 BASIC METABOLIC PNL TOTAL CA: CPT

## 2022-10-13 PROCEDURE — 6370000000 HC RX 637 (ALT 250 FOR IP): Performed by: INTERNAL MEDICINE

## 2022-10-13 PROCEDURE — 97110 THERAPEUTIC EXERCISES: CPT

## 2022-10-13 PROCEDURE — 2580000003 HC RX 258: Performed by: COLON & RECTAL SURGERY

## 2022-10-13 PROCEDURE — 36415 COLL VENOUS BLD VENIPUNCTURE: CPT

## 2022-10-13 PROCEDURE — 97116 GAIT TRAINING THERAPY: CPT

## 2022-10-13 PROCEDURE — 97530 THERAPEUTIC ACTIVITIES: CPT

## 2022-10-13 PROCEDURE — 97535 SELF CARE MNGMENT TRAINING: CPT

## 2022-10-13 PROCEDURE — 85025 COMPLETE CBC W/AUTO DIFF WBC: CPT

## 2022-10-13 RX ADMIN — ONDANSETRON 4 MG: 2 INJECTION INTRAMUSCULAR; INTRAVENOUS at 08:09

## 2022-10-13 RX ADMIN — TRAMADOL HYDROCHLORIDE 50 MG: 50 TABLET ORAL at 08:09

## 2022-10-13 RX ADMIN — TRAMADOL HYDROCHLORIDE 50 MG: 50 TABLET ORAL at 21:01

## 2022-10-13 RX ADMIN — Medication 10 ML: at 08:10

## 2022-10-13 RX ADMIN — ATORVASTATIN CALCIUM 20 MG: 10 TABLET, FILM COATED ORAL at 08:01

## 2022-10-13 RX ADMIN — MICONAZOLE NITRATE: 2 POWDER TOPICAL at 08:01

## 2022-10-13 RX ADMIN — ENOXAPARIN SODIUM 40 MG: 100 INJECTION SUBCUTANEOUS at 21:01

## 2022-10-13 ASSESSMENT — PAIN DESCRIPTION - ORIENTATION: ORIENTATION: ANTERIOR

## 2022-10-13 ASSESSMENT — PAIN DESCRIPTION - DESCRIPTORS: DESCRIPTORS: PRESSURE

## 2022-10-13 ASSESSMENT — PAIN DESCRIPTION - LOCATION: LOCATION: ABDOMEN

## 2022-10-13 ASSESSMENT — PAIN SCALES - GENERAL: PAINLEVEL_OUTOF10: 5

## 2022-10-13 NOTE — PROGRESS NOTES
Occupational Therapy  Facility/Department: [facility] MED SURG UNIT  Rehabilitation Occupational Therapy Daily Treatment Note    Date: 10/13/22  Patient Name: Susan Rangel       Room: 5628/0022-86  MRN: [id number]  Account: [de-identified]   : 1950  (81 y.o.) Gender: female         Discharge Recommendations:  Continue to assess pending progress, Home with assist PRN, Home with Home health OT  OT Equipment Recommendations  Equipment Needed: Yes  Other: shower chair and BSC  Treatment Diagnosis: Decreased ADL performance d/t recent ileostomy. Past Medical History:  has a past medical history of Arthritis, Cancer ([facility] [address] and Hyperlipidemia. Past Surgical History:   has a past surgical history that includes Cataract removal; skin biopsy; Tubal ligation; Cholecystectomy; Colonoscopy (N/A, 2022); Colonoscopy (N/A, 2022); and colectomy (N/A, 10/3/2022). Restrictions   Restrictions/Precautions  Restrictions/Precautions: Fall Risk  Required Braces or Orthoses?: No    Subjective      Pt. Was agreeable to OT. Objective           UB exercises with BUE in all planes and directions to increase UB strength 20 reps each with RB in between  Bicep curls, chest presses, wrist flexion and extension,shoulder adduction and abduction, shoulder flexion and extension, and wrist pronation and supination     FM UB activity with BUE to increase FM skills and coordination in sitting position- supervision  Bed mobility- Mod A x 2 person for scooting to head of bed  Transfer sit to stand- Min A with vc   Home management skills- in sitting position after s/u- SBA  Trained and educated pt. In energy conservation techniques                    Assessment  OT follow up: yes  Pt. Was agreeable to OT. Pt. Stated she has no pain, but feels tired. Pt. Tolerated UB exercises with BUe in all planes and directions to increase UB strenth 20 reps each with RB in between. Pt.  Tolerated home management skills and FM activity well with no complaints. Pt. Transferred at [address] with vc. Pt. Did Mod A x 2 person for scooting up in the bed. Pt. Is polite and cooperative. Pt. Required extra time to complete all tasks at hand. Answered pt.'s questions and concerns. Patient Education   On UB exercises     Plan   Occupational Therapy Plan  Times Per Week: 4-7  Times Per Day: Once a day  Current Treatment Recommendations: Strengthening, Balance training, Functional mobility training, Neuromuscular re-education, Endurance training, Self-Care / ADL, Safety education & training, Patient/Caregiver education & training        Goals   Short Term Goals  Time Frame for Short Term Goals: 3-5 days  Short Term Goal 1: Doff/senia UB/LB using compensatory strategies SBA with mod vc  Short Term Goal 2: Bathe UB/LB using compensatory strategies SBA with mod vc  Short Term Goal 3: Complete toilet routine CGA  Short Term Goal 4: Complete sinkside grooming in LRE and proper body mechanics SBA  Short Term Goal 5: Complete functional transfers and functional mobility using LRE and good safety awareness SBA  Long Term Goals  Time Frame for Long Term Goals : 7-14  Long Term Goal 1: Doff/senia UB/LB using compensatory strategies Mod I  Long Term Goal 2: Bathe UB/LB using compensatory strategies Mod I  Long Term Goal 3: Complete toilet routine Mod I  Long Term Goal 4: Complete sinkside grooming in LRE and proper body mechanics Mod I  Long Term Goal 5: Complete functional transfers and functional mobility using LRE and good safety awareness Mod I  Additional Goals?: Yes  Long Term Goal 6: Demo Mod I implementing compensatory and adaptive strategies to manage abdominal incision pain  Long Term Goal 7: Tolerate continuous functional activity for >10 min demo'ing good safety awareness and use of pacing  Patient Goals   Patient goals :  To return home                      Therapy Time   Individual Concurrent Group Co-treatment   Time In  2:30 pm         Time Out  3:20 pm Minutes  [address] [facility], [address]

## 2022-10-13 NOTE — PROGRESS NOTES
Physical Therapy  Facility/Department: [facility] MED SURG UNIT  Daily Treatment Note  NAME: Vaishnavi العلي  : 1950  MRN: [id number]    Date of Service: 10/13/2022    Discharge Recommendations:  (P) Continue to assess pending progress, Home with Home health PT        Patient Diagnosis(es): There were no encounter diagnoses. Assessment   Assessment: (P) Pt. tolerated limited gait distance with very slight improve step length with vc's and stedy without LOB. Pt. continues to demo safe controlled sit to stand tsf skills with use of FWW. Pt. struggles and demo's difficulty with bed mobility due to post op pain and weakness. Pt. needs assistance with bed mobility during training with vc's for self functional sequence. Pt. demo's less struggle with rolling to left sidelying and supine position with bed flat. Activity Tolerance: (P) Patient limited by fatigue;Patient limited by endurance; Patient limited by pain     Plan    Physcial Therapy Plan  General Plan: (P) 5-7 times per week  Current Treatment Recommendations: (P) Strengthening;ROM;Safety education & training;Functional mobility training;Transfer training;Patient/Caregiver education & training;Gait training; Endurance training;Balance training;Stair training;Home exercise program;Neuromuscular re-education     Restrictions  Restrictions/Precautions  Restrictions/Precautions: Fall Risk  Required Braces or Orthoses?: No     Subjective    Subjective  Subjective: I have been sitting up this morning and would like to lay down after therapy. My buttocks feels a little better with me scooting around in chair. Objective   Vitals     Bed Mobility Training  Bed Mobility Training: (P) Yes  Rolling: (P) Minimum assistance (Supine <-> B sidelying x 2 with each side. VC's for self functional sequence.  3 to 4 pain with difficulty rolling to R sidelying > L sidelying.)  Sit to Supine: (P) Maximum assistance (with bed flat with good self effort.)  Transfer Training  Overall Level of Assistance: (P) Minimum assistance;Contact-guard assistance  Interventions: (P)  (No vc's)  Sit to Stand: (P) Contact-guard assistance;Setup  Stand to Sit: (P) Contact-guard assistance;Setup;Minimum assistance  Gait Training  Gait Training: (P) Yes  Right Side Weight Bearing: As tolerated  Left Side Weight Bearing: As tolerated  Gait  Overall Level of Assistance: (P) Contact-guard assistance;Stand-by assistance  Interventions: (P) Verbal cues (VC's to increase step length as tolerated.)  Speed/Yasemin: (P) Slow  Gait Abnormalities:  (Discontinuous steps with slow yasemin, some scooting of feet demo'd with stiff trunk. Pt. appears resistant to soreness in lower hip and abdominal region. Pt. stedy with c/o weakness.)  Distance (ft): (P) 50 Feet  Assistive Device: (P) Walker, rolling     PT Exercises  A/AROM Exercises: (P) Seated trunk flexion/extension AAROM x 10' edge of bed.              Goals  Short Term Goals  Short Term Goal 1: bed mobility with SBA  Short Term Goal 2: Transfers with SBA/CGA  Short Term Goal 3: Pt ambulate 50 feet with ww SBA  Long Term Goals  Long Term Goal 1: I bed mobility  Long Term Goal 2: Transfers mod I  Long Term Goal 3: Pt ambulate 150 feet with ww mod I to allow safe I amb at home  Long Term Goal 4: Pt ascend and descend 3-4 steps with HR with SBA to allow safe entrance into home  Long Term Goal 5: I with HEP    Education       Therapy Time   Individual Concurrent Group Co-treatment   Time In  110         Time Out  145         Minutes  [address] .[address]

## 2022-10-13 NOTE — PROGRESS NOTES
Pt able to empty ileostomy bag by herself after set up from this nurse all times bag was needed to be emptied. Some ques were given. Will continue to monitor.

## 2022-10-13 NOTE — PROGRESS NOTES
Physical Therapy  Facility/Department: [facility] MED SURG UNIT  Daily Treatment Note  NAME: Lilliana Boone  : 1950  MRN: [id number]    Date of Service: 10/13/2022    Discharge Recommendations:  (P) Continue to assess pending progress, Home with Home health PT        Patient Diagnosis(es): There were no encounter diagnoses. Assessment   Assessment: (P) Pt. slowly tolerating gait training and seated ther ex for LE and core function and strength. Pt. demo's and c/o trunk stiffness and soreness with lower abdominal and low back region from post op. 10 reps tolerated with LE ROM ther ex. Pt. able to tolerate 5 reps of AAROM hand held assistance with partial trunk flexion and extension. Pt. requires min assistance to scoot back in chair for proper support sit position. Activity Tolerance: (P) Patient limited by fatigue;Patient limited by endurance; Patient limited by pain     Plan    Physcial Therapy Plan  General Plan: (P) 5-7 times per week  Current Treatment Recommendations: (P) Strengthening;ROM;Safety education & training;Functional mobility training;Transfer training;Patient/Caregiver education & training;Gait training; Endurance training;Balance training;Stair training;Home exercise program;Neuromuscular re-education     Restrictions  Restrictions/Precautions  Restrictions/Precautions: Fall Risk  Required Braces or Orthoses?: No     Subjective    Subjective  Subjective: (P) I am a little sore in my lower abdominal region and low back 5/10.      Objective   Vitals     Transfer Training  Overall Level of Assistance: (P) Minimum assistance;Contact-guard assistance  Interventions: (P)  (No verbal cues needed)  Sit to Stand: (P) Contact-guard assistance;Setup  Stand to Sit: (P) Contact-guard assistance;Setup;Minimum assistance  Gait Training  Gait Training: (P) Yes  Right Side Weight Bearing: (P) As tolerated  Left Side Weight Bearing: (P) As tolerated  Gait  Overall Level of Assistance: (P) Contact-guard assistance;Stand-by assistance  Interventions: (P) Verbal cues  Speed/Yasemin: (P) Slow  Gait Abnormalities: (P)  (Discontinuous steps with slow yasemin, some scooting of feet demo'd with stiff trunk. Pt. appears resistant to soreness in lower hip and abdominal region. Pt. stedy with c/o weakness.)  Distance (ft): (P) 44 Feet  Assistive Device: (P) Walker, rolling     PT Exercises  A/AROM Exercises: (P) Seated AP x 15', LAQ alternating x 10', hip flexion march x 10', hip adduction x 10' with mild resistance, hip abduction x 10' with mild manual resistance. Added B hand held assistance with partial engagement with trunk flexion/extension x 5 reps to assist with ROM, increasing core functional strength and decrease tightness. Pt. demo's and reports increase soreness with engaging trunk function.              Goals  Short Term Goals  Short Term Goal 1: bed mobility with SBA  Short Term Goal 2: Transfers with SBA/CGA  Short Term Goal 3: Pt ambulate 50 feet with ww SBA  Long Term Goals  Long Term Goal 1: I bed mobility  Long Term Goal 2: Transfers mod I  Long Term Goal 3: Pt ambulate 150 feet with ww mod I to allow safe I amb at home  Long Term Goal 4: Pt ascend and descend 3-4 steps with HR with SBA to allow safe entrance into home  Long Term Goal 5: I with HEP    Education       Therapy Time   Individual Concurrent Group Co-treatment   Time In  915         Time Out  1000         Minutes  [employer] [facility] [address] [place] [address] .[phone]

## 2022-10-14 PROCEDURE — 97110 THERAPEUTIC EXERCISES: CPT

## 2022-10-14 PROCEDURE — 1200000002 HC SEMI PRIVATE SWING BED

## 2022-10-14 PROCEDURE — 6370000000 HC RX 637 (ALT 250 FOR IP): Performed by: COLON & RECTAL SURGERY

## 2022-10-14 PROCEDURE — 6360000002 HC RX W HCPCS: Performed by: COLON & RECTAL SURGERY

## 2022-10-14 PROCEDURE — 97116 GAIT TRAINING THERAPY: CPT

## 2022-10-14 PROCEDURE — 97530 THERAPEUTIC ACTIVITIES: CPT

## 2022-10-14 PROCEDURE — 6370000000 HC RX 637 (ALT 250 FOR IP): Performed by: INTERNAL MEDICINE

## 2022-10-14 PROCEDURE — 97535 SELF CARE MNGMENT TRAINING: CPT

## 2022-10-14 RX ADMIN — ATORVASTATIN CALCIUM 20 MG: 10 TABLET, FILM COATED ORAL at 09:06

## 2022-10-14 RX ADMIN — ENOXAPARIN SODIUM 40 MG: 100 INJECTION SUBCUTANEOUS at 20:36

## 2022-10-14 RX ADMIN — MICONAZOLE NITRATE: 2 POWDER TOPICAL at 20:35

## 2022-10-14 RX ADMIN — TRAMADOL HYDROCHLORIDE 50 MG: 50 TABLET ORAL at 15:39

## 2022-10-14 RX ADMIN — TRAMADOL HYDROCHLORIDE 50 MG: 50 TABLET ORAL at 20:39

## 2022-10-14 ASSESSMENT — PAIN DESCRIPTION - LOCATION
LOCATION: ABDOMEN

## 2022-10-14 ASSESSMENT — PAIN DESCRIPTION - DESCRIPTORS: DESCRIPTORS: PRESSURE

## 2022-10-14 ASSESSMENT — PAIN SCALES - GENERAL
PAINLEVEL_OUTOF10: 0
PAINLEVEL_OUTOF10: 5
PAINLEVEL_OUTOF10: 0

## 2022-10-14 ASSESSMENT — PAIN DESCRIPTION - ORIENTATION
ORIENTATION: ANTERIOR
ORIENTATION: ANTERIOR

## 2022-10-14 NOTE — PROGRESS NOTES
Physical Therapy  Facility/Department: [facility] MED SURG UNIT  Daily Treatment Note  NAME: Rocky Clement  : 1950  MRN: [id number]    Date of Service: 10/14/2022    Discharge Recommendations:  (P) Continue to assess pending progress, Home with Home health PT        Patient Diagnosis(es): There were no encounter diagnoses. Assessment   Assessment: (P) Pt. slowly improving with functional movement and tolerance with therapeutic intervention. Pt. tolerated trial to perform stand ther ex for B LE strengthening in stand position. Pt. needed 2 seated breaks during intervention with reports of fatigue. Pt. demo's improve stand to sit tsf and weight shifting to scoot back in chair. Slight improve gait distance. Encourage pt, to increase yasemin as tolerated to progress towards continous yasemin. Activity Tolerance: (P) Patient limited by fatigue;Patient limited by endurance; Patient limited by pain     Plan    Physcial Therapy Plan  General Plan: (P) 5-7 times per week  Current Treatment Recommendations: (P) Strengthening;ROM;Safety education & training;Functional mobility training;Transfer training;Patient/Caregiver education & training;Gait training; Endurance training;Balance training;Stair training;Home exercise program;Neuromuscular re-education     Restrictions  Restrictions/Precautions  Restrictions/Precautions: Fall Risk  Required Braces or Orthoses?: No     Subjective    Subjective  Subjective: (P) I am doing okay. I am tired. I had and okay evening lastnight.  My pain is about 2/10 at rest.     Objective   Vitals     Transfer Training  Transfer Training: Yes  Sit to Stand: Contact-guard assistance;Setup  Stand to Sit: (P) Contact-guard assistance;Setup (Improve self scoot back in chair surface shifting weight side to side.)  Gait Training  Gait Training: Yes  Gait  Overall Level of Assistance: Contact-guard assistance;Stand-by assistance  Interventions: (P) Verbal cues (VC's to increase yasemin slightly as tolerated to progress towards continuous stride.)  Speed/Alina: (P) Slow  Step Length: (P) Right shortened;Left shortened  Gait Abnormalities: (P)  (Scoots feet partially with focus on attempting to increase step length B LE.)  Distance (ft): 60 Feet     PT Exercises  A/AROM Exercises: (P) Seated trunk flexion/extension AAROM x 10' edge of bed. Static Standing Balance Exercises: (P) Supported stand ther ex B LE: heel raises x 10', hamstring curls x10', hip SLR x 10', hip abduction x 10', hip extension x 10'. one seated rest break due to fatigue.              Goals  Short Term Goals  Short Term Goal 1: bed mobility with SBA  Short Term Goal 2: Transfers with SBA/CGA  Short Term Goal 3: Pt ambulate 50 feet with ww SBA  Long Term Goals  Long Term Goal 1: I bed mobility  Long Term Goal 2: Transfers mod I  Long Term Goal 3: Pt ambulate 150 feet with ww mod I to allow safe I amb at home  Long Term Goal 4: Pt ascend and descend 3-4 steps with HR with SBA to allow safe entrance into home  Long Term Goal 5: I with HEP    Education       Therapy Time   Individual Concurrent Group Co-treatment   Time In  845         Time Out  935         Minutes  [place], [place] .[address]

## 2022-10-14 NOTE — PLAN OF CARE
Pt. is sitting in her recliner watching Tv. She is A/O/ x 4, 1 Assist w/ a walker. She takes her po medications whole, w/ no issues. Pt. noted to have an ileostomy, draining liquid formed stool. Ostomy bag is intact, emptied 250 ml of liquid stool. Umbilical incision is well approximated, has erythema, no drainage,15 staples are intact. Area cleaned w/ NS, patted dry, and left JOSE MARTIN. Previous BRENDAN drain healing, no drainage. At this time Pt. is w/ PT. Call light is w/in reach. Will continue to monitor. Lizzie Rowland jazz

## 2022-10-14 NOTE — PLAN OF CARE
Pt. Is in bed watching Tv. She is A/O/ x 4, 1 Assist w/ a walker. She takes her po medications whole, w/ no issues. Upon assessment Pt. noted to have an ileostomy, draining liquid formed stool. Ostomy bag is intact, filling up w/ gas and burped. Umbilical incision is well approximated, has erythema, no drainage, 15 staples are intact. Area cleaned w/ NS, patted dry, and left JOSE MARTIN. Previous BRENDAN drain healing, no drainage. Pt. c/o abd pain and was medicated with 1 PRN 50 mg of Tramadol, she rated her pain at 5/10 on the 0/10 pain scale. Will reassess in 1 hr. Call light is w/in reach. Will continue to monitor. Mayda schulz

## 2022-10-14 NOTE — PROGRESS NOTES
Facility/Department: [facility] MED SURG UNIT  Daily Treatment Note  NAME: Thomas Warner  : 1950  MRN: [id number]    Date of Service: 10/14/2022    Discharge Recommendations:  Continue to assess pending progress, Home with assist PRN, Home with Home health OT         Patient Diagnosis(es):  Rectosigmoid CA- post surgical repair, colostomy placement    Assessment    Assessment: Pt presents seated in chair with son visiting, agreeable to OT. Functional mobility to bathroom with FWW CGA, CGA for toilet transfer. Requires mod A for hygiene and clothing management following toileting. Hand hygiene at sink SBA. Functional mobility to EOB for seated BUE AROM with transition to supine at session conclusion. Activity Tolerance: Patient limited by fatigue;Patient limited by endurance; Patient limited by pain  Discharge Recommendations: Continue to assess pending progress;Home with assist PRN;Home with Home health OT      Plan   Occupational Therapy Plan  Times Per Week: 4-7  Times Per Day: Once a day  Current Treatment Recommendations: Strengthening;Balance training;Functional mobility training;Neuromuscular re-education; Endurance training;Self-Care / ADL; Safety education & training;Patient/Caregiver education & training     Restrictions   Abdominal precautions    Subjective   Subjective  Subjective: \"I'll work with you. \"  Pain: 3/10 abdomen pain.   Orientation  Overall Orientation Status: Within Normal Limits  Orientation Level: Oriented X4           Objective    Vitals  Vitals  Heart Rate: 75  SpO2: 95 %  O2 Device: None (Room air)  Bed Mobility Training  Bed Mobility Training: Yes  Sit to Supine: Maximum assistance  Balance  Sitting: Intact  Standing: With support (Functional mobility with FWW CGA, slow pace and steady)  Transfer Training  Transfer Training: Yes  Overall Level of Assistance: Minimum assistance  Sit to Stand: Contact-guard assistance;Setup  Stand to Sit: Contact-guard assistance;Setup (Improve self scoot back in chair surface shifting weight side to side.)  Toilet Transfer: Minimum assistance  Gait Training  Gait Training: Yes  Gait  Overall Level of Assistance: Contact-guard assistance;Stand-by assistance  Interventions: Verbal cues (VC's to increase yasemin slightly as tolerated to progress towards continuous stride.)  Speed/Yasemin: Slow  Step Length: Right shortened;Left shortened  Gait Abnormalities:  (Scoots feet partially with focus on attempting to increase step length B LE.)  Distance (ft): 60 Feet     ADL  Grooming: Stand by assistance  Grooming Skilled Clinical Factors: Hand hygiene at sink with FWW SBA  LE Dressing Skilled Clinical Factors: Assist needed to manage brief and for hygiene  Toileting: Moderate assistance  Toileting Skilled Clinical Factors: Assist needed to manage brief and for hygiene  OT Exercises  A/AROM Exercises: BUE AROM while seated EOB x10 reps shoulder flexion, elbow flexion/extension before requested to lay down     Safety Devices  Type of Devices: Call light within reach; Patient at risk for falls; Bed alarm in place; Left in bed;Gait belt; All fall risk precautions in place  Restraints  Restraints Initially in Place: No     Patient Education  Education Given To: Patient  Education Provided: Role of Therapy; ADL Adaptive Strategies;Transfer Training;Equipment; Energy Conservation; Fall Prevention Strategies; Plan of Care;Precautions  Education Method: Demonstration;Verbal  Barriers to Learning: None  Education Outcome: Verbalized understanding;Demonstrated understanding    Goals  Short Term Goals  Time Frame for Short Term Goals: 3-5 days  Short Term Goal 1: Doff/senia UB/LB using compensatory strategies SBA with mod vc  Short Term Goal 2: Bathe UB/LB using compensatory strategies SBA with mod vc  Short Term Goal 3: Complete toilet routine CGA  Short Term Goal 4: Complete sinkside grooming in LRE and proper body mechanics SBA  Short Term Goal 5: Complete functional transfers and functional mobility using LRE and good safety awareness SBA  Long Term Goals  Time Frame for Long Term Goals : 7-14  Long Term Goal 1: Doff/senia UB/LB using compensatory strategies Mod I  Long Term Goal 2: Bathe UB/LB using compensatory strategies Mod I  Long Term Goal 3: Complete toilet routine Mod I  Long Term Goal 4: Complete sinkside grooming in LRE and proper body mechanics Mod I  Long Term Goal 5: Complete functional transfers and functional mobility using LRE and good safety awareness Mod I  Additional Goals?: Yes  Long Term Goal 6: Demo Mod I implementing compensatory and adaptive strategies to manage abdominal incision pain  Long Term Goal 7: Tolerate continuous functional activity for >10 min demo'ing good safety awareness and use of pacing  Patient Goals   Patient goals :  To return home       Therapy Time   Individual Concurrent Group Co-treatment   Time In [address]         Time Out 1620         Minutes [address] [place] [address], [id number]

## 2022-10-14 NOTE — PROGRESS NOTES
Reassessed Pt. 1 hr later, She was in bed sleeping. Call light is w/ in reach. Will continue to monitor. Albaro schulz

## 2022-10-14 NOTE — PROGRESS NOTES
Comprehensive Nutrition Assessment    Type and Reason for Visit:  Reassess    Nutrition Recommendations/Plan:   Continue Current Diet  Encourage PO intake >75% meals. Malnutrition Assessment:  Malnutrition Status: At risk for malnutrition (Comment) (r/t current suboptimal PO intake and dx of rectosigmoid cancer.) (10/12/22 9834)        Nutrition Assessment:    Nutritional status appears to be improving. Does not present as malnourished. PO intake is improving . Patient reports eating less and going slow with PO intake because she \"does not want to over do it\" post ileostomy. Educated on recommended foods post procedure. Patient has refused trying supplement and declines any other nutritional supplement in fear that it will make her nauseous. Adequate PO and fluid intake encouraged. Nutrition Related Findings:    81 y.o. female who presented to [facility] after acute admission to [address] [facility] after colonoscopy during which she was found rectosigmoid CA for scheduled partial proctectomy with primary anastomosis and diverting ileostomy which was performed on 10/3 per dr Kam Darnell. After completing her acute treatment she was transferred to [facility] for post acute rehabilitation/supportive care. PMH includes arthritis, HLD. Weight reviewed per weight history in EMR, weight generally 185-195#, patient verifies accuracy of this and reports no recent sig weight changes. Edema +1 to LUE and RLE/LLE trace edema noted- may see weight changes as edema improves. BMI 37.5 obese. Diet is regular- patient reports no chewing/swallowing difficulty, appetite reported as good and  no more nausea post eating. Reports she has been eating less on purpose due to ileostomy- \"does not want to over do it\". However, reports she is trying to eat more at each meal and tolerating it well. Discussed recommended foods for post ileostomy, encouraged adequate PO and fluid intake.  Patient refusing nutritional supplement and does not want to try any others d/t fear of it making her nauseous. Labs reviewed- 10/13 BUN/creatinine elevated but improved from 10/8- encouraged adequate fluid intake. Last BM 10/14 (ileostomy). Wound Type: Surgical Incision       Current Nutrition Intake & Therapies:    Average Meal Intake: 26-50%  Average Supplements Intake: Refusing to take  ADULT DIET;  Regular  ADULT ORAL NUTRITION SUPPLEMENT; Lunch; Clear Liquid Oral Supplement    Anthropometric Measures:  Height: 4' 11\" (149.9 cm)  Ideal Body Weight (IBW): 95 lbs (43 kg)       Current Body Weight: 185 lb (83.9 kg) (10/10/22),  Weight Source: Bed Scale  Current BMI (kg/m2): 37.3  Usual Body Weight: 185 lb (83.9 kg) (per patient and weight history review)  % Weight Change (Calculated): 0  Weight Adjustment For: No Adjustment  BMI Categories: Obese Class 2 (BMI 35.0 -39.9)    Estimated Daily Nutrient Needs:  Energy Requirements Based On: Kcal/kg  Weight Used for Energy Requirements: Current  Energy (kcal/day):    Weight Used for Protein Requirements: Current  Protein (g/day):    Method Used for Fluid Requirements: 1 ml/kcal  Fluid (ml/day): 8498    Nutrition Diagnosis:   Inadequate oral intake, In context of acute illness or injury related to catabolic illness as evidenced by intake 26-50%    Nutrition Interventions:   Food and/or Nutrient Delivery: Discontinue Oral Nutrition Supplement  Nutrition Education/Counseling: Education initiated  Coordination of Nutrition Care: Continue to monitor while inpatient  Plan of Care discussed with: patient    Goals:  Previous Goal Met: Progressing toward Goal(s)  Goals: PO intake 75% or greater       Nutrition Monitoring and Evaluation:      Food/Nutrient Intake Outcomes: Food and Nutrient Intake  Physical Signs/Symptoms Outcomes: Weight, Meal Time Behavior, Nausea or Vomiting    Discharge Planning:    Continue current diet     Amirah Waller RD

## 2022-10-14 NOTE — PLAN OF CARE
Nutrition Problem #1: Inadequate oral intake, In context of acute illness or injury  Intervention: Food and/or Nutrient Delivery: Discontinue Oral Nutrition Supplement

## 2022-10-14 NOTE — PROGRESS NOTES
Hospitalist Progress Note      PCP: Tang Ty DO    Date of Admission: 10/10/2022    Chief Complaint: weakness, nausea    Subjective: pt awake/alert, looks comfortable     Medications:  Reviewed    Infusion Medications    sodium chloride       Scheduled Medications    sodium chloride flush  5-40 mL IntraVENous 2 times per day    enoxaparin  40 mg SubCUTAneous Daily    atorvastatin  20 mg Oral Daily    miconazole   Topical BID     PRN Meds: traMADol, sodium chloride, ondansetron **OR** ondansetron, metoclopramide      Intake/Output Summary (Last 24 hours) at 10/14/2022 0750  Last data filed at 10/14/2022 0458  Gross per 24 hour   Intake 240 ml   Output 950 ml   Net -710 ml       Exam:    /77   Pulse 75   Temp 97.4 °F (36.3 °C) (Oral)   Resp 18   Ht 4' 11\" (1.499 m)   Wt 185 lb 6.4 oz (84.1 kg)   SpO2 94%   BMI 37.45 kg/m²     General appearance: No apparent distress, appears stated age and cooperative. HEENT: Pupils equal, round, and reactive to light. Conjunctivae/corneas clear. Neck: Supple, with full range of motion. No jugular venous distention. Trachea midline. Respiratory:  Normal respiratory effort. Clear to auscultation, bilaterally without Rales/Wheezes/Rhonchi. Cardiovascular: Regular rate and rhythm with normal S1/S2 without murmurs, rubs or gallops. Abdomen: colostomy in place  Musculoskeletal: No clubbing, cyanosis or edema bilaterally. Full range of motion without deformity. Skin: Skin color, texture, turgor normal.  No rashes or lesions. Neurologic:  Neurovascularly intact without any focal sensory/motor deficits.  Cranial nerves: II-XII intact, grossly non-focal.  Psychiatric: Alert and oriented, thought content appropriate, normal insight  Capillary Refill: Brisk,< 3 seconds   Peripheral Pulses: +2 palpable, equal bilaterally       Labs:   Recent Labs     10/13/22  0602   WBC 11.2*   HGB 12.0   HCT 37.1   *     Recent Labs     10/13/22  0602      K 3.6   CL 97   CO2 30   BUN 24*   CREATININE 0.96*   CALCIUM 9.0     No results for input(s): AST, ALT, BILIDIR, BILITOT, ALKPHOS in the last 72 hours. No results for input(s): INR in the last 72 hours. No results for input(s): Felisha Coombes in the last 72 hours. Urinalysis:      Lab Results   Component Value Date/Time    NITRU Negative 05/09/2017 10:30 AM    BLOODU Negative 05/09/2017 10:30 AM    SPECGRAV 1.003 05/09/2017 10:30 AM    GLUCOSEU Negative 05/09/2017 10:30 AM       Radiology:  No orders to display           Assessment/Plan:    Active Hospital Problems    Diagnosis Date Noted    Malignant neoplasm of rectum ([facility] [address] Faulkner Jerrell 10/11/2022     Priority: Medium         DVT Prophylaxis: lovenox  Diet: ADULT DIET;  Regular  ADULT ORAL NUTRITION SUPPLEMENT; Lunch; Clear Liquid Oral Supplement  Code Status: Full Code    PT/OT Eval Status: done    Dispo - Rectosigmoid CA- post surgical repair, colostomy placement- pain controlled, colostomy functioning well  prolong hospital stay/weakness- PT on case  Obesity with BMI 37%- supportive care  Nausea- pt tolerated PO intake, improving, follow up clinically   Medically stable for skilled admission at [facility]          Electronically signed by Timbo Pedro MD on 10/14/2022 at 7:50 AM

## 2022-10-15 PROCEDURE — 97530 THERAPEUTIC ACTIVITIES: CPT

## 2022-10-15 PROCEDURE — 6360000002 HC RX W HCPCS: Performed by: COLON & RECTAL SURGERY

## 2022-10-15 PROCEDURE — 97116 GAIT TRAINING THERAPY: CPT

## 2022-10-15 PROCEDURE — 6370000000 HC RX 637 (ALT 250 FOR IP): Performed by: INTERNAL MEDICINE

## 2022-10-15 PROCEDURE — 6370000000 HC RX 637 (ALT 250 FOR IP): Performed by: COLON & RECTAL SURGERY

## 2022-10-15 PROCEDURE — 97110 THERAPEUTIC EXERCISES: CPT

## 2022-10-15 PROCEDURE — 1200000002 HC SEMI PRIVATE SWING BED

## 2022-10-15 RX ADMIN — TRAMADOL HYDROCHLORIDE 50 MG: 50 TABLET ORAL at 09:36

## 2022-10-15 RX ADMIN — TRAMADOL HYDROCHLORIDE 50 MG: 50 TABLET ORAL at 21:31

## 2022-10-15 RX ADMIN — ATORVASTATIN CALCIUM 20 MG: 10 TABLET, FILM COATED ORAL at 09:36

## 2022-10-15 RX ADMIN — MICONAZOLE NITRATE: 2 POWDER TOPICAL at 09:36

## 2022-10-15 RX ADMIN — ENOXAPARIN SODIUM 40 MG: 100 INJECTION SUBCUTANEOUS at 21:32

## 2022-10-15 ASSESSMENT — PAIN SCALES - GENERAL: PAINLEVEL_OUTOF10: 4

## 2022-10-15 ASSESSMENT — PAIN DESCRIPTION - DESCRIPTORS: DESCRIPTORS: PRESSURE

## 2022-10-15 ASSESSMENT — PAIN DESCRIPTION - LOCATION: LOCATION: ABDOMEN

## 2022-10-15 ASSESSMENT — PAIN DESCRIPTION - ORIENTATION: ORIENTATION: ANTERIOR

## 2022-10-15 NOTE — PROGRESS NOTES
Physical Therapy  Facility/Department: [facility] MED SURG UNIT  Daily Treatment Note  NAME: Gary Castro  : 1950  MRN: [id number]    Date of Service: 10/15/2022    Discharge Recommendations:  Continue to assess pending progress, Home with Home health PT        Patient Diagnosis(es): There were no encounter diagnoses. Assessment   Assessment: Pt is pleasant, agreeable to all interventions, receptive to education. Pt moves slowly, very rigid posture, mostly on account of abd incision/sx. Pt fatiques easily with ambulation and dyn standing activity, no LOB noted. Activity Tolerance: Patient limited by fatigue;Patient limited by endurance; Patient limited by pain/ increased abd discomfort with mobility, no pain at rest     [address]: 5-7 times per week  Current Treatment Recommendations: Strengthening;ROM;Safety education & training;Functional mobility training;Transfer training;Patient/Caregiver education & training;Gait training; Endurance training;Balance training;Stair training;Home exercise program;Neuromuscular re-education     Restrictions  Restrictions/Precautions  Restrictions/Precautions: Fall Risk  Required Braces or Orthoses?: No     Subjective    Subjective  Subjective: Pt seated in recliner this AM- talking on cell, agreeable to PT interventions. Pain: No pain noted at rest.     Objective   Transfer sit to stand with SBA/supervision  Amb-85 ft very slow pace, antlagic,   Stairs- pt defers trial due to fatigue   PT Exercises  Exercise Treatment: seated APs, LAQs, marching, hip abd/add 2 x 10     Safety Devices  Type of Devices:  All fall risk precautions in place;Call light within reach       Goals  Short Term Goals  Short Term Goal 1: bed mobility with SBA  Short Term Goal 2: Transfers with SBA/CGA  Short Term Goal 3: Pt ambulate 50 feet with ww SBA  Long Term Goals  Long Term Goal 1: I bed mobility  Long Term Goal 2: Transfers mod I  Long Term Goal 3: Pt ambulate 150 feet with ww mod I to allow safe I amb at home  Long Term Goal 4: Pt ascend and descend 3-4 steps with HR with SBA to allow safe entrance into home  Long Term Goal 5: I with HEP    Education       Therapy Time   Individual Concurrent Group Co-treatment   Time In  10:15 am         Time Out  11:15 am         Minutes  [address], PTA

## 2022-10-15 NOTE — PROGRESS NOTES
Facility/Department: [facility] MED SURG UNIT  Daily Treatment Note  NAME: Gary Castro  : 1950  MRN: [id number]    Date of Service: 10/15/2022    Discharge Recommendations:  Continue to assess pending progress, Home with assist PRN, Home with Home health OT  OT Equipment Recommendations  Other: shower chair and BSC      Patient Diagnosis(es): Rectosigmoid CA- post surgical repair, colostomy placement    Assessment    Assessment: Pt seated in chair upon arrival. Agreeable to OT session, states 4/10 pain at rest. Completes ther ex in stance with frequent rest breaks, activity slow and laborious d/t weakness and increased pain. Transfer training sit<>stand using FWW CGA, pt closing eyes and expressing she feels very weak and has increased pain. Pt assisted to chair with nurse notified of needing pain meds, meds on schedule. In chair at session conclusion with all needs within reach. Activity Tolerance: Patient limited by fatigue;Patient limited by endurance; Patient limited by pain  Discharge Recommendations: Continue to assess pending progress;Home with assist PRN;Home with Home health OT  Other: shower chair and BSC      Plan   Occupational Therapy Plan  Times Per Week: 4-7  Times Per Day: Once a day  Current Treatment Recommendations: Strengthening;Balance training;Functional mobility training;Neuromuscular re-education; Endurance training;Self-Care / ADL; Safety education & training;Patient/Caregiver education & training       Subjective   Subjective  Subjective: Agreeable to OT  Pain: 4/10 abdominal pain  Orientation  Overall Orientation Status: Within Normal Limits  Orientation Level: Oriented X4  Pain: No pain noted at rest.  Cognition  Overall Cognitive Status: WNL        Objective    Vitals  Vitals  Heart Rate: 70  SpO2: 98 %  O2 Device: None (Room air)  Balance  Sitting: Intact  Standing: With support  Transfer Training  Transfer Training: Yes  Overall Level of Assistance: Contact-guard assistance (With FWW, good safety and proper hand placement)     ADL  Feeding: Independent  Grooming: Stand by assistance  Toileting: Moderate assistance  Toileting Skilled Clinical Factors: Reports difficulty performing toilet hygiene d/t abdominal pain and decreased ROM  OT Exercises  Exercise Treatment: BUE ther ex in stance using 1.5# dowel, x10 reps all planes. CGA in stance with pt increased. Rest breaks provided and ice chips. Motor Control/Coordination: x5 sit<>stand transitions with FWW for functional transfer training and closed kinetic chain ther ex     Safety Devices  Type of Devices: All fall risk precautions in place;Call light within reach; Left in chair;Nurse notified; Patient at risk for falls  Restraints  Restraints Initially in Place: No     Patient Education  Education Given To: Patient  Education Provided: Role of Therapy; ADL Adaptive Strategies;Transfer Training;Equipment; Energy Conservation; Fall Prevention Strategies; Plan of Care;Precautions  Education Method: Demonstration;Verbal  Barriers to Learning: None  Education Outcome: Verbalized understanding;Demonstrated understanding    Goals  Short Term Goals  Time Frame for Short Term Goals: 3-5 days  Short Term Goal 1: Doff/senia UB/LB using compensatory strategies SBA with mod vc  Short Term Goal 2: Bathe UB/LB using compensatory strategies SBA with mod vc  Short Term Goal 3: Complete toilet routine CGA  Short Term Goal 4: Complete sinkside grooming in LRE and proper body mechanics SBA  Short Term Goal 5: Complete functional transfers and functional mobility using LRE and good safety awareness SBA  Long Term Goals  Time Frame for Long Term Goals : 7-14  Long Term Goal 1: Doff/senia UB/LB using compensatory strategies Mod I  Long Term Goal 2: Bathe UB/LB using compensatory strategies Mod I  Long Term Goal 3: Complete toilet routine Mod I  Long Term Goal 4: Complete sinkside grooming in LRE and proper body mechanics Mod I  Long Term Goal 5: Complete functional transfers and functional mobility using LRE and good safety awareness Mod I  Additional Goals?: Yes  Long Term Goal 6: Demo Mod I implementing compensatory and adaptive strategies to manage abdominal incision pain  Long Term Goal 7: Tolerate continuous functional activity for >10 min demo'ing good safety awareness and use of pacing  Patient Goals   Patient goals :  To return home       Therapy Time   Individual Concurrent Group Co-treatment   Time In [address]         Time Out 1150         Minutes [address], [id number]

## 2022-10-15 NOTE — PLAN OF CARE
Pt. Is in bed resting. She is A/O/x 4, 1 Assist w/ a walker. Pt. takes her po medications whole , w/ no issues. She has an Abd incision w/ 15 staples that are intact, well approximated, has erythema, no drainage, or odor. She has an Ileostomy bag that is draining stool that is in liquid form. She is being educated on its care. For home discharge. Previous old [place] area has minimal serosanganious drainage, no odor. Both areas were cleaned w/ NS and patted dry. She c/o Abd pressure and rated her pain at 5/10 on the 0/10 pain scale. Pt. was medicated w/ 50 mg of PRN Tramadol. Will reassess in 1hr. Call light is w/in reach. Will continue to monitor. Vijaya Ramirez jazz

## 2022-10-16 PROBLEM — Z12.11 SPECIAL SCREENING FOR MALIGNANT NEOPLASMS, COLON: Status: RESOLVED | Noted: 2022-09-16 | Resolved: 2022-10-16

## 2022-10-16 PROCEDURE — 97116 GAIT TRAINING THERAPY: CPT

## 2022-10-16 PROCEDURE — 97110 THERAPEUTIC EXERCISES: CPT

## 2022-10-16 PROCEDURE — 6370000000 HC RX 637 (ALT 250 FOR IP): Performed by: INTERNAL MEDICINE

## 2022-10-16 PROCEDURE — 97535 SELF CARE MNGMENT TRAINING: CPT

## 2022-10-16 PROCEDURE — 6360000002 HC RX W HCPCS: Performed by: COLON & RECTAL SURGERY

## 2022-10-16 PROCEDURE — 6370000000 HC RX 637 (ALT 250 FOR IP): Performed by: COLON & RECTAL SURGERY

## 2022-10-16 PROCEDURE — 1200000002 HC SEMI PRIVATE SWING BED

## 2022-10-16 RX ADMIN — MICONAZOLE NITRATE: 2 POWDER TOPICAL at 20:53

## 2022-10-16 RX ADMIN — TRAMADOL HYDROCHLORIDE 50 MG: 50 TABLET ORAL at 21:06

## 2022-10-16 RX ADMIN — ENOXAPARIN SODIUM 40 MG: 100 INJECTION SUBCUTANEOUS at 20:53

## 2022-10-16 RX ADMIN — ATORVASTATIN CALCIUM 20 MG: 10 TABLET, FILM COATED ORAL at 08:41

## 2022-10-16 RX ADMIN — TRAMADOL HYDROCHLORIDE 50 MG: 50 TABLET ORAL at 08:40

## 2022-10-16 RX ADMIN — MICONAZOLE NITRATE: 2 POWDER TOPICAL at 08:42

## 2022-10-16 ASSESSMENT — PAIN SCALES - GENERAL
PAINLEVEL_OUTOF10: 0
PAINLEVEL_OUTOF10: 3
PAINLEVEL_OUTOF10: 3
PAINLEVEL_OUTOF10: 7

## 2022-10-16 ASSESSMENT — PAIN DESCRIPTION - ORIENTATION: ORIENTATION: MID

## 2022-10-16 ASSESSMENT — PAIN DESCRIPTION - LOCATION: LOCATION: ABDOMEN

## 2022-10-16 ASSESSMENT — PAIN DESCRIPTION - DESCRIPTORS: DESCRIPTORS: SHARP;POUNDING

## 2022-10-16 NOTE — PROGRESS NOTES
Facility/Department: [facility] MED SURG UNIT   Daily Treatment Note  NAME: Vaishnavi العلي  : 1950  MRN: [id number]    Date of Service: 10/16/2022    Discharge Recommendations:  Continue to assess pending progress, Home with assist PRN, Home with Home health OT  OT Equipment Recommendations  Equipment Needed: Yes  Other: shower chair, grab bars  and BSC      Patient Diagnosis(es): There were no encounter diagnoses. Assessment    Assessment: Pt seated in chair upon arrival. Instruction in tub and wallk in shower transfer trials. Agreeable to OT teatment and shower. Pt required increased time to complete bathing and dressing tasks w/ mod cues for technique. Educated pt on compensatory strategies. Pts colestomy bag was leaking and communicated concerns to pts nurse. Activity Tolerance: Patient tolerated treatment well  Discharge Recommendations: Continue to assess pending progress;Home with assist PRN;Home with Home health OT  Equipment Needed: Yes  Other: shower chair, grab bars  and BSC      Plan   Occupational Therapy Plan  Times Per Week: 4-7  Times Per Day: Once a day  Current Treatment Recommendations: Strengthening;Balance training;Functional mobility training;Neuromuscular re-education; Endurance training;Self-Care / ADL; Safety education & training;Patient/Caregiver education & training     Restrictions       Subjective   Subjective  Subjective: Pt cooperative with Ot treatment           Objective    Vitals     Bed Mobility Training  Bed Mobility Training: Yes  Overall Level of Assistance: Minimum assistance  Rolling: Minimum assistance  Sit to Supine: Minimum assistance  Scooting: Contact-guard assistance  Balance  Sitting: Intact  Standing: With support  Transfer Training  Transfer Training: Yes  Overall Level of Assistance: Contact-guard assistance  Interventions: Demonstration  Sit to Stand: Stand-by assistance  Stand to Sit: Stand-by assistance  Toilet Transfer: Contact-guard assistance  Gait Training  Gait Training: Yes  Right Side Weight Bearing: As tolerated  Left Side Weight Bearing: As tolerated  Gait  Overall Level of Assistance: Contact-guard assistance;Stand-by assistance  Interventions: Verbal cues  Base of Support: Narrowed  Speed/Alina: Slow;Pace decreased (< 100 feet/min)  Step Length: Right shortened;Left shortened  Distance (ft): 50 Feet  Assistive Device: Walker, rolling     ADL  Grooming: Supervision  UE Bathing: Contact guard assistance  LE Bathing: Minimal assistance  UE Dressing: Stand by assistance  LE Dressing: Contact guard assistance;Minimal assistance  LE Dressing Skilled Clinical Factors: MIN A to don socks DEP to don brief  Toileting: Minimal assistance              Patient Education  Education Given To: Patient  Education Provided: Role of Therapy; ADL Adaptive Strategies;Transfer Training;Equipment; Energy Conservation; Fall Prevention Strategies; Plan of Care;Precautions  Education Method: Demonstration;Verbal  Barriers to Learning: None  Education Outcome: Verbalized understanding;Demonstrated understanding    Goals  Short Term Goals  Time Frame for Short Term Goals: 3-5 days  Short Term Goal 1: Doff/senia UB/LB using compensatory strategies SBA with mod vc  Short Term Goal 2: Bathe UB/LB using compensatory strategies SBA with mod vc  Short Term Goal 3: Complete toilet routine CGA  Short Term Goal 4: Complete sinkside grooming in LRE and proper body mechanics SBA  Short Term Goal 5: Complete functional transfers and functional mobility using LRE and good safety awareness SBA  Long Term Goals  Time Frame for Long Term Goals : 7-14  Long Term Goal 1: Doff/senia UB/LB using compensatory strategies Mod I  Long Term Goal 2: Bathe UB/LB using compensatory strategies Mod I  Long Term Goal 3: Complete toilet routine Mod I  Long Term Goal 4: Complete sinkside grooming in LRE and proper body mechanics Mod I  Long Term Goal 5: Complete functional transfers and functional mobility using LRE and good safety awareness Mod I  Additional Goals?: Yes  Long Term Goal 6: Demo Mod I implementing compensatory and adaptive strategies to manage abdominal incision pain  Long Term Goal 7: Tolerate continuous functional activity for >10 min demo'ing good safety awareness and use of pacing  Patient Goals   Patient goals : To return home       Therapy Time   Individual Concurrent Group Co-treatment   Time In 1245         Time Out 1340         Minutes [address] [facility]   Occupational Therapy  Facility/Department: [facility] MED SURG UNIT  Daily Treatment Note  NAME: Sherryle Comfort  : 1950  MRN: [id number]    Date of Service: 10/16/2022    Discharge Recommendations:  Continue to assess pending progress, Home with assist PRN, Home with Home health OT  OT Equipment Recommendations  Equipment Needed: Yes  Other: shower chair, grab bars  and BSC      Patient Diagnosis(es): There were no encounter diagnoses. Assessment    Assessment: Pt seated in chair upon arrival. Instruction in tub and wallk in shower transfer trials. Agreeable to OT teatment and shower. Pt required increased time to complete bathing and dressing tasks w/ mod cues for technique. Educated pt on compensatory strategies. Pts colestomy bag was leaking and communicated concerns to pts nurse. Activity Tolerance: Patient tolerated treatment well  Discharge Recommendations: Continue to assess pending progress;Home with assist PRN;Home with Home health OT  Equipment Needed: Yes  Other: shower chair, grab bars  and BSC      Plan   Occupational Therapy Plan  Times Per Week: 4-7  Times Per Day: Once a day  Current Treatment Recommendations: Strengthening;Balance training;Functional mobility training;Neuromuscular re-education; Endurance training;Self-Care / ADL; Safety education & training;Patient/Caregiver education & training     Restrictions       Subjective   Subjective  Subjective: Pt cooperative with Ot treatment           Objective Vitals     Bed Mobility Training  Bed Mobility Training: Yes  Overall Level of Assistance: Minimum assistance  Rolling: Minimum assistance  Sit to Supine: Minimum assistance  Scooting: Contact-guard assistance  Balance  Sitting: Intact  Standing: With support  Transfer Training  Transfer Training: Yes  Overall Level of Assistance: Contact-guard assistance  Interventions: Demonstration  Sit to Stand: Stand-by assistance  Stand to Sit: Stand-by assistance  Toilet Transfer: Contact-guard assistance  Gait Training  Gait Training: Yes  Right Side Weight Bearing: As tolerated  Left Side Weight Bearing: As tolerated  Gait  Overall Level of Assistance: Contact-guard assistance;Stand-by assistance  Interventions: Verbal cues  Base of Support: Narrowed  Speed/Alina: Slow;Pace decreased (< 100 feet/min)  Step Length: Right shortened;Left shortened  Distance (ft): 50 Feet  Assistive Device: Walker, rolling     ADL  Grooming: Supervision  UE Bathing: Contact guard assistance  LE Bathing: Minimal assistance  UE Dressing: Stand by assistance  LE Dressing: Contact guard assistance;Minimal assistance  LE Dressing Skilled Clinical Factors: MIN A to don socks DEP to don brief  Toileting: Minimal assistance              Patient Education  Education Given To: Patient  Education Provided: Role of Therapy; ADL Adaptive Strategies;Transfer Training;Equipment; Energy Conservation; Fall Prevention Strategies; Plan of Care;Precautions  Education Method: Demonstration;Verbal  Barriers to Learning: None  Education Outcome: Verbalized understanding;Demonstrated understanding    Goals  Short Term Goals  Time Frame for Short Term Goals: 3-5 days  Short Term Goal 1: Doff/senia UB/LB using compensatory strategies SBA with mod vc  Short Term Goal 2: Bathe UB/LB using compensatory strategies SBA with mod vc  Short Term Goal 3: Complete toilet routine CGA  Short Term Goal 4: Complete sinkside grooming in LRE and proper body mechanics SBA  Short Term Goal 5: Complete functional transfers and functional mobility using LRE and good safety awareness SBA  Long Term Goals  Time Frame for Long Term Goals : 7-14  Long Term Goal 1: Doff/senia UB/LB using compensatory strategies Mod I  Long Term Goal 2: Bathe UB/LB using compensatory strategies Mod I  Long Term Goal 3: Complete toilet routine Mod I  Long Term Goal 4: Complete sinkside grooming in LRE and proper body mechanics Mod I  Long Term Goal 5: Complete functional transfers and functional mobility using LRE and good safety awareness Mod I  Additional Goals?: Yes  Long Term Goal 6: Demo Mod I implementing compensatory and adaptive strategies to manage abdominal incision pain  Long Term Goal 7: Tolerate continuous functional activity for >10 min demo'ing good safety awareness and use of pacing  Patient Goals   Patient goals :  To return home       Therapy Time   Individual Concurrent Group Co-treatment   Time In Jeb Ritter 79         Time Out 1340         Minutes 700 [facility]

## 2022-10-16 NOTE — PROGRESS NOTES
Changed illostomy cleansed and applied a new bag. Noted pink/red around staples, Rn aware pinkish red due to healing process. Will continue to monitor.

## 2022-10-16 NOTE — PROGRESS NOTES
Reassessed 1 hr later from PRN pain medication. Pt. Is in bed sleeping. Call light is w/in reach. Will continue to monitor. Denny Patella jazz

## 2022-10-16 NOTE — PLAN OF CARE
Pt. is in bed watching Tv. She is A/O/ x 4, 1 Assist w/ a walker. She takes her po medications whole, w/ no issues. Pt. has an umbilical incision w/ 15 staples, well approximated, some erythema, no drainage. Lateral to her incision, Pt. has a prior BRENDAN wound that is covered w/ gauze and Tegaderm. Dsg is clean, dry, and intact. She has a ileostomy, that is draining liquid yellow formed stool. Pt. c/o Abd pressure, rates her pain at 4/10 on the 0/10 pain scale. She was medicated w/ 1- PRN 50mg Tramadol. Will reassess in 1 hr. Call light is w/in reach. Pt. Is in bed resting comfortably. Will continue to monitor. Jasbir Whipple jazz

## 2022-10-16 NOTE — PROGRESS NOTES
Hospitalist Progress Note      PCP: Delma Souza DO    Date of Admission: 10/10/2022    Chief Complaint: weakness    Subjective: pt walking with walker to restroom     Medications:  Reviewed    Infusion Medications    sodium chloride       Scheduled Medications    sodium chloride flush  5-40 mL IntraVENous 2 times per day    enoxaparin  40 mg SubCUTAneous Daily    atorvastatin  20 mg Oral Daily    miconazole   Topical BID     PRN Meds: traMADol, sodium chloride, ondansetron **OR** ondansetron, metoclopramide      Intake/Output Summary (Last 24 hours) at 10/16/2022 0820  Last data filed at 10/15/2022 1848  Gross per 24 hour   Intake 140 ml   Output 250 ml   Net -110 ml       Exam:    /80   Pulse 75   Temp 97.8 °F (36.6 °C) (Oral)   Resp 18   Ht 4' 11\" (1.499 m)   Wt 185 lb 6.4 oz (84.1 kg)   SpO2 97%   BMI 37.45 kg/m²     General appearance: No apparent distress, appears stated age and cooperative. HEENT: Pupils equal, round, and reactive to light. Conjunctivae/corneas clear. Neck: Supple, with full range of motion. No jugular venous distention. Trachea midline. Respiratory:  Normal respiratory effort. Clear to auscultation, bilaterally without Rales/Wheezes/Rhonchi. Cardiovascular: Regular rate and rhythm with normal S1/S2 without murmurs, rubs or gallops. Abdomen: colostomy, staples in place   Musculoskeletal: No clubbing, cyanosis or edema bilaterally. Full range of motion without deformity. Skin: Skin color, texture, turgor normal.  No rashes or lesions. Neurologic:  Neurovascularly intact without any focal sensory/motor deficits. Cranial nerves: II-XII intact, grossly non-focal.  Psychiatric: Alert and oriented, thought content appropriate, normal insight  Capillary Refill: Brisk,< 3 seconds   Peripheral Pulses: +2 palpable, equal bilaterally       Labs:   No results for input(s): WBC, HGB, HCT, PLT in the last 72 hours.   No results for input(s): NA, K, CL, CO2, BUN, CREATININE, CALCIUM, PHOS in the last 72 hours. Invalid input(s): MAGNES  No results for input(s): AST, ALT, BILIDIR, BILITOT, ALKPHOS in the last 72 hours. No results for input(s): INR in the last 72 hours. No results for input(s): Sourav Pander in the last 72 hours. Urinalysis:      Lab Results   Component Value Date/Time    NITRU Negative 05/09/2017 10:30 AM    BLOODU Negative 05/09/2017 10:30 AM    SPECGRAV 1.003 05/09/2017 10:30 AM    GLUCOSEU Negative 05/09/2017 10:30 AM       Radiology:  No orders to display           Assessment/Plan:    Active Hospital Problems    Diagnosis Date Noted    Malignant neoplasm of rectum ([facility] [address] Connie Flood 10/11/2022     Priority: Medium         DVT Prophylaxis: lovenox  Diet: ADULT DIET; Regular  Code Status: Full Code    PT/OT Eval Status: done    Dispo - Rectosigmoid CA- post surgical repair, colostomy placement- pain controlled, colostomy functioning well.  Postoperative staples could be removed in 2-3 days   prolong hospital stay/weakness- PT on case  Obesity with BMI 37%- supportive care  Nausea- pt tolerated PO intake, improving, follow up clinically   Medically stable for skilled admission at [facility]       Electronically signed by Unruly Hughes MD on 10/16/2022 at 8:20 AM

## 2022-10-16 NOTE — PROGRESS NOTES
Physical Therapy  Facility/Department: [facility] MED SURG UNIT  Daily Treatment Note  NAME: Sherryle Comfort  : 1950  MRN: [id number]    Date of Service: 10/16/2022    Discharge Recommendations:  Continue to assess pending progress, Home with Home health PT        Patient Diagnosis(es): There were no encounter diagnoses. Assessment   Assessment: Patient able to increase distance and decrease assist level with gait ; patient tolerated exercises without complaint. Activity Tolerance: Patient tolerated treatment well     Plan          Restrictions  Restrictions/Precautions  Restrictions/Precautions: Fall Risk  Required Braces or Orthoses?: No     Subjective    Subjective  Subjective: Patient reports that she can go for a little walk.      Objective   Vitals     Balance  Sitting: Intact  Standing: With support  Transfer Training  Transfer Training: Yes  Overall Level of Assistance: Contact-guard assistance  Interventions: Demonstration  Sit to Stand: Contact-guard assistance;Setup  Stand to Sit: Contact-guard assistance;Setup  Gait Training  Gait Training: Yes  Right Side Weight Bearing: As tolerated  Left Side Weight Bearing: As tolerated  Gait  Overall Level of Assistance: Contact-guard assistance;Stand-by assistance  Interventions: Verbal cues  Base of Support: Narrowed  Speed/Alina: Slow;Pace decreased (< 100 feet/min)  Step Length: Right shortened;Left shortened  Distance (ft): 50 Feet  Assistive Device: Walker, rolling     PT Exercises  A/AROM Exercises: Seated LE - AP, LAQ, HS, Hip CARS,             Goals  Short Term Goals  Short Term Goal 1: bed mobility with SBA  Short Term Goal 2: Transfers with SBA/CGA  Short Term Goal 3: Pt ambulate 50 feet with ww SBA  Long Term Goals  Long Term Goal 1: I bed mobility  Long Term Goal 2: Transfers mod I  Long Term Goal 3: Pt ambulate 150 feet with ww mod I to allow safe I amb at home  Long Term Goal 4: Pt ascend and descend 3-4 steps with HR with SBA to allow safe entrance into home  Long Term Goal 5: I with HEP    Education  Patient Education  Education Given To: Patient  Education Provided: Role of Therapy;Plan of Care;Transfer Training  Education Provided Comments:  (Instruction on pacing with regard to balance)  Barriers to Learning: None    Therapy Time   Individual Concurrent Group Co-treatment   Time In 1000         Time Out 1030         Minutes [place], [place]

## 2022-10-17 LAB
ANION GAP SERPL CALCULATED.3IONS-SCNC: 13 MEQ/L (ref 9–15)
BASOPHILS ABSOLUTE: 0.1 K/UL (ref 0–0.1)
BASOPHILS RELATIVE PERCENT: 0.6 % (ref 0.1–1.2)
BUN BLDV-MCNC: 21 MG/DL (ref 8–23)
CALCIUM SERPL-MCNC: 9.5 MG/DL (ref 8.5–9.9)
CHLORIDE BLD-SCNC: 96 MEQ/L (ref 95–107)
CO2: 27 MEQ/L (ref 20–31)
CREAT SERPL-MCNC: 1.02 MG/DL (ref 0.5–0.9)
EOSINOPHILS ABSOLUTE: 0.1 K/UL (ref 0–0.4)
EOSINOPHILS RELATIVE PERCENT: 1.2 % (ref 0.7–5.8)
GFR AFRICAN AMERICAN: >60
GFR NON-AFRICAN AMERICAN: 53.6
GLUCOSE BLD-MCNC: 111 MG/DL (ref 70–99)
HCT VFR BLD CALC: 37.5 % (ref 37–47)
HEMOGLOBIN: 12.2 G/DL (ref 11.2–15.7)
IMMATURE GRANULOCYTES #: 0.1 K/UL
IMMATURE GRANULOCYTES %: 0.7 %
LYMPHOCYTES ABSOLUTE: 3 K/UL (ref 1.2–3.7)
LYMPHOCYTES RELATIVE PERCENT: 29.5 %
MCH RBC QN AUTO: 28.7 PG (ref 25.6–32.2)
MCHC RBC AUTO-ENTMCNC: 32.5 % (ref 32.2–35.5)
MCV RBC AUTO: 88.2 FL (ref 79.4–94.8)
MONOCYTES ABSOLUTE: 1 K/UL (ref 0.2–0.9)
MONOCYTES RELATIVE PERCENT: 9.6 % (ref 4.7–12.5)
NEUTROPHILS ABSOLUTE: 5.9 K/UL (ref 1.6–6.1)
NEUTROPHILS RELATIVE PERCENT: 58.4 % (ref 34–71.1)
PDW BLD-RTO: 14 % (ref 11.7–14.4)
PLATELET # BLD: 422 K/UL (ref 182–369)
POTASSIUM SERPL-SCNC: 4.1 MEQ/L (ref 3.4–4.9)
RBC # BLD: 4.25 M/UL (ref 3.93–5.22)
SODIUM BLD-SCNC: 136 MEQ/L (ref 135–144)
WBC # BLD: 10.1 K/UL (ref 4–10)

## 2022-10-17 PROCEDURE — 97116 GAIT TRAINING THERAPY: CPT

## 2022-10-17 PROCEDURE — 97530 THERAPEUTIC ACTIVITIES: CPT

## 2022-10-17 PROCEDURE — 36415 COLL VENOUS BLD VENIPUNCTURE: CPT

## 2022-10-17 PROCEDURE — 6360000002 HC RX W HCPCS: Performed by: COLON & RECTAL SURGERY

## 2022-10-17 PROCEDURE — 6370000000 HC RX 637 (ALT 250 FOR IP): Performed by: COLON & RECTAL SURGERY

## 2022-10-17 PROCEDURE — 2500000003 HC RX 250 WO HCPCS: Performed by: COLON & RECTAL SURGERY

## 2022-10-17 PROCEDURE — 6370000000 HC RX 637 (ALT 250 FOR IP): Performed by: INTERNAL MEDICINE

## 2022-10-17 PROCEDURE — 85025 COMPLETE CBC W/AUTO DIFF WBC: CPT

## 2022-10-17 PROCEDURE — 80048 BASIC METABOLIC PNL TOTAL CA: CPT

## 2022-10-17 PROCEDURE — 97110 THERAPEUTIC EXERCISES: CPT

## 2022-10-17 PROCEDURE — 1200000002 HC SEMI PRIVATE SWING BED

## 2022-10-17 RX ADMIN — MICONAZOLE NITRATE: 2 POWDER TOPICAL at 08:47

## 2022-10-17 RX ADMIN — ENOXAPARIN SODIUM 40 MG: 100 INJECTION SUBCUTANEOUS at 20:51

## 2022-10-17 RX ADMIN — MICONAZOLE NITRATE: 2 POWDER TOPICAL at 20:51

## 2022-10-17 RX ADMIN — ATORVASTATIN CALCIUM 20 MG: 10 TABLET, FILM COATED ORAL at 08:44

## 2022-10-17 RX ADMIN — TRAMADOL HYDROCHLORIDE 50 MG: 50 TABLET ORAL at 11:41

## 2022-10-17 ASSESSMENT — PAIN SCALES - GENERAL
PAINLEVEL_OUTOF10: 4
PAINLEVEL_OUTOF10: 0

## 2022-10-17 ASSESSMENT — PAIN DESCRIPTION - LOCATION: LOCATION: ABDOMEN

## 2022-10-17 NOTE — PROGRESS NOTES
Following up on a patient. Patient is doing fairly well. Minimal abdominal discomfort. No chest pain or palpitation. No headaches, loss of consciousness or seizure. Patient is able to walk in the hallway with the use of walker and 1 person assist.    ROS: 12 system review otherwise is negative for acute signs or symptoms over the last 24 hrs. Exam: Awake, alert oriented, in no apparent distress  HEENT: Pink conjunctiva and buccal mucosa. Normal and throat and nose  Neck: Supple, no nuchal rigidity. Endo: No thyromegaly. Vascular: No JVD or carotid bruit. Chest: Clear to auscultation, no dullness to percussion. Heart: Regular rate and rhythm, no extra sounds. No murmur, no rub. Abdomen: Soft, mild tenderness. Postoperative changes. Colostomy noted. LE: No cyanosis or clubbing, no varices or edema. Neuro: Awake, alert, oriented, normal speech, normal comprehension, normal extension, normal cranial nerves, normal and symmetrical motor and tone examination throughout. MS: No joint effusion or tenderness. Skin: No skin rash, itching, bruising or significant findings. Assessment and plan:     *Colon cancer status postresection. Patient is to follow-up with surgical team and oncology. *Functional impairment. PT OT.    *Anemia, stable    *Obesity. *Few other medical issues not listed above    Continue PT OT  Follow-up with the PCP, surgery and oncology was discharge. I may or may not have addressed all of this pt symptoms, medical issues, abnormal labs and findings. Pt will need additional work up, investigation, testing, surveillance, and treatment to be done at a later time and date during this hospitalization or post discharge by PCP and other out pt providers.

## 2022-10-17 NOTE — PROGRESS NOTES
Facility/Department: [facility] MED SURG UNIT  Daily Treatment Note  NAME: Gilda Alegre  : 1950  MRN: [id number]    Date of Service: 10/17/2022    Attempted OT intervention at this time, pt presents supine in bed. Politely declines d/t fatigue and asks OT to return tomorrow.     Therapy Time   Individual Concurrent Group Co-treatment   Time In  3:15pm         Time Out  3:20pm         Minutes  [facility], [id number]

## 2022-10-17 NOTE — PROGRESS NOTES
Facility/Department: [facility] MED SURG UNIT  Daily Treatment Note  NAME: Rocky Clement  : 1950  MRN: [id number]    Date of Service: 10/17/2022    Discharge Recommendations:  Continue to assess pending progress, Home with Home health PT        Patient Diagnosis(es): There were no encounter diagnoses. Assessment       Plan    Physcial Therapy Plan  General Plan: 5-7 times per week  Current Treatment Recommendations: Strengthening;ROM;Safety education & training;Functional mobility training;Transfer training;Patient/Caregiver education & training;Gait training; Endurance training;Balance training;Stair training;Home exercise program;Neuromuscular re-education     Restrictions  Restrictions/Precautions  Restrictions/Precautions: Fall Risk  Required Braces or Orthoses?: No     Subjective    Pt. Declined PT session reports she is just too tired.     Objective   Vitals               Goals  Short Term Goals  Short Term Goal 1: bed mobility with SBA  Short Term Goal 2: Transfers with SBA/CGA  Short Term Goal 3: Pt ambulate 50 feet with ww SBA  Long Term Goals  Long Term Goal 1: I bed mobility  Long Term Goal 2: Transfers mod I  Long Term Goal 3: Pt ambulate 150 feet with ww mod I to allow safe I amb at home  Long Term Goal 4: Pt ascend and descend 3-4 steps with HR with SBA to allow safe entrance into home  Long Term Goal 5: I with HEP    Education       Therapy Time   Individual Concurrent Group Co-treatment   Time In           Time Out           Minutes  5                 [place], [place]           Physical Therapy

## 2022-10-17 NOTE — PROGRESS NOTES
I informed Dr Aravind Hylton of the appearance of surgical incision line. Staples are well approx and no active drainage noted from incision, but the entire incision line is angry red. Matthew Valles, ostomy nurse is aware and will assess tomorrow, 10/18/22. Distal flange is leaking, Sybil Del Rosario, [place] aware, and will cleanse abdominal are and apply a new appliance.

## 2022-10-17 NOTE — PROGRESS NOTES
Physical Therapy  Facility/Department: [facility] MED SURG UNIT  Daily Treatment Note  NAME: Ryne Boles  : 1950  MRN: [id number]    Date of Service: 10/17/2022    Discharge Recommendations:  (P) Continue to assess pending progress, Home with Home health PT        Patient Diagnosis(es): There were no encounter diagnoses. Assessment   Assessment: (P) Pt. progressing slowly with attempting to improve step length and yasemin however, pt. tolerating to ambulate farther distance up to 80 feet this date. Pt. appears resistant to prevent soreness in abdominal region. Pt. stedy and demo's slight improve self functional movement and control with sit to stand tsf's with various surfaces with proper hand placement. Moreover, pt. tolerated 20 reps of B LE and 10 reps of trunk ROM ther ex for strength and ROM. Pt. demo's limited tolerance with ROM at hips and trunk. Activity Tolerance: (P) Patient tolerated treatment well     Plan    Physcial Therapy Plan  General Plan: (P) 5-7 times per week  Current Treatment Recommendations: (P) Strengthening;ROM;Safety education & training;Functional mobility training;Transfer training;Patient/Caregiver education & training;Gait training; Endurance training;Balance training;Stair training;Home exercise program;Neuromuscular re-education     Restrictions  Restrictions/Precautions  Restrictions/Precautions: Fall Risk  Required Braces or Orthoses?: No     Subjective    Subjective  Subjective: I am getting a little better everyday.  I have no pain while I am at rest.     Objective   Vitals     Transfer Training  Sit to Stand: Stand-by assistance  Stand to Sit: Stand-by assistance  Gait  Overall Level of Assistance: Stand-by assistance  Interventions: Verbal cues  Speed/Yasemin: Slow;Pace decreased (< 100 feet/min)  Step Length: (P) Right shortened;Left shortened (Small B step length.)  Distance (ft): (P)  (20'x1, 80'x1, 65'x1)     PT Exercises  A/AROM Exercises: (P) Seated ther ex B LE: AP x 20', LAQ x 20', Hip flexion march x 20', hip abduction/adduction x 20'. Limited AROM demo'd at hips with slow movement. Seated trunk flexion/extension x 10', and minimal lateral weight shift tolerated x 10' with tactile cues. Limited R lateral weight shift tolerated.              Goals  Short Term Goals  Short Term Goal 1: bed mobility with SBA  Short Term Goal 2: Transfers with SBA/CGA  Short Term Goal 3: Pt ambulate 50 feet with ww SBA  Long Term Goals  Long Term Goal 1: I bed mobility  Long Term Goal 2: Transfers mod I  Long Term Goal 3: Pt ambulate 150 feet with ww mod I to allow safe I amb at home  Long Term Goal 4: Pt ascend and descend 3-4 steps with HR with SBA to allow safe entrance into home  Long Term Goal 5: I with HEP    Education       Therapy Time   Individual Concurrent Group Co-treatment   Time In  1120         Time Out  1210         Minutes  [place], [place] [id number]

## 2022-10-18 PROCEDURE — 97530 THERAPEUTIC ACTIVITIES: CPT

## 2022-10-18 PROCEDURE — 6360000002 HC RX W HCPCS: Performed by: COLON & RECTAL SURGERY

## 2022-10-18 PROCEDURE — 2580000003 HC RX 258: Performed by: INTERNAL MEDICINE

## 2022-10-18 PROCEDURE — 6370000000 HC RX 637 (ALT 250 FOR IP): Performed by: COLON & RECTAL SURGERY

## 2022-10-18 PROCEDURE — 97116 GAIT TRAINING THERAPY: CPT

## 2022-10-18 PROCEDURE — 6370000000 HC RX 637 (ALT 250 FOR IP): Performed by: INTERNAL MEDICINE

## 2022-10-18 PROCEDURE — 2580000003 HC RX 258: Performed by: COLON & RECTAL SURGERY

## 2022-10-18 PROCEDURE — 99213 OFFICE O/P EST LOW 20 MIN: CPT

## 2022-10-18 PROCEDURE — 97535 SELF CARE MNGMENT TRAINING: CPT

## 2022-10-18 PROCEDURE — 1200000002 HC SEMI PRIVATE SWING BED

## 2022-10-18 PROCEDURE — 2500000003 HC RX 250 WO HCPCS: Performed by: INTERNAL MEDICINE

## 2022-10-18 RX ADMIN — Medication 10 ML: at 20:28

## 2022-10-18 RX ADMIN — TRAMADOL HYDROCHLORIDE 50 MG: 50 TABLET ORAL at 05:23

## 2022-10-18 RX ADMIN — DOXYCYCLINE 100 MG: 100 INJECTION, POWDER, LYOPHILIZED, FOR SOLUTION INTRAVENOUS at 22:46

## 2022-10-18 RX ADMIN — DOXYCYCLINE 100 MG: 100 INJECTION, POWDER, LYOPHILIZED, FOR SOLUTION INTRAVENOUS at 12:36

## 2022-10-18 RX ADMIN — ATORVASTATIN CALCIUM 20 MG: 10 TABLET, FILM COATED ORAL at 09:27

## 2022-10-18 RX ADMIN — MICONAZOLE NITRATE: 2 POWDER TOPICAL at 20:28

## 2022-10-18 RX ADMIN — ENOXAPARIN SODIUM 40 MG: 100 INJECTION SUBCUTANEOUS at 20:28

## 2022-10-18 RX ADMIN — MICONAZOLE NITRATE: 2 POWDER TOPICAL at 09:28

## 2022-10-18 ASSESSMENT — PAIN SCALES - GENERAL
PAINLEVEL_OUTOF10: 4
PAINLEVEL_OUTOF10: 4

## 2022-10-18 ASSESSMENT — PAIN DESCRIPTION - DESCRIPTORS: DESCRIPTORS: ACHING

## 2022-10-18 ASSESSMENT — PAIN DESCRIPTION - ORIENTATION: ORIENTATION: MID

## 2022-10-18 ASSESSMENT — PAIN DESCRIPTION - LOCATION: LOCATION: ABDOMEN

## 2022-10-18 NOTE — DISCHARGE INSTR - COC
Continuity of Care Form    Patient Name: Debbie Daugherty   :  1950  MRN:  [id number]    Admit date:  10/10/2022  Discharge date:  10/24/22    Code Status Order: Full Code   Advance Directives:     Admitting Physician:  Trini Bermudez MD  PCP: Mert Birmingham DO    Discharging Nurse: [facility] Unit/Room#: 7442/3115-67  Discharging Unit Phone Number: [phone]    Emergency Contact:   Extended Emergency Contact Information  Primary Emergency Contact: Jnenie [address] Phone: [phone]  Mobile Phone: [phone]  Relation: Child  Secondary Emergency Contact: Juan F Perkins   [address] Phone: [phone]  Relation: Child    Past Surgical History:  Past Surgical History:   Procedure Laterality Date    CATARACT REMOVAL      CHOLECYSTECTOMY      COLECTOMY N/A 10/3/2022    LOW ANTERIOR RESECTION WITH LOOP ILEOSTOMY performed by Sheila Craft MD at [address] [place] [address] N/A 2022    COLORECTAL CANCER SCREENING, NOT HIGH RISK performed by Estefani Markham MD at [facility]    COLONOSCOPY N/A 2022    FLEXIBLE SIGMOIDOSCOPY WITH [address] POLYPECTOMY performed by Sheila Craft MD at [facility] 2 LEG    TUBAL LIGATION         Immunization History:   Immunization History   Administered Date(s) Administered    COVID-19, J&J, (age 18y+), IM, 0.5 mL 2021       Active Problems:  Patient Active Problem List   Diagnosis Code    Cancer ([facility] [address] C80.1    Hyperlipidemia E78.5    Elevated blood pressure reading R03.0    Colonic mass K63.89    Rectal polyp K62.1    Rectal cancer ([facility] [address] C20    Malignant neoplasm of rectum ([facility] [address] C20       Isolation/Infection:   Isolation            No Isolation          Patient Infection Status       Infection Onset Added Last Indicated Last Indicated By Review Planned Expiration Resolved Resolved By    None active    Resolved    COVID-19 20 COVID-19 Ambulatory   20     COVID-19 (Rule Out) 20 COVID-19 Ambulatory (Ordered)   20 Rule-Out Test Resulted            Nurse Assessment:  Last Vital Signs: /75   Pulse 80   Temp 97.5 °F (36.4 °C) (Oral)   Resp 18   Ht 4' 11\" (1.499 m)   Wt 185 lb 6.4 oz (84.1 kg)   SpO2 99%   BMI 37.45 kg/m²     Last documented pain score (0-10 scale): Pain Level: 4  Last Weight:   Wt Readings from Last 1 Encounters:   10/10/22 185 lb 6.4 oz (84.1 kg)     Mental Status:  oriented and alert    IV Access:  - None    Nursing Mobility/ADLs:  Walking   Independent  Transfer  Independent  Bathing  Assisted  Dressing  Independent  [address]  Assisted  Med Delivery   whole    Wound Care Documentation and Therapy:  Wound 10/09/22 Abdomen Anterior; Left (Active)   Dressing Status Clean;Dry; Intact 10/17/22 0840   Wound Cleansed Cleansed with saline 10/17/22 0840   Dressing/Treatment Gauze dressing/dressing sponge;Dry dressing 10/17/22 0840   Dressing Change Due 10/14/22 10/17/22 0840   Wound Assessment Pink/red 10/17/22 0840   Drainage Amount None 10/17/22 0840   Drainage Description Serosanguinous 10/17/22 0840   Odor None 10/17/22 0840   Berenice-wound Assessment Blanchable erythema; Warm 10/17/22 0840   Number of days: 9       Incision 10/03/22 Abdomen Medial (Active)   Dressing Status Other (Comment) 10/17/22 0840   Dressing/Treatment Open to air 10/17/22 0840   Incision Assessment Erythema 10/17/22 0840   Drainage Amount None 10/17/22 0840   Odor None 10/17/22 0840   Number of days: 14        Elimination:  Continence:    Bowel: ileostomy pt  Bladder: Yes  Urinary Catheter: None   Colostomy/Ileostomy/Ileal Conduit: Yes  Ileostomy/Jejunostomy RLQ Loop ileostomy-Stomal Appliance: 1 piece  Ileostomy/Jejunostomy RLQ Loop ileostomy-Flange Size (inches): 4 Inches  Ileostomy/Jejunostomy RLQ Loop ileostomy-Stoma  Assessment: Moist, Pink  Ileostomy/Jejunostomy RLQ Loop ileostomy-Peristomal Assessment: Clean, Intact  Ileostomy/Jejunostomy RLQ Loop ileostomy-Treatment: Other (Comment) (stoma putty applied)  Ileostomy/Jejunostomy RLQ Loop ileostomy-Stool Appearance: Loose  Ileostomy/Jejunostomy RLQ Loop ileostomy-Stool Color: Yellow  Ileostomy/Jejunostomy RLQ Loop ileostomy-Stool Amount: Large  Ileostomy/Jejunostomy RLQ Loop ileostomy-Output (mL): 150 ml    Date of Last BM: 10/24/22    Intake/Output Summary (Last 24 hours) at 10/18/2022 1133  Last data filed at 10/17/2022 2051  Gross per 24 hour   Intake --   Output 775 ml   Net -775 ml     I/O last 3 completed shifts:  In: -   Out: 975 [Stool:975]    Safety Concerns: At Risk for Falls    Impairments/Disabilities:      None    Nutrition Therapy:  Current Nutrition Therapy:   - Oral Diet:  General    Routes of Feeding: Oral  Liquids: No Restrictions  Daily Fluid Restriction: no  Last Modified Barium Swallow with Video (Video Swallowing Test): not done    Treatments at the Time of Hospital Discharge:   Respiratory Treatments: none  Oxygen Therapy:  is not on home oxygen therapy. Ventilator:    - No ventilator support    Rehab Therapies: Physical Therapy and Occupational Therapy  Weight Bearing Status/Restrictions: No weight bearing restrictions  Other Medical Equipment (for information only, NOT a DME order):  walker  Other Treat-ments: ostomy care    Patient's personal belongings (please select all that are sent with patient):  None    RN SIGNATURE:  Electronically signed by Juju Perdomo RN on 10/24/22 at 11:03 AM EDT    CASE MANAGEMENT/SOCIAL WORK SECTION    Inpatient Status Date: 10/10/22    Readmission Risk Assessment Score:  Readmission Risk              Risk of Unplanned Readmission:  8           Discharging to Facility/ Agency   Name: [facility]   Address: [address] [place] [address]   Phone: [phone]  Fax: [phone]    / signature: Electronically signed by Jamal Forrest PRESTON Carrera on 10/18/22 at 11:34 AM EDT    PHYSICIAN SECTION    Prognosis: Good    Condition at Discharge: Stable    Rehab Potential (if transferring to Rehab): Good    Recommended Labs or Other Treatments After Discharge:     Physician Certification: I certify the above information and transfer of Debbie Daugherty  is necessary for the continuing treatment of the diagnosis listed and that she requires Home Care for greater 30 days.      Update Admission H&P: No change in H&P    PHYSICIAN SIGNATURE:  Electronically signed by Trini Bermudez MD on 10/24/22 at 10:47 AM EDT

## 2022-10-18 NOTE — PROGRESS NOTES
Physical Therapy  Facility/Department: [facility] MED SURG UNIT  Daily Treatment Note  NAME: Thomas Warner  : 1950  MRN: [id number]    Date of Service: 10/18/2022    Discharge Recommendations:  Continue to assess pending progress, Home with Home health PT        Patient Diagnosis(es): There were no encounter diagnoses. Assessment   Assessment: (P) Pt. progressing daily with improving self functional trunk and hip movement with less abdominal pain. Pt. able to get out of bed and perform tsf's with stand by assistance this a.m. Pt. followed with ambulating with slight improve B step through gait pattern and yasemin with stand by assistance. Pt. tolerated ambulating slight improve distance. Stair training performed with use of R hand rail with B UE support and R HR with R UE support and use of SPC with L UE support with contact guard assistance. VC's needed for training to ensure proper and safe sequence with SPC and leading with appropriate LE to reduce risk of incident. Activity Tolerance: Patient tolerated treatment well     Plan    Physcial Therapy Plan  General Plan: 5-7 times per week  Current Treatment Recommendations: Strengthening;ROM;Safety education & training;Functional mobility training;Transfer training;Patient/Caregiver education & training;Gait training; Endurance training;Balance training;Stair training;Home exercise program;Neuromuscular re-education     Restrictions  Restrictions/Precautions  Restrictions/Precautions: Fall Risk  Required Braces or Orthoses?: No     Subjective    Subjective  Subjective: Pt. reports 3 to 4/10 abdominal pain. Pt. reports I am ready to get out of this bed and walk. Objective   Vitals     Bed Mobility Training  Supine to Sit: Stand-by assistance  Transfer Training  Sit to Stand: Stand-by assistance  Stand to Sit: Stand-by assistance  Comments: sit to stand tsf's x 7 performed.    Gait  Overall Level of Assistance: Stand-by assistance  Interventions: Verbal cues  Speed/Yasemin: Slow;Pace decreased (< 100 feet/min)  Gait Abnormalities:  (waddle with decrease B step length. Slight improve step length and yasemin this morning.)  Distance (ft):  (80'x2)  Assistive Device: Walker, rolling  Rail Use: Right (and use of cane for L UE support.)  Stairs - Level of Assistance: Contact-guard assistance  Number of Stairs Trained: 4      Toilet tsf with pt. Able to self care and demo controlled sit to stand tsf with B UE support and self pericare.              Goals  Short Term Goals  Short Term Goal 1: bed mobility with SBA  Short Term Goal 2: Transfers with SBA/CGA  Short Term Goal 3: Pt ambulate 50 feet with ww SBA  Long Term Goals  Long Term Goal 1: I bed mobility  Long Term Goal 2: Transfers mod I  Long Term Goal 3: Pt ambulate 150 feet with ww mod I to allow safe I amb at home  Long Term Goal 4: Pt ascend and descend 3-4 steps with HR with SBA to allow safe entrance into home  Long Term Goal 5: I with HEP    Education       Therapy Time   Individual Concurrent Group Co-treatment   Time In  1035         Time Out  1120         Minutes  [facility] [address] [place] [address] .[phone]

## 2022-10-18 NOTE — PROGRESS NOTES
Following up on a patient. No chest pain or palpitation. No abdominal pain, nausea or vomiting. ROS: 12 system review otherwise is negative for acute signs or symptoms over the last 24 hrs. Exam: Awake, alert oriented, in no apparent distress  HEENT: Pink conjunctiva and buccal mucosa. Normal and throat and nose  Neck: Supple, no nuchal rigidity. Endo: No thyromegaly. Vascular: No JVD or carotid bruit. Chest: Clear to auscultation, no dullness to percussion. Heart: Regular rate and rhythm, no extra sounds. No murmur, no rub. Abdomen: Soft, mild tenderness. Postoperative changes. Colostomy noted. Erythema and induration involving the surgical site. LE: No cyanosis or clubbing, no varices or edema. Neuro: Awake, alert, oriented, normal speech, normal comprehension, normal extension, normal cranial nerves, normal and symmetrical motor and tone examination throughout. MS: No joint effusion or tenderness. Skin: No skin rash, itching, bruising or significant findings. Assessment and plan:     *Surgical line infection. I start the patient on doxycycline twice a day     *Colon cancer status postresection. Patient is to follow-up with surgical team and oncology. *Functional impairment. PT OT.     *Anemia, stable     *Obesity. *Few other medical issues not listed above     Continue PT OT  Follow-up with the PCP, surgery and oncology was discharge.

## 2022-10-18 NOTE — PROGRESS NOTES
Wound Ostomy Continence Nurse  Ostomy Referral Follow-up Progress Note      NAME:  [address] RECORD NUMBER:  [id number]  AGE: 81 y.o. GENDER:  female  :  1950  TODAY'S DATE:  10/18/2022    Subjective: Diana Brenner is a 81 y.o. female referred by:   [x] Physician  [x] Nursing  [] Other:     Loop ileostomy and New    Objective    /75   Pulse 80   Temp 97.5 °F (36.4 °C) (Oral)   Resp 18   Ht 4' 11\" (1.499 m)   Wt 185 lb 6.4 oz (84.1 kg)   SpO2 99%   BMI 37.45 kg/m²     Kenneth Risk Score Kenneth Scale Score: 19    Assessment   Surgeon - Dr. Gloria Perkins       Ileostomy/Jejunostomy RLQ Loop ileostomy (Active)   Stomal Appliance 1 piece 10/18/22 1330   Flange Size (inches) 1.25 Inches 10/18/22 1330   Stoma  Assessment Red;Moist;Protrudes 10/18/22 1330   Peristomal Assessment Denuded 10/18/22 1330   Treatment Bag change;Site care;Stoma powder 10/18/22 1330   Stool Appearance Loose 10/18/22 1330   Stool Color Brown 10/18/22 1330   Stool Amount Medium 10/18/22 1330   Output (mL) 100 ml 10/18/22 1330   Number of days: 14     Patient has had issues with appliance leaking since . Prior to this, patient established a 4 day wear time with appliance this [facility] Nurse applied - goal wear time for patient is 3-4 days, due to complex abdomen and stoma placement in deep skin fold. Current appliance is leaking from the medial aspect of the appliance. Appliance removed and site care provided with warm water and 4x4s. Stoma is red, moist and protrudes, stoma is in a deep skin fold with deep dip in skin at the 3 o'clock position of the liberty stomal skin contour. Mucocutaneous junction is intact. Peristomal skin and midline incision are red and denuded which is erosion from appliance leaking ileostomy contents to the skin. Midline incision cleansed with saline and liberty stomal skin cleansed with soap and water. All areas thoroughly dried with 4x4s. Stoma powder applied to the peristomal skin, all excess brushed off - to provide dry pouching surface. Half a piece of strip paste molded to fit the dip in peristomal skin contour at 0 o'clock- to provide flat pouching surface. One piece deep convex appliance cut to 1\", thin protective seal applied and then ostomy appliance applied. Warmth of hand help over the area to activate the adhesive. Perforated tape used to the border of the flange to support the appliance. Ostomy support belt applied last. Goal wear time is 48-72hrs, as skin was raw with this appliance change. Patient anticipates discharge on 10/24, with support of [facility] ad family. Patient encouraged to continue to work with her appliance and empty the bag with supervision of nursing staff. Patient's cousin Luiz Clark is a retired nurse and has met with patient and this [address] [place] [address] Avenue for education during admission at [facility] [place]. Rhea plans to come in Friday for follow-up lesson with this [address] [place] [address] Nurse and patient. This [address] [place] [address] Nurse will also evaluate the appliance. Intake/Output Summary (Last 24 hours) at 10/18/2022 1333  Last data filed at 10/18/2022 1330  Gross per 24 hour   Intake --   Output 700 ml   Net -700 ml       Plan:   Plan for Ostomy Care: see above    Ostomy Plan of Care  [x] Supplies/Instructions left in room  [] Patient using home supplies  [x] Brand/supplies at bedside - Coloplast Sensura Dominic Deep convex, stoma powder, stoma strip paste, belt    Current Diet: ADULT DIET;  Regular  Dietician consult:  Yes    Discharge Plan:  Placement for patient upon discharge: home with support    Outpatient visit plan N/A  Supplies given Yes   Samples requested Yes    [facility]:  [facility]   [facility]  [] Supplies  [] Other    Patient/Caregiver Teaching:  Written Instructions given to patient/family- Patient  Teaching provided:  [] Reviewed GI and A&P        [x] Supplies  [x] Pouch emptying      [x] Manipulate closure  [x] Routine Care         [] Comment  [x] Pouch maintenance           Level of patient/caregiver understanding able to:  [] Indicates understanding       [x] Needs reinforcement  [] Unsuccessful      [x] Verbal Understanding  [] Demonstrated understanding       [] No evidence of learning  [] Refused teaching         [] N/A    Electronically signed by Seabron Bosworth, BSN, RN, CWOCN on 10/18/2022 at 1:51 PM

## 2022-10-18 NOTE — PROGRESS NOTES
Psychosocial Assessment     Physical Appearance: Overweight    Dress: Hospital attire    Hygiene: Good    Speech: Coherent    Affect/Mood: Appropriate, Calm, and Cooperative    Alert and Orientated: Person and Place and situation     Thought Process: Relevant    Behavior: Cooperative    Resides: Patient reports that oldest son resides with patient     DME: Kat     Support System:  Patient reports that family is supportive   \"My kids, my sisters, and my nieces. We are a close knit family\"     History of Mental Health: \"This is a bump in the road. I am a pretty strong person. This is only temporary. \" Patient reports no current issues coping with current situation. Patient reports that prayer and mackenzie aids in coping     Suicidal/ Homicidal:  No    Food: Patient reports that she was grocery shopping and preparing meals.   Patient reports that family plans to aid with grocery shopping and preparing meals     Medication: Flaco Hardwick in [address] at home needs: none reported     DC Plan: Patient reports plan to return home with son upon DC    DC Recommendations:  SS to follow and monitor patient's progress for DC needs     Plan for transition of care is related to the following treatment goals: Patient reports goal is to be able to not make a mess when changing ostomy bag    The patient was provided with choice of provider, and agrees with the discharge plan: on-going     The patient was provided with choice of all post acute providers and agrees with the discharge plan: on-going     Electronically signed by PRESTON Jimenez on 10/18/2022 at 9:30 AM

## 2022-10-18 NOTE — PROGRESS NOTES
Patient has reports interest in having [facility] arranged to follow patient upon DC. This  contacted [address] with new referral.  [facility] to follow up upon DC. Belen Calloway reports that [facility] will need ostomy orders upon DC. ABRAHAM completed.   SS to continue to follow

## 2022-10-18 NOTE — PROGRESS NOTES
Occupational Therapy  Facility/Department: [facility] MED SURG UNIT  Rehabilitation Occupational Therapy Daily Treatment Note    Date: 10/18/22  Patient Name: Jessica Morgan       Room: [address] [place], [place] [address]  MRN: [id number]  Account: [de-identified]   : 1950  (81 y.o.) Gender: female         Discharge Recommendations:   Continue to assess pending progress, Home with assist PRN, Home with Home health OT  OT Equipment Recommendations  Equipment Needed: Yes  Other: shower chair and BSC  Treatment Diagnosis: Decreased ADL performance d/t recent ileostomy. Past Medical History:  has a past medical history of Arthritis, Cancer ([facility] [address] and Hyperlipidemia. Past Surgical History:   has a past surgical history that includes Cataract removal; skin biopsy; Tubal ligation; Cholecystectomy; Colonoscopy (N/A, 2022); Colonoscopy (N/A, 2022); and colectomy (N/A, 10/3/2022). Restrictions   Restrictions/Precautions  Restrictions/Precautions: Fall Risk  Required Braces or Orthoses?: No    Subjective      Pt. Was agreeable to bathing/dressing with OT, but refused a shower due to colostomy bag was changed today. Objective           Setup for bathing/dressing    ADL   Grooming face and hair- Mod I  UB bathing/dressing- Mod I- supervision/supervision-Mod I  LB bathing/dressing- Min A/Min A for feet and socks  Pt. Tolerated standing for 4 minutes with ww 2 x with ww  Toilet transfer- SBA with vc  Toileting- SBA/supervision for urination  Transfer- SBA with vc for safety 3 x                     Assessment  Assessment  Activity Tolerance: Patient tolerated treatment well   Pt. Just got her colostomy bag changed today so she did not want to get it wet. She was agreeable to bathing/dressing with OT, but refused a full shower. Pt.'s ADL status is listed above. Pt. Required extra time to complete ADLs and tasks at hand.  Pt. Is pleasant and cooperative with OT. Pt. Stated she has no pain right now, but some discomfort in her stomach. Answered pt.'s questions and concerns. Patient Education   On safety awareness and dressing techniques    Plan   Occupational Therapy Plan  Times Per Week: 4-7  Times Per Day: Once a day  Current Treatment Recommendations: Strengthening, Balance training, Functional mobility training, Neuromuscular re-education, Endurance training, Self-Care / ADL, Safety education & training, Patient/Caregiver education & training        Goals   Short Term Goal 1: Doff/senia UB/LB using compensatory strategies SBA with mod vc  Short Term Goal 2: Bathe UB/LB using compensatory strategies SBA with mod vc  Short Term Goal 3: Complete toilet routine CGA  Short Term Goal 4: Complete sinkside grooming in LRE and proper body mechanics SBA  Short Term Goal 5: Complete functional transfers and functional mobility using LRE and good safety awareness SBA  Long Term Goals  Time Frame for Long Term Goals : 7-14  Long Term Goal 1: Doff/senia UB/LB using compensatory strategies Mod I  Long Term Goal 2: Bathe UB/LB using compensatory strategies Mod I  Long Term Goal 3: Complete toilet routine Mod I  Long Term Goal 4: Complete sinkside grooming in LRE and proper body mechanics Mod I  Long Term Goal 5: Complete functional transfers and functional mobility using LRE and good safety awareness Mod I  Additional Goals?: Yes  Long Term Goal 6: Demo Mod I implementing compensatory and adaptive strategies to manage abdominal incision pain  Long Term Goal 7: Tolerate continuous functional activity for >10 min demo'ing good safety awareness and use of pacing  Patient Goals   Patient goals :  To return home                   Therapy Time   Individual Concurrent Group Co-treatment   Time In  12:45 pm          Time Out  1:45 pm          Minutes  [facility]

## 2022-10-19 PROCEDURE — 97116 GAIT TRAINING THERAPY: CPT

## 2022-10-19 PROCEDURE — 6370000000 HC RX 637 (ALT 250 FOR IP): Performed by: COLON & RECTAL SURGERY

## 2022-10-19 PROCEDURE — 2580000003 HC RX 258: Performed by: COLON & RECTAL SURGERY

## 2022-10-19 PROCEDURE — 97535 SELF CARE MNGMENT TRAINING: CPT

## 2022-10-19 PROCEDURE — 97110 THERAPEUTIC EXERCISES: CPT

## 2022-10-19 PROCEDURE — 97530 THERAPEUTIC ACTIVITIES: CPT

## 2022-10-19 PROCEDURE — 1200000002 HC SEMI PRIVATE SWING BED

## 2022-10-19 PROCEDURE — 6360000002 HC RX W HCPCS: Performed by: COLON & RECTAL SURGERY

## 2022-10-19 PROCEDURE — 2580000003 HC RX 258: Performed by: INTERNAL MEDICINE

## 2022-10-19 PROCEDURE — 2500000003 HC RX 250 WO HCPCS: Performed by: INTERNAL MEDICINE

## 2022-10-19 RX ADMIN — ATORVASTATIN CALCIUM 20 MG: 10 TABLET, FILM COATED ORAL at 08:29

## 2022-10-19 RX ADMIN — MICONAZOLE NITRATE: 2 POWDER TOPICAL at 20:34

## 2022-10-19 RX ADMIN — ONDANSETRON 4 MG: 2 INJECTION INTRAMUSCULAR; INTRAVENOUS at 23:55

## 2022-10-19 RX ADMIN — ENOXAPARIN SODIUM 40 MG: 100 INJECTION SUBCUTANEOUS at 20:34

## 2022-10-19 RX ADMIN — Medication 10 ML: at 08:30

## 2022-10-19 RX ADMIN — MICONAZOLE NITRATE: 2 POWDER TOPICAL at 08:30

## 2022-10-19 RX ADMIN — Medication 10 ML: at 20:34

## 2022-10-19 RX ADMIN — DOXYCYCLINE 100 MG: 100 INJECTION, POWDER, LYOPHILIZED, FOR SOLUTION INTRAVENOUS at 12:29

## 2022-10-19 RX ADMIN — DOXYCYCLINE 100 MG: 100 INJECTION, POWDER, LYOPHILIZED, FOR SOLUTION INTRAVENOUS at 22:38

## 2022-10-19 ASSESSMENT — PAIN SCALES - GENERAL: PAINLEVEL_OUTOF10: 0

## 2022-10-19 NOTE — PROGRESS NOTES
1800 Pt was able to drain her own colostomy bag. Needed very little verbal cues. Pt verbalized understanding of how to perform procedure. She stated that Leyda Childers RN showed her \"how to fold the bag. \"     Lena Quintanilla RN

## 2022-10-19 NOTE — PROGRESS NOTES
Chart reviewed. Patient continues to receive skilled therapies at [facility]. Multidisciplinary team met with patient in care conference with daughter [facility] via phone. Discussed patient's care and DC planning. Plan is for patient to have family instruct at [facility] on 10/21 to learn how to change ostomy bag with wound care nurse. Patient then has family instruct on 10/24 at [facility] with therapy to learn how to safely navigate stairs. Family plans to be present during scheduled instructs at [facility]. Therapy recommending Travis Lucero and increased assistance upon DC. [place] of choice provided. Patient has identified [facility] as agency of choice. [facility] liaison Sylwia Guzman provided with new referral.  Patient reported to have a follow up with Dr. Charlean Kawasaki on Fri. 10/28 at 2pm.  Family reports plan to aid with patient's transportation needs. Family also reports plan to stay with patient upon DC from [facility]. Yamilett's tentative DC date is [facility] 10/24. Daughter [facility] reports plan to transport patient home via private car on 10/24. Patient and family identify no further DC or help at home needs at this time.

## 2022-10-19 NOTE — PROGRESS NOTES
Occupational Therapy  Facility/Department: [facility] MED SURG UNIT  Rehabilitation Occupational Therapy Daily Treatment Note    Date: 10/19/22  Patient Name: Daljit Pang       Room: 3538/0919-15  MRN: [id number]  Account: [de-identified]   : 1950  (81 y.o.) Gender: female         Discharge Recommendations:   Continue to assess pending progress, Home with assist PRN, Home with Home health OT  OT Equipment Recommendations  Equipment Needed: Yes  Other: shower chair and BSC  Treatment Diagnosis: Decreased ADL performance d/t recent ileostomy. Past Medical History:  has a past medical history of Arthritis, Cancer ([facility] [address] and Hyperlipidemia. Past Surgical History:   has a past surgical history that includes Cataract removal; skin biopsy; Tubal ligation; Cholecystectomy; Colonoscopy (N/A, 2022); Colonoscopy (N/A, 2022); and colectomy (N/A, 10/3/2022). Restrictions     Restrictions/Precautions  Restrictions/Precautions: Fall Risk  Required Braces or Orthoses?: No  Subjective      Pt. Was agreeable to OT. Objective                UB exercises with BUE in all planes and directions to increase UB strength 20 reps each with RB in between  Bicep curls, chest presses, wrist flexion and extension,shoulder adduction and abduction, shoulder flexion and extension, and wrist pronation and supination      Hand squeezes with both hands 20 x hold for 3 seconds each to increase hand strength and dexterity. Trained and educated pt. In tub transfer with chair. Recommended tub chair for pt. For home. ( Gave suggestions on where to buy one and the costs)   Demonstrated for pt. Correct safety techniques for tub transfer with a chair. Assessment   OT follow up: yes     Pt. Was agreeable to OT. Pt. Was agreeable to UB exercises with no weights, 20 reps each, and RB in between to increase UB strength. Pt. Already tolerated bathing/dressing yesterday with OT so she said she is not dirty. Recommended tub chair for home. Educated pt. On [address] and [address] sell them and deliver. Trained and educated pt. In tub transfer with chair. Demonstrated for pt. Process and technique for tub transfer with a chair. Pt. Stated she will sponge bathe at home due to wound nurse told her to not get the colostomy bag wet at all. When pt. Is allowed to get the bag wet she knows what equipment she will need and know the correct safety techniques. Retrieved pt. A cup of ice per her request. Answered pt.'s questions and concerns. Patient Education   On exercises and body mechanics    Plan   Occupational Therapy Plan  Times Per Week: 4-7  Times Per Day: Once a day  Current Treatment Recommendations: Strengthening, Balance training, Functional mobility training, Neuromuscular re-education, Endurance training, Self-Care / ADL, Safety education & training, Patient/Caregiver education & training        Goals   Short Term Goal 1: Doff/senia UB/LB using compensatory strategies SBA with mod vc  Short Term Goal 2: Bathe UB/LB using compensatory strategies SBA with mod vc  Short Term Goal 3: Complete toilet routine CGA  Short Term Goal 4: Complete sinkside grooming in LRE and proper body mechanics SBA  Short Term Goal 5: Complete functional transfers and functional mobility using LRE and good safety awareness SBA  Long Term Goals  Time Frame for Long Term Goals : 7-14  Long Term Goal 1: Doff/senia UB/LB using compensatory strategies Mod I  Long Term Goal 2: Bathe UB/LB using compensatory strategies Mod I  Long Term Goal 3: Complete toilet routine Mod I  Long Term Goal 4: Complete sinkside grooming in LRE and proper body mechanics Mod I  Long Term Goal 5: Complete functional transfers and functional mobility using LRE and good safety awareness Mod I  Additional Goals?: Yes  Long Term Goal 6: Demo Mod I implementing compensatory and adaptive strategies to manage abdominal incision pain  Long Term Goal 7:  Tolerate continuous functional activity for >10 min demo'ing good safety awareness and use of pacing  Patient Goals   Patient goals :  To return home                  Therapy Time   Individual Concurrent Group Co-treatment   Time In  9:05 am         Time Out  9:40 am         Minutes  [address], [address]

## 2022-10-19 NOTE — PROGRESS NOTES
Assumed patient care. She is resting in bed and watching TV. A&O x 4. Pleasant and cooperative. Respirations even and unlabored. Ileostomy appliance in place without complications. No complaints voiced. No s/s distress noted.

## 2022-10-19 NOTE — PROGRESS NOTES
Physical Therapy  Facility/Department: [facility] MED SURG UNIT  Daily Treatment Note  NAME: Momo Mathews  : 1950  MRN: [id number]    Date of Service: 10/19/2022    Discharge Recommendations:  (P) Home with Home health PT        Patient Diagnosis(es): There were no encounter diagnoses. Assessment   Assessment: (P) Pt. progressing daily. Pt. tolerated gait training with various trials with walker, pushing w/c, and without AD. Pt. responds well with pushing walker with mild UE support to reduce wobble and risk of LOB. Pt. demonstrates she may benefit with use of rollator at this time to progress self function. Increase wobble demo'd without AD requiring at least hand held assistance. Bed mobility training perform. Pt. demo's improve sit to supine with least assistance on left side of bed with use of assist rail. Moreover, pt. tolerated well with seated ther ex and sit to stands for strength and ROM. Activity Tolerance: (P) Patient tolerated treatment well     Plan    Physcial Therapy Plan  General Plan: (P) 5-7 times per week  Current Treatment Recommendations: (P) Strengthening;ROM;Safety education & training;Functional mobility training;Transfer training;Patient/Caregiver education & training;Gait training; Endurance training;Balance training;Stair training;Home exercise program;Neuromuscular re-education     Restrictions  Restrictions/Precautions  Restrictions/Precautions: Fall Risk  Required Braces or Orthoses?: No     Subjective    Subjective  Subjective: (P) I am getting better. I am stiff ~ 3/10 around abdominal region and 1/10 pain.      Objective   Vitals     Bed Mobility Training  Supine to Sit: (P) Minimum assistance;Contact-guard assistance (Improve supine to sit on L side of bed versus R side of bed due to abdominal pain 3/10.)  Sit to Supine: (P) Contact-guard assistance (able to complete with assist rail for UE support.)  Transfer Training  Sit to Stand: (P) Stand-by assistance  Stand to Sit: (P) Stand-by assistance  Gait Training  Gait Training: (P) Yes  Gait  Overall Level of Assistance: (P) Stand-by assistance;Contact-guard assistance  Gait Abnormalities: (P)  (Wobble but stedy with walker or pushing wheelchair. Increase wobble wihtout AD and needs slight UE support for balance.)  Distance (ft): (P)  (80'x2)  Assistive Device: (P) Walker, rolling; Other (comment) (pushing w/c, and without AD.)     PT Exercises  A/AROM Exercises: (P) Seated trunk flexion/extension x 10' AROM. Seated hip flexion x 10', hip abduction x 10', sit to stand x 10' SBA. Other Specialty Interventions  Other Treatments/Modalities: (P) Assisted pt andreea restroom for toilet tsf. Nurse notified and to assist pt with hygiene care.           Goals  Short Term Goals  Short Term Goal 1: bed mobility with SBA  Short Term Goal 2: Transfers with SBA/CGA  Short Term Goal 3: Pt ambulate 50 feet with ww SBA  Long Term Goals  Long Term Goal 1: I bed mobility  Long Term Goal 2: Transfers mod I  Long Term Goal 3: Pt ambulate 150 feet with ww mod I to allow safe I amb at home  Long Term Goal 4: Pt ascend and descend 3-4 steps with HR with SBA to allow safe entrance into home  Long Term Goal 5: I with HEP    Education       Therapy Time   Individual Concurrent Group Co-treatment   Time In  240         Time Out  320         Minutes  [place], [place] .[id number]

## 2022-10-19 NOTE — PROGRESS NOTES
Comprehensive Nutrition Assessment    Type and Reason for Visit:  Reassess    Nutrition Recommendations/Plan:   Continue with current diet with PO >75% meals. Obtain weight  Encourage adequate fluid intake     Malnutrition Assessment:  Malnutrition Status: At risk for malnutrition (Comment) (r/t current suboptimal PO intake and dx of rectosigmoid cancer.) (10/12/22 0826)        Nutrition Assessment:    Nutritional status appears to be improving. Does not present as malnourished. PO intake is improving post ileostomy. Educated on recommended foods post procedure. Patient has refused trying supplement and declines any other nutritional supplement in fear that it will make her nauseous. Adequate PO and fluid intake encouraged. Nutrition Related Findings:    81 y.o. 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 After completing her acute treatment she was transferred to [facility] for post acute rehabilitation/supportive care. PMH includes arthritis, HLD. Weight reviewed per weight history in EMR, weight generally 185-195#, patient verifies accuracy of this and reports no recent sig weight changes. Edema +1 to LUE and RLE/LLE trace edema noted- may see weight changes as edema improves. BMI 37.5 obese. Diet is regular- patient reports no chewing/swallowing difficulty, appetite reported as good and no more nausea post eating. Initially reported she was eating less on purpose due to ileostomy- \"does not want to over do it\". However, reports she is trying to eat more at each meal and tolerating it well. Emesis noted on 10/17. Discussed recommended foods for post ileostomy, encouraged adequate PO and fluid intake.  Patient refusing nutritional supplement and does not want to try any others d/t fear of it making her nauseous. Labs reviewed- 10/13 BUN/creatinine elevated but improved from 10/8- encouraged adequate fluid intake. Last BM 10/18 (ileostomy). Reviewed recommended diet and foods with patient and daughter for home going. Verbalized understanding of diet. Care conference held and patient reports improving appetite. New weight ordered/requested and communication note left. Wound Type: Surgical Incision       Current Nutrition Intake & Therapies:    Average Meal Intake: %, 51-75%  Average Supplements Intake: Refusing to take  ADULT DIET;  Regular    Anthropometric Measures:  Height: 4' 11\" (149.9 cm)  Ideal Body Weight (IBW): 95 lbs (43 kg)     Current Body Weight: 185 lb (83.9 kg) (10/10/22),  Weight Source: Bed Scale  Current BMI (kg/m2): 37.3  Usual Body Weight: 185 lb (83.9 kg) (per patient and weight history review)  % Weight Change (Calculated): 0  Weight Adjustment For: No Adjustment  BMI Categories: Obese Class 2 (BMI 35.0 -39.9)    Estimated Daily Nutrient Needs:  Energy Requirements Based On: Kcal/kg  Weight Used for Energy Requirements: Current  Energy (kcal/day):    Weight Used for Protein Requirements: Current  Protein (g/day):    Method Used for Fluid Requirements: 1 ml/kcal  Fluid (ml/day): 1274    Nutrition Diagnosis:   Inadequate oral intake, In context of acute illness or injury related to catabolic illness as evidenced by intake 51-75%    Nutrition Interventions:   Food and/or Nutrient Delivery: Continue Current Diet  Nutrition Education/Counseling: Education initiated  Coordination of Nutrition Care: Continue to monitor while inpatient  Plan of Care discussed with: patient    Goals:  Previous Goal Met: Progressing toward Goal(s)  Goals: PO intake 75% or greater       Nutrition Monitoring and Evaluation:      Food/Nutrient Intake Outcomes: Food and Nutrient Intake  Physical Signs/Symptoms Outcomes: Weight, Meal Time Behavior, Nausea or Vomiting    Discharge Planning:    Continue current diet Nory Leahy, BRIAN

## 2022-10-19 NOTE — PROGRESS NOTES
Facility/Department: [facility] MED SURG UNIT  Daily Treatment Note  NAME: Meseret Roberts  : 1950  MRN: [id number]    Date of Service: 10/19/2022    Discharge Recommendations:  Continue to assess pending progress, Home with Home health PT        Patient Diagnosis(es): There were no encounter diagnoses. Assessment Pt making good steady progress with therapies , functional mobility and endurance. Pt on target for tentative discharge date of 22            Plan  5-7x week, 1-2x per day try Rollator as pt has Rollator at home. Restrictions  Restrictions/Precautions  Restrictions/Precautions: Fall Risk  Required Braces or Orthoses?: No     Subjective      I am starting to get stornger, but still so tired. \"    Objective   Pt and dtr via phone present for Interdisciplinary Care Conference this date. See separate note for full report. Tentative discharge date next Monday.  Tentative family instruction Monday 10/24/22 at 11 AM.      Sit to stand with SBA,   Amb 30ft x 2, 20 ft x 1 FWW with SBA  No LOB, mild fatigue    Toilet transfer SBA/ toileting SBA after urination          Goals  Short Term Goals  Short Term Goal 1: bed mobility with SBA  Short Term Goal 2: Transfers with SBA/CGA  Short Term Goal 3: Pt ambulate 50 feet with ww SBA  Long Term Goals  Long Term Goal 1: I bed mobility  Long Term Goal 2: Transfers mod I  Long Term Goal 3: Pt ambulate 150 feet with ww mod I to allow safe I amb at home  Long Term Goal 4: Pt ascend and descend 3-4 steps with HR with SBA to allow safe entrance into home  Long Term Goal 5: I with HEP    Education plan , goals,        Therapy Time   Individual Concurrent Group Co-treatment   Time In  1100         Time Out  1130         Minutes  [id number], [id number]

## 2022-10-19 NOTE — PLAN OF CARE
Nutrition Problem #1: Inadequate oral intake, In context of acute illness or injury  Intervention: Food and/or Nutrient Delivery: Continue Current Diet

## 2022-10-20 LAB
ANION GAP SERPL CALCULATED.3IONS-SCNC: 12 MEQ/L (ref 9–15)
BASOPHILS ABSOLUTE: 0 K/UL (ref 0–0.1)
BASOPHILS RELATIVE PERCENT: 0.3 % (ref 0.1–1.2)
BUN BLDV-MCNC: 22 MG/DL (ref 8–23)
CALCIUM SERPL-MCNC: 9.6 MG/DL (ref 8.5–9.9)
CHLORIDE BLD-SCNC: 97 MEQ/L (ref 95–107)
CO2: 26 MEQ/L (ref 20–31)
CREAT SERPL-MCNC: 1.06 MG/DL (ref 0.5–0.9)
EOSINOPHILS ABSOLUTE: 0.1 K/UL (ref 0–0.4)
EOSINOPHILS RELATIVE PERCENT: 0.8 % (ref 0.7–5.8)
GFR SERPL CREATININE-BSD FRML MDRD: 56.5 ML/MIN/{1.73_M2}
GLUCOSE BLD-MCNC: 108 MG/DL (ref 70–99)
HCT VFR BLD CALC: 38.4 % (ref 37–47)
HEMOGLOBIN: 12.5 G/DL (ref 11.2–15.7)
IMMATURE GRANULOCYTES #: 0 K/UL
IMMATURE GRANULOCYTES %: 0.3 %
LYMPHOCYTES ABSOLUTE: 2.6 K/UL (ref 1.2–3.7)
LYMPHOCYTES RELATIVE PERCENT: 21 %
MCH RBC QN AUTO: 28.5 PG (ref 25.6–32.2)
MCHC RBC AUTO-ENTMCNC: 32.6 % (ref 32.2–35.5)
MCV RBC AUTO: 87.5 FL (ref 79.4–94.8)
MONOCYTES ABSOLUTE: 0.9 K/UL (ref 0.2–0.9)
MONOCYTES RELATIVE PERCENT: 7.5 % (ref 4.7–12.5)
NEUTROPHILS ABSOLUTE: 8.8 K/UL (ref 1.6–6.1)
NEUTROPHILS RELATIVE PERCENT: 70.1 % (ref 34–71.1)
PDW BLD-RTO: 13.8 % (ref 11.7–14.4)
PLATELET # BLD: 410 K/UL (ref 182–369)
POTASSIUM SERPL-SCNC: 4.2 MEQ/L (ref 3.4–4.9)
RBC # BLD: 4.39 M/UL (ref 3.93–5.22)
SODIUM BLD-SCNC: 135 MEQ/L (ref 135–144)
WBC # BLD: 12.6 K/UL (ref 4–10)

## 2022-10-20 PROCEDURE — 97530 THERAPEUTIC ACTIVITIES: CPT

## 2022-10-20 PROCEDURE — 6370000000 HC RX 637 (ALT 250 FOR IP): Performed by: COLON & RECTAL SURGERY

## 2022-10-20 PROCEDURE — 80048 BASIC METABOLIC PNL TOTAL CA: CPT

## 2022-10-20 PROCEDURE — 2580000003 HC RX 258: Performed by: COLON & RECTAL SURGERY

## 2022-10-20 PROCEDURE — 36415 COLL VENOUS BLD VENIPUNCTURE: CPT

## 2022-10-20 PROCEDURE — 97535 SELF CARE MNGMENT TRAINING: CPT

## 2022-10-20 PROCEDURE — 85025 COMPLETE CBC W/AUTO DIFF WBC: CPT

## 2022-10-20 PROCEDURE — 97116 GAIT TRAINING THERAPY: CPT

## 2022-10-20 PROCEDURE — 6360000002 HC RX W HCPCS: Performed by: COLON & RECTAL SURGERY

## 2022-10-20 PROCEDURE — 2580000003 HC RX 258: Performed by: INTERNAL MEDICINE

## 2022-10-20 PROCEDURE — 1200000002 HC SEMI PRIVATE SWING BED

## 2022-10-20 PROCEDURE — 6370000000 HC RX 637 (ALT 250 FOR IP): Performed by: INTERNAL MEDICINE

## 2022-10-20 PROCEDURE — 97110 THERAPEUTIC EXERCISES: CPT

## 2022-10-20 PROCEDURE — 2500000003 HC RX 250 WO HCPCS: Performed by: INTERNAL MEDICINE

## 2022-10-20 RX ADMIN — ATORVASTATIN CALCIUM 20 MG: 10 TABLET, FILM COATED ORAL at 08:07

## 2022-10-20 RX ADMIN — TRAMADOL HYDROCHLORIDE 50 MG: 50 TABLET ORAL at 21:09

## 2022-10-20 RX ADMIN — DOXYCYCLINE 100 MG: 100 INJECTION, POWDER, LYOPHILIZED, FOR SOLUTION INTRAVENOUS at 10:15

## 2022-10-20 RX ADMIN — DOXYCYCLINE 100 MG: 100 INJECTION, POWDER, LYOPHILIZED, FOR SOLUTION INTRAVENOUS at 21:16

## 2022-10-20 RX ADMIN — MICONAZOLE NITRATE: 2 POWDER TOPICAL at 08:06

## 2022-10-20 RX ADMIN — MICONAZOLE NITRATE: 2 POWDER TOPICAL at 20:56

## 2022-10-20 RX ADMIN — ONDANSETRON 4 MG: 2 INJECTION INTRAMUSCULAR; INTRAVENOUS at 15:54

## 2022-10-20 RX ADMIN — Medication 10 ML: at 20:57

## 2022-10-20 RX ADMIN — ENOXAPARIN SODIUM 40 MG: 100 INJECTION SUBCUTANEOUS at 20:57

## 2022-10-20 RX ADMIN — Medication 10 ML: at 08:07

## 2022-10-20 ASSESSMENT — PAIN DESCRIPTION - DESCRIPTORS
DESCRIPTORS: SORE
DESCRIPTORS: SORE
DESCRIPTORS: DULL;ACHING

## 2022-10-20 ASSESSMENT — PAIN SCALES - GENERAL
PAINLEVEL_OUTOF10: 4
PAINLEVEL_OUTOF10: 2
PAINLEVEL_OUTOF10: 3

## 2022-10-20 ASSESSMENT — PAIN DESCRIPTION - PAIN TYPE
TYPE: SURGICAL PAIN
TYPE: SURGICAL PAIN

## 2022-10-20 ASSESSMENT — PAIN DESCRIPTION - ONSET
ONSET: ON-GOING
ONSET: ON-GOING

## 2022-10-20 ASSESSMENT — PAIN DESCRIPTION - LOCATION
LOCATION: ABDOMEN

## 2022-10-20 ASSESSMENT — PAIN DESCRIPTION - ORIENTATION
ORIENTATION: MID
ORIENTATION: ANTERIOR
ORIENTATION: MID

## 2022-10-20 ASSESSMENT — PAIN DESCRIPTION - FREQUENCY
FREQUENCY: INTERMITTENT
FREQUENCY: INTERMITTENT

## 2022-10-20 ASSESSMENT — PAIN - FUNCTIONAL ASSESSMENT
PAIN_FUNCTIONAL_ASSESSMENT: ACTIVITIES ARE NOT PREVENTED
PAIN_FUNCTIONAL_ASSESSMENT: ACTIVITIES ARE NOT PREVENTED

## 2022-10-20 NOTE — FLOWSHEET NOTE
Patient dry heaving. Medicated with PRN zofran per order.  Electronically signed by Kiley Chowdary RN on 10/20/2022 at 3:55 PM

## 2022-10-20 NOTE — PROGRESS NOTES
Up in chair at bedside. Ambulates with walker and supervision. A&O x 4. Pleasant and cooperative. Respirations even and unlabored. Ileostomy appliance intact with no complications noted. Brown/yellow loose stool noted. Voids continently. No s/s distress noted.

## 2022-10-20 NOTE — PROGRESS NOTES
Patient is doing well. No chest pain or palpitation. No abdominal pain, nausea or vomiting. She is participating well in therapy. No headaches, loss of conscious or seizure. ROS: 12 system review otherwise is negative for acute signs or symptoms over the last 24 hrs. Exam: Awake, alert oriented, in no apparent distress  HEENT: Pink conjunctiva and buccal mucosa. Normal and throat and nose  Neck: Supple, no nuchal rigidity. Endo: No thyromegaly. Vascular: No JVD or carotid bruit. Chest: Clear to auscultation, no dullness to percussion. Heart: Regular rate and rhythm, no extra sounds. No murmur, no rub. Abdomen: Soft, mild tenderness. Postoperative changes. Colostomy noted. Erythema and induration involving the surgical site. Improvement of erythema over the last for 8 hours  LE: No cyanosis or clubbing, no varices or edema. Neuro: Awake, alert, oriented, normal speech, normal comprehension, normal extension, normal cranial nerves, normal and symmetrical motor and tone examination throughout. MS: No joint effusion or tenderness. Skin: No skin rash, itching, bruising or significant findings. Assessment and plan:      *Surgical line infection. I start the patient on doxycycline twice a day. The erythema is getting away     *Colon cancer status postresection. Patient is to follow-up with surgical team and oncology. *Functional impairment. PT OT.     *Anemia, stable     *Obesity. *Few other medical issues not listed above     Continue PT OT  Follow-up with the PCP, surgery and oncology was discharge.

## 2022-10-20 NOTE — ED NOTES
Patient continues to dry heave after nausea medications.  Electronically signed by Nikolai Rangel RN on 10/20/2022 at 4:36 PM       Nikolai Rangel RN  10/20/22 5922

## 2022-10-20 NOTE — PROGRESS NOTES
Physical Therapy  Facility/Department: [facility] MED SURG UNIT  Daily Treatment Note  NAME: Edith Underwood  : 1950  MRN: [id number]    Date of Service: 10/20/2022    Discharge Recommendations:  Home with Home health PT        Patient Diagnosis(es): There were no encounter diagnoses. Assessment   Pt states 3/10 abdominal pain at start of visit. Pt agreeable to therapy. Transfer training requiring SBA and review of safety to transfer to rollator today to unlock/ lock brakes at appropriate times with good patient understanding and follow through. Pt ambulated 70' x 2 with slow pace and SBA/ CGA required. Pt has good safety awareness with activities. Seated There Ex to increase B LE strength with good patient tolerance. Plan    Physcial Therapy Plan  General Plan: 5-7 times per week  Current Treatment Recommendations: Strengthening;ROM;Safety education & training;Functional mobility training;Transfer training;Patient/Caregiver education & training;Gait training; Endurance training;Balance training;Stair training;Home exercise program;Neuromuscular re-education     Restrictions  Restrictions/Precautions  Restrictions/Precautions: Fall Risk  Required Braces or Orthoses?: No     Subjective    Subjective  Subjective: Pt reclined in room chair at arrival. She states 3/10 abdominal pain and dry heaving on and off last night through this morning.      Objective   Vitals     Balance  Sitting: (P) Intact  Standing: (P) With support  Transfer Training  Transfer Training: (P) Yes  Overall Level of Assistance: (P) Stand-by assistance  Interventions: (P) Safety awareness training (Verbal instruction on safety training with use to transfer to Rollator with good pt follow through.)  Sit to Stand: (P) Stand-by assistance  Stand to Sit: (P) Stand-by assistance  Stand Pivot Transfers: (P) Stand-by assistance  Gait Training  Gait Training: (P) Yes  Gait  Overall Level of Assistance: (P) Stand-by assistance;Contact-guard assistance  Interventions: (P) Verbal cues  Speed/Alina: (P) Slow;Pace decreased (< 100 feet/min)  Step Length: (P) Right shortened;Left shortened  Gait Abnormalities: (P) Shuffling gait  Distance (ft): (P)  (70' x 2. Slow pace with wide turn. No RB's)  Assistive Device: (P) Gait belt;Walker, rollator     PT Exercises  A/AROM Exercises: (P) Seated AP's x 20, LAQ's x 15 w/ 3 sec H, hip Abduction, marches. Verbal instruction for proper technique and counting of reps. Other Specialty Interventions  Other Treatments/Modalities: (P) Pt states 3/10 abdominal pain at start of visit. Pt agreeable to therapy. transfer training requiring SBA and review of safety to transfer to rollator today to lock/ unlock brakes at appropriate times with good patient understanding and follow through. Pt ambulated 70' x 2 with slow pace and SBA/CGA requried. patient has good safety awareness with activities. Seated Ther Ex to increase B LE sterngth with good patient tolerance.           Goals  Short Term Goals  Short Term Goal 1: bed mobility with SBA  Short Term Goal 2: Transfers with SBA/CGA  Short Term Goal 3: Pt ambulate 50 feet with ww SBA  Long Term Goals  Long Term Goal 1: I bed mobility  Long Term Goal 2: Transfers mod I  Long Term Goal 3: Pt ambulate 150 feet with ww mod I to allow safe I amb at home  Long Term Goal 4: Pt ascend and descend 3-4 steps with HR with SBA to allow safe entrance into home  Long Term Goal 5: I with HEP    Education       Therapy Time   Individual Concurrent Group Co-treatment   Time In (P) 1025         Time Out (P) 1115         Minutes (P) 50                 Kate Cardoso, PTA

## 2022-10-20 NOTE — PROGRESS NOTES
Facility/Department: [facility] MED SURG UNIT  Daily Treatment Note  NAME: Gilda Alegre  : 1950  MRN: [id number]    Date of Service: 10/20/2022    Discharge Recommendations:  Home with Home health PT        Patient Diagnosis(es): There were no encounter diagnoses. Assessment   Assessment: Pt able to manage iliostomy this date as she is returning home Monday, 10/23/22. Pt continues to progress with strength and endurance ambulating inc distances with standing RB's only. Pt performed stairs this date with VC's for sequencing to inc ease and dec strain on L arthritic knee. Pt performed a few seated LE ther ex and laid down for a nap. Call light and bed alarm in place. Continue with POC. Activity Tolerance: Patient tolerated treatment well     Plan    Physcial Therapy Plan  General Plan: 5-7 times per week  Current Treatment Recommendations: Strengthening;ROM;Safety education & training;Functional mobility training;Transfer training;Patient/Caregiver education & training;Gait training; Endurance training;Balance training;Stair training;Home exercise program;Neuromuscular re-education     Restrictions  Restrictions/Precautions  Restrictions/Precautions: Fall Risk  Required Braces or Orthoses?: No     Subjective    Subjective  Subjective: Pt sitting up in recliner and agreeable to therapy. Pain: Pt states no c/o pain.      Objective   Vitals     Bed Mobility Training  Sit to Supine: Supervision  Scooting: Supervision (painful with movement)  Balance  Sitting: Intact  Standing: With support  Transfer Training  Transfer Training: Yes  Overall Level of Assistance: Stand-by assistance  Interventions: Safety awareness training (Verbal instruction on safety training with use to transfer to Rollator with good pt follow through.)  Sit to Stand: Stand-by assistance  Stand to Sit: Stand-by assistance  Stand Pivot Transfers: Stand-by assistance  Gait Training  Gait Training: Yes  Gait  Overall Level of Assistance: Stand-by assistance (dec rollator height to pt's height.)  Interventions: Verbal cues  Speed/Alina: Pace decreased (< 100 feet/min)  Step Length: Right shortened;Left shortened  Gait Abnormalities: Antalgic (L knee antalgic at times with gait)  Distance (ft):  (170' x 2 and 100' with standing RB's between trials)  Assistive Device: Gait belt;Walker, rollator  Stairs - Level of Assistance: Contact-guard assistance  Number of Stairs Trained:  (3 6'')     PT Exercises  Exercise Treatment: seated LAQ; x 20, seated marches x 20 alternating              Goals  Short Term Goals  Short Term Goal 1: bed mobility with SBA  Short Term Goal 2: Transfers with SBA/CGA  Short Term Goal 3: Pt ambulate 50 feet with ww SBA  Long Term Goals  Long Term Goal 1: I bed mobility  Long Term Goal 2: Transfers mod I  Long Term Goal 3: Pt ambulate 150 feet with ww mod I to allow safe I amb at home  Long Term Goal 4: Pt ascend and descend 3-4 steps with HR with SBA to allow safe entrance into home  Long Term Goal 5: I with HEP    Education       Therapy Time   Individual Concurrent Group Co-treatment   Time In 1230         Time Out 1330         Minutes [address] moines, PTA

## 2022-10-20 NOTE — PROGRESS NOTES
Occupational Therapy  Facility/Department: [facility] MED SURG UNIT  Rehabilitation Occupational Therapy Daily Treatment Note    Date: 10/20/22  Patient Name: Daljit Pang       Room: 8911/3696-08  MRN: [id number]  Account: [de-identified]   : 1950  (81 y.o.) Gender: female         Discharge Recommendations:   Continue to assess pending progress, Home with assist PRN, Home with Home health OT  OT Equipment Recommendations  Equipment Needed: Yes  Other: shower chair and BSC  Treatment Diagnosis: Decreased ADL performance d/t recent ileostomy. Past Medical History:  has a past medical history of Arthritis, Cancer ([facility] [address] and Hyperlipidemia. Past Surgical History:   has a past surgical history that includes Cataract removal; skin biopsy; Tubal ligation; Cholecystectomy; Colonoscopy (N/A, 2022); Colonoscopy (N/A, 2022); and colectomy (N/A, 10/3/2022). Restrictions   Restrictions/Precautions  Restrictions/Precautions: Fall Risk  Required Braces or Orthoses?: No    Subjective      Pt. Was agreeable to bathing/dressing with OT, but not a shower due to she can not get her colostomy bag wet. Objective             Set up bath  ADL   Grooming- Mod I (hair and face)  UB bathing/dressing- Mod I/Mod I  LB bathing/dressing-Min A/Min A for feet and socks  Pt. Tolerated standing for 6 minutes with ww 2 x for static standing , no LOB  At SBA/supervision       Pt. Needs RB and moves slowly due to increased fatigue    Trained and educated pt. In dressing techniques. Toileting- SBA/supervision  Toilet transfer- SBA/supervision  Stood at sink  washing hands-supervision  Assessment   OT follow up:yes  Pt. Was agreeable to bathing/dressing with OT. Pt.'s status is listed above. Pt. Increased her standing tolerance to 6 minutes 2 x with no LOB. Pt. Increased her transfers to supervision/SBA. Pt. Fatigues quickly and needs frequent RB. Trained and educated pt.  In energy conservation techniques to decrease fatigue. Pt. Completed ADLs needing extra time. Answered pt.'s questions and concerns. Patient Education   on dressing techniques and energy conservation techniques    Plan   Occupational Therapy Plan  Times Per Week: 4-7  Times Per Day: Once a day  Current Treatment Recommendations: Strengthening, Balance training, Functional mobility training, Neuromuscular re-education, Endurance training, Self-Care / ADL, Safety education & training, Patient/Caregiver education & training        Goals   Short Term Goal 1: Doff/senia UB/LB using compensatory strategies SBA with mod vc  Short Term Goal 2: Bathe UB/LB using compensatory strategies SBA with mod vc  Short Term Goal 3: Complete toilet routine CGA  Short Term Goal 4: Complete sinkside grooming in LRE and proper body mechanics SBA  Short Term Goal 5: Complete functional transfers and functional mobility using LRE and good safety awareness SBA  Long Term Goals  Time Frame for Long Term Goals : 7-14  Long Term Goal 1: Doff/senia UB/LB using compensatory strategies Mod I  Long Term Goal 2: Bathe UB/LB using compensatory strategies Mod I  Long Term Goal 3: Complete toilet routine Mod I  Long Term Goal 4: Complete sinkside grooming in LRE and proper body mechanics Mod I  Long Term Goal 5: Complete functional transfers and functional mobility using LRE and good safety awareness Mod I  Additional Goals?: Yes  Long Term Goal 6: Demo Mod I implementing compensatory and adaptive strategies to manage abdominal incision pain  Long Term Goal 7: Tolerate continuous functional activity for >10 min demo'ing good safety awareness and use of pacing  Patient Goals   Patient goals :  To return home       AM-PAC Score               Therapy Time   Individual Concurrent Group Co-treatment   Time In  8:30 am         Time Out  9:30 am         Minutes  [address] [facility], [address]

## 2022-10-21 PROCEDURE — 2580000003 HC RX 258: Performed by: INTERNAL MEDICINE

## 2022-10-21 PROCEDURE — 97530 THERAPEUTIC ACTIVITIES: CPT

## 2022-10-21 PROCEDURE — 97110 THERAPEUTIC EXERCISES: CPT

## 2022-10-21 PROCEDURE — 1200000002 HC SEMI PRIVATE SWING BED

## 2022-10-21 PROCEDURE — 97535 SELF CARE MNGMENT TRAINING: CPT

## 2022-10-21 PROCEDURE — 97116 GAIT TRAINING THERAPY: CPT

## 2022-10-21 PROCEDURE — 2500000003 HC RX 250 WO HCPCS: Performed by: INTERNAL MEDICINE

## 2022-10-21 PROCEDURE — 6360000002 HC RX W HCPCS: Performed by: COLON & RECTAL SURGERY

## 2022-10-21 PROCEDURE — 6370000000 HC RX 637 (ALT 250 FOR IP): Performed by: INTERNAL MEDICINE

## 2022-10-21 PROCEDURE — 99214 OFFICE O/P EST MOD 30 MIN: CPT

## 2022-10-21 PROCEDURE — 2580000003 HC RX 258: Performed by: COLON & RECTAL SURGERY

## 2022-10-21 PROCEDURE — 6370000000 HC RX 637 (ALT 250 FOR IP): Performed by: COLON & RECTAL SURGERY

## 2022-10-21 RX ADMIN — ENOXAPARIN SODIUM 40 MG: 100 INJECTION SUBCUTANEOUS at 22:11

## 2022-10-21 RX ADMIN — DOXYCYCLINE 100 MG: 100 INJECTION, POWDER, LYOPHILIZED, FOR SOLUTION INTRAVENOUS at 22:22

## 2022-10-21 RX ADMIN — MICONAZOLE NITRATE: 2 POWDER TOPICAL at 08:50

## 2022-10-21 RX ADMIN — MICONAZOLE NITRATE: 2 POWDER TOPICAL at 22:12

## 2022-10-21 RX ADMIN — ATORVASTATIN CALCIUM 20 MG: 10 TABLET, FILM COATED ORAL at 08:49

## 2022-10-21 RX ADMIN — TRAMADOL HYDROCHLORIDE 50 MG: 50 TABLET ORAL at 08:49

## 2022-10-21 RX ADMIN — DOXYCYCLINE 100 MG: 100 INJECTION, POWDER, LYOPHILIZED, FOR SOLUTION INTRAVENOUS at 10:31

## 2022-10-21 RX ADMIN — Medication 10 ML: at 08:51

## 2022-10-21 RX ADMIN — TRAMADOL HYDROCHLORIDE 50 MG: 50 TABLET ORAL at 22:25

## 2022-10-21 RX ADMIN — Medication 10 ML: at 22:41

## 2022-10-21 ASSESSMENT — PAIN DESCRIPTION - DESCRIPTORS: DESCRIPTORS: PRESSURE;TENDER;SORE

## 2022-10-21 ASSESSMENT — PAIN SCALES - GENERAL: PAINLEVEL_OUTOF10: 5

## 2022-10-21 ASSESSMENT — PAIN DESCRIPTION - ORIENTATION: ORIENTATION: ANTERIOR

## 2022-10-21 ASSESSMENT — PAIN DESCRIPTION - LOCATION: LOCATION: ABDOMEN

## 2022-10-21 NOTE — PROGRESS NOTES
Wound Ostomy Continence Nurse  Ostomy Referral Follow-up Progress Note      NAME:  [address] RECORD NUMBER:  [id number]  AGE: 81 y.o. GENDER:  female  :  1950  TODAY'S DATE:  10/21/2022    Subjective: Arabella Lou is a 81 y.o. female referred by:   [x] Physician  [] Nursing  [] Other:     ileostomy and New    Objective    /81   Pulse 75   Temp 97.5 °F (36.4 °C) (Oral)   Resp 18   Ht 4' 11\" (1.499 m)   Wt 178 lb 11.2 oz (81.1 kg)   SpO2 98%   BMI 36.09 kg/m²     Kenneth Risk Score Kenneth Scale Score: 19    Assessment   Surgeon - Dr. Jose Hansen       Ileostomy/Jejunostomy RLQ Loop ileostomy (Active)   Stomal Appliance 1 piece 10/21/22 1316   Flange Size (inches) 1.25 Inches 10/21/22 1316   Stoma  Assessment Red;Moist;Protrudes 10/21/22 1316   Peristomal Assessment Pink 10/21/22 1316   Treatment Bag change;Site care;Stoma powder 10/21/22 1316   Stool Appearance Loose 10/21/22 1316   Stool Color Brown 10/21/22 1316   Stool Amount Medium 10/21/22 1316   Output (mL) 100 ml 10/21/22 1316   Number of days: 17     Patient sitting in chair, eating lunch, with her family at the bedside. While patient finished her lunch, supplies provided for anticipated discharge date of 10/24. One patient finished eating lunch, she ambulated to the bed where demonstration of ostomy appliance change was provided to patient and patient's sisters and cousin. Current appliance applied by this [id number]  Nurse on Tuesday has maintained a great seal, no signs of leaking. Current appliance removed, berenice stomal skin care provided with warm water and 4x4s. No change in stoma appearance from last evaluation, mucocutaneous junction remains intact. Berenice stomal skin erosion has almost completely resolved since Tuesday, just very small area of berenice stomal skin denudation just below the stoma. Providing step by step demonstration and instruction, appliance change.  Berenice stomal skin cleansed and dried. Stoma powder used - to provide dry pouching surface. Half piece of Stoma strip past folded in half and molded to fit the crease in skin contour at 3 o'clock. Appliance cut to fit stoma size. Barrier ring applied to the back. Stoma rounded out (gently smooth skin above and below the stoma) to provide a flat surface for pouching). Apply the appliance and warmth of hand help over the area. Perforated tape border applied to support the appliance. Ostomy support belt applied last.  All questions answered. Patient has supplies and already has supplies list at home. Samples already at home as well. Ostomy care instructions provided in the discharge instruction. Intake/Output Summary (Last 24 hours) at 10/21/2022 1318  Last data filed at 10/21/2022 1316  Gross per 24 hour   Intake 240 ml   Output 100 ml   Net 140 ml       Plan:   Plan for Ostomy Care: see above    Ostomy Plan of Care  [x] Supplies/Instructions left in room  [] Patient using home supplies  [x] Brand/supplies at bedside - [employer]    Current Diet: ADULT DIET;  Regular  Dietician consult:  Yes    Discharge Plan:  Placement for patient upon discharge: home with support    Outpatient visit plan N/A  Supplies given Yes   Samples requested Yes    Referrals:  []   [x] [facility]  [] Supplies  [] Other    Patient/Caregiver Teaching:  Written Instructions given to patient/family- Patient and patient's family  Teaching provided:  [] Reviewed GI and A&P        [x] Supplies  [x] Pouch emptying      [x] Manipulate closure  [x] Routine Care         [] Comment  [x] Pouch maintenance           Level of patient/caregiver understanding able to:  [] Indicates understanding       [] Needs reinforcement  [] Unsuccessful      [x] Verbal Understanding  [x] Demonstrated understanding       [] No evidence of learning  [] Refused teaching         [] N/A    Electronically signed by Marie Bailey, GÓMEZN, RN, CWOCN on 10/21/2022 at 1:37 PM

## 2022-10-21 NOTE — PROGRESS NOTES
Physical Therapy  Facility/Department: [facility] MED SURG UNIT  Daily Treatment Note  NAME: Hong De La Cruz  : 1950  MRN: [id number]    Date of Service: 10/21/2022    Discharge Recommendations:  (P) Home with Home health PT        Patient Diagnosis(es): There were no encounter diagnoses. Assessment   Assessment: (P) Pt. progressing slowly with quality of gait pattern; however, pt able to increase gait distance. Pt. indicates she would continue to benefit with using rollator at this time due to lateral wobble to reduce risk of LOB. Pt. tolerated added limited B LE repetition with standing ther ex for LE strengthening. Pt. needed to alternate LE to increase tolerance to complete with less c/o L knee pain. Activity Tolerance: (P) Patient tolerated treatment well;Patient limited by pain     Plan    Physcial Therapy Plan  General Plan: (P) 5-7 times per week  Current Treatment Recommendations: (P) Strengthening;ROM;Safety education & training;Functional mobility training;Transfer training;Patient/Caregiver education & training;Gait training; Endurance training;Balance training;Stair training;Home exercise program;Neuromuscular re-education     Restrictions  Restrictions/Precautions  Restrictions/Precautions: Fall Risk  Required Braces or Orthoses?: No     Subjective          Objective   Vitals  Bed Mobility:  Supine -> sit: stand by assistance with use of assist rail and mild struggle. Transfers   Sit to stand Tsf: stand by assistance with various surfaces such as bed, toilet, and recliner. Balance  Sitting: Intact  Standing: With support  Transfer Training  Transfer Training: Yes  Overall Level of Assistance: Stand-by assistance  Interventions: Safety awareness training;Verbal cues  Sit to Stand: Stand-by assistance  Stand to Sit: Stand-by assistance  Toilet Transfer: Stand-by assistance  Gait  Overall Level of Assistance: Stand-by assistance   Ambulated  200' x1 with rollator and stand by assistance.      PT Exercises  A/AROM Exercises: (P) Seated trunk flexion/extension x 10', and B lateral trunk rotation x 10' AROM. Supported stand ther ex B LE: Heel raises x 10', toe raises x 10', hamstring curls alternating x 10', Hip abduction/ adduction x 10' altrernating, Hip extension alternating x 10', mini squat. Pt. reports limited tolerance due to L knee pain and arthritis. Pt. reponded better with alternating LE's.              Goals  Short Term Goals  Short Term Goal 1: bed mobility with SBA  Short Term Goal 2: Transfers with SBA/CGA  Short Term Goal 3: Pt ambulate 50 feet with ww SBA  Long Term Goals  Long Term Goal 1: I bed mobility  Long Term Goal 2: Transfers mod I  Long Term Goal 3: Pt ambulate 150 feet with ww mod I to allow safe I amb at home  Long Term Goal 4: Pt ascend and descend 3-4 steps with HR with SBA to allow safe entrance into home  Long Term Goal 5: I with HEP    Education       Therapy Time   Individual Concurrent Group Co-treatment   Time In  205         Time Out  245         Minutes  [address], [place] .[id number]

## 2022-10-21 NOTE — DISCHARGE INSTRUCTIONS
Ostomy Care Instruction  1) Cleanse and dry liberty stomal skin    2) Use stoma powder and thoroughly brush off excess - to provide dry pouching surface (DO NOT USE IF SKIN IS INTACT). 3) Use Half piece of Stoma strip paste folded in half and molded to fit the crease in skin contour at 3 o'clock - to provide flat pouching surface  4) Cut the appliance to fit stoma size, and apply barrier ring to the back. 5) Round out the stoma (gently smooth skin above and below the stoma) to provide a flat surface for pouching. 6) Apply the appliance and use warmth of hand to activate the adhesive.   7) Apply Perforated tape border applied to support the appliance  8) Apply Ostomy support belt last.

## 2022-10-21 NOTE — PROGRESS NOTES
Occupational Therapy  Facility/Department: [facility] MED SURG UNIT  Daily Treatment Note  NAME: Sherryle Comfort  : 1950  MRN: [id number]    Date of Service: 10/21/2022    Discharge Recommendations:  Continue to assess pending progress, Home with assist PRN, Home with Home health OT  OT Equipment Recommendations  Equipment Needed: Yes  Mobility Devices: ADL Assistive Devices  ADL Assistive Devices: Sock-Aid Hard;Reacher      Patient Diagnosis(es): There were no encounter diagnoses.      Assessment    Assessment: pt expresses good understanding and performed well with AE sock aide, 0 LOB in functional mobility  Activity Tolerance: Patient tolerated treatment well (6 min standing activity)  Discharge Recommendations: Continue to assess pending progress;Home with assist PRN;Home with Home health OT  Equipment Needed: Yes  Mobility Devices: ADL Assistive Devices      Plan         Restrictions       Subjective   Subjective  Subjective: Pt is agreeablefor tx,has already washed but would like to do shampoo cap and grooming ADLs           Objective    Vitals     Balance  Sitting: Intact  Standing: With support  Transfer Training  Transfer Training: Yes  Overall Level of Assistance: Stand-by assistance  Interventions: Safety awareness training;Verbal cues  Sit to Stand: Stand-by assistance  Stand to Sit: Stand-by assistance  Toilet Transfer: Stand-by assistance  Gait  Overall Level of Assistance: Stand-by assistance     ADL  Grooming: Setup  Grooming Skilled Clinical Factors: shampoo cap max A set up for comb out  LE Dressing: Minimal assistance  LE Dressing Skilled Clinical Factors: sock aide training with good participation /performance  Toileting: Stand by assistance  OT Exercises  A/AROM Exercises: 1.5# aby, standing 10 reps 3UE exercises  Functional Mobility Circuit Training: with rollater 4 min in room and mclain  Dynamic Standing Balance Exercises: R,L rotation with straight aby 5 reps,bean bag toss 2 min           Patient Education  Education Given To: Patient  Education Provided: Home Exercise Program;ADL Adaptive Strategies;Transfer Training;IADL Safety  Education Provided Comments: pt galina good understanding of rollater use  Education Method: Demonstration;Verbal  Barriers to Learning: None  Education Outcome: Demonstrated understanding    Goals  Short Term Goals  Time Frame for Short Term Goals: 3-5 days  Short Term Goal 1: Doff/senia UB/LB using compensatory strategies SBA with mod vc  Short Term Goal 2: Bathe UB/LB using compensatory strategies SBA with mod vc  Short Term Goal 3: Complete toilet routine CGA  Short Term Goal 4: Complete sinkside grooming in LRE and proper body mechanics SBA  Short Term Goal 5: Complete functional transfers and functional mobility using LRE and good safety awareness SBA  Long Term Goals  Time Frame for Long Term Goals : 7-14  Long Term Goal 1: Doff/senia UB/LB using compensatory strategies Mod I  Long Term Goal 2: Bathe UB/LB using compensatory strategies Mod I  Long Term Goal 3: Complete toilet routine Mod I  Long Term Goal 4: Complete sinkside grooming in LRE and proper body mechanics Mod I  Long Term Goal 5: Complete functional transfers and functional mobility using LRE and good safety awareness Mod I  Additional Goals?: Yes  Long Term Goal 6: Demo Mod I implementing compensatory and adaptive strategies to manage abdominal incision pain  Long Term Goal 7: Tolerate continuous functional activity for >10 min demo'ing good safety awareness and use of pacing  Patient Goals   Patient goals :  To return home       Therapy Time   Individual Concurrent Group Co-treatment   Time In 0925         Time Out 1025         Minutes [address], JOSE MARTIN

## 2022-10-21 NOTE — PLAN OF CARE
Pt. In bed resting comfortably, She is A/O/ x 4, 1 Assist w/ a rollator. Takes her po medications whole w/ H2O. Pt. has an ileostomy that has yellow /brownish liquified stool in bag. She has been educated on how to empty. Abd incision is well approximated, 15 staples are intact, erythema, no drainage. Prior BRENDAN drain, lateral to incision, area cleaned w/ NS, patted dry and covered w/ a Mepilex dsg. Pt. c/o pain to her abd and was medicated w/ 50mg of Tramadol. Reassessed 1 hr after administration, Pt. was in bed sleeping. Call light is w/in reach. She is in bed resting comfortably. Will continue to monitor. Mayda Renteria jazz

## 2022-10-22 PROCEDURE — 97530 THERAPEUTIC ACTIVITIES: CPT

## 2022-10-22 PROCEDURE — 97110 THERAPEUTIC EXERCISES: CPT

## 2022-10-22 PROCEDURE — 6370000000 HC RX 637 (ALT 250 FOR IP): Performed by: INTERNAL MEDICINE

## 2022-10-22 PROCEDURE — 6360000002 HC RX W HCPCS: Performed by: COLON & RECTAL SURGERY

## 2022-10-22 PROCEDURE — 6370000000 HC RX 637 (ALT 250 FOR IP): Performed by: COLON & RECTAL SURGERY

## 2022-10-22 PROCEDURE — 2580000003 HC RX 258: Performed by: INTERNAL MEDICINE

## 2022-10-22 PROCEDURE — 1200000002 HC SEMI PRIVATE SWING BED

## 2022-10-22 PROCEDURE — 2580000003 HC RX 258: Performed by: COLON & RECTAL SURGERY

## 2022-10-22 PROCEDURE — 2500000003 HC RX 250 WO HCPCS: Performed by: INTERNAL MEDICINE

## 2022-10-22 RX ADMIN — TRAMADOL HYDROCHLORIDE 50 MG: 50 TABLET ORAL at 22:42

## 2022-10-22 RX ADMIN — DOXYCYCLINE 100 MG: 100 INJECTION, POWDER, LYOPHILIZED, FOR SOLUTION INTRAVENOUS at 10:33

## 2022-10-22 RX ADMIN — DOXYCYCLINE 100 MG: 100 INJECTION, POWDER, LYOPHILIZED, FOR SOLUTION INTRAVENOUS at 22:40

## 2022-10-22 RX ADMIN — TRAMADOL HYDROCHLORIDE 50 MG: 50 TABLET ORAL at 08:43

## 2022-10-22 RX ADMIN — MICONAZOLE NITRATE: 2 POWDER TOPICAL at 22:46

## 2022-10-22 RX ADMIN — ENOXAPARIN SODIUM 40 MG: 100 INJECTION SUBCUTANEOUS at 22:42

## 2022-10-22 RX ADMIN — MICONAZOLE NITRATE: 2 POWDER TOPICAL at 08:44

## 2022-10-22 RX ADMIN — Medication 10 ML: at 08:45

## 2022-10-22 RX ADMIN — Medication 10 ML: at 22:48

## 2022-10-22 RX ADMIN — ATORVASTATIN CALCIUM 20 MG: 10 TABLET, FILM COATED ORAL at 08:43

## 2022-10-22 ASSESSMENT — PAIN SCALES - GENERAL
PAINLEVEL_OUTOF10: 3
PAINLEVEL_OUTOF10: 0
PAINLEVEL_OUTOF10: 3
PAINLEVEL_OUTOF10: 3
PAINLEVEL_OUTOF10: 0

## 2022-10-22 ASSESSMENT — PAIN DESCRIPTION - LOCATION: LOCATION: ABDOMEN

## 2022-10-22 ASSESSMENT — PAIN DESCRIPTION - DESCRIPTORS: DESCRIPTORS: ACHING;PRESSURE

## 2022-10-22 ASSESSMENT — PAIN DESCRIPTION - ORIENTATION: ORIENTATION: ANTERIOR

## 2022-10-22 NOTE — PLAN OF CARE
Pt. is in her recliner watching tv. She is A/O/ x 4, 1 Assist w/ a rollator. Pt. takes her po medications whole w/ no issues. She has a 20 ga in her Left AC, that is clean, dry, and intact, w/ a brisk blood return. She has an ileostomy that is draining brown/yellowish liqufied 100 ml stool. She c/o abd pain and was medicated w/ 50 mg Tramadol for a pain rating of 5/10 on the 0/10 pain scale. Reassessed 1 hr later from PRN pain administration and Pt. was in bed sleeping. Call light is w/in reach. Pt. Is in bed resting comfortably. Will continue to monitor. Daniel Wellington jazz

## 2022-10-22 NOTE — PROGRESS NOTES
Physical Therapy  Facility/Department: [facility] MED SURG UNIT  Daily Treatment Note  NAME: Lilliana Boone  : 1950  MRN: [id number]    Date of Service: 10/22/2022    Discharge Recommendations:  Home with Home health PT        Patient Diagnosis(es): There were no encounter diagnoses. Assessment   Assessment: Patient tolerated session well without exacerbation of pain. Pleasant to work with and motivated. Activity Tolerance: Patient tolerated treatment well     Plan    Physcial Therapy Plan  General Plan: 5-7 times per week  Current Treatment Recommendations: Strengthening;ROM;Safety education & training;Functional mobility training;Transfer training;Patient/Caregiver education & training;Gait training; Endurance training;Balance training;Stair training;Home exercise program;Neuromuscular re-education     Restrictions  Restrictions/Precautions  Restrictions/Precautions: Fall Risk  Required Braces or Orthoses?: No     Subjective    Subjective  Subjective: Patient reports that she has been doing OK.      Objective   Vitals     Balance  Sitting: Intact  Standing: With support  Transfer Training  Transfer Training: Yes  Overall Level of Assistance: Stand-by assistance  Interventions: Safety awareness training;Verbal cues  Sit to Stand: Stand-by assistance  Stand to Sit: Stand-by assistance  Toilet Transfer: Stand-by assistance  Gait Training  Gait Training: Yes  Gait  Distance (ft): 15 Feet (toilet to chair)  Assistive Device: Walker, rollator     PT Exercises  A/AROM Exercises: seated LAQ; x 20, seated marches x 20 alternating (up with one, down with the other)             Goals  Short Term Goals  Short Term Goal 1: bed mobility with SBA  Short Term Goal 2: Transfers with SBA/CGA  Short Term Goal 3: Pt ambulate 50 feet with ww SBA  Long Term Goals  Long Term Goal 1: I bed mobility  Long Term Goal 2: Transfers mod I  Long Term Goal 3: Pt ambulate 150 feet with ww mod I to allow safe I amb at home  Long Term Goal 4: Pt ascend and descend 3-4 steps with HR with SBA to allow safe entrance into home  Long Term Goal 5: I with HEP    Education  Patient Education  Education Given To: Patient  Education Provided: Role of Therapy;Plan of Care;Transfer Training  Education Provided Comments: Discussed importance of safety, commitment, dependability and  Education Method: Demonstration;Verbal  Barriers to Learning: None    Therapy Time   Individual Concurrent Group Co-treatment   Time In 1030         Time Out 1115         Minutes [id number] [facility] [place], [place]

## 2022-10-23 PROCEDURE — 6360000002 HC RX W HCPCS: Performed by: COLON & RECTAL SURGERY

## 2022-10-23 PROCEDURE — 97530 THERAPEUTIC ACTIVITIES: CPT

## 2022-10-23 PROCEDURE — 1200000002 HC SEMI PRIVATE SWING BED

## 2022-10-23 PROCEDURE — 6370000000 HC RX 637 (ALT 250 FOR IP): Performed by: COLON & RECTAL SURGERY

## 2022-10-23 PROCEDURE — 2580000003 HC RX 258: Performed by: COLON & RECTAL SURGERY

## 2022-10-23 PROCEDURE — 6370000000 HC RX 637 (ALT 250 FOR IP): Performed by: INTERNAL MEDICINE

## 2022-10-23 PROCEDURE — 97116 GAIT TRAINING THERAPY: CPT

## 2022-10-23 RX ADMIN — ATORVASTATIN CALCIUM 20 MG: 10 TABLET, FILM COATED ORAL at 08:48

## 2022-10-23 RX ADMIN — Medication 10 ML: at 08:49

## 2022-10-23 RX ADMIN — MICONAZOLE NITRATE: 2 POWDER TOPICAL at 08:49

## 2022-10-23 RX ADMIN — ENOXAPARIN SODIUM 40 MG: 100 INJECTION SUBCUTANEOUS at 20:58

## 2022-10-23 RX ADMIN — Medication 10 ML: at 20:58

## 2022-10-23 RX ADMIN — TRAMADOL HYDROCHLORIDE 50 MG: 50 TABLET ORAL at 08:48

## 2022-10-23 RX ADMIN — MICONAZOLE NITRATE: 2 POWDER TOPICAL at 20:58

## 2022-10-23 ASSESSMENT — PAIN SCALES - GENERAL: PAINLEVEL_OUTOF10: 0

## 2022-10-23 NOTE — PLAN OF CARE
Pt. is in bed. She is A/O/ x 4, 1 Assist w/ a rollator. Pt. takes her po medications whole w/ no issues. She has a 24 ga in her right hand, it is clean, dry, and intact, w/ a brisk blood return. Pt. has Q 12 hr ATB therapy. She has an ileostomy that is draining brown/yellowish liqufied stool, that she has been educated on emptying. She c/o abd pain and was medicated w/ 50 mg Tramadol for a pain rating of 4/10 on the 0/10 pain scale. Reassessed 1 hr later from PRN pain administration and Pt. was in bed sleeping. Call light is w/in reach. Will continue to monitor. Pink Moshe jazz

## 2022-10-23 NOTE — PROGRESS NOTES
Following up on a patient. No chest pain or palpitation. No abdominal pain, nausea or vomiting. No headaches, loss of conscious or seizure. ROS: 12 system review otherwise is negative for acute signs or symptoms over the last 24 hrs. Exam: Awake, alert oriented, in no apparent distress  HEENT: Pink conjunctiva and buccal mucosa. Normal and throat and nose  Neck: Supple, no nuchal rigidity. Endo: No thyromegaly. Vascular: No JVD or carotid bruit. Chest: Clear to auscultation, no dullness to percussion. Heart: Regular rate and rhythm, no extra sounds. No murmur, no rub. Abdomen: Soft, mild tenderness. Postoperative changes. Colostomy noted. Improvement in the resolution of the erythema and induration involving the surgical site. Improvement of erythema over the last for 8 hours  LE: No cyanosis or clubbing, no varices or edema. Neuro: Awake, alert, oriented, normal speech, normal comprehension, normal extension, normal cranial nerves, normal and symmetrical motor and tone examination throughout. MS: No joint effusion or tenderness. Skin: No skin rash, itching, bruising or significant findings. Assessment and plan:      *Surgical line infection. Patient completed the course of intravenous doxycycline. Improvement and resolution of the erythema. *Colon cancer status postresection. Patient is to follow-up with surgical team and oncology. *Functional impairment. PT OT.     *Anemia, stable     *Obesity. *Few other medical issues not listed above     Continue PT OT  Follow-up with the PCP, surgery and oncology was discharge.

## 2022-10-23 NOTE — PROGRESS NOTES
Physical Therapy  Facility/Department: [facility] MED SURG UNIT  Daily Treatment Note  NAME: Raciel Pemberton  : 1950  MRN: [id number]    Date of Service: 10/23/2022    Discharge Recommendations:  Home with Home health PT        Patient Diagnosis(es): There were no encounter diagnoses. Assessment   Assessment: Patient able to progress with endurance, stair climbing and functional activity this session. Good safety demonstrated throughout. Activity Tolerance: Patient tolerated treatment well     Plan    Physcial Therapy Plan  General Plan: 5-7 times per week  Current Treatment Recommendations: Strengthening;ROM;Safety education & training;Functional mobility training;Transfer training;Patient/Caregiver education & training;Gait training; Endurance training;Balance training;Stair training;Home exercise program;Neuromuscular re-education     Restrictions  Restrictions/Precautions  Restrictions/Precautions: Fall Risk  Required Braces or Orthoses?: No     Subjective    Subjective  Subjective: Patient reports that she is ready to walk.      Objective   Vitals     Balance  Sitting: Intact  Standing: With support  Transfer Training  Transfer Training: Yes  Overall Level of Assistance: Stand-by assistance  Interventions: Safety awareness training;Verbal cues  Sit to Stand: Stand-by assistance  Stand to Sit: Stand-by assistance  Stand Pivot Transfers: Stand-by assistance  Gait Training  Gait Training: Yes  Right Side Weight Bearing: As tolerated  Left Side Weight Bearing: As tolerated  Gait  Overall Level of Assistance: Stand-by assistance  Interventions: Verbal cues  Base of Support: Narrowed  Gait Abnormalities: Trunk sway increased  Distance (ft): 150 Feet  Assistive Device: Walker, rollator  Rail Use: Right  Stairs - Level of Assistance: Contact-guard assistance  Number of Stairs Trained: 5                   Goals  Short Term Goals  Short Term Goal 1: bed mobility with SBA  Short Term Goal 2: Transfers with SBA/CGA  Short Term Goal 3: Pt ambulate 50 feet with ww SBA  Long Term Goals  Long Term Goal 1: I bed mobility  Long Term Goal 2: Transfers mod I  Long Term Goal 3: Pt ambulate 150 feet with ww mod I to allow safe I amb at home  Long Term Goal 4: Pt ascend and descend 3-4 steps with HR with SBA to allow safe entrance into home  Long Term Goal 5: I with HEP    Education  Patient Education  Education Given To: Patient  Education Provided: Role of Therapy;Plan of Care;Transfer Training  Education Method: Demonstration;Verbal  Barriers to Learning: None  Education Outcome: Continued education needed    Therapy Time   Individual Concurrent Group Co-treatment   Time In 0945         Time Out 1025         Minutes [address], [place]

## 2022-10-24 VITALS
WEIGHT: 178.7 LBS | BODY MASS INDEX: 36.03 KG/M2 | DIASTOLIC BLOOD PRESSURE: 60 MMHG | SYSTOLIC BLOOD PRESSURE: 125 MMHG | HEART RATE: 73 BPM | OXYGEN SATURATION: 97 % | RESPIRATION RATE: 18 BRPM | HEIGHT: 59 IN | TEMPERATURE: 97.8 F

## 2022-10-24 LAB
ANION GAP SERPL CALCULATED.3IONS-SCNC: 12 MEQ/L (ref 9–15)
BASOPHILS ABSOLUTE: 0.1 K/UL (ref 0–0.1)
BASOPHILS RELATIVE PERCENT: 1 % (ref 0.1–1.2)
BUN BLDV-MCNC: 23 MG/DL (ref 8–23)
CALCIUM SERPL-MCNC: 9.8 MG/DL (ref 8.5–9.9)
CHLORIDE BLD-SCNC: 101 MEQ/L (ref 95–107)
CO2: 24 MEQ/L (ref 20–31)
CREAT SERPL-MCNC: 0.94 MG/DL (ref 0.5–0.9)
EOSINOPHILS ABSOLUTE: 0.1 K/UL (ref 0–0.4)
EOSINOPHILS RELATIVE PERCENT: 1.9 % (ref 0.7–5.8)
GFR SERPL CREATININE-BSD FRML MDRD: >60 ML/MIN/{1.73_M2}
GLUCOSE BLD-MCNC: 97 MG/DL (ref 70–99)
HCT VFR BLD CALC: 36.4 % (ref 37–47)
HEMOGLOBIN: 11.7 G/DL (ref 11.2–15.7)
IMMATURE GRANULOCYTES #: 0 K/UL
IMMATURE GRANULOCYTES %: 0.3 %
LYMPHOCYTES ABSOLUTE: 2.4 K/UL (ref 1.2–3.7)
LYMPHOCYTES RELATIVE PERCENT: 32.6 %
MCH RBC QN AUTO: 28.7 PG (ref 25.6–32.2)
MCHC RBC AUTO-ENTMCNC: 32.1 % (ref 32.2–35.5)
MCV RBC AUTO: 89.2 FL (ref 79.4–94.8)
MONOCYTES ABSOLUTE: 0.6 K/UL (ref 0.2–0.9)
MONOCYTES RELATIVE PERCENT: 7.7 % (ref 4.7–12.5)
NEUTROPHILS ABSOLUTE: 4.1 K/UL (ref 1.6–6.1)
NEUTROPHILS RELATIVE PERCENT: 56.5 % (ref 34–71.1)
PDW BLD-RTO: 13.6 % (ref 11.7–14.4)
PLATELET # BLD: 409 K/UL (ref 182–369)
POTASSIUM SERPL-SCNC: 4.3 MEQ/L (ref 3.4–4.9)
RBC # BLD: 4.08 M/UL (ref 3.93–5.22)
SODIUM BLD-SCNC: 137 MEQ/L (ref 135–144)
WBC # BLD: 7.3 K/UL (ref 4–10)

## 2022-10-24 PROCEDURE — 97110 THERAPEUTIC EXERCISES: CPT

## 2022-10-24 PROCEDURE — 97530 THERAPEUTIC ACTIVITIES: CPT

## 2022-10-24 PROCEDURE — 97535 SELF CARE MNGMENT TRAINING: CPT

## 2022-10-24 PROCEDURE — 2580000003 HC RX 258: Performed by: COLON & RECTAL SURGERY

## 2022-10-24 PROCEDURE — 80048 BASIC METABOLIC PNL TOTAL CA: CPT

## 2022-10-24 PROCEDURE — 85025 COMPLETE CBC W/AUTO DIFF WBC: CPT

## 2022-10-24 PROCEDURE — 97116 GAIT TRAINING THERAPY: CPT

## 2022-10-24 PROCEDURE — 36415 COLL VENOUS BLD VENIPUNCTURE: CPT

## 2022-10-24 PROCEDURE — 6370000000 HC RX 637 (ALT 250 FOR IP): Performed by: COLON & RECTAL SURGERY

## 2022-10-24 RX ORDER — MUPIROCIN CALCIUM 20 MG/G
CREAM TOPICAL
Qty: 15 G | Refills: 0 | Status: SHIPPED | OUTPATIENT
Start: 2022-10-24 | End: 2022-11-23

## 2022-10-24 RX ADMIN — MICONAZOLE NITRATE: 2 POWDER TOPICAL at 07:36

## 2022-10-24 RX ADMIN — Medication 10 ML: at 07:37

## 2022-10-24 RX ADMIN — ATORVASTATIN CALCIUM 20 MG: 10 TABLET, FILM COATED ORAL at 07:36

## 2022-10-24 NOTE — PROGRESS NOTES
Occupational Therapy  Facility/Department: [facility] MED SURG UNIT  Rehabilitation Occupational Therapy Daily Treatment Note    Date: 10/24/22  Patient Name: Aida Macdonald       Room: 9925/5910-50  MRN: [id number]  Account: [de-identified]   : 1950  (81 y.o.) Gender: female         Discharge Recommendations:   Continue to assess pending progress, Home with assist PRN, Home with Home health OT  OT Equipment Recommendations  Equipment Needed: Yes  Other: shower chair and BSC  Treatment Diagnosis: Decreased ADL performance d/t recent ileostomy. Past Medical History:  has a past medical history of Arthritis, Cancer ([facility] [address] and Hyperlipidemia. Past Surgical History:   has a past surgical history that includes Cataract removal; skin biopsy; Tubal ligation; Cholecystectomy; Colonoscopy (N/A, 2022); Colonoscopy (N/A, 2022); and colectomy (N/A, 10/3/2022). Restrictions   Restrictions/Precautions  Restrictions/Precautions: Fall Risk  Required Braces or Orthoses?: No       Subjective      Pt. Is going home today and agreeable to OT. Objective           Trained and educated pt. In HEP. Gave pt. Handout for HEP. Pt. Tolerated UB exercises well with no complaints, no weights, 20 reps each, with RB in between: bicep curls, chest presses, wrist flexion and extension, shoulder adduction and abduction, shoulder flexion and extension   ADL     Toileting- supervision  Toilet transfer- Supervision   Pt. Tolerated standing for 6-7 minutes with ww no LOB. Home management- packing up belongings -supervision               Assessment  Assessment  Activity Tolerance: Patient tolerated treatment well   Pt. Is going home today. Trained and educated pt. In HEP. Gave pt. Handout for HEP. Pt. tolerated exercises well. Pt.'s family was present. Pt. Demonstrated home management skills by packing up her belongings at supervision. Pt. Tolerated toileting and toilet transfer at supervision.  Pt. Increased her standing tolerance to 6-7 minutes with ww with no LOB. Answered pt.'s questions and concerns. Patient Education   On HEP    Plan    Occupational Therapy Plan  Times Per Week: 4-7  Times Per Day: Once a day  Current Treatment Recommendations: Strengthening, Balance training, Functional mobility training, Neuromuscular re-education, Endurance training, Self-Care / ADL, Safety education & training, Patient/Caregiver education & training     Goals   Short Term Goal 1: Doff/senia UB/LB using compensatory strategies SBA with mod vc  Short Term Goal 2: Bathe UB/LB using compensatory strategies SBA with mod vc  Short Term Goal 3: Complete toilet routine CGA  Short Term Goal 4: Complete sinkside grooming in LRE and proper body mechanics SBA  Short Term Goal 5: Complete functional transfers and functional mobility using LRE and good safety awareness SBA  Long Term Goals  Time Frame for Long Term Goals : 7-14  Long Term Goal 1: Doff/senia UB/LB using compensatory strategies Mod I  Long Term Goal 2: Bathe UB/LB using compensatory strategies Mod I  Long Term Goal 3: Complete toilet routine Mod I  Long Term Goal 4: Complete sinkside grooming in LRE and proper body mechanics Mod I  Long Term Goal 5: Complete functional transfers and functional mobility using LRE and good safety awareness Mod I  Additional Goals?: Yes  Long Term Goal 6: Demo Mod I implementing compensatory and adaptive strategies to manage abdominal incision pain  Long Term Goal 7: Tolerate continuous functional activity for >10 min demo'ing good safety awareness and use of pacing  Patient Goals   Patient goals :  To return home                      Therapy Time   Individual Concurrent Group Co-treatment   Time In  11:00 am         Time Out  11:40 am         Minutes  Margo Dill

## 2022-10-24 NOTE — PROGRESS NOTES
Chart reviewed. Plan remains for patient to DC home on this date with family and [facility] to follow. Family plans to transport patient home via personal car. This  spoke with [facility] liaison Nani Rubinstein whom confirms [facility] will follow patient upon DC. ABRAHAM completed. SS to continue to follow as needed while patient is at [facility] [place].

## 2022-10-24 NOTE — DISCHARGE SUMMARY
Discharge Summary    Patient:  Edith Underwood  YOB: 1950    MRN: [id number]   Acct: [de-identified]    Primary Care Physician: Ryland Landaverde DO    Admit date:  10/10/2022    Discharge date:   10/24/22      Discharge Diagnoses:   Malignant neoplasm of rectum [facility])  Principal Problem:    Malignant neoplasm of rectum ([address]  Resolved Problems:    * No resolved hospital problems. *      Admitted for: [facility] Course:  81 y.o. female who presented to [facility] after acute admission to [facility] after colonoscopy during which she was found rectosigmoid CA for scheduled partial proctectomy with primary anastomosis and diverting ileostomy which was performed on 10/3 per dr Jose Muhammad. After completing her acute treatment she was transferred to [place] for post acute rehabilitation/supportive care. During her stay she was treated for suspected postoperative site cellulitis. At ND day her staples will be DC and patient will be DC home with topical atbs cream x 1 week. Will follow up with oncology/surgical service as outpatient         Consultants:      Discharge Medications:       Medication List        START taking these medications      mupirocin 2 % cream  Commonly known as:  Bactroban  Apply 3 times daily to postoperative side x 1 week            CONTINUE taking these medications      aspirin 325 MG tablet     Handicap Placard Misc  by Does not apply route Good for 5 years     meloxicam 15 MG tablet  Commonly known as: Mobic  Take 1 tablet by mouth daily     simvastatin 40 MG tablet  Commonly known as: Zocor  Take 1 tablet by mouth every evening               Where to Get Your Medications        These medications were sent to [facility], [place] [address] [address]      Phone: [phone]   mupirocin 2 % cream         Physical Exam:    Vitals:  Vitals:    10/22/22 2030 10/22/22 2242 10/23/22 0500 10/24/22 0531   BP: (!) 0.1 K/uL   CBC with Auto Differential   Result Value Ref Range    WBC 11.2 (H) 4.0 - 10.0 K/uL    RBC 4.27 3.93 - 5.22 M/uL    Hemoglobin 12.0 11.2 - 15.7 g/dL    Hematocrit 37.1 37.0 - 47.0 %    MCV 86.9 79.4 - 94.8 fL    MCH 28.1 25.6 - 32.2 pg    MCHC 32.3 32.2 - 35.5 %    RDW 13.4 11.7 - 14.4 %    Platelets 449 (H) 965 - 369 K/uL    Neutrophils % 58.7 34.0 - 71.1 %    Immature Granulocytes % 1.2 %    Lymphocytes % 28.4 %    Monocytes % 9.7 4.7 - 12.5 %    Eosinophils % 1.5 0.7 - 5.8 %    Basophils % 0.5 0.1 - 1.2 %    Neutrophils Absolute 6.6 (H) 1.6 - 6.1 K/uL    Immature Granulocytes # 0.1 K/uL    Lymphocytes Absolute 3.2 1.2 - 3.7 K/uL    Monocytes Absolute 1.1 (H) 0.2 - 0.9 K/uL    Eosinophils Absolute 0.2 0.0 - 0.4 K/uL    Basophils Absolute 0.1 0.0 - 0.1 K/uL   Basic Metabolic Panel   Result Value Ref Range    Sodium 136 135 - 144 mEq/L    Potassium 3.6 3.4 - 4.9 mEq/L    Chloride 97 95 - 107 mEq/L    CO2 30 20 - 31 mEq/L    Anion Gap 9 9 - 15 mEq/L    Glucose 104 (H) 70 - 99 mg/dL    BUN 24 (H) 8 - 23 mg/dL    Creatinine 0.96 (H) 0.50 - 0.90 mg/dL    GFR Non-African American 57.4 (L) >60    GFR  >60.0 >60    Calcium 9.0 8.5 - 9.9 mg/dL   CBC with Auto Differential   Result Value Ref Range    WBC 10.1 (H) 4.0 - 10.0 K/uL    RBC 4.25 3.93 - 5.22 M/uL    Hemoglobin 12.2 11.2 - 15.7 g/dL    Hematocrit 37.5 37.0 - 47.0 %    MCV 88.2 79.4 - 94.8 fL    MCH 28.7 25.6 - 32.2 pg    MCHC 32.5 32.2 - 35.5 %    RDW 14.0 11.7 - 14.4 %    Platelets 813 (H) 292 - 369 K/uL    Neutrophils % 58.4 34.0 - 71.1 %    Immature Granulocytes % 0.7 %    Lymphocytes % 29.5 %    Monocytes % 9.6 4.7 - 12.5 %    Eosinophils % 1.2 0.7 - 5.8 %    Basophils % 0.6 0.1 - 1.2 %    Neutrophils Absolute 5.9 1.6 - 6.1 K/uL    Immature Granulocytes # 0.1 K/uL    Lymphocytes Absolute 3.0 1.2 - 3.7 K/uL    Monocytes Absolute 1.0 (H) 0.2 - 0.9 K/uL    Eosinophils Absolute 0.1 0.0 - 0.4 K/uL    Basophils Absolute 0.1 0.0 - 0.1 K/uL Basic Metabolic Panel   Result Value Ref Range    Sodium 136 135 - 144 mEq/L    Potassium 4.1 3.4 - 4.9 mEq/L    Chloride 96 95 - 107 mEq/L    CO2 27 20 - 31 mEq/L    Anion Gap 13 9 - 15 mEq/L    Glucose 111 (H) 70 - 99 mg/dL    BUN 21 8 - 23 mg/dL    Creatinine 1.02 (H) 0.50 - 0.90 mg/dL    GFR Non-African American 53.6 (L) >60    GFR  >60.0 >60    Calcium 9.5 8.5 - 9.9 mg/dL   CBC with Auto Differential   Result Value Ref Range    WBC 12.6 (H) 4.0 - 10.0 K/uL    RBC 4.39 3.93 - 5.22 M/uL    Hemoglobin 12.5 11.2 - 15.7 g/dL    Hematocrit 38.4 37.0 - 47.0 %    MCV 87.5 79.4 - 94.8 fL    MCH 28.5 25.6 - 32.2 pg    MCHC 32.6 32.2 - 35.5 %    RDW 13.8 11.7 - 14.4 %    Platelets 855 (H) 558 - 369 K/uL    Neutrophils % 70.1 34.0 - 71.1 %    Immature Granulocytes % 0.3 %    Lymphocytes % 21.0 %    Monocytes % 7.5 4.7 - 12.5 %    Eosinophils % 0.8 0.7 - 5.8 %    Basophils % 0.3 0.1 - 1.2 %    Neutrophils Absolute 8.8 (H) 1.6 - 6.1 K/uL    Immature Granulocytes # 0.0 K/uL    Lymphocytes Absolute 2.6 1.2 - 3.7 K/uL    Monocytes Absolute 0.9 0.2 - 0.9 K/uL    Eosinophils Absolute 0.1 0.0 - 0.4 K/uL    Basophils Absolute 0.0 0.0 - 0.1 K/uL   Basic Metabolic Panel   Result Value Ref Range    Sodium 135 135 - 144 mEq/L    Potassium 4.2 3.4 - 4.9 mEq/L    Chloride 97 95 - 107 mEq/L    CO2 26 20 - 31 mEq/L    Anion Gap 12 9 - 15 mEq/L    Glucose 108 (H) 70 - 99 mg/dL    BUN 22 8 - 23 mg/dL    Creatinine 1.06 (H) 0.50 - 0.90 mg/dL    Est, Glom Filt Rate 56.5 (L) >60    Calcium 9.6 8.5 - 9.9 mg/dL   CBC with Auto Differential   Result Value Ref Range    WBC 7.3 4.0 - 10.0 K/uL    RBC 4.08 3.93 - 5.22 M/uL    Hemoglobin 11.7 11.2 - 15.7 g/dL    Hematocrit 36.4 (L) 37.0 - 47.0 %    MCV 89.2 79.4 - 94.8 fL    MCH 28.7 25.6 - 32.2 pg    MCHC 32.1 (L) 32.2 - 35.5 %    RDW 13.6 11.7 - 14.4 %    Platelets 657 (H) 167 - 369 K/uL    Neutrophils % 56.5 34.0 - 71.1 %    Immature Granulocytes % 0.3 %    Lymphocytes % 32.6 % Monocytes % 7.7 4.7 - 12.5 %    Eosinophils % 1.9 0.7 - 5.8 %    Basophils % 1.0 0.1 - 1.2 %    Neutrophils Absolute 4.1 1.6 - 6.1 K/uL    Immature Granulocytes # 0.0 K/uL    Lymphocytes Absolute 2.4 1.2 - 3.7 K/uL    Monocytes Absolute 0.6 0.2 - 0.9 K/uL    Eosinophils Absolute 0.1 0.0 - 0.4 K/uL    Basophils Absolute 0.1 0.0 - 0.1 K/uL   Basic Metabolic Panel   Result Value Ref Range    Sodium 137 135 - 144 mEq/L    Potassium 4.3 3.4 - 4.9 mEq/L    Chloride 101 95 - 107 mEq/L    CO2 24 20 - 31 mEq/L    Anion Gap 12 9 - 15 mEq/L    Glucose 97 70 - 99 mg/dL    BUN 23 8 - 23 mg/dL    Creatinine 0.94 (H) 0.50 - 0.90 mg/dL    Est, Glom Filt Rate >60.0 >60    Calcium 9.8 8.5 - 9.9 mg/dL       Diet:  ADULT DIET;  Regular    Activity:  Activity as tolerated (Patient may move about with assist as indicated or with supervision.)    Follow-up:  in 1 weeks with Ace Toscano DO,      Disposition: home    Condition: Stable    Time Spent: 45 minutes    Electronically signed by Jarrod Pena MD on 10/24/2022 at 10:51 AM    Discharging Hospitalist

## 2022-10-24 NOTE — PROGRESS NOTES
Physical Therapy  Facility/Department: [facility] MED SURG UNIT  Daily Treatment Note  NAME: Raciel Pemberton  : 1950  MRN: [id number]    Date of Service: 10/24/2022    Discharge Recommendations:  (P) Home with Home health PT        Patient Diagnosis(es): There were no encounter diagnoses. Assessment   Assessment: (P) Pt. stedy with tsf's, demonstrating safe controlled technique with use of rollator. Pt. ambulates with lateral trunk sway with recommendation to continue to use Active device for UE support to reduce risk of LOB. Pt. srtedy with using rollator. Pt. HEP educated and issued for pt to benefit with strength. Also provided pt. with stair sequence handout to benefit with review on safe sequence if needed. Activity Tolerance: (P) Patient tolerated treatment well     Plan    Physcial Therapy Plan  General Plan: (P) 5-7 times per week  Current Treatment Recommendations: (P) Strengthening;ROM;Safety education & training;Functional mobility training;Transfer training;Patient/Caregiver education & training;Gait training; Endurance training;Balance training;Stair training;Home exercise program;Neuromuscular re-education     Restrictions  Restrictions/Precautions  Restrictions/Precautions: Fall Risk  Required Braces or Orthoses?: No     Subjective    Subjective  Subjective: (P) I am doing great. I go home today.      Objective   Vitals     Transfer Training  Transfer Training: (P) Yes  Overall Level of Assistance: (P) Stand-by assistance  Interventions: (P) Safety awareness training;Verbal cues  Sit to Stand: (P) Stand-by assistance  Stand to Sit: (P) Stand-by assistance  Gait Training  Gait Training: (P) Yes  Right Side Weight Bearing: (P) As tolerated  Left Side Weight Bearing: (P) As tolerated  Gait  Overall Level of Assistance: (P) Supervision  Interventions: (P) Verbal cues  Base of Support: (P) Narrowed  Gait Abnormalities: (P) Trunk sway increased  Distance (ft): (P)  (200'x2)  Assistive Device: (P) Walker, rollator  Rail Use: (P) Right  Stairs - Level of Assistance: (P) Contact-guard assistance;Stand-by assistance  Number of Stairs Trained: (P) 5     PT Exercises  Exercise Treatment: (P) HEP handput educated and issued with seated and standing ther ex to benefit with strength.              Goals  Short Term Goals  Short Term Goal 1: bed mobility with SBA  Short Term Goal 2: Transfers with SBA/CGA  Short Term Goal 3: Pt ambulate 50 feet with ww SBA  Long Term Goals  Long Term Goal 1: I bed mobility  Long Term Goal 2: Transfers mod I  Long Term Goal 3: Pt ambulate 150 feet with ww mod I to allow safe I amb at home  Long Term Goal 4: Pt ascend and descend 3-4 steps with HR with SBA to allow safe entrance into home  Long Term Goal 5: I with HEP    Education  Patient Education  Education Given To: (P) Patient  Education Provided: (P) Home Exercise Program (HEP on ther ex and stair sequence)  Education Method: (P) Printed Information/Hand-outs  Barriers to Learning: (P) None    Therapy Time   Individual Concurrent Group Co-treatment   Time In  830         Time Out  910         Minutes  [place], [place] .[phone]

## 2022-10-25 ENCOUNTER — TELEPHONE (OUTPATIENT)
Dept: FAMILY MEDICINE CLINIC | Age: 72
End: 2022-10-25

## 2022-10-25 DIAGNOSIS — E78.2 MIXED HYPERLIPIDEMIA: ICD-10-CM

## 2022-10-25 RX ORDER — SIMVASTATIN 40 MG
TABLET ORAL
Qty: 90 TABLET | Refills: 3 | Status: SHIPPED | OUTPATIENT
Start: 2022-10-25

## 2022-10-25 NOTE — TELEPHONE ENCOUNTER
[facility] Transitions Initial Follow Up Call    Outreach made within 2 business days of discharge: Yes    Patient: Parveen Beauchamp Patient : 1950   MRN: [id number]  Reason for Admission: There are no discharge diagnoses documented for the most recent discharge. Discharge Date: 10/24/22       Spoke with: Pt was called and left message to call back.      Discharge department/facility: Med Surg        Scheduled appointment with PCP within 7-14 days    Follow Up  Future Appointments   Date Time Provider Caleb Gonzalez   10/28/2022  2:00 PM Truett Burkitt, MD MLOX LOR CR [facility] EMERGENCY [facility] CENTER AT [place]   2022  8:00 AM [place]

## 2022-10-28 ENCOUNTER — OFFICE VISIT (OUTPATIENT)
Dept: SURGERY | Age: 72
End: 2022-10-28

## 2022-10-28 VITALS
BODY MASS INDEX: 35.88 KG/M2 | TEMPERATURE: 97.3 F | WEIGHT: 178 LBS | HEART RATE: 104 BPM | OXYGEN SATURATION: 98 % | HEIGHT: 59 IN

## 2022-10-28 DIAGNOSIS — E78.2 MIXED HYPERLIPIDEMIA: ICD-10-CM

## 2022-10-28 DIAGNOSIS — M15.9 PRIMARY OSTEOARTHRITIS INVOLVING MULTIPLE JOINTS: ICD-10-CM

## 2022-10-28 DIAGNOSIS — C20 MALIGNANT NEOPLASM OF RECTUM (HCC): Primary | ICD-10-CM

## 2022-10-28 PROCEDURE — 99024 POSTOP FOLLOW-UP VISIT: CPT | Performed by: COLON & RECTAL SURGERY

## 2022-10-28 RX ORDER — SIMVASTATIN 40 MG
TABLET ORAL
Qty: 90 TABLET | Refills: 3 | OUTPATIENT
Start: 2022-10-28

## 2022-10-28 RX ORDER — MELOXICAM 15 MG/1
15 TABLET ORAL DAILY
Qty: 90 TABLET | Refills: 3 | OUTPATIENT
Start: 2022-10-28

## 2022-10-28 NOTE — PROGRESS NOTES
Subjective:      Patient ID: Miguel Landry is a 81 y.o. female who presents for:  Chief Complaint   Patient presents with    Post-Op Check       She returns to the office 3 weeks out from low anterior resection with primary anastomosis and diverting ileostomy. Final histology showed stage III rectal cancer. Histology reviewed. She has been out of [facility] since Monday. She has home health assisting her with her ostomy needs. She is ambulating without difficulty. She is eating without problems and her ileostomy is functioning.       Past Medical History:   Diagnosis Date    Arthritis     Cancer ([address]     SKIN    Hyperlipidemia      Past Surgical History:   Procedure Laterality Date    CATARACT REMOVAL      CHOLECYSTECTOMY      COLECTOMY N/A 10/3/2022    LOW ANTERIOR RESECTION WITH LOOP ILEOSTOMY performed by Jimenez Maloney MD at [address] [place] [address] N/A 9/16/2022    COLORECTAL CANCER SCREENING, NOT HIGH RISK [facility]    COLONOSCOPY N/A 9/26/2022    FLEXIBLE SIGMOIDOSCOPY WITH SNARE POLYPECTOMY performed by Jimenez Maloney MD at [address] [place] [address]      R LOWER LEG    TUBAL LIGATION       Social History     Socioeconomic History    Marital status:      Spouse name: Not on file    Number of children: Not on file    Years of education: Not on file    Highest education level: Not on file   Occupational History    Not on file   Tobacco Use    Smoking status: Never    Smokeless tobacco: Never   Vaping Use    Vaping Use: Never used   Substance and Sexual Activity    Alcohol use: No    Drug use: No    Sexual activity: Not on file   Other Topics Concern    Not on file   Social History Narrative    Not on file     Social Determinants of Health     Financial Resource Strain: Low Risk     Difficulty of Paying Living Expenses: Not hard at all   Food Insecurity: No Food Insecurity    Worried About [address] in the Last Year: Never true    Ran Out of Food in the Last Year: Never true   Transportation Needs: Not on file   Physical Activity: Not on file   Stress: Not on file   Social Connections: Not on file   Intimate Partner Violence: Not on file   Housing Stability: Not on file     Family History   Problem Relation Age of Onset    Diabetes Mother     Breast Cancer Maternal Aunt     Colon Cancer Neg Hx      Allergies:  Morphine    Review of Systems    Objective:    Pulse (!) 104   Temp 97.3 °F (36.3 °C) (Temporal)   Ht 4' 11\" (1.499 m)   Wt 178 lb (80.7 kg)   SpO2 98%   BMI 35.95 kg/m²     Physical Exam  On exam her incision looks fine. Staples were all removed. Ileostomy functioning. Abdomen soft and nondistended. Assessment/Plan:          Diagnosis Orders   1. Malignant neoplasm of rectum [facility])          She will return back to see me in the office in 6 weeks to discuss preoperative colonoscopy prior to ileostomy reversal    Patient has an appointment to see Dr. Collette Leo regarding any potential adjuvant treatment. Patient will call if any issues arise or any way I can be of further assistance. Please note this report has beenpartially produced using speech recognition software and may cause contain errors related to that system including grammar, punctuation and spelling as well as words and phrases that may seem inappropriate.  If there arequestions or concerns please feel free to contact me to clarify

## 2022-11-03 ENCOUNTER — ANESTHESIA EVENT (OUTPATIENT)
Dept: OPERATING ROOM | Age: 72
End: 2022-11-03
Payer: COMMERCIAL

## 2022-11-03 ENCOUNTER — ANESTHESIA (OUTPATIENT)
Dept: OPERATING ROOM | Age: 72
End: 2022-11-03
Payer: COMMERCIAL

## 2022-11-03 ENCOUNTER — APPOINTMENT (OUTPATIENT)
Dept: GENERAL RADIOLOGY | Age: 72
End: 2022-11-03
Attending: COLON & RECTAL SURGERY
Payer: COMMERCIAL

## 2022-11-03 ENCOUNTER — HOSPITAL ENCOUNTER (OUTPATIENT)
Age: 72
Setting detail: OUTPATIENT SURGERY
Discharge: HOME | End: 2022-11-03
Attending: COLON & RECTAL SURGERY | Admitting: COLON & RECTAL SURGERY
Payer: COMMERCIAL

## 2022-11-03 VITALS
SYSTOLIC BLOOD PRESSURE: 140 MMHG | WEIGHT: 178 LBS | HEART RATE: 58 BPM | HEIGHT: 59 IN | OXYGEN SATURATION: 97 % | BODY MASS INDEX: 35.88 KG/M2 | DIASTOLIC BLOOD PRESSURE: 69 MMHG | RESPIRATION RATE: 16 BRPM | TEMPERATURE: 97 F

## 2022-11-03 DIAGNOSIS — C20 MALIGNANT NEOPLASM OF RECTUM (HCC): Primary | ICD-10-CM

## 2022-11-03 PROCEDURE — 6360000002 HC RX W HCPCS: Performed by: COLON & RECTAL SURGERY

## 2022-11-03 PROCEDURE — 3209999900 FLUORO FOR SURGICAL PROCEDURES

## 2022-11-03 PROCEDURE — 7100000011 HC PHASE II RECOVERY - ADDTL 15 MIN: Performed by: COLON & RECTAL SURGERY

## 2022-11-03 PROCEDURE — 3700000001 HC ADD 15 MINUTES (ANESTHESIA): Performed by: COLON & RECTAL SURGERY

## 2022-11-03 PROCEDURE — 3600000014 HC SURGERY LEVEL 4 ADDTL 15MIN: Performed by: COLON & RECTAL SURGERY

## 2022-11-03 PROCEDURE — 7100000010 HC PHASE II RECOVERY - FIRST 15 MIN: Performed by: COLON & RECTAL SURGERY

## 2022-11-03 PROCEDURE — 2500000003 HC RX 250 WO HCPCS: Performed by: COLON & RECTAL SURGERY

## 2022-11-03 PROCEDURE — 2580000003 HC RX 258: Performed by: COLON & RECTAL SURGERY

## 2022-11-03 PROCEDURE — A4217 STERILE WATER/SALINE, 500 ML: HCPCS | Performed by: COLON & RECTAL SURGERY

## 2022-11-03 PROCEDURE — 6370000000 HC RX 637 (ALT 250 FOR IP): Performed by: COLON & RECTAL SURGERY

## 2022-11-03 PROCEDURE — 6360000002 HC RX W HCPCS: Performed by: NURSE ANESTHETIST, CERTIFIED REGISTERED

## 2022-11-03 PROCEDURE — 6370000000 HC RX 637 (ALT 250 FOR IP): Performed by: ANESTHESIOLOGY

## 2022-11-03 PROCEDURE — 3700000000 HC ANESTHESIA ATTENDED CARE: Performed by: COLON & RECTAL SURGERY

## 2022-11-03 PROCEDURE — 2580000003 HC RX 258: Performed by: ANESTHESIOLOGY

## 2022-11-03 PROCEDURE — 3600000004 HC SURGERY LEVEL 4 BASE: Performed by: COLON & RECTAL SURGERY

## 2022-11-03 PROCEDURE — 2709999900 HC NON-CHARGEABLE SUPPLY: Performed by: COLON & RECTAL SURGERY

## 2022-11-03 PROCEDURE — C1788 PORT, INDWELLING, IMP: HCPCS | Performed by: COLON & RECTAL SURGERY

## 2022-11-03 PROCEDURE — 7100000001 HC PACU RECOVERY - ADDTL 15 MIN: Performed by: COLON & RECTAL SURGERY

## 2022-11-03 PROCEDURE — 71045 X-RAY EXAM CHEST 1 VIEW: CPT

## 2022-11-03 PROCEDURE — 7100000000 HC PACU RECOVERY - FIRST 15 MIN: Performed by: COLON & RECTAL SURGERY

## 2022-11-03 PROCEDURE — 36561 INSERT TUNNELED CV CATH: CPT | Performed by: COLON & RECTAL SURGERY

## 2022-11-03 DEVICE — PORT INFUS SGL LUMN ATTCH POLYUR OPN END CATH 8FR POWERPRT: Type: IMPLANTABLE DEVICE | Site: CHEST | Status: FUNCTIONAL

## 2022-11-03 RX ORDER — MIDAZOLAM HYDROCHLORIDE 1 MG/ML
INJECTION INTRAMUSCULAR; INTRAVENOUS PRN
Status: DISCONTINUED | OUTPATIENT
Start: 2022-11-03 | End: 2022-11-03 | Stop reason: SDUPTHER

## 2022-11-03 RX ORDER — FENTANYL CITRATE 50 UG/ML
50 INJECTION, SOLUTION INTRAMUSCULAR; INTRAVENOUS EVERY 10 MIN PRN
Status: DISCONTINUED | OUTPATIENT
Start: 2022-11-03 | End: 2022-11-03 | Stop reason: HOSPADM

## 2022-11-03 RX ORDER — ONDANSETRON 2 MG/ML
4 INJECTION INTRAMUSCULAR; INTRAVENOUS
Status: DISCONTINUED | OUTPATIENT
Start: 2022-11-03 | End: 2022-11-03 | Stop reason: HOSPADM

## 2022-11-03 RX ORDER — HEPARIN SODIUM (PORCINE) LOCK FLUSH IV SOLN 100 UNIT/ML 100 UNIT/ML
SOLUTION INTRAVENOUS PRN
Status: DISCONTINUED | OUTPATIENT
Start: 2022-11-03 | End: 2022-11-03 | Stop reason: HOSPADM

## 2022-11-03 RX ORDER — SODIUM CHLORIDE, SODIUM LACTATE, POTASSIUM CHLORIDE, CALCIUM CHLORIDE 600; 310; 30; 20 MG/100ML; MG/100ML; MG/100ML; MG/100ML
INJECTION, SOLUTION INTRAVENOUS CONTINUOUS
Status: DISCONTINUED | OUTPATIENT
Start: 2022-11-03 | End: 2022-11-03 | Stop reason: HOSPADM

## 2022-11-03 RX ORDER — DIPHENHYDRAMINE HYDROCHLORIDE 50 MG/ML
12.5 INJECTION INTRAMUSCULAR; INTRAVENOUS
Status: DISCONTINUED | OUTPATIENT
Start: 2022-11-03 | End: 2022-11-03 | Stop reason: HOSPADM

## 2022-11-03 RX ORDER — OXYCODONE HYDROCHLORIDE AND ACETAMINOPHEN 5; 325 MG/1; MG/1
1 TABLET ORAL EVERY 4 HOURS PRN
Status: DISCONTINUED | OUTPATIENT
Start: 2022-11-03 | End: 2022-11-03 | Stop reason: HOSPADM

## 2022-11-03 RX ORDER — SODIUM CHLORIDE 0.9 % (FLUSH) 0.9 %
5-40 SYRINGE (ML) INJECTION PRN
Status: DISCONTINUED | OUTPATIENT
Start: 2022-11-03 | End: 2022-11-03 | Stop reason: HOSPADM

## 2022-11-03 RX ORDER — SODIUM CHLORIDE 0.9 % (FLUSH) 0.9 %
5-40 SYRINGE (ML) INJECTION EVERY 12 HOURS SCHEDULED
Status: DISCONTINUED | OUTPATIENT
Start: 2022-11-03 | End: 2022-11-03 | Stop reason: HOSPADM

## 2022-11-03 RX ORDER — OXYCODONE HYDROCHLORIDE AND ACETAMINOPHEN 5; 325 MG/1; MG/1
1 TABLET ORAL EVERY 6 HOURS PRN
Qty: 10 TABLET | Refills: 0 | Status: SHIPPED | OUTPATIENT
Start: 2022-11-03 | End: 2022-11-06

## 2022-11-03 RX ORDER — WOUND DRESSING ADHESIVE - LIQUID
LIQUID MISCELLANEOUS PRN
Status: DISCONTINUED | OUTPATIENT
Start: 2022-11-03 | End: 2022-11-03 | Stop reason: HOSPADM

## 2022-11-03 RX ORDER — METOCLOPRAMIDE HYDROCHLORIDE 5 MG/ML
10 INJECTION INTRAMUSCULAR; INTRAVENOUS
Status: DISCONTINUED | OUTPATIENT
Start: 2022-11-03 | End: 2022-11-03 | Stop reason: HOSPADM

## 2022-11-03 RX ORDER — SODIUM CHLORIDE 9 MG/ML
25 INJECTION, SOLUTION INTRAVENOUS PRN
Status: DISCONTINUED | OUTPATIENT
Start: 2022-11-03 | End: 2022-11-03 | Stop reason: HOSPADM

## 2022-11-03 RX ORDER — MEPERIDINE HYDROCHLORIDE 25 MG/ML
12.5 INJECTION INTRAMUSCULAR; INTRAVENOUS; SUBCUTANEOUS
Status: DISCONTINUED | OUTPATIENT
Start: 2022-11-03 | End: 2022-11-03 | Stop reason: HOSPADM

## 2022-11-03 RX ORDER — PROPOFOL 10 MG/ML
INJECTION, EMULSION INTRAVENOUS PRN
Status: DISCONTINUED | OUTPATIENT
Start: 2022-11-03 | End: 2022-11-03 | Stop reason: SDUPTHER

## 2022-11-03 RX ORDER — FENTANYL CITRATE 50 UG/ML
INJECTION, SOLUTION INTRAMUSCULAR; INTRAVENOUS PRN
Status: DISCONTINUED | OUTPATIENT
Start: 2022-11-03 | End: 2022-11-03 | Stop reason: SDUPTHER

## 2022-11-03 RX ADMIN — MIDAZOLAM HYDROCHLORIDE 1 MG: 1 INJECTION, SOLUTION INTRAMUSCULAR; INTRAVENOUS at 08:48

## 2022-11-03 RX ADMIN — FENTANYL CITRATE 25 MCG: 50 INJECTION, SOLUTION INTRAMUSCULAR; INTRAVENOUS at 09:09

## 2022-11-03 RX ADMIN — OXYCODONE AND ACETAMINOPHEN 1 TABLET: 5; 325 TABLET ORAL at 11:01

## 2022-11-03 RX ADMIN — CEFAZOLIN 2000 MG: 10 INJECTION, POWDER, FOR SOLUTION INTRAVENOUS at 08:57

## 2022-11-03 RX ADMIN — PROPOFOL 30 MG: 10 INJECTION, EMULSION INTRAVENOUS at 08:59

## 2022-11-03 RX ADMIN — MIDAZOLAM HYDROCHLORIDE 1 MG: 1 INJECTION, SOLUTION INTRAMUSCULAR; INTRAVENOUS at 08:53

## 2022-11-03 RX ADMIN — SODIUM CHLORIDE, POTASSIUM CHLORIDE, SODIUM LACTATE AND CALCIUM CHLORIDE: 600; 310; 30; 20 INJECTION, SOLUTION INTRAVENOUS at 08:48

## 2022-11-03 RX ADMIN — FENTANYL CITRATE 25 MCG: 50 INJECTION, SOLUTION INTRAMUSCULAR; INTRAVENOUS at 08:53

## 2022-11-03 RX ADMIN — FENTANYL CITRATE 25 MCG: 50 INJECTION, SOLUTION INTRAMUSCULAR; INTRAVENOUS at 09:31

## 2022-11-03 RX ADMIN — PHENYLEPHRINE HYDROCHLORIDE 50 MCG: 10 INJECTION INTRAVENOUS at 09:33

## 2022-11-03 RX ADMIN — PROPOFOL 100 MCG/KG/MIN: 10 INJECTION, EMULSION INTRAVENOUS at 08:53

## 2022-11-03 RX ADMIN — FENTANYL CITRATE 25 MCG: 50 INJECTION, SOLUTION INTRAMUSCULAR; INTRAVENOUS at 09:02

## 2022-11-03 ASSESSMENT — PAIN - FUNCTIONAL ASSESSMENT
PAIN_FUNCTIONAL_ASSESSMENT: ACTIVITIES ARE NOT PREVENTED
PAIN_FUNCTIONAL_ASSESSMENT: PREVENTS OR INTERFERES SOME ACTIVE ACTIVITIES AND ADLS
PAIN_FUNCTIONAL_ASSESSMENT: ACTIVITIES ARE NOT PREVENTED

## 2022-11-03 ASSESSMENT — PAIN DESCRIPTION - LOCATION
LOCATION: CHEST
LOCATION: OTHER (COMMENT)
LOCATION: CHEST

## 2022-11-03 ASSESSMENT — PAIN SCALES - GENERAL
PAINLEVEL_OUTOF10: 3
PAINLEVEL_OUTOF10: 5
PAINLEVEL_OUTOF10: 0
PAINLEVEL_OUTOF10: 3
PAINLEVEL_OUTOF10: 3

## 2022-11-03 ASSESSMENT — PAIN DESCRIPTION - ONSET
ONSET: GRADUAL
ONSET: ON-GOING

## 2022-11-03 ASSESSMENT — PAIN DESCRIPTION - FREQUENCY
FREQUENCY: CONTINUOUS
FREQUENCY: CONTINUOUS

## 2022-11-03 ASSESSMENT — PAIN DESCRIPTION - ORIENTATION
ORIENTATION: LEFT
ORIENTATION: LEFT;UPPER
ORIENTATION: LEFT;UPPER

## 2022-11-03 ASSESSMENT — PAIN DESCRIPTION - PAIN TYPE
TYPE: SURGICAL PAIN
TYPE: SURGICAL PAIN

## 2022-11-03 ASSESSMENT — PAIN DESCRIPTION - DESCRIPTORS
DESCRIPTORS: SORE
DESCRIPTORS: SORE

## 2022-11-03 NOTE — OP NOTE
[facility] [place] [address] [place] [address], [address] [facility]                                OPERATIVE REPORT    PATIENT NAME: Cleve Burkitt                     :        1950  MED REC NO:   [id number]                            ROOM:  ACCOUNT NO:   [de-identified]                           ADMIT DATE: 2022  PROVIDER:     Gui Schwartz MD    DATE OF PROCEDURE:  2022    PREOPERATIVE DIAGNOSIS:  Stage III rectal cancer. POSTOPERATIVE DIAGNOSIS:  Stage III rectal cancer. PROCEDURE PERFORMED:  Ultrasound-guided left internal jugular 8-Mosotho  Nssunk-V-Ehyg. SURGEON:  Gui Schwartz MD    ASSISTANT:  Ms. Tamiko Thompson. ANALGESIA:  1. MAC.  2.  Local.    ESTIMATED BLOOD LOSS:  10 mL. SPECIMENS:  None. COMPLICATIONS:  None. INDICATIONS:  A 70-year-old female with recent diagnosis stage III  rectal cancer. I was asked to place an Ihikku-L-Yiag for assistance  with chemotherapy. Risks of infection, bleeding, and collapsed lung  were outlined. She wished to proceed. Consent obtained. OPERATIVE PROCEDURE:  She was taken to the operating room, placed in the  supine position. MAC anesthesia was administered. Neck and chest were  prepped and draped with a ChloraPrep-containing solution. She received  Ancef preoperatively. A time-out was taken for appropriate verification  and verification of laterality. 0.25% Marcaine was injected at all injection sites for local analgesia. An ultrasound was used to identify the left internal jugular vein and a  Seldinger needle was inserted and the venous blood aspirated. A  guidewire was placed and confirmed in position by fluoroscopy. A port pocket was created on the left chest wall down to the pectoralis  fascia. An 8-Mosotho Xgepdr-U-Okph was sutured in two locations to the  pectoralis fascia for stability.   The catheter was tunneled  subcutaneously to the exit site of the guidewire in the left neck. A dilator followed by dilator sheath were inserted. The dilator and  wire were removed. The catheter, which was tailored to length was  inserted through the sheath, as it was pulled away. The port aspirated venous blood and flushed without problems. It was  confirmed in good position on fluoroscopy. The Jeffrey's fascia was closed using a running 3-0 Vicryl suture. The  exit site of the guidewire and Nkpkee-C-Jspl incision were closed with  4-0 Monocryl in a subcuticular fashion. Steri-Strips, Mastisol, and Band-Aids were placed. The port was accessed again and aspirated venous blood and flushed  without problems. The patient was taken to recovery room for postop monitoring.         Jolanta Abarca MD    D: 11/03/2022 9:40:27       T: 11/03/2022 9:43:13     [email or web address]  Job#: [id number]     Doc#: [id number]    CC:

## 2022-11-03 NOTE — ANESTHESIA POSTPROCEDURE EVALUATION
Department of Anesthesiology  Postprocedure Note    Patient: Charito Doe  MRN: [id number]  YOB: 1950  Date of evaluation: 11/3/2022      Procedure Summary     Date: 11/03/22 Room / Location: [address]    Anesthesia Start: 0848 Anesthesia Stop: 0962    Procedure: INFUSAPORT Diagnosis:       Rectal cancer ([address]      (RECTAL CANCER)    Surgeons: Jan Nguyen MD Responsible Provider: Anthony Benites MD    Anesthesia Type: MAC ASA Status: 2          Anesthesia Type: No value filed.     Jessi Phase I:      Jessi Phase II:        Anesthesia Post Evaluation    Patient location during evaluation: PACU  Patient participation: complete - patient participated  Level of consciousness: awake  Pain score: 0  Airway patency: patent  Nausea & Vomiting: no nausea and no vomiting  Complications: no  Cardiovascular status: hemodynamically stable  Respiratory status: acceptable  Hydration status: euvolemic

## 2022-11-03 NOTE — ANESTHESIA PRE PROCEDURE
Department of Anesthesiology  Preprocedure Note       Name:  Armin Pyle   Age:  81 y.o.  :  1950                                          MRN:  [id number]         Date:  11/3/2022      Surgeon: Marli Leon):  Rui Bar MD    Procedure: Procedure(s): INFUSAPORT, PAT ON ADMIT    Medications prior to admission:   Prior to Admission medications    Medication Sig Start Date End Date Taking? Authorizing Provider   simvastatin (ZOCOR) 40 MG tablet TAKE ONE TABLET BY MOUTH EVERY DAY IN THE EVENING 10/25/22   Rabia Hanson DO   mupirocin (BACTROBAN) 2 % cream Apply 3 times daily to postoperative side x 1 week 10/24/22 11/23/22  Maurilio Christianson MD   aspirin 325 MG tablet Take 325 mg by mouth daily    Historical Provider, MD   meloxicam (MOBIC) 15 MG tablet Take 1 tablet by mouth daily 21   Ger Marquez, APRN - CNP   Handicap Placard MISC by Does not apply route Good for 5 years 19   Mary Alice Garza MD       Current medications:    No current facility-administered medications for this visit. No current outpatient medications on file. Facility-Administered Medications Ordered in Other Visits   Medication Dose Route Frequency Provider Last Rate Last Admin    ceFAZolin (ANCEF) 2000 mg in dextrose 5 % 100 mL IVPB  2,000 mg IntraVENous On Call to Kyree Bass MD        lactated ringers infusion   IntraVENous Continuous Carly Baker MD           Allergies:     Allergies   Allergen Reactions    Morphine Itching       Problem List:    Patient Active Problem List   Diagnosis Code    [facility] C80.1    Hyperlipidemia E78.5    Elevated blood pressure reading R03.0    Colonic mass K63.89    Rectal polyp K62.1    Rectal [facility] ([facility] C20    Malignant neoplasm of rectum ([facility] C20       Past Medical History:        Diagnosis Date    Arthritis     Cancer ([facility]     SKIN    Hyperlipidemia        Past Surgical History:        Procedure Laterality Date    CATARACT REMOVAL      CHOLECYSTECTOMY      COLECTOMY N/A 10/3/2022    LOW ANTERIOR RESECTION WITH LOOP ILEOSTOMY performed by Reema Chandra MD at [facility] COLONOSCOPY N/A 9/16/2022    COLORECTAL CANCER SCREENING, NOT HIGH RISK performed by Clinton Rivas MD at [address] [facility] COLONOSCOPY N/A 9/26/2022    FLEXIBLE SIGMOIDOSCOPY WITH SNARE POLYPECTOMY performed by Reema Chandra MD at [facility] [facility] LEG    TUBAL LIGATION         Social History:    Social History     Tobacco Use    Smoking status: Never    Smokeless tobacco: Never   Substance Use Topics    Alcohol use: No                                Counseling given: Not Answered      Vital Signs (Current): There were no vitals filed for this visit.                                            BP Readings from Last 3 Encounters:   11/03/22 (!) 121/58   10/24/22 125/60   10/10/22 (!) 122/51       NPO Status:                                                                                 BMI:   Wt Readings from Last 3 Encounters:   11/03/22 178 lb (80.7 kg)   10/28/22 178 lb (80.7 kg)   10/20/22 178 lb 11.2 oz (81.1 kg)     There is no height or weight on file to calculate BMI.    CBC:   Lab Results   Component Value Date/Time    WBC 7.3 10/24/2022 05:55 AM    RBC 4.08 10/24/2022 05:55 AM    HGB 11.7 10/24/2022 05:55 AM    HCT 36.4 10/24/2022 05:55 AM    MCV 89.2 10/24/2022 05:55 AM    RDW 13.6 10/24/2022 05:55 AM     10/24/2022 05:55 AM       CMP:   Lab Results   Component Value Date/Time     10/24/2022 05:55 AM    K 4.3 10/24/2022 05:55 AM    K 4.3 10/03/2022 07:01 PM     10/24/2022 05:55 AM    CO2 24 10/24/2022 05:55 AM    BUN 23 10/24/2022 05:55 AM    CREATININE 0.94 10/24/2022 05:55 AM    GFRAA >60.0 10/17/2022 06:35 AM    LABGLOM >60.0 10/24/2022 05:55 AM    GLUCOSE 97 10/24/2022 05:55 AM    PROT 5.8 10/09/2022 05:35 AM    CALCIUM 9.8 10/24/2022 05:55 AM    BILITOT 0.5 10/09/2022 05:35 AM    ALKPHOS 88 10/09/2022 05:35 AM    AST 19 10/09/2022 05:35 AM    ALT 14 10/09/2022 05:35 AM       POC Tests: No results for input(s): POCGLU, POCNA, POCK, POCCL, POCBUN, POCHEMO, POCHCT in the last 72 hours. Coags:   Lab Results   Component Value Date/Time    PROTIME 12.9 09/17/2022 10:13 AM    INR 1.0 09/17/2022 10:13 AM    APTT 26.1 05/09/2017 10:30 AM       HCG (If Applicable): No results found for: PREGTESTUR, PREGSERUM, HCG, HCGQUANT     ABGs: No results found for: PHART, PO2ART, RTN6RBM, EZN7GXX, BEART, A8XVOIOD     Type & Screen (If Applicable):  No results found for: LABABO, LABRH    Drug/Infectious Status (If Applicable):  No results found for: HIV, HEPCAB    COVID-19 Screening (If Applicable):   Lab Results   Component Value Date/Time    COVID19 Not Detected 10/10/2022 03:21 PM    COVID19 Detected 12/14/2020 09:16 PM           Anesthesia Evaluation  Patient summary reviewed and Nursing notes reviewed  Airway: Mallampati: II  TM distance: >3 FB   Neck ROM: full  Mouth opening: > = 3 FB   Dental: normal exam         Pulmonary:Negative Pulmonary ROS and normal exam                               Cardiovascular:Negative CV ROS  Exercise tolerance: good (>4 METS),   (+) hypertension:, hyperlipidemia      ECG reviewed               Beta Blocker:  Not on Beta Blocker         Neuro/Psych:   Negative Neuro/Psych ROS              GI/Hepatic/Renal: Neg GI/Hepatic/Renal ROS            Endo/Other: Negative Endo/Other ROS   (+) malignancy/cancer. ROS comment: Colon CA Abdominal:             Vascular: negative vascular ROS. Other Findings:             Anesthesia Plan      MAC     ASA 2     ( )  Induction: intravenous. MIPS: Postoperative opioids intended and Prophylactic antiemetics administered. Anesthetic plan and risks discussed with patient. Use of blood products discussed with patient whom consented to blood products. Plan discussed with CRNA.     Attending anesthesiologist reviewed and agrees with Pre Eval content                Je Abel MD   11/3/2022

## 2022-11-03 NOTE — H&P
Department of General Surgery - Adult  Surgical Service general surgery  Attending admit note        CHIEF COMPLAINT: Rectal cancer    History Obtained From:  patient, electronic medical record    HISTORY OF PRESENT ILLNESS:                The patient is a 81 y.o. female who presents with rectal cancer stage III. Patient recently had a low anterior resection with diverting ileostomy. She has been seen by her oncologist and recommended adjuvant chemotherapy. I was asked to place an Tsbuyi-a-Kbcs for assistance. Risks and benefits of procedure explained including infection, bleeding, collapsed lung and port complications. Despite the risks, she wishes to proceed. Consent obtained. Past Medical History:        Diagnosis Date    Arthritis     Cancer ([facility]     SKIN    Hyperlipidemia      Past Surgical History:        Procedure Laterality Date    CATARACT REMOVAL      CHOLECYSTECTOMY      COLECTOMY N/A 10/3/2022    LOW ANTERIOR RESECTION WITH LOOP ILEOSTOMY performed by Reema Chandra MD at [address] [place] [address] N/A 9/16/2022    COLORECTAL CANCER SCREENING, NOT HIGH RISK performed by Clinton Rivas MD at [facility]    COLONOSCOPY N/A 9/26/2022    FLEXIBLE SIGMOIDOSCOPY WITH SNARE POLYPECTOMY performed by Reema Chandra MD at [facility] LEG    TUBAL LIGATION       Current Medications:   Current Facility-Administered Medications: ceFAZolin (ANCEF) 2000 mg in dextrose 5 % 100 mL IVPB, 2,000 mg, IntraVENous, On Call to OR  lactated ringers infusion, , IntraVENous, Continuous  Allergies:  Morphine    Social History:   TOBACCO:   reports that she has never smoked. She has never used smokeless tobacco.  ETOH:   reports no history of alcohol use. DRUGS:   reports no history of drug use.   Family History:       Problem Relation Age of Onset    Diabetes Mother     Breast Cancer Maternal Aunt     Colon Cancer Neg Hx        REVIEW OF SYSTEMS:    CONSTITUTIONAL: negative  EYES:  negative  HEENT:  negative for  tinnitus and earaches  RESPIRATORY:  negative for  dry cough, dyspnea, and chest pain  CARDIOVASCULAR:  negative for  chest pain, palpitations, orthopnea  GASTROINTESTINAL:  negative  INTEGUMENT/BREAST:  negative  HEMATOLOGIC/LYMPHATIC:  negative  MUSCULOSKELETAL:  negative  NEUROLOGICAL:  negative  BEHAVIOR/PSYCH:  negative    PHYSICAL EXAM:    VITALS:  There were no vitals taken for this visit. CONSTITUTIONAL:  awake, alert, cooperative, no apparent distress, and appears stated age  EYES:  Lids and lashes normal, pupils equal, round and reactive to light, extra ocular muscles intact, sclera clear, conjunctiva normal  ENT:  Normocephalic, without obvious abnormality, atraumatic, sinuses nontender on palpation, external ears without lesions, oral pharynx with moist mucus membranes, tonsils without erythema or exudates, gums normal and good dentition. NECK:  Supple, symmetrical, trachea midline, no adenopathy, thyroid symmetric, not enlarged and no tenderness, skin normal  HEMATOLOGIC/LYMPHATICS:  no cervical lymphadenopathy  BACK:  Symmetric, no curvature, spinous processes are non-tender on palpation, paraspinous muscles are non-tender on palpation, no costal vertebral tenderness  LUNGS:  No increased work of breathing, good air exchange, clear to auscultation bilaterally, no crackles or wheezing  CARDIOVASCULAR:  Normal apical impulse, regular rate and rhythm, normal S1 and S2, no S3 or S4, and no murmur noted  ABDOMEN: Previous midline incision healing well. Ileostomy right abdomen working  MUSCULOSKELETAL:  There is no redness, warmth, or swelling of the joints. Full range of motion noted. Motor strength is 5 out of 5 all extremities bilaterally. Tone is normal.  NEUROLOGIC: Awake alert and oriented  SKIN:  no bruising or bleeding      IMPRESSION/RECOMMENDATIONS:      Rectal cancer with need for adjuvant chemotherapy.     Risks and benefits of Trkewp-o-Kafg discussed. Risks of infection bleeding collapsed lung outlined. She understands the risks and wishes to proceed. Consent obtained.

## 2022-11-03 NOTE — BRIEF OP NOTE
Brief Postoperative Note      Patient: Diana Brenner  YOB: 1950  MRN: [id number]    Date of Procedure: 11/3/2022    Pre-Op Diagnosis: RECTAL CANCER    Post-Op Diagnosis: Same       Procedure(s): INFUSAPORT    Surgeon(s):  Mello Keating MD    Assistant:   Assistant: Oralia Gonzalez    Anesthesia: Monitor Anesthesia Care    Estimated Blood Loss (mL): 10    Complications: None    Specimens:   * No specimens in log *    Implants:  Implant Name Type Inv. Item Serial No.  Lot No. LRB No. Used Action   PORT INFUS SGL LUMN ATTCH POLYUR OPN END CATH 8FR POWERPRT - [id number]  PORT INFUS SGL LUMN ATTCH POLYUR OPN END CATH 8FR POWERPRT  [employer]WD [id number] N/A 1 Implanted         Drains:   Ileostomy/Jejunostomy RLQ Loop ileostomy (Active)   Stomal Appliance 1 piece 10/24/22 0800   Flange Size (inches) 1.25 Inches 10/21/22 1316   Stoma  Assessment Moist 10/24/22 0800   Peristomal Assessment GERA 10/24/22 0800   Treatment Bag change;Site care;Stoma powder 10/21/22 1316   Stool Appearance Loose 10/24/22 0800   Stool Color Brown 10/24/22 0800   Stool Amount Medium 10/24/22 0800   Output (mL) 100 ml 10/22/22 2030       [REMOVED] Closed/Suction Drain Medial;Left LLQ Bulb (Removed)   Site Description Leaking at site 10/07/22 1814   Dressing Status Clean, dry & intact 10/07/22 2300   Drainage Appearance Serosanguinous; Serous 10/08/22 0947   Drain Status To bulb suction 10/08/22 0947   Output (ml) 20 ml 10/08/22 0640       [REMOVED] NG/OG/NJ/NE Tube Orogastric 16 fr Left mouth (Removed)       [REMOVED] Urinary Catheter 10/03/22 Balbuena-Temperature (Removed)   $ Urethral catheter insertion Inserted for procedure 10/06/22 2050   Catheter Indications Perioperative use for selected surgical procedures 10/07/22 0930   Site Assessment Ellinger 10/05/22 2127   Urine Color Yellow 10/07/22 0930   Urine Appearance Clear 10/07/22 0930   Collection Container Standard 10/05/22 2127   Securement Method Securing device (Describe) 10/07/22 0930   Catheter Care Completed Yes 10/06/22 2050   Catheter Best Practices  Drainage tube clipped to bed;Catheter secured to thigh; Tamper seal intact; Bag below bladder;Bag not on floor;Drainage bag less than half full 10/07/22 0930   Status Draining 10/07/22 0930   Output (mL) 250 mL 10/07/22 0930       Findings: Left internal jugular Yrbjqx-i-Kxlw placement using ultrasound guidance    Electronically signed by Sindi Go MD on 11/3/2022 at 9:33 AM

## 2022-11-11 ENCOUNTER — OFFICE VISIT (OUTPATIENT)
Dept: SURGERY | Age: 72
End: 2022-11-11

## 2022-11-11 VITALS
OXYGEN SATURATION: 99 % | HEART RATE: 115 BPM | WEIGHT: 178 LBS | BODY MASS INDEX: 35.88 KG/M2 | TEMPERATURE: 97.5 F | HEIGHT: 59 IN

## 2022-11-11 DIAGNOSIS — C20 MALIGNANT NEOPLASM OF RECTUM (HCC): Primary | ICD-10-CM

## 2022-11-11 PROCEDURE — 99024 POSTOP FOLLOW-UP VISIT: CPT | Performed by: COLON & RECTAL SURGERY

## 2022-11-11 NOTE — PROGRESS NOTES
Subjective:      Patient ID: Oscar York is a 81 y.o. female who presents for:  Chief Complaint   Patient presents with    Post-Op Check       She returns to the office after an Apdxxm-g-Eyfs placement for adjuvant chemotherapy following low anterior resection. Patient doing well. She is scheduled for chemotherapy Tuesday.       Past Medical History:   Diagnosis Date    Arthritis     Cancer ([address]     SKIN    Hyperlipidemia      Past Surgical History:   Procedure Laterality Date    CATARACT REMOVAL      CHOLECYSTECTOMY      COLECTOMY N/A 10/3/2022    LOW ANTERIOR RESECTION WITH LOOP ILEOSTOMY performed by Oscar Chaparro MD at [facility] N/A 9/16/2022    COLORECTAL CANCER SCREENING, NOT HIGH RISK performed by Claude Mile, MD at [facility]    COLONOSCOPY N/A 9/26/2022    FLEXIBLE SIGMOIDOSCOPY WITH SNARE POLYPECTOMY performed by Oscar Chaparro MD at [address] N/A 11/3/2022    INFUSAPORT performed by Oscar Chaparro MD at [facility] LOWER LEG    TUBAL LIGATION       Social History     Socioeconomic History    Marital status:      Spouse name: Not on file    Number of children: Not on file    Years of education: Not on file    Highest education level: Not on file   Occupational History    Not on file   Tobacco Use    Smoking status: Never    Smokeless tobacco: Never   Vaping Use    Vaping Use: Never used   Substance and Sexual Activity    Alcohol use: No    Drug use: No    Sexual activity: Not on file   Other Topics Concern    Not on file   Social History Narrative    Not on file     Social Determinants of Health     Financial Resource Strain: Not on file   Food Insecurity: Not on file   Transportation Needs: Not on file   Physical Activity: Not on file   Stress: Not on file   Social Connections: Not on file   Intimate Partner Violence: Not on file   Housing Stability: Not on file     Family History   Problem Relation Age of Onset Diabetes Mother     Breast Cancer Maternal Aunt     Colon Cancer Neg Hx      Allergies:  Morphine    Review of Systems    Objective:    Pulse (!) 115   Temp 97.5 °F (36.4 °C) (Temporal)   Ht 4' 11\" (1.499 m)   Wt 178 lb (80.7 kg)   SpO2 99%   BMI 35.95 kg/m²     Physical Exam  On exam the incisions look fine. Steri-Strips removed. No signs of infection. Assessment/Plan:          Diagnosis Orders   1. Malignant neoplasm of rectum [facility])          She will return back to see me in the office as needed. If there are problems or questions that arise from her Zbtxkb-p-Chuc, I asked her to call. Please note this report has beenpartially produced using speech recognition software and may cause contain errors related to that system including grammar, punctuation and spelling as well as words and phrases that may seem inappropriate.  If there arequestions or concerns please feel free to contact me to clarify

## 2022-11-17 ENCOUNTER — TELEMEDICINE (OUTPATIENT)
Dept: FAMILY MEDICINE CLINIC | Age: 72
End: 2022-11-17
Payer: COMMERCIAL

## 2022-11-17 DIAGNOSIS — C20 RECTAL CANCER (HCC): ICD-10-CM

## 2022-11-17 DIAGNOSIS — Z09 HOSPITAL DISCHARGE FOLLOW-UP: Primary | ICD-10-CM

## 2022-11-17 PROCEDURE — 1111F DSCHRG MED/CURRENT MED MERGE: CPT | Performed by: STUDENT IN AN ORGANIZED HEALTH CARE EDUCATION/TRAINING PROGRAM

## 2022-11-17 PROCEDURE — 99442 PR PHYS/QHP TELEPHONE EVALUATION 11-20 MIN: CPT | Performed by: STUDENT IN AN ORGANIZED HEALTH CARE EDUCATION/TRAINING PROGRAM

## 2022-11-17 RX ORDER — LIDOCAINE AND PRILOCAINE 25; 25 MG/G; MG/G
CREAM TOPICAL
COMMUNITY
Start: 2022-11-08

## 2022-11-17 RX ORDER — PROCHLORPERAZINE MALEATE 10 MG
TABLET ORAL
COMMUNITY
Start: 2022-11-08

## 2022-11-17 RX ORDER — SOD SULF/POT CHLORIDE/MAG SULF 1.479 G
TABLET ORAL
COMMUNITY
Start: 2022-09-12

## 2022-11-17 SDOH — ECONOMIC STABILITY: FOOD INSECURITY: WITHIN THE PAST 12 MONTHS, YOU WORRIED THAT YOUR FOOD WOULD RUN OUT BEFORE YOU GOT MONEY TO BUY MORE.: NEVER TRUE

## 2022-11-17 SDOH — ECONOMIC STABILITY: TRANSPORTATION INSECURITY
IN THE PAST 12 MONTHS, HAS LACK OF TRANSPORTATION KEPT YOU FROM MEETINGS, WORK, OR FROM GETTING THINGS NEEDED FOR DAILY LIVING?: NO

## 2022-11-17 SDOH — ECONOMIC STABILITY: FOOD INSECURITY: WITHIN THE PAST 12 MONTHS, THE FOOD YOU BOUGHT JUST DIDN'T LAST AND YOU DIDN'T HAVE MONEY TO GET MORE.: NEVER TRUE

## 2022-11-17 SDOH — ECONOMIC STABILITY: TRANSPORTATION INSECURITY
IN THE PAST 12 MONTHS, HAS THE LACK OF TRANSPORTATION KEPT YOU FROM MEDICAL APPOINTMENTS OR FROM GETTING MEDICATIONS?: NO

## 2022-11-17 ASSESSMENT — ENCOUNTER SYMPTOMS
RHINORRHEA: 0
ABDOMINAL PAIN: 0
VOMITING: 0
SORE THROAT: 0
COUGH: 0
SHORTNESS OF BREATH: 0
NAUSEA: 0
EYE DISCHARGE: 0
WHEEZING: 0

## 2022-11-17 ASSESSMENT — SOCIAL DETERMINANTS OF HEALTH (SDOH): HOW HARD IS IT FOR YOU TO PAY FOR THE VERY BASICS LIKE FOOD, HOUSING, MEDICAL CARE, AND HEATING?: NOT HARD AT ALL

## 2022-11-17 NOTE — PROGRESS NOTES
Post-Discharge Transitional Care Follow Up      Wally Fritz   YOB: 1950    Date of Office Visit:  11/17/2022  Date of Hospital Admission: 11/3/22  Date of Hospital Discharge: 11/3/22  Readmission Risk Score(high >=14%. Medium >=10%):Readmission Risk Score: 11.2      Care management risk score Rising risk (score 2-5) and Complex Care (Scores >=6): No Risk Score On File     Non face to face  following discharge, date last encounter closed (first attempt may have been earlier): *No documented post hospital discharge outreach found in the last 14 days     Call initiated 2 business days of discharge: *No response recorded in the last 14 days     Below is the assessment and plan developed based on review of pertinent history, physical exam, labs, studies, and medications. Hospital discharge follow-up  -     MS DISCHARGE MEDS RECONCILED W/ CURRENT OUTPATIENT MED LIST  Patient    Discharge follow-up. Medication reconciliation completed. She is feeling well. Rectal cancer [facility])  Patient with new diagnosis of rectal cancer. Following with surgery and oncology. She is getting chemotherapy. She does have in the last dose. Functioning well. No concerns at this time. Continue to follow with specialist.  Follow-up in 6 months or sooner if anything changes. Medical Decision Making: moderate complexity  Return in about 6 months (around 5/17/2023) for follow up. On this date 11/17/2022 I have spent 20 minutes reviewing previous notes, test results and face to face with the patient discussing the diagnosis and importance of compliance with the treatment plan as well as documenting on the day of the visit. Subjective:     Chief Complaint   Patient presents with    Follow-Up from [address] [place] [address] 11/3/22 & was admitted. States she is doing well. Has no concerns at this time. HPI    Inpatient course: Discharge summary reviewed- see chart.     Interval history/Current status: Patient with appointment for hospital discharge follow-up. She had an Egpakc-j-Zebr placed on 11/30 and was admitted for this. She states she is doing well. It is healing well. She has been undergoing chemotherapy for rectal cancer. Tolerating it well. No side effects from the treatment. She is following with surgeon and oncologist.  She has appointment tomorrow. She is healing well. Surgical site is healing well from her low anterior resection. She does have an ileostomy in place. Doing well with this. She has no other concerns at this time. Patient Active Problem List   Diagnosis    Cancer ([facility]    Hyperlipidemia    Elevated blood pressure reading    Colonic mass    Rectal polyp    Rectal cancer ([facility]    Malignant neoplasm of rectum (HCC)       Medications listed as started at the time of discharge from hospital  Cannot display discharge medications since this is not an admission. Medications marked \"taking\" at this time  Outpatient Medications Marked as Taking for the 11/17/22 encounter (Telemedicine) with Cherie Grissom, DO   Medication Sig Dispense Refill    lidocaine-prilocaine (EMLA) 2.5-2.5 % cream       prochlorperazine (COMPAZINE) 10 MG tablet       SUTAB [id number] MG TABS       simvastatin (ZOCOR) 40 MG tablet TAKE ONE TABLET BY MOUTH EVERY DAY IN THE EVENING 90 tablet 3    Handicap Placard MISC by Does not apply route Good for 5 years 1 each 0        Medications patient taking as of now reconciled against medications ordered at time of hospital discharge: Yes    Review of Systems   Constitutional:  Negative for chills, fatigue, fever and unexpected weight change. HENT:  Negative for congestion, rhinorrhea and sore throat. Eyes:  Negative for discharge. Respiratory:  Negative for cough, shortness of breath and wheezing. Cardiovascular:  Negative for chest pain, palpitations and leg swelling. Gastrointestinal:  Negative for abdominal pain, nausea and vomiting. Musculoskeletal:  Negative for arthralgias and joint swelling. Skin:  Negative for rash. Neurological:  Negative for weakness, light-headedness, numbness and headaches. Psychiatric/Behavioral:  Negative for confusion. The patient is not nervous/anxious. Objective:    Patient-Reported Vitals  No data recorded    Physical Exam  Due to nature of telephonic visit, unable to complete full physical exam at this time, patient does not sound short of breath while talking. Edith Underwood, was evaluated through a synchronous (real-time) audio-video encounter. The patient (or guardian if applicable) is aware that this is a billable service, which includes applicable co-pays. This Virtual Visit was conducted with patient's (and/or legal guardian's) consent. The visit was conducted pursuant to the emergency declaration under the [facility], [address] [place] [address] wa[facility] authority and the [employer] General Act. Patient identification was verified, and a caregiver was present when appropriate. The patient was located at Home: [address]. Provider was located at [facility] (Appt Dept): [address] [facility],  [address] [place] [address]. An electronic signature was used to authenticate this note.   --Ryland Landaverde DO

## 2022-12-18 ENCOUNTER — APPOINTMENT (OUTPATIENT)
Dept: GENERAL RADIOLOGY | Age: 72
DRG: 163 | End: 2022-12-18
Payer: COMMERCIAL

## 2022-12-18 ENCOUNTER — APPOINTMENT (OUTPATIENT)
Dept: INTERVENTIONAL RADIOLOGY/VASCULAR | Age: 72
DRG: 163 | End: 2022-12-18
Payer: COMMERCIAL

## 2022-12-18 ENCOUNTER — HOSPITAL ENCOUNTER (INPATIENT)
Age: 72
LOS: 8 days | Discharge: HOME | DRG: 163 | End: 2022-12-26
Attending: FAMILY MEDICINE | Admitting: INTERNAL MEDICINE
Payer: COMMERCIAL

## 2022-12-18 ENCOUNTER — APPOINTMENT (OUTPATIENT)
Dept: CT IMAGING | Age: 72
DRG: 163 | End: 2022-12-18
Payer: COMMERCIAL

## 2022-12-18 DIAGNOSIS — I26.02 ACUTE SADDLE PULMONARY EMBOLISM WITH ACUTE COR PULMONALE (HCC): Primary | ICD-10-CM

## 2022-12-18 DIAGNOSIS — R56.9 SEIZURE-LIKE ACTIVITY (HCC): ICD-10-CM

## 2022-12-18 DIAGNOSIS — R55 SYNCOPE AND COLLAPSE: ICD-10-CM

## 2022-12-18 DIAGNOSIS — R77.8 ELEVATED TROPONIN: ICD-10-CM

## 2022-12-18 DIAGNOSIS — Z85.048 HISTORY OF RECTAL CANCER: ICD-10-CM

## 2022-12-18 DIAGNOSIS — D70.9 NEUTROPENIA, UNSPECIFIED TYPE (HCC): ICD-10-CM

## 2022-12-18 DIAGNOSIS — D69.6 THROMBOCYTOPENIA (HCC): ICD-10-CM

## 2022-12-18 DIAGNOSIS — E87.6 HYPOKALEMIA: ICD-10-CM

## 2022-12-18 DIAGNOSIS — U07.1 COVID-19: ICD-10-CM

## 2022-12-18 PROBLEM — I26.99 BILATERAL PULMONARY EMBOLISM (HCC): Status: ACTIVE | Noted: 2022-12-18

## 2022-12-18 LAB
ALBUMIN SERPL-MCNC: 3.6 G/DL (ref 3.5–4.6)
ALBUMIN SERPL-MCNC: 3.6 G/DL (ref 3.5–4.6)
ALP BLD-CCNC: 116 U/L (ref 40–130)
ALP BLD-CCNC: 125 U/L (ref 40–130)
ALT SERPL-CCNC: 14 U/L (ref 0–33)
ALT SERPL-CCNC: 16 U/L (ref 0–33)
ANION GAP SERPL CALCULATED.3IONS-SCNC: 13 MEQ/L (ref 9–15)
ANION GAP SERPL CALCULATED.3IONS-SCNC: 14 MEQ/L (ref 9–15)
ANISOCYTOSIS: ABNORMAL
ANTI-XA UNFRAC HEPARIN: >=2 IU/ML
APTT: 29.9 SEC (ref 24.4–36.8)
AST SERPL-CCNC: 20 U/L (ref 0–35)
AST SERPL-CCNC: 21 U/L (ref 0–35)
BANDED NEUTROPHILS RELATIVE PERCENT: 6 % (ref 5–11)
BASOPHILS ABSOLUTE: 0 K/UL (ref 0–0.2)
BASOPHILS RELATIVE PERCENT: 1.4 %
BILIRUB SERPL-MCNC: 0.4 MG/DL (ref 0.2–0.7)
BILIRUB SERPL-MCNC: 0.6 MG/DL (ref 0.2–0.7)
BUN BLDV-MCNC: 9 MG/DL (ref 8–23)
BUN BLDV-MCNC: 9 MG/DL (ref 8–23)
CALCIUM SERPL-MCNC: 8.6 MG/DL (ref 8.5–9.9)
CALCIUM SERPL-MCNC: 8.7 MG/DL (ref 8.5–9.9)
CHLORIDE BLD-SCNC: 96 MEQ/L (ref 95–107)
CHLORIDE BLD-SCNC: 98 MEQ/L (ref 95–107)
CO2: 24 MEQ/L (ref 20–31)
CO2: 26 MEQ/L (ref 20–31)
CREAT SERPL-MCNC: 0.65 MG/DL (ref 0.5–0.9)
CREAT SERPL-MCNC: 0.66 MG/DL (ref 0.5–0.9)
EOSINOPHILS ABSOLUTE: 0 K/UL (ref 0–0.7)
EOSINOPHILS RELATIVE PERCENT: 0.4 %
GFR SERPL CREATININE-BSD FRML MDRD: >60 ML/MIN/{1.73_M2}
GFR SERPL CREATININE-BSD FRML MDRD: >60 ML/MIN/{1.73_M2}
GLOBULIN: 2.4 G/DL (ref 2.3–3.5)
GLOBULIN: 2.5 G/DL (ref 2.3–3.5)
GLUCOSE BLD-MCNC: 169 MG/DL (ref 70–99)
GLUCOSE BLD-MCNC: 98 MG/DL (ref 70–99)
HCT VFR BLD CALC: 39.3 % (ref 37–47)
HEMOGLOBIN: 12.8 G/DL (ref 12–16)
INFLUENZA A BY PCR: NEGATIVE
INFLUENZA B BY PCR: NEGATIVE
INR BLD: 1.2
LACTIC ACID: 1.2 MMOL/L (ref 0.5–2.2)
LYMPHOCYTES ABSOLUTE: 0.2 K/UL (ref 1–4.8)
LYMPHOCYTES RELATIVE PERCENT: 13 %
MAGNESIUM: 2 MG/DL (ref 1.7–2.4)
MAGNESIUM: 2.2 MG/DL (ref 1.7–2.4)
MCH RBC QN AUTO: 28.7 PG (ref 27–31.3)
MCHC RBC AUTO-ENTMCNC: 32.5 % (ref 33–37)
MCV RBC AUTO: 88.2 FL (ref 79.4–94.8)
MICROCYTES: ABNORMAL
MONOCYTES ABSOLUTE: 0 K/UL (ref 0.2–0.8)
MONOCYTES RELATIVE PERCENT: 2 %
NEUTROPHILS ABSOLUTE: 1.6 K/UL (ref 1.4–6.5)
NEUTROPHILS RELATIVE PERCENT: 79 %
PDW BLD-RTO: 15.8 % (ref 11.5–14.5)
PLATELET # BLD: 94 K/UL (ref 130–400)
PLATELET SLIDE REVIEW: ABNORMAL
POIKILOCYTES: ABNORMAL
POTASSIUM REFLEX MAGNESIUM: 3.3 MEQ/L (ref 3.4–4.9)
POTASSIUM SERPL-SCNC: 2.8 MEQ/L (ref 3.4–4.9)
PRO-BNP: 1049 PG/ML
PROCALCITONIN: 0.16 NG/ML (ref 0–0.15)
PROTHROMBIN TIME: 15.5 SEC (ref 12.3–14.9)
RBC # BLD: 4.45 M/UL (ref 4.2–5.4)
SARS-COV-2, NAAT: DETECTED
SODIUM BLD-SCNC: 134 MEQ/L (ref 135–144)
SODIUM BLD-SCNC: 137 MEQ/L (ref 135–144)
TOTAL PROTEIN: 6 G/DL (ref 6.3–8)
TOTAL PROTEIN: 6.1 G/DL (ref 6.3–8)
TROPONIN: 0.1 NG/ML (ref 0–0.01)
VACUOLATED NEUTROPHILS: ABNORMAL
WBC # BLD: 1.9 K/UL (ref 4.8–10.8)

## 2022-12-18 PROCEDURE — 99285 EMERGENCY DEPT VISIT HI MDM: CPT

## 2022-12-18 PROCEDURE — 2000000000 HC ICU R&B

## 2022-12-18 PROCEDURE — 71275 CT ANGIOGRAPHY CHEST: CPT

## 2022-12-18 PROCEDURE — 6360000002 HC RX W HCPCS: Performed by: INTERNAL MEDICINE

## 2022-12-18 PROCEDURE — 71045 X-RAY EXAM CHEST 1 VIEW: CPT

## 2022-12-18 PROCEDURE — 72170 X-RAY EXAM OF PELVIS: CPT

## 2022-12-18 PROCEDURE — 84145 PROCALCITONIN (PCT): CPT

## 2022-12-18 PROCEDURE — 96361 HYDRATE IV INFUSION ADD-ON: CPT

## 2022-12-18 PROCEDURE — C1769 GUIDE WIRE: HCPCS

## 2022-12-18 PROCEDURE — 83735 ASSAY OF MAGNESIUM: CPT

## 2022-12-18 PROCEDURE — 83880 ASSAY OF NATRIURETIC PEPTIDE: CPT

## 2022-12-18 PROCEDURE — 72128 CT CHEST SPINE W/O DYE: CPT

## 2022-12-18 PROCEDURE — 96365 THER/PROPH/DIAG IV INF INIT: CPT

## 2022-12-18 PROCEDURE — 84484 ASSAY OF TROPONIN QUANT: CPT

## 2022-12-18 PROCEDURE — 02HV33Z INSERTION OF INFUSION DEVICE INTO SUPERIOR VENA CAVA, PERCUTANEOUS APPROACH: ICD-10-PCS | Performed by: RADIOLOGY

## 2022-12-18 PROCEDURE — 6370000000 HC RX 637 (ALT 250 FOR IP): Performed by: INTERNAL MEDICINE

## 2022-12-18 PROCEDURE — 6360000002 HC RX W HCPCS: Performed by: FAMILY MEDICINE

## 2022-12-18 PROCEDURE — 93005 ELECTROCARDIOGRAM TRACING: CPT | Performed by: STUDENT IN AN ORGANIZED HEALTH CARE EDUCATION/TRAINING PROGRAM

## 2022-12-18 PROCEDURE — 36573 INSJ PICC RS&I 5 YR+: CPT

## 2022-12-18 PROCEDURE — 85730 THROMBOPLASTIN TIME PARTIAL: CPT

## 2022-12-18 PROCEDURE — 6360000002 HC RX W HCPCS: Performed by: STUDENT IN AN ORGANIZED HEALTH CARE EDUCATION/TRAINING PROGRAM

## 2022-12-18 PROCEDURE — 99291 CRITICAL CARE FIRST HOUR: CPT | Performed by: INTERNAL MEDICINE

## 2022-12-18 PROCEDURE — 6830039000 HC L3 TRAUMA ALERT

## 2022-12-18 PROCEDURE — 36415 COLL VENOUS BLD VENIPUNCTURE: CPT

## 2022-12-18 PROCEDURE — 85025 COMPLETE CBC W/AUTO DIFF WBC: CPT

## 2022-12-18 PROCEDURE — 2580000003 HC RX 258: Performed by: STUDENT IN AN ORGANIZED HEALTH CARE EDUCATION/TRAINING PROGRAM

## 2022-12-18 PROCEDURE — 85520 HEPARIN ASSAY: CPT

## 2022-12-18 PROCEDURE — 87635 SARS-COV-2 COVID-19 AMP PRB: CPT

## 2022-12-18 PROCEDURE — 6370000000 HC RX 637 (ALT 250 FOR IP): Performed by: STUDENT IN AN ORGANIZED HEALTH CARE EDUCATION/TRAINING PROGRAM

## 2022-12-18 PROCEDURE — 6360000004 HC RX CONTRAST MEDICATION: Performed by: STUDENT IN AN ORGANIZED HEALTH CARE EDUCATION/TRAINING PROGRAM

## 2022-12-18 PROCEDURE — 83605 ASSAY OF LACTIC ACID: CPT

## 2022-12-18 PROCEDURE — 72131 CT LUMBAR SPINE W/O DYE: CPT

## 2022-12-18 PROCEDURE — 80053 COMPREHEN METABOLIC PANEL: CPT

## 2022-12-18 PROCEDURE — 87502 INFLUENZA DNA AMP PROBE: CPT

## 2022-12-18 PROCEDURE — 36556 INSERT NON-TUNNEL CV CATH: CPT

## 2022-12-18 PROCEDURE — 85610 PROTHROMBIN TIME: CPT

## 2022-12-18 PROCEDURE — 70450 CT HEAD/BRAIN W/O DYE: CPT

## 2022-12-18 PROCEDURE — 2500000003 HC RX 250 WO HCPCS: Performed by: STUDENT IN AN ORGANIZED HEALTH CARE EDUCATION/TRAINING PROGRAM

## 2022-12-18 PROCEDURE — 96360 HYDRATION IV INFUSION INIT: CPT

## 2022-12-18 PROCEDURE — 72125 CT NECK SPINE W/O DYE: CPT

## 2022-12-18 RX ORDER — HEPARIN SODIUM 1000 [USP'U]/ML
40 INJECTION, SOLUTION INTRAVENOUS; SUBCUTANEOUS PRN
Status: DISCONTINUED | OUTPATIENT
Start: 2022-12-18 | End: 2022-12-19

## 2022-12-18 RX ORDER — METOPROLOL SUCCINATE 25 MG/1
25 TABLET, EXTENDED RELEASE ORAL DAILY
Status: DISCONTINUED | OUTPATIENT
Start: 2022-12-18 | End: 2022-12-23

## 2022-12-18 RX ORDER — 0.9 % SODIUM CHLORIDE 0.9 %
500 INTRAVENOUS SOLUTION INTRAVENOUS ONCE
Status: COMPLETED | OUTPATIENT
Start: 2022-12-18 | End: 2022-12-18

## 2022-12-18 RX ORDER — LIDOCAINE HYDROCHLORIDE 20 MG/ML
5 INJECTION, SOLUTION INFILTRATION; PERINEURAL ONCE
Status: COMPLETED | OUTPATIENT
Start: 2022-12-18 | End: 2022-12-18

## 2022-12-18 RX ORDER — SODIUM CHLORIDE 9 MG/ML
INJECTION, SOLUTION INTRAVENOUS PRN
Status: DISCONTINUED | OUTPATIENT
Start: 2022-12-18 | End: 2022-12-26 | Stop reason: HOSPADM

## 2022-12-18 RX ORDER — ACETAMINOPHEN 650 MG/1
650 SUPPOSITORY RECTAL EVERY 6 HOURS PRN
Status: DISCONTINUED | OUTPATIENT
Start: 2022-12-18 | End: 2022-12-26 | Stop reason: HOSPADM

## 2022-12-18 RX ORDER — HEPARIN SODIUM 1000 [USP'U]/ML
80 INJECTION, SOLUTION INTRAVENOUS; SUBCUTANEOUS PRN
Status: DISCONTINUED | OUTPATIENT
Start: 2022-12-18 | End: 2022-12-18 | Stop reason: SDUPTHER

## 2022-12-18 RX ORDER — POTASSIUM CHLORIDE 20 MEQ/1
40 TABLET, EXTENDED RELEASE ORAL ONCE
Status: COMPLETED | OUTPATIENT
Start: 2022-12-18 | End: 2022-12-18

## 2022-12-18 RX ORDER — HEPARIN SODIUM 10000 [USP'U]/100ML
5-30 INJECTION, SOLUTION INTRAVENOUS CONTINUOUS
Status: DISCONTINUED | OUTPATIENT
Start: 2022-12-18 | End: 2022-12-18 | Stop reason: SDUPTHER

## 2022-12-18 RX ORDER — ACETAMINOPHEN 325 MG/1
650 TABLET ORAL EVERY 6 HOURS PRN
Status: DISCONTINUED | OUTPATIENT
Start: 2022-12-18 | End: 2022-12-26 | Stop reason: HOSPADM

## 2022-12-18 RX ORDER — HEPARIN SODIUM 1000 [USP'U]/ML
80 INJECTION, SOLUTION INTRAVENOUS; SUBCUTANEOUS ONCE
Status: COMPLETED | OUTPATIENT
Start: 2022-12-18 | End: 2022-12-18

## 2022-12-18 RX ORDER — POTASSIUM CHLORIDE 7.45 MG/ML
10 INJECTION INTRAVENOUS
Status: ACTIVE | OUTPATIENT
Start: 2022-12-18 | End: 2022-12-18

## 2022-12-18 RX ORDER — SODIUM CHLORIDE 0.9 % (FLUSH) 0.9 %
5-40 SYRINGE (ML) INJECTION PRN
Status: DISCONTINUED | OUTPATIENT
Start: 2022-12-18 | End: 2022-12-26 | Stop reason: HOSPADM

## 2022-12-18 RX ORDER — HEPARIN SODIUM 1000 [USP'U]/ML
80 INJECTION, SOLUTION INTRAVENOUS; SUBCUTANEOUS PRN
Status: DISCONTINUED | OUTPATIENT
Start: 2022-12-18 | End: 2022-12-19

## 2022-12-18 RX ORDER — ONDANSETRON 2 MG/ML
4 INJECTION INTRAMUSCULAR; INTRAVENOUS EVERY 6 HOURS PRN
Status: DISCONTINUED | OUTPATIENT
Start: 2022-12-18 | End: 2022-12-26 | Stop reason: HOSPADM

## 2022-12-18 RX ORDER — SODIUM CHLORIDE 0.9 % (FLUSH) 0.9 %
5-40 SYRINGE (ML) INJECTION EVERY 12 HOURS SCHEDULED
Status: DISCONTINUED | OUTPATIENT
Start: 2022-12-18 | End: 2022-12-26 | Stop reason: HOSPADM

## 2022-12-18 RX ORDER — SODIUM CHLORIDE 9 MG/ML
250 INJECTION, SOLUTION INTRAVENOUS ONCE
Status: COMPLETED | OUTPATIENT
Start: 2022-12-18 | End: 2022-12-18

## 2022-12-18 RX ORDER — LEVETIRACETAM 500 MG/1
500 TABLET ORAL 2 TIMES DAILY
Status: DISCONTINUED | OUTPATIENT
Start: 2022-12-18 | End: 2022-12-20

## 2022-12-18 RX ORDER — ONDANSETRON 4 MG/1
4 TABLET, ORALLY DISINTEGRATING ORAL EVERY 8 HOURS PRN
Status: DISCONTINUED | OUTPATIENT
Start: 2022-12-18 | End: 2022-12-26 | Stop reason: HOSPADM

## 2022-12-18 RX ORDER — HEPARIN SODIUM 10000 [USP'U]/100ML
5-30 INJECTION, SOLUTION INTRAVENOUS CONTINUOUS
Status: DISCONTINUED | OUTPATIENT
Start: 2022-12-18 | End: 2022-12-19

## 2022-12-18 RX ORDER — HEPARIN SODIUM 1000 [USP'U]/ML
40 INJECTION, SOLUTION INTRAVENOUS; SUBCUTANEOUS PRN
Status: DISCONTINUED | OUTPATIENT
Start: 2022-12-18 | End: 2022-12-18 | Stop reason: SDUPTHER

## 2022-12-18 RX ORDER — POLYETHYLENE GLYCOL 3350 17 G/17G
17 POWDER, FOR SOLUTION ORAL DAILY PRN
Status: DISCONTINUED | OUTPATIENT
Start: 2022-12-18 | End: 2022-12-26 | Stop reason: HOSPADM

## 2022-12-18 RX ORDER — POTASSIUM CHLORIDE 750 MG/1
40 TABLET, FILM COATED, EXTENDED RELEASE ORAL ONCE
Status: COMPLETED | OUTPATIENT
Start: 2022-12-18 | End: 2022-12-18

## 2022-12-18 RX ADMIN — SODIUM CHLORIDE 250 ML: 9 INJECTION, SOLUTION INTRAVENOUS at 12:11

## 2022-12-18 RX ADMIN — HEPARIN SODIUM 6280 UNITS: 1000 INJECTION INTRAVENOUS; SUBCUTANEOUS at 12:52

## 2022-12-18 RX ADMIN — SODIUM CHLORIDE 500 MG: 9 INJECTION, SOLUTION INTRAVENOUS at 11:04

## 2022-12-18 RX ADMIN — FILGRASTIM-AAFI 480 MCG: 480 INJECTION, SOLUTION SUBCUTANEOUS at 20:15

## 2022-12-18 RX ADMIN — POTASSIUM CHLORIDE 40 MEQ: 1500 TABLET, EXTENDED RELEASE ORAL at 22:08

## 2022-12-18 RX ADMIN — HEPARIN SODIUM AND DEXTROSE 18 UNITS/KG/HR: 10000; 5 INJECTION INTRAVENOUS at 12:55

## 2022-12-18 RX ADMIN — IOPAMIDOL 150 ML: 612 INJECTION, SOLUTION INTRAVENOUS at 11:28

## 2022-12-18 RX ADMIN — SODIUM CHLORIDE 500 ML: 9 INJECTION, SOLUTION INTRAVENOUS at 08:01

## 2022-12-18 RX ADMIN — POTASSIUM CHLORIDE 40 MEQ: 750 TABLET, FILM COATED, EXTENDED RELEASE ORAL at 11:09

## 2022-12-18 RX ADMIN — LIDOCAINE HYDROCHLORIDE 5 ML: 20 INJECTION, SOLUTION INFILTRATION; PERINEURAL at 12:20

## 2022-12-18 RX ADMIN — METOPROLOL SUCCINATE 25 MG: 25 TABLET, FILM COATED, EXTENDED RELEASE ORAL at 15:49

## 2022-12-18 ASSESSMENT — ENCOUNTER SYMPTOMS
WHEEZING: 0
BLOOD IN STOOL: 0
EYES NEGATIVE: 1
CHEST TIGHTNESS: 0
SHORTNESS OF BREATH: 0
DIARRHEA: 0
COUGH: 1
CONSTIPATION: 0
VOMITING: 0
PHOTOPHOBIA: 0
STRIDOR: 0
NAUSEA: 0
ABDOMINAL DISTENTION: 0
ABDOMINAL PAIN: 0
GASTROINTESTINAL NEGATIVE: 1

## 2022-12-18 ASSESSMENT — LIFESTYLE VARIABLES
HOW MANY STANDARD DRINKS CONTAINING ALCOHOL DO YOU HAVE ON A TYPICAL DAY: PATIENT DOES NOT DRINK
HOW OFTEN DO YOU HAVE A DRINK CONTAINING ALCOHOL: NEVER
HOW MANY STANDARD DRINKS CONTAINING ALCOHOL DO YOU HAVE ON A TYPICAL DAY: PATIENT DOES NOT DRINK
HOW OFTEN DO YOU HAVE A DRINK CONTAINING ALCOHOL: NEVER

## 2022-12-18 ASSESSMENT — PAIN SCALES - GENERAL: PAINLEVEL_OUTOF10: 0

## 2022-12-18 NOTE — ED TRIAGE NOTES
Pt was in the bathroom changing her colostomy bag when she fell. Pt did have LOC. Pt does not remember the event. - Thinners. Pt is currently being treated for colon cancer. Pt states she does have abdominal pain at this time.

## 2022-12-18 NOTE — CARE COORDINATION
[facility] Initial Discharge Assessment    Met with Patient to discuss discharge plan. PCP: Demond Moya DO                                Date of Last Visit: 1 month    VA Patient: No        VA Notified: no    If no PCP, list provided? N/A    Discharge Planning    Living Arrangements: independently at home    Who do you live with? son    Who helps you with your care:  self or family    If lives at home:     Do you have any barriers navigating in your home? no    Patient can perform ADL? Yes    Current Services (outpatient and in home) :  [address] [employer] (Company need to verify if it is [facility], she receives a nurse weekly for colostomy bag care)    Dialysis: No    Is transportation available to get to your appointments? Yes    DME Equipment:  yes - walker, elevated toilet seat    Respiratory equipment: None    Respiratory provider:  no     Pharmacy:  yes - [facility]    Consult with Medication Assistance Program?  No      Patient agreeable to [place] [address]? Declined    Patient agreeable to SNF/Rehab? Declined    Other discharge needs identified? N/A    Does Patient Have a High-Risk for Readmission Diagnosis (CHF, PN, MI, COPD)? No  Initial Discharge Plan? (Note: please see concurrent daily documentation for any updates after initial note). Pt denied d/c needs in ER. Cm to further assess.     Readmission Risk              Risk of Unplanned Readmission:  0         Electronically signed by Sachin Rae RN on 12/18/2022 at 1:19 PM

## 2022-12-18 NOTE — CONSULTS
Inpatient consult to Cardiology  Consult performed by: Norberto Mireles MD  Consult ordered by: Heidy Stack PA-C        Patient Name: Ji Day Date: 2022  6:12 AM  MR #: [id number]  : 1950    Attending Physician: Aby Rogel MD  Reason for consult: PE    History of Presenting Illness:      Vaishnavi العلي is a 81 y.o. female on hospital day 0 with a history of . History Obtained From:  patient, electronic medical record    Pt lives at home w son. This am she was walking to bathroom and collapsed and presented to ER. While at CT scan and walking to bathroom again she collapsed. For the past week she has a little cough no fever nor chills. She did not feel like she was ill. She tested + for COVID today. CTA chest showed saddle PE. She is on 2L O2 and has no SOB nor CP. She does not feel dizzy at this time. BP is stable. HR is little on high side.      In October she had colon surgery for colon CA and has a colostomy  History:      EKG:  ( ER)  Past Medical History:   Diagnosis Date    Arthritis     Cancer ([address]     SKIN    Hyperlipidemia      Past Surgical History:   Procedure Laterality Date    CATARACT REMOVAL      CHOLECYSTECTOMY      COLECTOMY N/A 10/3/2022    LOW ANTERIOR RESECTION WITH LOOP ILEOSTOMY performed by Richard Trujillo MD at [facility] N/A 2022    COLORECTAL CANCER SCREENING, NOT HIGH RISK performed by Claire Beltran MD at [facility]    COLONOSCOPY N/A 2022    FLEXIBLE SIGMOIDOSCOPY WITH SNARE POLYPECTOMY performed by Richard Trujillo MD at [address] N/A 11/3/2022    INFUSAPORT performed by Richard Trujillo MD at [address] [place] [address]      R LOWER LEG    TUBAL LIGATION         Family History  Family History   Problem Relation Age of Onset    Diabetes Mother     Breast Cancer Maternal Aunt     Colon Cancer Neg Hx      [] Unable to obtain due to ventilated and/ or neurologic status    Social History Socioeconomic History    Marital status:      Spouse name: Not on file    Number of children: Not on file    Years of education: Not on file    Highest education level: Not on file   Occupational History    Not on file   Tobacco Use    Smoking status: Never    Smokeless tobacco: Never   Vaping Use    Vaping Use: Never used   Substance and Sexual Activity    Alcohol use: No    Drug use: No    Sexual activity: Not on file   Other Topics Concern    Not on file   Social History Narrative    Not on file     Social Determinants of Health     Financial Resource Strain: Low Risk     Difficulty of Paying Living Expenses: Not hard at all   Food Insecurity: No Food Insecurity    Worried About Running Out of Food in the Last Year: Never true    [address] in the Last Year: Never true   Transportation Needs: No Transportation Needs    Lack of Transportation (Medical): No    Lack of Transportation (Non-Medical):  No   Physical Activity: Not on file   Stress: Not on file   Social Connections: Not on file   Intimate Partner Violence: Not on file   Housing Stability: Not on file      [] Unable to obtain due to ventilated and/ or neurologic status      Home Medications:      Medications Prior to Admission: lidocaine-prilocaine (EMLA) 2.5-2.5 % cream,   prochlorperazine (COMPAZINE) 10 MG tablet,   SUTAB 7417-682-493 MG TABS,   simvastatin (ZOCOR) 40 MG tablet, TAKE ONE TABLET BY MOUTH EVERY DAY IN THE EVENING  aspirin 325 MG tablet, Take 325 mg by mouth daily (Patient not taking: Reported on 11/17/2022)  meloxicam (MOBIC) 15 MG tablet, Take 1 tablet by mouth daily (Patient not taking: Reported on 11/17/2022)  Handicap Placard MISC, by Does not apply route Good for 5 years    Current Hospital Medications:     Scheduled Meds:   sodium chloride flush  5-40 mL IntraVENous 2 times per day    sodium chloride flush  5-40 mL IntraVENous 2 times per day    metoprolol succinate  25 mg Oral Daily     Continuous Infusions: sodium chloride      sodium chloride      heparin (PORCINE) Infusion 18 Units/kg/hr (12/18/22 1255)     PRN Meds:.sodium chloride flush, sodium chloride, sodium chloride flush, sodium chloride, ondansetron **OR** ondansetron, polyethylene glycol, acetaminophen **OR** acetaminophen, heparin (porcine), heparin (porcine)  . sodium chloride      sodium chloride      heparin (PORCINE) Infusion 18 Units/kg/hr (12/18/22 1255)        Allergies: Allergies   Allergen Reactions    Morphine Itching        Review of Systems:       Review of Systems   Constitutional: Negative. Negative for diaphoresis and fatigue. HENT: Negative. Eyes: Negative. Respiratory:  Positive for cough. Negative for chest tightness, shortness of breath, wheezing and stridor. Cardiovascular: Negative. Negative for chest pain, palpitations and leg swelling. Gastrointestinal: Negative. Negative for blood in stool and nausea. Genitourinary: Negative. Musculoskeletal: Negative. Skin: Negative. Neurological: Negative. Negative for dizziness, syncope, weakness and light-headedness. Hematological: Negative. Psychiatric/Behavioral: Negative. Objective Findings:     Vitals:/86   Pulse (!) 106   Temp 98 °F (36.7 °C)   Resp 23   Ht 4' 11\" (1.499 m)   Wt 173 lb (78.5 kg)   SpO2 100%   BMI 34.94 kg/m²      Physical Examination:    Physical Exam   Constitutional: She appears healthy. No distress. HENT:   Normal cephalic and Atraumatic   Eyes: Pupils are equal, round, and reactive to light. Neck: Thyroid normal. No JVD present. No neck adenopathy. No thyromegaly present. Cardiovascular: Normal rate, regular rhythm, normal heart sounds, intact distal pulses and normal pulses. Pulmonary/Chest: Effort normal and breath sounds normal. She has no wheezes. She has no rales. She exhibits no tenderness. Abdominal: Soft. Bowel sounds are normal. There is no abdominal tenderness.    Musculoskeletal: General: No tenderness or edema. Normal range of motion. Cervical back: Normal range of motion and neck supple. Neurological: She is alert and oriented to person, place, and time. Skin: Skin is warm. No cyanosis. Nails show no clubbing. Results/ Medications reviewed 12/18/2022, 1:53 PM     Laboratory, Microbiology, Pathology, Radiology, Cardiology, Medications and Transcriptions reviewed  Scheduled Meds:   sodium chloride flush  5-40 mL IntraVENous 2 times per day    sodium chloride flush  5-40 mL IntraVENous 2 times per day    metoprolol succinate  25 mg Oral Daily     Continuous Infusions:   sodium chloride      sodium chloride      heparin (PORCINE) Infusion 18 Units/kg/hr (12/18/22 1255)       Recent Labs     12/18/22  0645   WBC 1.9*   HGB 12.8   HCT 39.3   MCV 88.2   PLT 94*     Recent Labs     12/18/22  0645   *   K 2.8*   CL 96   CO2 24   BUN 9   CREATININE 0.66     Recent Labs     12/18/22  0645   AST 21   ALT 16   BILITOT 0.6   ALKPHOS 125     No results for input(s): LIPASE, AMYLASE in the last 72 hours. Recent Labs     12/18/22  0645 12/18/22  0840   PROT 6.1*  --    INR  --  1.2     CT HEAD WO CONTRAST    Result Date: 12/18/2022  EXAMINATION: CT OF THE HEAD WITHOUT CONTRAST  12/18/2022 8:30 am TECHNIQUE: CT of the head was performed without the administration of intravenous contrast. Automated exposure control, iterative reconstruction, and/or weight based adjustment of the mA/kV was utilized to reduce the radiation dose to as low as reasonably achievable. COMPARISON: None. HISTORY: ORDERING SYSTEM PROVIDED HISTORY: syncope, fall TECHNOLOGIST PROVIDED HISTORY: Reason for exam:->syncope, fall Has a \"code stroke\" or \"stroke alert\" been called? ->No Decision Support Exception - unselect if not a suspected or confirmed emergency medical condition->Emergency Medical Condition (MA) What reading provider will be dictating this exam?->CRC FINDINGS: BRAIN/VENTRICLES: There is no acute intracranial hemorrhage, mass effect or midline shift. No abnormal extra-axial fluid collection. The gray-white differentiation is maintained without evidence of an acute infarct. There is no evidence of hydrocephalus. The ventricles, cisterns and sulci are prominent consistent with atrophy. There is decreased attenuation within the periventricular white matter consistent with periventricular leukomalacia. ORBITS: The visualized portion of the orbits demonstrate no acute abnormality. SINUSES: The visualized paranasal sinuses and mastoid air cells demonstrate no acute abnormality. There is mild mucosal thickening seen within the right ethmoid air cells. SOFT TISSUES/SKULL:  No acute abnormality of the visualized skull or soft tissues. 1. There is no acute intracranial abnormality. Specifically, there is no intracranial hemorrhage. 2. Atrophy and periventricular leukomalacia,     CTA CHEST W WO CONTRAST PE Eval    Result Date: 12/18/2022  EXAMINATION: CTA OF THE CHEST WITH AND WITHOUT CONTRAST 12/18/2022 8:30 am TECHNIQUE: CTA of the chest was performed before and after the administration of intravenous contrast.  Multiplanar reformatted images are provided for review. MIP images are provided for review. Automated exposure control, iterative reconstruction, and/or weight based adjustment of the mA/kV was utilized to reduce the radiation dose to as low as reasonably achievable. COMPARISON: None.  HISTORY: ORDERING SYSTEM PROVIDED HISTORY: PE, syncope, hx of cancer, rule out PE; cough evaluate for infiltrate TECHNOLOGIST PROVIDED HISTORY: Reason for exam:->PE Reason for exam:->syncope, hx of cancer, rule out PE; cough evaluate for infiltrate Decision Support Exception - unselect if not a suspected or confirmed emergency medical condition->Emergency Medical Condition (MA) What reading provider will be dictating this exam?->CRC FINDINGS: Pulmonary Arteries: Pulmonary arteries are adequately opacified for evaluation. There is abnormal filling defect seen within the main pulmonary arteries bilaterally with slight saddle embolism identified. There is opacification identified within all the segmental and subsegmental branches most pronounced within the right middle and lower lobe pulmonary arteries. Emboli as well seen within the lingular branch. There is evidence of right heart strain within RV LV ratio of 1.6. There is prominence identified of the main pulmonary artery. Mediastinum: No evidence of mediastinal lymphadenopathy. The heart and pericardium demonstrate no acute abnormality. There is no acute abnormality of the thoracic aorta. Lungs/pleura: Mild ground-glass opacity identified in the right lower lobe suggesting possibly an area of infarction given the extensive thrombus within the pulmonary arteries on the right. Atelectatic change cannot be excluded. There is minimal atelectatic changes seen within the lingula. No definite pleural effusion or pneumothorax. Upper Abdomen: Limited images of the upper abdomen are unremarkable. Soft Tissues/Bones: Abnormal increased density identified within the subcutaneous tissues surrounding the luke catheter on the left anterior chest wall to suggest extravasation of intravenous contrast.     Extensive pulmonary emboli with saddle embolism identified. There is evidence of right heart strain within RV LV ratio of 1.6. Mild ground-glass seen at the right lung base in which early infarction cannot be excluded. There is increased density seen surrounding the left luke catheter in the subcutaneous tissues to suggest extravasation of intravenous contrast. Findings were discussed with Dr. Harvey Dove at time of interpretation of the examination.      CT CERVICAL SPINE WO CONTRAST    Result Date: 12/18/2022  EXAMINATION: CT OF THE CERVICAL SPINE WITHOUT CONTRAST 12/18/2022 8:30 am TECHNIQUE: CT of the cervical spine was performed without the administration of intravenous contrast. Multiplanar reformatted images are provided for review. Automated exposure control, iterative reconstruction, and/or weight based adjustment of the mA/kV was utilized to reduce the radiation dose to as low as reasonably achievable. COMPARISON: None. HISTORY: ORDERING SYSTEM PROVIDED HISTORY: syncope, fall TECHNOLOGIST PROVIDED HISTORY: Reason for exam:->syncope, fall Decision Support Exception - unselect if not a suspected or confirmed emergency medical condition->Emergency Medical Condition (MA) What reading provider will be dictating this exam?->CRC FINDINGS: The ring of C1 is intact as is the dense. There is no compression fracture of the cervical spine. No jumped or perched facet is noted. Multilevel degenerative disc and degenerative joint disease is noted. There are posterior degenerative endplate spurs seen at the C3-4 and C6-7 levels. There is mild central canal stenosis at the C6-7 level and mild bilateral neural foraminal narrowing. The prevertebral soft tissues are unremarkable. The airway is widely patent. Images through the lung apices are negative for a pneumothorax. 1. There is no acute compression fracture or subluxation of the cervical spine. 2. Multilevel degenerative disc and degenerative joint disease. .     CT THORACIC SPINE WO CONTRAST    Result Date: 12/18/2022  EXAMINATION: CT OF THE THORACIC SPINE WITHOUT CONTRAST  12/18/2022 8:30 am: TECHNIQUE: CT of the thoracic spine was performed without the administration of intravenous contrast. Multiplanar reformatted images are provided for review. Automated exposure control, iterative reconstruction, and/or weight based adjustment of the mA/kV was utilized to reduce the radiation dose to as low as reasonably achievable. COMPARISON: None.  HISTORY: ORDERING SYSTEM PROVIDED HISTORY: syncope, fall TECHNOLOGIST PROVIDED HISTORY: Reason for exam:->syncope, fall What reading provider will be dictating this exam?->CRC FINDINGS: BONES/ALIGNMENT: There is no acute compression fracture of the thoracic spine. There is dextroscoliosis of the thoracic spine. Mild multilevel degenerative disc and degenerative joint disease is noted. There are large anterior and lateral degenerative endplate spurs seen throughout the course of the thoracic spine. .  There is no osseous lesion. SOFT TISSUES: No paraspinal mass is seen. 1. There is no acute compression fracture of the thoracic spine. 2. Dextroscoliosis 3. Multilevel degenerative changes. CT LUMBAR SPINE WO CONTRAST    Result Date: 12/18/2022  EXAMINATION: CT OF THE LUMBAR SPINE WITHOUT CONTRAST  12/18/2022 TECHNIQUE: CT of the lumbar spine was performed without the administration of intravenous contrast. Multiplanar reformatted images are provided for review. Adjustment of mA and/or kV according to patient size was utilized. Automated exposure control, iterative reconstruction, and/or weight based adjustment of the mA/kV was utilized to reduce the radiation dose to as low as reasonably achievable. COMPARISON: None. HISTORY: ORDERING SYSTEM PROVIDED HISTORY: syncope, fall TECHNOLOGIST PROVIDED HISTORY: Reason for exam:->syncope, fall Decision Support Exception - unselect if not a suspected or confirmed emergency medical condition->Emergency Medical Condition (MA) What reading provider will be dictating this exam?->CRC FINDINGS: Vertebral alignment is normal.  Mild disc space narrowing and discovertebral degenerative changes are identified. Facet arthritis is also seen. No acute vertebral fracture is visualized. No spondylolysis is noted on the right or left. The sacroiliac joints are intact. The paraspinal soft tissues appear unremarkable. The urinary bladder is dilated. There are mild right hydronephrosis and hydroureter. There is colonic diverticulosis. There are postsurgical changes at the colorectal junction. Disc space narrowing and degenerative changes.  No acute vertebral fracture or subluxation. XR CHEST PORTABLE    Result Date: 12/18/2022  EXAMINATION: ONE XRAY VIEW OF THE CHEST 12/18/2022 9:59 am COMPARISON: The previous study performed 11/03/2022. HISTORY: ORDERING SYSTEM PROVIDED HISTORY: fall, syncope TECHNOLOGIST PROVIDED HISTORY: Reason for exam:->fall, syncope What reading provider will be dictating this exam?->CRC FINDINGS: There is a large, ill-defined density overlying the left shoulder and mid to upper lateral left hemithorax. This may be external to the patient. It limits full evaluation of the surrounding soft tissue and osseous structures. There is no evidence of acute consolidation or infiltrate. No active pleural disease is seen. The cardiac silhouette and mediastinal structures are unremarkable. The tracheobronchial tree is midline and patent. A left-sided MediPort catheter is again noted, with the tip in the proximal SVC. There is again noted to be a thoracic scoliosis, with convexity to the right. Osteo-degenerative changes are again noted involving the thoracic spine. Large, ill-defined density overlying the left shoulder and mid to upper lateral left hemithorax, which limits full evaluation of the immediate surrounding soft tissue and osseous structures. No acute consolidation or infiltrate. Repeat chest radiograph can be performed further evaluation. Active Hospital Problems    Diagnosis Date Noted    Bilateral pulmonary embolism ([address] [I26.99] 12/18/2022     Priority: Medium         Impression/Plan:   COVID - appears clinically mild with scant cough no fever. no respiratory distress  Saddle PE- Heaprin gtt. Discussed with Dr. Claudio Hopper regarding Thrombectomy. - probably provoked from [place] but also w underlying clotting disorder from cancer. Will benefit from long term anticoagulation. Trop mildly elevated - will give low zuniga BB. Hold ASA due to mild Thrombocytopenia. Hypokalemia- replace iv and PO.    Checl LE Duplex  Check Echo  Colon Cancer s/p surgery and Ostomy in place  Cct>30       Thank you for allowing us to participate in the care of this patient. Will continue to follow. Please call if questions or concerns arise.     Electronically signed by Stanton Morillo MD on 12/18/2022 at 1:53 PM

## 2022-12-18 NOTE — PROGRESS NOTES
Admission from ED. Sister of pt, Hina Barragan [phone], here as today's visitor. Pt alert and oriented, talking on phone video call with another sister. No shortness of breath noted. VS noted. Pt reamins on [facility] for comfort. Pt laughing and gesticulating without ECHAVARRIA. Purewick in place. Colostomy o changed by celso RN.   Call [facility] within reach, database for admission started

## 2022-12-18 NOTE — ED NOTES
Pt's port is flushing but no blood return. Tried to get an IV in pt's hand (unable to). PA and charge nurse notified.       Trenton Wilkes RN  12/18/22 1608

## 2022-12-18 NOTE — ED NOTES
Pt was placed on life charisse. Pt will be taken on the monitor (BP and pulse oxygen).  Report will be given at bedside      Marc Michael RN  12/18/22 [id number]

## 2022-12-18 NOTE — ACP (ADVANCE CARE PLANNING)
Pt had stated to me that she would allow cpr but not a vent in case of emergency. I perfect serve contacted Dr Anny Peña to inform him of this. She was provided with advance directives per her request. Pastoral care to follow up.

## 2022-12-18 NOTE — ED PROVIDER NOTES
[address]      Pt Name: Sunni Chau  MRN: [id number]  [place] 1950  Date of evaluation: 12/18/2022  Provider: Nate Reyez Dr       Chief Complaint   Patient presents with    Fall     Pt fell in the bathroom, + LOC, + Head Injury, - Thinners. Pt does not remember the event. HISTORY OF PRESENT ILLNESS   (Location/Symptom, Timing/Onset, Context/Setting, Quality, Duration, Modifying Factors, Severity)  Note limiting factors. Sunni Chau is a 81 y.o. female who per chart review has a past medical history of colon cancer status post colectomy and on chemo arthritis hyperlipidemia presents to the emergency department for evaluation of syncope. Patient states that she was sitting on the toilet urinating and emptying her colostomy bag. She states when she stood up she felt \"weird,\" lightheaded. That is the last thing she remembers, she woke up on the floor. She is unsure how long she was unconscious for. She believes she hit her head, no thinners. She states she is not in any pain at this time. She did not have cp or sob prior to syncope. Denies hx of syncope. She has had a dry cough over the last few days, denies sick contacts. She has been vaccinated for covid. She has not been eating much and states she slept a lot yesterday which she attributes to chemo. Has been drinking water. She denies fever chills cp sob abd pain nvd headache dizziness confusion back or neck pain bowel or bladder incontinence saddle anesthesia leg pain or swelling urinary sx orthopnea throat pain visual changes facial droop     HPI    Nursing Notes were reviewed. REVIEW OF SYSTEMS    (2-9 systems for level 4, 10 or more for level 5)     Review of Systems   Constitutional:  Negative for chills and fever. HENT:  Negative for congestion. Eyes:  Negative for photophobia. Respiratory:  Positive for cough. Negative for shortness of breath and wheezing. Cardiovascular:  Negative for chest pain and palpitations. Gastrointestinal:  Negative for abdominal distention, abdominal pain, blood in stool, constipation, diarrhea, nausea and vomiting. Genitourinary:  Negative for dysuria, frequency and hematuria. Musculoskeletal:  Negative for myalgias. Allergic/Immunologic: Negative for immunocompromised state. Neurological:  Positive for syncope. Negative for dizziness, seizures, facial asymmetry, speech difficulty, weakness, numbness and headaches. All other systems reviewed and are negative. Except as noted above the remainder of the review of systems was reviewed and negative.        PAST MEDICAL HISTORY     Past Medical History:   Diagnosis Date    Arthritis     Cancer ([facility] [address]     SKIN    Hyperlipidemia          SURGICAL HISTORY       Past Surgical History:   Procedure Laterality Date    CATARACT REMOVAL      CHOLECYSTECTOMY      COLECTOMY N/A 10/3/2022    LOW ANTERIOR RESECTION WITH LOOP ILEOSTOMY performed by Jay Hubbard MD at [address] [facility] N/A 9/16/2022    COLORECTAL CANCER SCREENING, NOT HIGH RISK performed by Shirley Gonzales MD at [address] [facility] 9/26/2022    FLEXIBLE SIGMOIDOSCOPY WITH [address] POLYPECTOMY performed by Jay Hubbard MD at [address] N/A 11/3/2022    INFUSAPORT performed by Jay Hubbard MD at [facility] 2 LEG    TUBAL LIGATION           CURRENT MEDICATIONS       Current Discharge Medication List        CONTINUE these medications which have NOT CHANGED    Details   lidocaine-prilocaine (EMLA) 2.5-2.5 % cream       prochlorperazine (COMPAZINE) 10 MG tablet       SUTAB [id number] MG TABS       simvastatin (ZOCOR) 40 MG tablet TAKE ONE TABLET BY MOUTH EVERY DAY IN THE EVENING  Qty: 90 tablet, Refills: 3    Associated Diagnoses: Mixed hyperlipidemia      aspirin 325 MG tablet Take 325 mg by mouth daily      meloxicam (MOBIC) 15 MG tablet Take 1 tablet by mouth daily  Qty: 90 tablet, Refills: 3    Associated Diagnoses: Primary osteoarthritis involving multiple joints      Handicap Placard MISC by Does not apply route Good for 5 years  Qty: 1 each, Refills: 0    Associated Diagnoses: Cancer ([address]             ALLERGIES     Morphine    FAMILY HISTORY       Family History   Problem Relation Age of Onset    Diabetes Mother     Breast Cancer Maternal Aunt     Colon Cancer Neg Hx           SOCIAL HISTORY       Social History     Socioeconomic History    Marital status:    Tobacco Use    Smoking status: Never    Smokeless tobacco: Never   Vaping Use    Vaping Use: Never used   Substance and Sexual Activity    Alcohol use: No    Drug use: No     Social Determinants of Health     Financial Resource Strain: Low Risk     Difficulty of Paying Living Expenses: Not hard at all   Food Insecurity: No Food Insecurity    Worried About Running Out of Food in the Last Year: Never true    Ran Out of Food in the Last Year: Never true   Transportation Needs: No Transportation Needs    Lack of Transportation (Medical): No    Lack of Transportation (Non-Medical):  No       SCREENINGS         [place] Coma Scale  Eye Opening: Spontaneous  Best Verbal Response: Oriented  Best Motor Response: Obeys commands  [place]isa Coma Scale Score: 15                     CIWA Assessment  BP: 122/86  Heart Rate: (!) 106  Nausea and Vomiting: no nausea and no vomiting  Tactile Disturbances: none  Tremor: no tremor  Auditory Disturbances: not present  Paroxysmal Sweats: no sweat visible  Visual Disturbances: not present  Anxiety: no anxiety, at ease  Headache, Fullness in Head: none present  Agitation: normal activity  Orientation and Clouding of Sensorium: oriented and can do serial additions  CIWA-Ar Total: 0                 PHYSICAL EXAM    (up to 7 for level 4, 8 or more for level 5)     ED Triage Vitals [12/18/22 0530]   BP Temp Temp src Heart Rate Resp SpO2 Height Weight   (!) 134/94 98 °F (36.7 °C) -- (!) 114 18 94 % 4' 11\" (1.499 m) 173 lb (78.5 kg)       Physical Exam  Constitutional:       General: She is not in acute distress. Appearance: She is well-developed. She is ill-appearing. She is not toxic-appearing or diaphoretic. HENT:      Head: Normocephalic and atraumatic. Right Ear: Tympanic membrane, ear canal and external ear normal.      Left Ear: Tympanic membrane, ear canal and external ear normal.      Nose: Nose normal.      Mouth/Throat:      Mouth: Mucous membranes are moist.   Eyes:      Pupils: Pupils are equal, round, and reactive to light. Cardiovascular:      Rate and Rhythm: Regular rhythm. Tachycardia present. Heart sounds: No murmur heard. No friction rub. No gallop. Pulmonary:      Effort: Pulmonary effort is normal. No respiratory distress. Breath sounds: Normal breath sounds. No stridor. No wheezing, rhonchi or rales. Comments: Dry cough noted  Abdominal:      General: Bowel sounds are normal. There is no distension. Palpations: Abdomen is soft. Tenderness: There is no abdominal tenderness. There is no guarding or rebound. Musculoskeletal:         General: No swelling. Cervical back: Normal range of motion. Tenderness present. No swelling, edema, deformity, erythema, signs of trauma, lacerations, bony tenderness or crepitus. No pain with movement. Normal range of motion. Thoracic back: Tenderness present. Lumbar back: Normal.      Right lower leg: No edema. Left lower leg: No edema. Comments: There is mild TTP of the lower cervical/upper thoracic region however no obvious injury or deformity. No obvious step off or crepitus. Skin:     General: Skin is warm and dry. Capillary Refill: Capillary refill takes less than 2 seconds. Findings: No rash. Neurological:      General: No focal deficit present. Mental Status: She is alert and oriented to person, place, and time.       GCS: GCS eye subscore is 4. GCS verbal subscore is 5. GCS motor subscore is 6. Cranial Nerves: Cranial nerves 2-12 are intact. Sensory: Sensation is intact. Motor: Motor function is intact. Coordination: Coordination is intact. Gait: Gait is intact. Comments: A&O x 4, GCS 15. DIAGNOSTIC RESULTS     EKG: All EKG's are interpreted by the Emergency Department Physician who either signs or Co-signs this chart in the absence of a cardiologist.    EKG shows Sinus tach with , R axis, normal intervals, no ST changes. Repeat EKG: EKG shows sinus tach with , normal axis, normal intervals, no ST changes. Incomplete RBBB. RADIOLOGY:   Non-plain film images such as CT, Ultrasound and MRI are read by the radiologist. Plain radiographic images are visualized and preliminarily interpreted by the emergency physician with the below findings:        Interpretation per the Radiologist below, if available at the time of this note:    CTA CHEST W WO CONTRAST PE Eval   Final Result   Extensive pulmonary emboli with saddle embolism identified. There is   evidence of right heart strain within RV LV ratio of 1.6. Mild ground-glass   seen at the right lung base in which early infarction cannot be excluded. There is increased density seen surrounding the left luke catheter in the   subcutaneous tissues to suggest extravasation of intravenous contrast.      Findings were discussed with Dr. Harvey Dove at time of interpretation of the   examination. XR CHEST PORTABLE   Final Result   Large, ill-defined density overlying the left shoulder and mid to upper   lateral left hemithorax, which limits full evaluation of the immediate   surrounding soft tissue and osseous structures. No acute consolidation or   infiltrate. Repeat chest radiograph can be performed further evaluation. CT HEAD WO CONTRAST   Final Result   1. There is no acute intracranial abnormality.   Specifically, there is no intracranial hemorrhage. 2. Atrophy and periventricular leukomalacia,         CT CERVICAL SPINE WO CONTRAST   Final Result   1. There is no acute compression fracture or subluxation of the cervical   spine. 2. Multilevel degenerative disc and degenerative joint disease. .         CT THORACIC SPINE WO CONTRAST   Final Result   1. There is no acute compression fracture of the thoracic spine. 2. Dextroscoliosis   3. Multilevel degenerative changes. CT LUMBAR SPINE WO CONTRAST   Final Result   Disc space narrowing and degenerative changes. No acute vertebral fracture or subluxation.          IR PICC WO SQ PORT/PUMP > 5 YEARS    (Results Pending)   XR PELVIS (1-2 VIEWS)    (Results Pending)   US DUP LOWER EXTREMITIES BILATERAL VENOUS    (Results Pending)         ED BEDSIDE ULTRASOUND:   Performed by ED Physician - none    LABS:  Labs Reviewed   COVID-19, RAPID - Abnormal; Notable for the following components:       Result Value    SARS-CoV-2, NAAT DETECTED (*)     All other components within normal limits   CBC WITH AUTO DIFFERENTIAL - Abnormal; Notable for the following components:    WBC 1.9 (*)     MCHC 32.5 (*)     RDW 15.8 (*)     Platelets 94 (*)     Lymphocytes Absolute 0.2 (*)     Monocytes Absolute 0.0 (*)     All other components within normal limits   COMPREHENSIVE METABOLIC PANEL - Abnormal; Notable for the following components:    Sodium 134 (*)     Potassium 2.8 (*)     Total Protein 6.1 (*)     All other components within normal limits    Narrative:     CALL  [place]  [facility] tel. [phone],  Potassium results called to and read back by Rea Calixto, 12/18/2022  09:04, by MARIAJOSE  Troponin results called to and read back by [facility], 12/18/2022 08:46,  by Milagro Brenner   TROPONIN - Abnormal; Notable for the following components:    Troponin 0.096 (*)     All other components within normal limits    Narrative:     CALL  [place]  [facility] tel. [phone],  Troponin results called to and read back by Nichelle Wilkes, 12/18/2022 08:46,  by Oli Colmenares - Abnormal; Notable for the following components:    Protime 15.5 (*)     All other components within normal limits   PROCALCITONIN - Abnormal; Notable for the following components:    Procalcitonin 0.16 (*)     All other components within normal limits    Narrative:     CALL  [place]  [facility] tel. [phone],  Troponin results called to and read back by [facility], 12/18/2022 08:46,  by Yolie Click   RAPID INFLUENZA A/B ANTIGENS   MAGNESIUM    Narrative:     Alex Schmitz tel. [phone],  Potassium results called to and read back by Lauren Roberts, 12/18/2022  09:04, by Yolie Click  Troponin results called to and read back by [facility], 12/18/2022 08:46,  by Yolie Click   APTT   BRAIN NATRIURETIC PEPTIDE    Narrative:     Alex Schmitz tel. [phone],  Troponin results called to and read back by [facility], 12/18/2022 08:46,  by Yolie Click   LACTIC ACID   CBC   APTT   PROTIME-INR   ANTI-XA, UNFRACTIONATED HEPARIN   ANTI-XA, UNFRACTIONATED HEPARIN   POCT CREATININE       All other labs were within normal range or not returned as of this dictation. EMERGENCY DEPARTMENT COURSE and DIFFERENTIAL DIAGNOSIS/MDM:   Vitals:    Vitals:    12/18/22 1130 12/18/22 1145 12/18/22 1245 12/18/22 1300   BP: (!) 139/101  119/67 122/86   Pulse: (!) 108 96 98 (!) 106   Resp: 19 20 18 23   Temp:       SpO2: 97% 99% 99% 100%   Weight:       Height:           MDM    Patient is a 81-year-old female presents the ED for evaluation of syncope with fall and cough. She is afebrile tachycardic to 110-115 in the ED. she is A&O x4 with a GCS of 15. She was given 500cc IV NS in the. EKG non ischemic, shows R axis and tachycardia to 115. CBC is remarkable for neutropenia to 1.9 and thrombocytopenia to 94. Patient states that her oncologist Dr. Josh Hughes is aware, was told that it was from her chemo. CMP is remarkable for potassium of 2.8 which was replaced in the ED.   Troponin elevated to 0.096, access for CTA via ultrasound and multiple nurses but unsuccessful. PICC team called in at this time. While waiting for PICC team, EMT was able to obtain a 20 in the [facility] so patient was able to go back to CT before pt had PICC placed. While awaiting CT, I was able to have the patient admitted to the ICU. Dr. Hal Walker accepted to the floor. I reviewed CTA image along with DR. Milligan and there appears to be saddle PE. Heparin ordered. Dosing per pharmacy consult. Per Dr. Naz Genao and Dr. Hal Walker, agree with heparin administration despite platelets of 94. Received call from stat rad radiology, confirms saddle PE with R heart strain. Pt has not had any hypotension while in the ED. Spoke with Dr. Bradley Morris of cardiology regarding saddle PE, recommends consulting with Dr. Josue Gomez. Spoke with Dr. Josue Gomez, planning for PE thrombectomy tomorrow. Would like bilateral LE US added at this time and complete bed rest, both of which were ordered by me. Due to syncope secondary to saddle PE likely secondary to cancer history, recent surgery, covid-19 infection, pt to be admitted to the ICU. Stable for admission at this time. REASSESSMENT          CRITICAL CARE TIME   Total Critical Care time was 30 minutes, excluding separately reportable procedures. There was a high probability of clinically significant/life threatening deterioration in the patient's condition which required my urgent intervention. CONSULTS:  IP CONSULT TO PHARMACY  IP CONSULT TO HEM/ONC  IP CONSULT TO CRITICAL CARE  IP CONSULT TO CARDIOLOGY  IP CONSULT TO SPIRITUAL SERVICES  IP CONSULT TO CARDIOLOGY    PROCEDURES:  Unless otherwise noted below, none     Procedures        FINAL IMPRESSION      1. Acute saddle pulmonary embolism with acute cor pulmonale (HCC)    2. Syncope and collapse    3. COVID-19    4. Elevated troponin    5. Thrombocytopenia ([address]    6. Neutropenia, unspecified type ([address]    7. Seizure-like activity ([address]    8.  History of rectal cancer    9. Hypokalemia          DISPOSITION/PLAN   DISPOSITION Admitted 12/18/2022 11:43:52 AM      PATIENT REFERRED TO:  No follow-up provider specified. DISCHARGE MEDICATIONS:  Current Discharge Medication List        Controlled Substances Monitoring:     No flowsheet data found.     (Please note that portions of this note were completed with a voice recognition program.  Efforts were made to edit the dictations but occasionally words are mis-transcribed.)    Irene Esparza PA-C (electronically signed)           Irene Esparza PA-C  12/18/22 1452

## 2022-12-18 NOTE — CONSULTS
Hematology/Oncology Consult  Encounter Date: 2022 2:51 PM    Ms. Jennifer Cooper is a 81 y.o. female  : 1950  MRN: [id number]  Acct Number: [de-identified]  Requesting Provider: DR Aaron Adam    Reason for request: pulmonary embolism      CONSULTANT: Jennifer Chahal MD    HPI: Warner Robledo was admitted for weakness and fall at home. Ct chest showed bilateral pulmonary embolism. Tested positive for Covid . On heparin infusion. She also has history of rectal cancer s/p resection with colostomy . On current chemotherapy , given q 2 weeks. Last from 2022. WBC at 1900.      Patient Active Problem List   Diagnosis    Cancer (HCC)    Hyperlipidemia    Elevated blood pressure reading    Colonic mass    Rectal polyp    Rectal cancer ([facility] [address]    Malignant neoplasm of rectum (HCC)    Bilateral pulmonary embolism (HCC)     Past Medical History:   Diagnosis Date    Arthritis     Cancer ([facility] [address]     SKIN    Hyperlipidemia      @PSH@  Family History   Problem Relation Age of Onset    Diabetes Mother     Breast Cancer Maternal Aunt     Colon Cancer Neg Hx      Social History     Socioeconomic History    Marital status:      Spouse name: Not on file    Number of children: Not on file    Years of education: Not on file    Highest education level: Not on file   Occupational History    Not on file   Tobacco Use    Smoking status: Never    Smokeless tobacco: Never   Vaping Use    Vaping Use: Never used   Substance and Sexual Activity    Alcohol use: No    Drug use: No    Sexual activity: Not on file   Other Topics Concern    Not on file   Social History Narrative    Not on file     Social Determinants of Health     Financial Resource Strain: Low Risk     Difficulty of Paying Living Expenses: Not hard at all   Food Insecurity: No Food Insecurity    Worried About Running Out of Food in the Last Year: Never true    Ran Out of Food in the Last Year: Never true   Transportation Needs: No Transportation Needs    Lack of Transportation (Medical): No    Lack of Transportation (Non-Medical):  No   Physical Activity: Not on file   Stress: Not on file   Social Connections: Not on file   Intimate Partner Violence: Not on file   Housing Stability: Not on file            Current Facility-Administered Medications   Medication Dose Route Frequency Provider Last Rate Last Admin    sodium chloride flush 0.9 % injection 5-40 mL  5-40 mL IntraVENous 2 times per day Dorise Bolognese, PA-C        sodium chloride flush 0.9 % injection 5-40 mL  5-40 mL IntraVENous PRN Dorise Bolognese, PA-C        0.9 % sodium chloride infusion   IntraVENous PRN Dorise Bolognese, PA-C        sodium chloride flush 0.9 % injection 5-40 mL  5-40 mL IntraVENous 2 times per day Sammy Porras MD        sodium chloride flush 0.9 % injection 5-40 mL  5-40 mL IntraVENous PRN Sammy Porras MD        0.9 % sodium chloride infusion   IntraVENous PRN Sammy Porras MD        ondansetron (ZOFRAN-ODT) disintegrating tablet 4 mg  4 mg Oral Q8H PRN Sammy Porras MD        Or    ondansetron [facility]) injection 4 mg  4 mg IntraVENous Q6H PRN Sammy Porras MD        polyethylene glycol [facility]) packet 17 g  17 g Oral Daily PRN Sammy Porras MD        acetaminophen (TYLENOL) tablet 650 mg  650 mg Oral Q6H PRN Sammy Porras MD        Or    acetaminophen (TYLENOL) suppository 650 mg  650 mg Rectal Q6H PRN Sammy Porras MD        heparin (porcine) injection 6,280 Units  80 Units/kg IntraVENous PRN Sammy Porras MD        heparin (porcine) injection 3,140 Units  40 Units/kg IntraVENous PRN Sammy Porras MD        heparin 25,000 units in dextrose 5% 250 mL (premix) infusion  5-30 Units/kg/hr IntraVENous Continuous Sammy Porras MD 14.1 mL/hr at 12/18/22 1441 18 Units/kg/hr at 12/18/22 1441    metoprolol succinate (TOPROL XL) extended release tablet 25 mg  25 mg Oral Daily Juan Nina MD         [unfilled]  Allergies   Allergen Reactions    Morphine Itching        Review of Systems  All other systems are normal other than ones mentioned in HPI. PHYSICAL EXAMINATION:   VITAL SIGNS: /86   Pulse (!) 106   Temp 98 °F (36.7 °C)   Resp 23   Ht 4' 11\" (1.499 m)   Wt 173 lb (78.5 kg)   SpO2 100%   BMI 34.94 kg/m²         GENERAL: In no acute distress, well- nourished, well- developed,alert and oriented to person place and time. SKIN: Warm and dry, withoutjaundice, ecchymoses, or petechiae. HEENT: Normocephalic, sclera anicteric, oral mucosa moist without lesion or exudate in the visible oral cavity or oropharynx, tongue mid-line with good mobility and no deviation with extension. NODES: No palpable adenopathy in the neck Levels I-V, bilateral   Supraclavicular fossae, axillary chains, or inguinal regions. LUNGS: Good inspiratory effort, no accessory muscle use, clear bilaterally, no focal wheeze, rales or rhonchi. CARDIAC: Regular rate and rhythm, without murmurs, rubs or gallops. ABDOMINAL: Normal bowel soundspresent, soft, non-tender, no mass or  organomegaly.   MUSKL:   LAB RESULTS:  Recent Results (from the past 24 hour(s))   CBC with Auto Differential    Collection Time: 12/18/22  6:45 AM   Result Value Ref Range    WBC 1.9 (L) 4.8 - 10.8 K/uL    RBC 4.45 4.20 - 5.40 M/uL    Hemoglobin 12.8 12.0 - 16.0 g/dL    Hematocrit 39.3 37.0 - 47.0 %    MCV 88.2 79.4 - 94.8 fL    MCH 28.7 27.0 - 31.3 pg    MCHC 32.5 (L) 33.0 - 37.0 %    RDW 15.8 (H) 11.5 - 14.5 %    Platelets 94 (L) 137 - 400 K/uL    PLATELET SLIDE REVIEW Decreased     Neutrophils % 79.0 %    Lymphocytes % 13.0 %    Monocytes % 2.0 %    Eosinophils % 0.4 %    Basophils % 1.4 %    Neutrophils Absolute 1.6 1.4 - 6.5 K/uL    Lymphocytes Absolute 0.2 (L) 1.0 - 4.8 K/uL    Monocytes Absolute 0.0 (L) 0.2 - 0.8 K/uL    Eosinophils Absolute 0.0 0.0 - 0.7 K/uL    Basophils Absolute 0.0 0.0 - 0.2 K/uL    Bands Relative 6 5 - 11 %    Vacuolated Neutrophils 1+     Anisocytosis 2+     Microcytes 2+     Poikilocytes 1+ CMP    Collection Time: 12/18/22  6:45 AM   Result Value Ref Range    Sodium 134 (L) 135 - 144 mEq/L    Potassium 2.8 (LL) 3.4 - 4.9 mEq/L    Chloride 96 95 - 107 mEq/L    CO2 24 20 - 31 mEq/L    Anion Gap 14 9 - 15 mEq/L    Glucose 98 70 - 99 mg/dL    BUN 9 8 - 23 mg/dL    Creatinine 0.66 0.50 - 0.90 mg/dL    Est, Glom Filt Rate >60.0 >60    Calcium 8.7 8.5 - 9.9 mg/dL    Total Protein 6.1 (L) 6.3 - 8.0 g/dL    Albumin 3.6 3.5 - 4.6 g/dL    Total Bilirubin 0.6 0.2 - 0.7 mg/dL    Alkaline Phosphatase 125 40 - 130 U/L    ALT 16 0 - 33 U/L    AST 21 0 - 35 U/L    Globulin 2.5 2.3 - 3.5 g/dL   Magnesium    Collection Time: 12/18/22  6:45 AM   Result Value Ref Range    Magnesium 2.0 1.7 - 2.4 mg/dL   Troponin    Collection Time: 12/18/22  6:45 AM   Result Value Ref Range    Troponin 0.096 (HH) 0.000 - 0.010 ng/mL   Procalcitonin    Collection Time: 12/18/22  6:45 AM   Result Value Ref Range    Procalcitonin 0.16 (H) 0.00 - 0.15 ng/mL   Brain Natriuretic Peptide    Collection Time: 12/18/22  6:45 AM   Result Value Ref Range    Pro-BNP 1,049 pg/mL   Lactic Acid    Collection Time: 12/18/22  6:45 AM   Result Value Ref Range    Lactic Acid 1.2 0.5 - 2.2 mmol/L   COVID-19, Rapid    Collection Time: 12/18/22  7:41 AM    Specimen: Nasopharyngeal Swab   Result Value Ref Range    SARS-CoV-2, NAAT DETECTED (A) Not Detected   Rapid Influenza A/B Antigens    Collection Time: 12/18/22  7:41 AM    Specimen: Nasopharyngeal   Result Value Ref Range    Influenza A by PCR Negative     Influenza B by PCR Negative    EKG 12 Lead    Collection Time: 12/18/22  7:48 AM   Result Value Ref Range    Ventricular Rate 115 BPM    Atrial Rate 115 BPM    P-R Interval 152 ms    QRS Duration 94 ms    Q-T Interval 350 ms    QTc Calculation (Bazett) 484 ms    P Axis 37 degrees    R Axis 108 degrees    T Axis 15 degrees   APTT    Collection Time: 12/18/22  8:40 AM   Result Value Ref Range    aPTT 29.9 24.4 - 36.8 sec   Protime-INR    Collection Time: 12/18/22  8:40 AM   Result Value Ref Range    Protime 15.5 (H) 12.3 - 14.9 sec    INR 1.2      Recent Labs     12/18/22  0645   GLUCOSE 98        Pathology:     RADIOLOGY RESULTS:  CT HEAD WO CONTRAST    Result Date: 12/18/2022  EXAMINATION: CT OF THE HEAD WITHOUT CONTRAST  12/18/2022 8:30 am TECHNIQUE: CT of the head was performed without the administration of intravenous contrast. Automated exposure control, iterative reconstruction, and/or weight based adjustment of the mA/kV was utilized to reduce the radiation dose to as low as reasonably achievable. COMPARISON: None. HISTORY: ORDERING SYSTEM PROVIDED HISTORY: syncope, fall TECHNOLOGIST PROVIDED HISTORY: Reason for exam:->syncope, fall Has a \"code stroke\" or \"stroke alert\" been called? ->No Decision Support Exception - unselect if not a suspected or confirmed emergency medical condition->Emergency Medical Condition ([place]) What reading provider will be dictating this exam?->CRC FINDINGS: BRAIN/VENTRICLES: There is no acute intracranial hemorrhage, mass effect or midline shift. No abnormal extra-axial fluid collection. The gray-white differentiation is maintained without evidence of an acute infarct. There is no evidence of hydrocephalus. The ventricles, cisterns and sulci are prominent consistent with atrophy. There is decreased attenuation within the periventricular white matter consistent with periventricular leukomalacia. ORBITS: The visualized portion of the orbits demonstrate no acute abnormality. SINUSES: The visualized paranasal sinuses and mastoid air cells demonstrate no acute abnormality. There is mild mucosal thickening seen within the right ethmoid air cells. SOFT TISSUES/SKULL:  No acute abnormality of the visualized skull or soft tissues. 1. There is no acute intracranial abnormality. Specifically, there is no intracranial hemorrhage.  2. Atrophy and periventricular leukomalacia,     CTA CHEST W WO CONTRAST PE Eval    Result Date: 12/18/2022  EXAMINATION: CTA OF THE CHEST WITH AND WITHOUT CONTRAST 12/18/2022 8:30 am TECHNIQUE: CTA of the chest was performed before and after the administration of intravenous contrast.  Multiplanar reformatted images are provided for review. MIP images are provided for review. Automated exposure control, iterative reconstruction, and/or weight based adjustment of the mA/kV was utilized to reduce the radiation dose to as low as reasonably achievable. COMPARISON: None. HISTORY: ORDERING SYSTEM PROVIDED HISTORY: PE, syncope, hx of cancer, rule out PE; cough evaluate for infiltrate TECHNOLOGIST PROVIDED HISTORY: Reason for exam:->PE Reason for exam:->syncope, hx of cancer, rule out PE; cough evaluate for infiltrate Decision Support Exception - unselect if not a suspected or confirmed emergency medical condition->Emergency Medical Condition (MA) What reading provider will be dictating this exam?->CRC FINDINGS: Pulmonary Arteries: Pulmonary arteries are adequately opacified for evaluation. There is abnormal filling defect seen within the main pulmonary arteries bilaterally with slight saddle embolism identified. There is opacification identified within all the segmental and subsegmental branches most pronounced within the right middle and lower lobe pulmonary arteries. Emboli as well seen within the lingular branch. There is evidence of right heart strain within RV LV ratio of 1.6. There is prominence identified of the main pulmonary artery. Mediastinum: No evidence of mediastinal lymphadenopathy. The heart and pericardium demonstrate no acute abnormality. There is no acute abnormality of the thoracic aorta. Lungs/pleura: Mild ground-glass opacity identified in the right lower lobe suggesting possibly an area of infarction given the extensive thrombus within the pulmonary arteries on the right. Atelectatic change cannot be excluded. There is minimal atelectatic changes seen within the lingula.   No definite pleural effusion or pneumothorax. Upper Abdomen: Limited images of the upper abdomen are unremarkable. Soft Tissues/Bones: Abnormal increased density identified within the subcutaneous tissues surrounding the luke catheter on the left anterior chest wall to suggest extravasation of intravenous contrast.     Extensive pulmonary emboli with saddle embolism identified. There is evidence of right heart strain within RV LV ratio of 1.6. Mild ground-glass seen at the right lung base in which early infarction cannot be excluded. There is increased density seen surrounding the left luke catheter in the subcutaneous tissues to suggest extravasation of intravenous contrast. Findings were discussed with Dr. Kita Ruvalcaba at time of interpretation of the examination. CT CERVICAL SPINE WO CONTRAST    Result Date: 12/18/2022  EXAMINATION: CT OF THE CERVICAL SPINE WITHOUT CONTRAST 12/18/2022 8:30 am TECHNIQUE: CT of the cervical spine was performed without the administration of intravenous contrast. Multiplanar reformatted images are provided for review. Automated exposure control, iterative reconstruction, and/or weight based adjustment of the mA/kV was utilized to reduce the radiation dose to as low as reasonably achievable. COMPARISON: None. HISTORY: ORDERING SYSTEM PROVIDED HISTORY: syncope, fall TECHNOLOGIST PROVIDED HISTORY: Reason for exam:->syncope, fall Decision Support Exception - unselect if not a suspected or confirmed emergency medical condition->Emergency Medical Condition ([place]) What reading provider will be dictating this exam?->CRC FINDINGS: The ring of C1 is intact as is the dense. There is no compression fracture of the cervical spine. No jumped or perched facet is noted. Multilevel degenerative disc and degenerative joint disease is noted. There are posterior degenerative endplate spurs seen at the C3-4 and C6-7 levels.  There is mild central canal stenosis at the C6-7 level and mild bilateral neural foraminal narrowing. The prevertebral soft tissues are unremarkable. The airway is widely patent. Images through the lung apices are negative for a pneumothorax. 1. There is no acute compression fracture or subluxation of the cervical spine. 2. Multilevel degenerative disc and degenerative joint disease. .     CT THORACIC SPINE WO CONTRAST    Result Date: 12/18/2022  EXAMINATION: CT OF THE THORACIC SPINE WITHOUT CONTRAST  12/18/2022 8:30 am: TECHNIQUE: CT of the thoracic spine was performed without the administration of intravenous contrast. Multiplanar reformatted images are provided for review. Automated exposure control, iterative reconstruction, and/or weight based adjustment of the mA/kV was utilized to reduce the radiation dose to as low as reasonably achievable. COMPARISON: None. HISTORY: ORDERING SYSTEM PROVIDED HISTORY: syncope, fall TECHNOLOGIST PROVIDED HISTORY: Reason for exam:->syncope, fall What reading provider will be dictating this exam?->CRC FINDINGS: BONES/ALIGNMENT: There is no acute compression fracture of the thoracic spine. There is dextroscoliosis of the thoracic spine. Mild multilevel degenerative disc and degenerative joint disease is noted. There are large anterior and lateral degenerative endplate spurs seen throughout the course of the thoracic spine. .  There is no osseous lesion. SOFT TISSUES: No paraspinal mass is seen. 1. There is no acute compression fracture of the thoracic spine. 2. Dextroscoliosis 3. Multilevel degenerative changes. CT LUMBAR SPINE WO CONTRAST    Result Date: 12/18/2022  EXAMINATION: CT OF THE LUMBAR SPINE WITHOUT CONTRAST  12/18/2022 TECHNIQUE: CT of the lumbar spine was performed without the administration of intravenous contrast. Multiplanar reformatted images are provided for review. Adjustment of mA and/or kV according to patient size was utilized.   Automated exposure control, iterative reconstruction, and/or weight based adjustment of the mA/kV was utilized to reduce the radiation dose to as low as reasonably achievable. COMPARISON: None. HISTORY: ORDERING SYSTEM PROVIDED HISTORY: syncope, fall TECHNOLOGIST PROVIDED HISTORY: Reason for exam:->syncope, fall Decision Support Exception - unselect if not a suspected or confirmed emergency medical condition->Emergency Medical Condition ([place]) What reading provider will be dictating this exam?->CRC FINDINGS: Vertebral alignment is normal.  Mild disc space narrowing and discovertebral degenerative changes are identified. Facet arthritis is also seen. No acute vertebral fracture is visualized. No spondylolysis is noted on the right or left. The sacroiliac joints are intact. The paraspinal soft tissues appear unremarkable. The urinary bladder is dilated. There are mild right hydronephrosis and hydroureter. There is colonic diverticulosis. There are postsurgical changes at the colorectal junction. Disc space narrowing and degenerative changes. No acute vertebral fracture or subluxation. XR CHEST PORTABLE    Result Date: 12/18/2022  EXAMINATION: ONE XRAY VIEW OF THE CHEST 12/18/2022 9:59 am COMPARISON: The previous study performed 11/03/2022. HISTORY: ORDERING SYSTEM PROVIDED HISTORY: fall, syncope TECHNOLOGIST PROVIDED HISTORY: Reason for exam:->fall, syncope What reading provider will be dictating this exam?->CRC FINDINGS: There is a large, ill-defined density overlying the left shoulder and mid to upper lateral left hemithorax. This may be external to the patient. It limits full evaluation of the surrounding soft tissue and osseous structures. There is no evidence of acute consolidation or infiltrate. No active pleural disease is seen. The cardiac silhouette and mediastinal structures are unremarkable. The tracheobronchial tree is midline and patent. A left-sided MediPort catheter is again noted, with the tip in the proximal SVC.   There is again noted to be a thoracic scoliosis, with convexity to the right. Osteo-degenerative changes are again noted involving the thoracic spine. Large, ill-defined density overlying the left shoulder and mid to upper lateral left hemithorax, which limits full evaluation of the immediate surrounding soft tissue and osseous structures. No acute consolidation or infiltrate. Repeat chest radiograph can be performed further evaluation. .     ASSESSMENT AND PLAN  Pulmonary embolism on heparin. Covid positive. Rectal cancer s/p resection for stage 3 and o adjuvant chemotherapy. Leucopenia is from chemotherapy. Continue heparin and for neupogen.    Electronically signed by Roberto Barrow MD on 12/18/2022 at 2:51 PM

## 2022-12-18 NOTE — H&P
Hospital Medicine  History and Physical    Patient:  Armin Pyle  MRN: [id number]    CHIEF COMPLAINT:    Chief Complaint   Patient presents with    Fall     Pt fell in the bathroom, + LOC, + Head Injury, - Thinners. Pt does not remember the event. History Obtained From:  patient  Primary Care Physician: Rabia Hanson DO    HISTORY OF PRESENT ILLNESS:   The patient is a 81 y.o. female who presents with a hx of colon cancer undergoing active chemotherapy, hld, oa, who presents after a fall while walking from the bathroom after changing her colostomy bag. She lost consciousness for an unknown amount of time. She was then brought to the ER and had a second syncopal episode. The ct tech noted some seizure like activity with jaw clenching and grimacing along with clonic activity in 4 extremities. Past Medical History:      Diagnosis Date    Arthritis     Cancer ([address]     SKIN    Hyperlipidemia        Past Surgical History:      Procedure Laterality Date    CATARACT REMOVAL      CHOLECYSTECTOMY      COLECTOMY N/A 10/3/2022    LOW ANTERIOR RESECTION WITH LOOP ILEOSTOMY performed by Rui Bar MD at [address] [facility] N/A 9/16/2022    COLORECTAL CANCER SCREENING, NOT HIGH RISK performed by Mathtew Escoto MD at [address] [address] N/A 9/26/2022    FLEXIBLE SIGMOIDOSCOPY WITH SNARE POLYPECTOMY performed by Rui Bar MD at [address] [place] [address] N/A 11/3/2022    [place] [facility] [place]         Medications Prior to Admission:    Prior to Admission medications    Medication Sig Start Date End Date Taking?  Authorizing Provider   lidocaine-prilocaine (EMLA) 2.5-2.5 % cream  11/8/22   Historical Provider, MD   prochlorperazine (COMPAZINE) 10 MG tablet  11/8/22   Historical Provider, MD Jina Rodriguez [phone] MG TABS  9/12/22   Historical Provider, MD   simvastatin (ZOCOR) 40 MG tablet TAKE ONE TABLET BY MOUTH EVERY DAY IN THE EVENING 10/25/22   Ajith Lawson,    aspirin 325 MG tablet Take 325 mg by mouth daily  Patient not taking: Reported on 11/17/2022    Historical Provider, MD   meloxicam (MOBIC) 15 MG tablet Take 1 tablet by mouth daily  Patient not taking: Reported on 11/17/2022 9/28/21   Leah Puentes, APRN - CNP   Handicap Placard MISC by Does not apply route Good for 5 years 12/5/19   Roby Brito MD       Allergies:  Morphine    Social History:   TOBACCO:   reports that she has never smoked. She has never used smokeless tobacco.  ETOH:   reports no history of alcohol use. Family History:       Problem Relation Age of Onset    Diabetes Mother     Breast Cancer Maternal Aunt     Colon Cancer Neg Hx        REVIEW OF SYSTEMS:  Ten systems reviewed and negative except for as above. Physical Exam:    Vitals: /80   Pulse (!) 102   Temp 98 °F (36.7 °C)   Resp 20   Ht 4' 11\" (1.499 m)   Wt 173 lb (78.5 kg)   SpO2 98%   BMI 34.94 kg/m²   Constitutional: alert, appears stated age and cooperative  Skin: Skin color, texture, turgor normal. No rashes or lesions  Eyes:Eye: Normal external eye, conjunctiva, DARSHANA. ENT: Head: Normocephalic, no lesions, without obvious abnormality. Neck: no adenopathy, no carotid bruit, no JVD, supple, symmetrical, trachea midline and thyroid not enlarged, symmetric, no tenderness/mass/nodules  Respiratory: clear to auscultation bilaterally  Cardiovascular: regular rate and rhythm, S1, S2 normal, no murmur, click, rub or gallop  Gastrointestinal: soft, non-tender; bowel sounds normal; no masses,  no organomegaly  Genitourinary: Deferred  Musculoskeletal:extremities normal, atraumatic, no cyanosis or edema  Neurologic: Mental status AAOx3 No facial asymmetry or droop. Normal muscle strength b/l. CN II-XII grossly intact.     Recent Labs     12/18/22  0645   WBC 1.9*   HGB 12.8   PLT 94*     Recent Labs     12/18/22  0645   *   K 2.8*   CL 96 CO2 24   BUN 9   CREATININE 0.66   GLUCOSE 98   AST 21   ALT 16   BILITOT 0.6   ALKPHOS 125     Troponin T:   Recent Labs     12/18/22  0645   TROPONINI 0.096*       INR:   Recent Labs     12/18/22  0840   INR 1.2       -----   CT HEAD WO CONTRAST    Result Date: 12/18/2022  EXAMINATION: CT OF THE HEAD WITHOUT CONTRAST  12/18/2022 8:30 am TECHNIQUE: CT of the head was performed without the administration of intravenous contrast. Automated exposure control, iterative reconstruction, and/or weight based adjustment of the mA/kV was utilized to reduce the radiation dose to as low as reasonably achievable. COMPARISON: None. HISTORY: ORDERING SYSTEM PROVIDED HISTORY: syncope, fall TECHNOLOGIST PROVIDED HISTORY: Reason for exam:->syncope, fall Has a \"code stroke\" or \"stroke alert\" been called? ->No Decision Support Exception - unselect if not a suspected or confirmed emergency medical condition->Emergency Medical Condition (MA) What reading provider will be dictating this exam?->CRC FINDINGS: BRAIN/VENTRICLES: There is no acute intracranial hemorrhage, mass effect or midline shift. No abnormal extra-axial fluid collection. The gray-white differentiation is maintained without evidence of an acute infarct. There is no evidence of hydrocephalus. The ventricles, cisterns and sulci are prominent consistent with atrophy. There is decreased attenuation within the periventricular white matter consistent with periventricular leukomalacia. ORBITS: The visualized portion of the orbits demonstrate no acute abnormality. SINUSES: The visualized paranasal sinuses and mastoid air cells demonstrate no acute abnormality. There is mild mucosal thickening seen within the right ethmoid air cells. SOFT TISSUES/SKULL:  No acute abnormality of the visualized skull or soft tissues. 1. There is no acute intracranial abnormality. Specifically, there is no intracranial hemorrhage.  2. Atrophy and periventricular leukomalacia,     CT CERVICAL SPINE WO CONTRAST    Result Date: 12/18/2022  EXAMINATION: CT OF THE CERVICAL SPINE WITHOUT CONTRAST 12/18/2022 8:30 am TECHNIQUE: CT of the cervical spine was performed without the administration of intravenous contrast. Multiplanar reformatted images are provided for review. Automated exposure control, iterative reconstruction, and/or weight based adjustment of the mA/kV was utilized to reduce the radiation dose to as low as reasonably achievable. COMPARISON: None. HISTORY: ORDERING SYSTEM PROVIDED HISTORY: syncope, fall TECHNOLOGIST PROVIDED HISTORY: Reason for exam:->syncope, fall Decision Support Exception - unselect if not a suspected or confirmed emergency medical condition->Emergency Medical Condition (MA) What reading provider will be dictating this exam?->CRC FINDINGS: The ring of C1 is intact as is the dense. There is no compression fracture of the cervical spine. No jumped or perched facet is noted. Multilevel degenerative disc and degenerative joint disease is noted. There are posterior degenerative endplate spurs seen at the C3-4 and C6-7 levels. There is mild central canal stenosis at the C6-7 level and mild bilateral neural foraminal narrowing. The prevertebral soft tissues are unremarkable. The airway is widely patent. Images through the lung apices are negative for a pneumothorax. 1. There is no acute compression fracture or subluxation of the cervical spine. 2. Multilevel degenerative disc and degenerative joint disease. .     CT THORACIC SPINE WO CONTRAST    Result Date: 12/18/2022  EXAMINATION: CT OF THE THORACIC SPINE WITHOUT CONTRAST  12/18/2022 8:30 am: TECHNIQUE: CT of the thoracic spine was performed without the administration of intravenous contrast. Multiplanar reformatted images are provided for review.  Automated exposure control, iterative reconstruction, and/or weight based adjustment of the mA/kV was utilized to reduce the radiation dose to as low as reasonably achievable. COMPARISON: None. HISTORY: ORDERING SYSTEM PROVIDED HISTORY: syncope, fall TECHNOLOGIST PROVIDED HISTORY: Reason for exam:->syncope, fall What reading provider will be dictating this exam?->CRC FINDINGS: BONES/ALIGNMENT: There is no acute compression fracture of the thoracic spine. There is dextroscoliosis of the thoracic spine. Mild multilevel degenerative disc and degenerative joint disease is noted. There are large anterior and lateral degenerative endplate spurs seen throughout the course of the thoracic spine. .  There is no osseous lesion. SOFT TISSUES: No paraspinal mass is seen. 1. There is no acute compression fracture of the thoracic spine. 2. Dextroscoliosis 3. Multilevel degenerative changes. CT LUMBAR SPINE WO CONTRAST    Result Date: 12/18/2022  EXAMINATION: CT OF THE LUMBAR SPINE WITHOUT CONTRAST  12/18/2022 TECHNIQUE: CT of the lumbar spine was performed without the administration of intravenous contrast. Multiplanar reformatted images are provided for review. Adjustment of mA and/or kV according to patient size was utilized. Automated exposure control, iterative reconstruction, and/or weight based adjustment of the mA/kV was utilized to reduce the radiation dose to as low as reasonably achievable. COMPARISON: None. HISTORY: ORDERING SYSTEM PROVIDED HISTORY: syncope, fall TECHNOLOGIST PROVIDED HISTORY: Reason for exam:->syncope, fall Decision Support Exception - unselect if not a suspected or confirmed emergency medical condition->Emergency Medical Condition (MA) What reading provider will be dictating this exam?->CRC FINDINGS: Vertebral alignment is normal.  Mild disc space narrowing and discovertebral degenerative changes are identified. Facet arthritis is also seen. No acute vertebral fracture is visualized. No spondylolysis is noted on the right or left. The sacroiliac joints are intact. The paraspinal soft tissues appear unremarkable. The urinary bladder is dilated. There are mild right hydronephrosis and hydroureter. There is colonic diverticulosis. There are postsurgical changes at the colorectal junction. Disc space narrowing and degenerative changes. No acute vertebral fracture or subluxation. XR CHEST PORTABLE    Result Date: 12/18/2022  EXAMINATION: ONE XRAY VIEW OF THE CHEST 12/18/2022 9:59 am COMPARISON: The previous study performed 11/03/2022. HISTORY: ORDERING SYSTEM PROVIDED HISTORY: fall, syncope TECHNOLOGIST PROVIDED HISTORY: Reason for exam:->fall, syncope What reading provider will be dictating this exam?->CRC FINDINGS: There is a large, ill-defined density overlying the left shoulder and mid to upper lateral left hemithorax. This may be external to the patient. It limits full evaluation of the surrounding soft tissue and osseous structures. There is no evidence of acute consolidation or infiltrate. No active pleural disease is seen. The cardiac silhouette and mediastinal structures are unremarkable. The tracheobronchial tree is midline and patent. A left-sided MediPort catheter is again noted, with the tip in the proximal SVC. There is again noted to be a thoracic scoliosis, with convexity to the right. Osteo-degenerative changes are again noted involving the thoracic spine. Large, ill-defined density overlying the left shoulder and mid to upper lateral left hemithorax, which limits full evaluation of the immediate surrounding soft tissue and osseous structures. No acute consolidation or infiltrate. Repeat chest radiograph can be performed further evaluation. Assessment and Plan   Bilateral pulmonary embolism  Heparin gtt, no active bleeding, has plt 94 but benefit outweighs risk in regards to anticoagulation. Hematology consult. Has colon ca as risk factor. Intensivist consult. Dr. Benny Craft consulted for possible thrombectomy tomorrow with u/s ble.     Colon cancer with active chemo  Dr. Boyle Due to see, evaluate thrombocytopenia. COVID infection  Mild cough, otherwise asymptomatic  Seizure like activity   Cont keppra, neurology consult.   DVT proph  On heparin gtt    Patient Active Problem List   Diagnosis Code    Cancer ([facility] C80.1    Hyperlipidemia E78.5    Elevated blood pressure reading R03.0    Colonic mass K63.89    Rectal polyp K62.1    Rectal cancer ([facility] C20    Malignant neoplasm of rectum ([facility] C20    Bilateral pulmonary embolism ([facility] I26.99       Rudi Weathers MD, MD  Admitting Hospitalist    Emergency Contact:

## 2022-12-18 NOTE — PROGRESS NOTES
Spiritual Care Services     Summary of Visit:  Rapid Response event. Patient lost consciousness in CT. RRT was called. Patient regained consciousness and was returned to ED. No family present. Patient has tested positive for COVID. ED  thought the patient might be feeling a little lonely and upset after rapid and COVID result. Patient is very calm, taking in everything that is going on. She is a bit disconcerted that she fainted for a second time and not sure why that is. She wasn't anticipating being admitted to the hospital.     visited with the patient and offered encouragement and prayer support. She didn't want her family to visit if she has COVID.     Encounter Summary  Encounter Overview/Reason : Initial Encounter  Service Provided For[de-identified] Patient  Referral/Consult From[de-identified] Multi-disciplinary team  Support System: Children, Family members  Complexity of Encounter: Low  Begin Time: 1030  End Time : 1040  Total Time Calculated: 10 min  Encounter   Type: Initial Screen/Assessment  Crisis  Type: Rapid Response  Spiritual/Emotional needs  Type: Spiritual Support, Emotional Distress                      Spiritual Assessment/Intervention/Outcomes:    Assessment: Coping, Impaired resilience    Intervention: Discussed belief system/Holiness practices/mackenzie, Discussed relationship with God, Discussed illness injury and its impact, Explored/Affirmed feelings, thoughts, concerns, Prayer (assurance of)/[place]    Outcome: Comfort, Encouraged, Engaged in conversation, Expressed feelings, needs, and concerns, Expressed Gratitude      Care Plan:    Plan and Referrals  Plan/Referrals: Continue to visit, (comment)    Continue to provide support to patient and family      Spiritual Care Services   Electronically signed by Tania Washington, [address] on 12/18/2022 at 10:54 AM.    To reach a  for emotional and spiritual support, place an [facility] \[facility]\"" consult request.   If a  is needed immediately, dial 0

## 2022-12-18 NOTE — CONSULTS
Spiritual Care Services     Summary of Visit:  Advanced Directive consult. (See notes from ED Case Manager). Patient was resting in bed and expressed that she was feeling a bit better. She had not had a chance to review her documents as of yet. She did name her daughter Mario Guaman, as her primary medical decision maker. Encounter Summary  Encounter Overview/Reason : Advance Care Planning  Service Provided For[de-identified] Patient  Referral/Consult From[de-identified] Multi-disciplinary team  Support System: Children, Family members  Last Encounter : 12/17/22  Complexity of Encounter: Low  Begin Time: 1400  End Time : 1415  Total Time Calculated: 15 min  Encounter   Type: Follow up  Crisis  Type: Rapid Response  Spiritual/Emotional needs  Type: Spiritual Support, Emotional Distress                 Advance Care Planning  Type: ACP conversation (Patient has not reviewed documents yet. She did name her children as her medical decision makers.)    Spiritual Assessment/Intervention/Outcomes:    Assessment: Calm, Coping    Intervention: Sustaining Presence/Ministry of presence (Discussed AD)    Outcome: Expressed Gratitude, Engaged in conversation, Coping      Care Plan:    Plan and Referrals  Plan/Referrals: Continue Support (comment)    Spiritual Care to follow up. Spiritual Care Services   Electronically signed by Sheba Irizarry, [address] on 12/18/2022 at 2:20 PM.    To reach a  for emotional and spiritual support, place an [facility] consult request.   If a  is needed immediately, dial 0 and ask to page the on-call .

## 2022-12-18 NOTE — ED NOTES
Port accessed. Notified Nichelle HOPKINS that pt is unaware if the port is a power port. Unable to obtain IV suitable for CTA.       Malia Alfaro  12/18/22 1563

## 2022-12-18 NOTE — ED NOTES
Rapid response was called on pt in CT (pt was on the floor in the bathroom in CT). CT tech thinks pt may of had a seizure after she passed out. Pt is talking and asked if she passed out again like she did at home. Pt taken back to ED Room and placed on 2 liters of oxygen.       Rodrick Jurado RN  12/18/22 6070

## 2022-12-19 ENCOUNTER — APPOINTMENT (OUTPATIENT)
Dept: ULTRASOUND IMAGING | Age: 72
DRG: 163 | End: 2022-12-19
Payer: COMMERCIAL

## 2022-12-19 ENCOUNTER — APPOINTMENT (OUTPATIENT)
Dept: CARDIAC CATH/INVASIVE PROCEDURES | Age: 72
DRG: 163 | End: 2022-12-19
Payer: COMMERCIAL

## 2022-12-19 PROBLEM — I26.02 ACUTE SADDLE PULMONARY EMBOLISM WITH ACUTE COR PULMONALE (HCC): Status: ACTIVE | Noted: 2022-12-18

## 2022-12-19 PROBLEM — R55 CONVULSIVE SYNCOPE: Status: ACTIVE | Noted: 2022-12-19

## 2022-12-19 LAB
ALBUMIN SERPL-MCNC: 3.3 G/DL (ref 3.5–4.6)
ALP BLD-CCNC: 111 U/L (ref 40–130)
ALT SERPL-CCNC: 12 U/L (ref 0–33)
ANION GAP SERPL CALCULATED.3IONS-SCNC: 10 MEQ/L (ref 9–15)
ANISOCYTOSIS: ABNORMAL
ANTI-XA UNFRAC HEPARIN: 0.72 IU/ML
APTT: 96.7 SEC (ref 24.4–36.8)
AST SERPL-CCNC: 23 U/L (ref 0–35)
BASOPHILS ABSOLUTE: 0.2 K/UL (ref 0–0.2)
BASOPHILS RELATIVE PERCENT: 2 %
BILIRUB SERPL-MCNC: 0.4 MG/DL (ref 0.2–0.7)
BUN BLDV-MCNC: 10 MG/DL (ref 8–23)
CALCIUM SERPL-MCNC: 8.5 MG/DL (ref 8.5–9.9)
CHLORIDE BLD-SCNC: 98 MEQ/L (ref 95–107)
CO2: 27 MEQ/L (ref 20–31)
CREAT SERPL-MCNC: 0.73 MG/DL (ref 0.5–0.9)
EKG ATRIAL RATE: 115 BPM
EKG P AXIS: 37 DEGREES
EKG P-R INTERVAL: 152 MS
EKG Q-T INTERVAL: 350 MS
EKG QRS DURATION: 94 MS
EKG QTC CALCULATION (BAZETT): 484 MS
EKG R AXIS: 108 DEGREES
EKG T AXIS: 15 DEGREES
EKG VENTRICULAR RATE: 115 BPM
EOSINOPHILS ABSOLUTE: 0 K/UL (ref 0–0.7)
EOSINOPHILS RELATIVE PERCENT: 0.4 %
GFR SERPL CREATININE-BSD FRML MDRD: >60 ML/MIN/{1.73_M2}
GLOBULIN: 2.5 G/DL (ref 2.3–3.5)
GLUCOSE BLD-MCNC: 94 MG/DL (ref 70–99)
HCT VFR BLD CALC: 36.5 % (ref 37–47)
HEMOGLOBIN: 12.3 G/DL (ref 12–16)
INR BLD: 1.2
LYMPHOCYTES ABSOLUTE: 1.3 K/UL (ref 1–4.8)
LYMPHOCYTES RELATIVE PERCENT: 14 %
MCH RBC QN AUTO: 29.5 PG (ref 27–31.3)
MCHC RBC AUTO-ENTMCNC: 33.8 % (ref 33–37)
MCV RBC AUTO: 87.3 FL (ref 79.4–94.8)
MICROCYTES: ABNORMAL
MONOCYTES ABSOLUTE: 0.1 K/UL (ref 0.2–0.8)
MONOCYTES RELATIVE PERCENT: 1 %
NEUTROPHILS ABSOLUTE: 7.6 K/UL (ref 1.4–6.5)
NEUTROPHILS RELATIVE PERCENT: 83 %
PDW BLD-RTO: 16.1 % (ref 11.5–14.5)
PLATELET # BLD: 115 K/UL (ref 130–400)
PLATELET SLIDE REVIEW: ABNORMAL
POLYCHROMASIA: ABNORMAL
POTASSIUM SERPL-SCNC: 3.3 MEQ/L (ref 3.4–4.9)
PROTHROMBIN TIME: 15.4 SEC (ref 12.3–14.9)
RBC # BLD: 4.18 M/UL (ref 4.2–5.4)
SODIUM BLD-SCNC: 135 MEQ/L (ref 135–144)
TOTAL PROTEIN: 5.8 G/DL (ref 6.3–8)
WBC # BLD: 9.1 K/UL (ref 4.8–10.8)

## 2022-12-19 PROCEDURE — 85730 THROMBOPLASTIN TIME PARTIAL: CPT

## 2022-12-19 PROCEDURE — C1887 CATHETER, GUIDING: HCPCS

## 2022-12-19 PROCEDURE — 37184 PRIM ART M-THRMBC 1ST VSL: CPT

## 2022-12-19 PROCEDURE — 93970 EXTREMITY STUDY: CPT

## 2022-12-19 PROCEDURE — 2500000003 HC RX 250 WO HCPCS

## 2022-12-19 PROCEDURE — C1894 INTRO/SHEATH, NON-LASER: HCPCS

## 2022-12-19 PROCEDURE — B31T1ZZ FLUOROSCOPY OF LEFT PULMONARY ARTERY USING LOW OSMOLAR CONTRAST: ICD-10-PCS | Performed by: INTERNAL MEDICINE

## 2022-12-19 PROCEDURE — 99222 1ST HOSP IP/OBS MODERATE 55: CPT | Performed by: PSYCHIATRY & NEUROLOGY

## 2022-12-19 PROCEDURE — 85520 HEPARIN ASSAY: CPT

## 2022-12-19 PROCEDURE — 85025 COMPLETE CBC W/AUTO DIFF WBC: CPT

## 2022-12-19 PROCEDURE — 2580000003 HC RX 258: Performed by: FAMILY MEDICINE

## 2022-12-19 PROCEDURE — 2000000000 HC ICU R&B

## 2022-12-19 PROCEDURE — 95816 EEG AWAKE AND DROWSY: CPT

## 2022-12-19 PROCEDURE — C1751 CATH, INF, PER/CENT/MIDLINE: HCPCS

## 2022-12-19 PROCEDURE — 85610 PROTHROMBIN TIME: CPT

## 2022-12-19 PROCEDURE — 93010 ELECTROCARDIOGRAM REPORT: CPT | Performed by: INTERNAL MEDICINE

## 2022-12-19 PROCEDURE — 99223 1ST HOSP IP/OBS HIGH 75: CPT | Performed by: INTERNAL MEDICINE

## 2022-12-19 PROCEDURE — 2580000003 HC RX 258

## 2022-12-19 PROCEDURE — 02CR3ZZ EXTIRPATION OF MATTER FROM LEFT PULMONARY ARTERY, PERCUTANEOUS APPROACH: ICD-10-PCS | Performed by: INTERNAL MEDICINE

## 2022-12-19 PROCEDURE — 6370000000 HC RX 637 (ALT 250 FOR IP): Performed by: NURSE PRACTITIONER

## 2022-12-19 PROCEDURE — 02CQ3ZZ EXTIRPATION OF MATTER FROM RIGHT PULMONARY ARTERY, PERCUTANEOUS APPROACH: ICD-10-PCS | Performed by: INTERNAL MEDICINE

## 2022-12-19 PROCEDURE — 6360000002 HC RX W HCPCS: Performed by: INTERNAL MEDICINE

## 2022-12-19 PROCEDURE — 6370000000 HC RX 637 (ALT 250 FOR IP): Performed by: FAMILY MEDICINE

## 2022-12-19 PROCEDURE — C1769 GUIDE WIRE: HCPCS

## 2022-12-19 PROCEDURE — 2709999900 HC NON-CHARGEABLE SUPPLY

## 2022-12-19 PROCEDURE — 4A023N6 MEASUREMENT OF CARDIAC SAMPLING AND PRESSURE, RIGHT HEART, PERCUTANEOUS APPROACH: ICD-10-PCS | Performed by: INTERNAL MEDICINE

## 2022-12-19 PROCEDURE — 36014 PLACE CATHETER IN ARTERY: CPT

## 2022-12-19 PROCEDURE — 99233 SBSQ HOSP IP/OBS HIGH 50: CPT | Performed by: INTERNAL MEDICINE

## 2022-12-19 PROCEDURE — 2580000003 HC RX 258: Performed by: INTERNAL MEDICINE

## 2022-12-19 PROCEDURE — 2700000000 HC OXYGEN THERAPY PER DAY

## 2022-12-19 PROCEDURE — 2580000003 HC RX 258: Performed by: STUDENT IN AN ORGANIZED HEALTH CARE EDUCATION/TRAINING PROGRAM

## 2022-12-19 PROCEDURE — 2720000010 HC SURG SUPPLY STERILE

## 2022-12-19 PROCEDURE — 6360000002 HC RX W HCPCS

## 2022-12-19 PROCEDURE — C1757 CATH, THROMBECTOMY/EMBOLECT: HCPCS

## 2022-12-19 PROCEDURE — 80053 COMPREHEN METABOLIC PANEL: CPT

## 2022-12-19 PROCEDURE — 36415 COLL VENOUS BLD VENIPUNCTURE: CPT

## 2022-12-19 PROCEDURE — B31S1ZZ FLUOROSCOPY OF RIGHT PULMONARY ARTERY USING LOW OSMOLAR CONTRAST: ICD-10-PCS | Performed by: INTERNAL MEDICINE

## 2022-12-19 PROCEDURE — 6370000000 HC RX 637 (ALT 250 FOR IP): Performed by: INTERNAL MEDICINE

## 2022-12-19 PROCEDURE — 6360000004 HC RX CONTRAST MEDICATION: Performed by: INTERNAL MEDICINE

## 2022-12-19 RX ORDER — HEPARIN SODIUM 1000 [USP'U]/ML
80 INJECTION, SOLUTION INTRAVENOUS; SUBCUTANEOUS PRN
Status: DISCONTINUED | OUTPATIENT
Start: 2022-12-19 | End: 2022-12-19

## 2022-12-19 RX ORDER — HEPARIN SODIUM 10000 [USP'U]/100ML
5-30 INJECTION, SOLUTION INTRAVENOUS CONTINUOUS
Status: DISCONTINUED | OUTPATIENT
Start: 2022-12-19 | End: 2022-12-19

## 2022-12-19 RX ORDER — SODIUM CHLORIDE 0.9 % (FLUSH) 0.9 %
5-40 SYRINGE (ML) INJECTION PRN
Status: DISCONTINUED | OUTPATIENT
Start: 2022-12-19 | End: 2022-12-26 | Stop reason: HOSPADM

## 2022-12-19 RX ORDER — ONDANSETRON 2 MG/ML
4 INJECTION INTRAMUSCULAR; INTRAVENOUS EVERY 6 HOURS PRN
Status: DISCONTINUED | OUTPATIENT
Start: 2022-12-19 | End: 2022-12-26 | Stop reason: HOSPADM

## 2022-12-19 RX ORDER — SODIUM CHLORIDE 9 MG/ML
INJECTION, SOLUTION INTRAVENOUS PRN
Status: DISCONTINUED | OUTPATIENT
Start: 2022-12-19 | End: 2022-12-26 | Stop reason: HOSPADM

## 2022-12-19 RX ORDER — SODIUM CHLORIDE 0.9 % (FLUSH) 0.9 %
5-40 SYRINGE (ML) INJECTION EVERY 12 HOURS SCHEDULED
Status: DISCONTINUED | OUTPATIENT
Start: 2022-12-19 | End: 2022-12-26 | Stop reason: HOSPADM

## 2022-12-19 RX ORDER — POTASSIUM CHLORIDE 20 MEQ/1
60 TABLET, EXTENDED RELEASE ORAL ONCE
Status: COMPLETED | OUTPATIENT
Start: 2022-12-19 | End: 2022-12-19

## 2022-12-19 RX ORDER — ENOXAPARIN SODIUM 100 MG/ML
1 INJECTION SUBCUTANEOUS 2 TIMES DAILY
Status: DISCONTINUED | OUTPATIENT
Start: 2022-12-19 | End: 2022-12-20 | Stop reason: ALTCHOICE

## 2022-12-19 RX ORDER — HEPARIN SODIUM 1000 [USP'U]/ML
40 INJECTION, SOLUTION INTRAVENOUS; SUBCUTANEOUS PRN
Status: DISCONTINUED | OUTPATIENT
Start: 2022-12-19 | End: 2022-12-19

## 2022-12-19 RX ADMIN — LEVETIRACETAM 500 MG: 500 TABLET, FILM COATED ORAL at 21:01

## 2022-12-19 RX ADMIN — Medication 10 ML: at 21:00

## 2022-12-19 RX ADMIN — IOPAMIDOL 100 ML: 612 INJECTION, SOLUTION INTRAVENOUS at 13:37

## 2022-12-19 RX ADMIN — ENOXAPARIN SODIUM 80 MG: 100 INJECTION SUBCUTANEOUS at 16:54

## 2022-12-19 RX ADMIN — LEVETIRACETAM 500 MG: 500 TABLET, FILM COATED ORAL at 00:00

## 2022-12-19 RX ADMIN — Medication 10 ML: at 21:01

## 2022-12-19 RX ADMIN — LEVETIRACETAM 500 MG: 500 TABLET, FILM COATED ORAL at 08:19

## 2022-12-19 RX ADMIN — METOPROLOL SUCCINATE 25 MG: 25 TABLET, FILM COATED, EXTENDED RELEASE ORAL at 08:19

## 2022-12-19 RX ADMIN — POTASSIUM CHLORIDE 60 MEQ: 1500 TABLET, EXTENDED RELEASE ORAL at 16:10

## 2022-12-19 ASSESSMENT — PAIN SCALES - GENERAL
PAINLEVEL_OUTOF10: 0
PAINLEVEL_OUTOF10: 0

## 2022-12-19 ASSESSMENT — ENCOUNTER SYMPTOMS
ABDOMINAL PAIN: 0
WHEEZING: 0
EYES NEGATIVE: 1
GASTROINTESTINAL NEGATIVE: 1
NAUSEA: 0
BACK PAIN: 0
CHEST TIGHTNESS: 0
ALLERGIC/IMMUNOLOGIC NEGATIVE: 1
COUGH: 0
PHOTOPHOBIA: 0
RESPIRATORY NEGATIVE: 1
TROUBLE SWALLOWING: 0
SHORTNESS OF BREATH: 1
STRIDOR: 0
SHORTNESS OF BREATH: 0
CHOKING: 0
VOMITING: 0
COLOR CHANGE: 0
BLOOD IN STOOL: 0

## 2022-12-19 NOTE — CARE COORDINATION
Met with dtr Alirio Harvey to discuss IMM and review with her. She verbalizes understanding. She also states pt has been to [facility] and if she needed SNF that would be where she would like to go. Pt on Bedrest at present and no PT/OT yet .

## 2022-12-19 NOTE — PROGRESS NOTES
Hospitalist Progress Note      Date of Admission: 12/18/2022  Chief Complaint:    Chief Complaint   Patient presents with    Fall     Pt fell in the bathroom, + LOC, + Head Injury, - Thinners. Pt does not remember the event.       Subjective:  No complaints, nausea or chest pain or headache    Medications:    Infusion Medications    sodium chloride      sodium chloride      sodium chloride       Scheduled Medications    sodium chloride flush  5-40 mL IntraVENous 2 times per day    enoxaparin  1 mg/kg SubCUTAneous BID    sodium chloride flush  5-40 mL IntraVENous 2 times per day    sodium chloride flush  5-40 mL IntraVENous 2 times per day    metoprolol succinate  25 mg Oral Daily    levETIRAcetam  500 mg Oral BID     PRN Meds: sodium chloride flush, sodium chloride, ondansetron, sodium chloride flush, sodium chloride, sodium chloride flush, sodium chloride, ondansetron **OR** ondansetron, polyethylene glycol, acetaminophen **OR** acetaminophen    Intake/Output Summary (Last 24 hours) at 12/19/2022 1702  Last data filed at 12/19/2022 1655  Gross per 24 hour   Intake 240 ml   Output 1050 ml   Net -810 ml     Exam:  /77   Pulse 82   Temp 99 °F (37.2 °C) (Axillary)   Resp 21   Ht 4' 11\" (1.499 m)   Wt 167 lb 5.3 oz (75.9 kg)   SpO2 95%   BMI 33.80 kg/m²   Head: Normocephalic, atraumatic  Sclera clear  Neck JVD flat  Lungs: Normal effort of breathing    Labs:   Recent Labs     12/18/22  0645 12/19/22  0535   WBC 1.9* 9.1   HGB 12.8 12.3   HCT 39.3 36.5*   PLT 94* 115*     Recent Labs     12/18/22  0645 12/18/22  2030 12/19/22  0700   * 137 135   K 2.8* 3.3* 3.3*   CL 96 98 98   CO2 24 26 27   BUN 9 9 10   CREATININE 0.66 0.65 0.73   CALCIUM 8.7 8.6 8.5   AST 21 20 23   ALT 16 14 12   BILITOT 0.6 0.4 0.4   ALKPHOS 125 116 111     Recent Labs     12/18/22  0840 12/19/22  0700   INR 1.2 1.2     Recent Labs     12/18/22  0645   TROPONINI 0.096*     Radiology:  US DUP LOWER EXTREMITIES BILATERAL VENOUS Final Result   RIGHT LOWER EXTREMITY EXTENSIVE THE THIGH DEEP VEIN THROMBOSIS EXTENDING INTO THE CALF. LEFT LOWER EXTREMITY EDEMA DISTAL DEEP VEIN THROMBOSIS INVOLVING THE CALF REGION. XR PELVIS (1-2 VIEWS)   Final Result   No acute fracture or malalignment. CTA CHEST W WO CONTRAST PE Eval   Final Result   Extensive pulmonary emboli with saddle embolism identified. There is   evidence of right heart strain within RV LV ratio of 1.6. Mild ground-glass   seen at the right lung base in which early infarction cannot be excluded. There is increased density seen surrounding the left luke catheter in the   subcutaneous tissues to suggest extravasation of intravenous contrast.      Findings were discussed with Dr. Jasson Hassan at time of interpretation of the   examination. XR CHEST PORTABLE   Final Result   Large, ill-defined density overlying the left shoulder and mid to upper   lateral left hemithorax, which limits full evaluation of the immediate   surrounding soft tissue and osseous structures. No acute consolidation or   infiltrate. Repeat chest radiograph can be performed further evaluation. CT HEAD WO CONTRAST   Final Result   1. There is no acute intracranial abnormality. Specifically, there is no   intracranial hemorrhage. 2. Atrophy and periventricular leukomalacia,         CT CERVICAL SPINE WO CONTRAST   Final Result   1. There is no acute compression fracture or subluxation of the cervical   spine. 2. Multilevel degenerative disc and degenerative joint disease. .         CT THORACIC SPINE WO CONTRAST   Final Result   1. There is no acute compression fracture of the thoracic spine. 2. Dextroscoliosis   3. Multilevel degenerative changes. CT LUMBAR SPINE WO CONTRAST   Final Result   Disc space narrowing and degenerative changes. No acute vertebral fracture or subluxation.          IR PICC WO SQ PORT/PUMP > 5 YEARS    (Results Pending) Assessment/Plan:    Pulmonary embolism: Large. Anticoagulation initiated. 12/19: Thrombectomy done by cardiology tentatively scheduled for leg thrombectomy (extensive right leg DVT) on 12/21    Hypercoagulable state most likely secondary to active cancer. Rectal cancer  stage III status post resection neoadjuvant chemotherapy, as per oncology    Thrombocytopenia most likely secondary to chemotherapy. Mild. Continue to monitor platelet count, hemoglobin/hematocrit. No evidence of active bleeding. COVID-positive  Daughter outside room and subsequently at bedside.   Updated      35 minutes total care time, >1/2 in unit/floor time and care coordination       Ryne Aguirre MD ,MD

## 2022-12-19 NOTE — PROGRESS NOTES
Progress Note  Patient: Hugh Bailon  Unit/Bed: IC05/IC05-01  YOB: 1950  MRN: [id number]  Acct: [de-identified]   Admitting Diagnosis: Syncope and collapse [R55]  Hypokalemia [E87.6]  Thrombocytopenia (HCC) [D69.6]  Elevated troponin [R77.8]  Bilateral pulmonary embolism (HCC) [I26.99]  Seizure-like activity ([address] [R56.9]  History of rectal cancer [Z85.048]  Acute saddle pulmonary embolism with acute cor pulmonale (HCC) [I26.02]  Neutropenia, unspecified type ([address] [D70.9]  COVID-19 [U07.1]  Admit Date:  12/18/2022  Hospital Day: 1    Chief Complaint: PE DVT    Histories:  Past Medical History:   Diagnosis Date    Arthritis     Cancer ([address]     SKIN    Hyperlipidemia      Past Surgical History:   Procedure Laterality Date    CATARACT REMOVAL      CHOLECYSTECTOMY      COLECTOMY N/A 10/3/2022    LOW ANTERIOR RESECTION WITH LOOP ILEOSTOMY performed by Aura Weiss MD at [address] [place] [address] N/A 9/16/2022    COLORECTAL CANCER SCREENING, NOT HIGH RISK performed by Kyle Schwartz MD at [facility]    COLONOSCOPY N/A 9/26/2022    FLEXIBLE SIGMOIDOSCOPY WITH SNARE POLYPECTOMY performed by Aura Weiss MD at [address] N/A 11/3/2022    INFUSAPORT performed by Aura Weiss MD at [address] [place] [address] LOWER LEG    TUBAL LIGATION       Family History   Problem Relation Age of Onset    Diabetes Mother     Breast Cancer Maternal Aunt     Colon Cancer Neg Hx      Social History     Socioeconomic History    Marital status:    Tobacco Use    Smoking status: Never    Smokeless tobacco: Never   Vaping Use    Vaping Use: Never used   Substance and Sexual Activity    Alcohol use: No    Drug use: No     Social Determinants of Health     Financial Resource Strain: Low Risk     Difficulty of Paying Living Expenses: Not hard at all   Food Insecurity: No Food Insecurity    Worried About [address] in the Last Year: Never true    [facility] in the Last Year: Never true   Transportation Needs: No Transportation Needs    Lack of Transportation (Medical): No    Lack of Transportation (Non-Medical): No       Subjective/HPI  stable overnight. On 2L O2 no fever. No CP not SOB at rest.     EKG: SR 82        Review of Systems:   Review of Systems   Constitutional: Negative. Negative for diaphoresis and fatigue. HENT: Negative. Eyes: Negative. Respiratory: Negative. Negative for cough, chest tightness, shortness of breath, wheezing and stridor. Cardiovascular: Negative. Negative for chest pain, palpitations and leg swelling. Gastrointestinal: Negative. Negative for blood in stool and nausea. Genitourinary: Negative. Musculoskeletal: Negative. Skin: Negative. Neurological: Negative. Negative for dizziness, syncope, weakness and light-headedness. Hematological: Negative. Psychiatric/Behavioral: Negative. Physical Examination:    BP (!) 119/50   Pulse 81   Temp 98.2 °F (36.8 °C) (Oral)   Resp 18   Ht 4' 11\" (1.499 m)   Wt 167 lb 5.3 oz (75.9 kg)   SpO2 94%   BMI 33.80 kg/m²    Physical Exam   Constitutional: She appears healthy. No distress. HENT:   Normal cephalic and Atraumatic   Eyes: Pupils are equal, round, and reactive to light. Neck: Thyroid normal. No JVD present. No neck adenopathy. No thyromegaly present. Cardiovascular: Normal rate, regular rhythm, intact distal pulses and normal pulses. Murmur heard. Pulmonary/Chest: Effort normal and breath sounds normal. She has no wheezes. She has no rales. She exhibits no tenderness. Abdominal: Soft. Bowel sounds are normal. There is no abdominal tenderness. Musculoskeletal:         General: No tenderness or edema. Normal range of motion. Cervical back: Normal range of motion and neck supple. Neurological: She is alert and oriented to person, place, and time. Skin: Skin is warm. No cyanosis. Nails show no clubbing.      LABS:  CBC:   Lab Results   Component Value Date/Time    WBC 9.1 12/19/2022 05:35 AM    RBC 4.18 12/19/2022 05:35 AM    HGB 12.3 12/19/2022 05:35 AM    HCT 36.5 12/19/2022 05:35 AM    MCV 87.3 12/19/2022 05:35 AM    MCH 29.5 12/19/2022 05:35 AM    MCHC 33.8 12/19/2022 05:35 AM    RDW 16.1 12/19/2022 05:35 AM     12/19/2022 05:35 AM    MPV 9.0 10/28/2014 05:58 AM     CBC with Differential:    Lab Results   Component Value Date/Time    WBC 9.1 12/19/2022 05:35 AM    RBC 4.18 12/19/2022 05:35 AM    HGB 12.3 12/19/2022 05:35 AM    HCT 36.5 12/19/2022 05:35 AM     12/19/2022 05:35 AM    MCV 87.3 12/19/2022 05:35 AM    MCH 29.5 12/19/2022 05:35 AM    MCHC 33.8 12/19/2022 05:35 AM    RDW 16.1 12/19/2022 05:35 AM    BANDSPCT 6 12/18/2022 06:45 AM    LYMPHOPCT 14.0 12/19/2022 05:35 AM    MONOPCT 1.0 12/19/2022 05:35 AM    BASOPCT 2.0 12/19/2022 05:35 AM    MONOSABS 0.1 12/19/2022 05:35 AM    LYMPHSABS 1.3 12/19/2022 05:35 AM    EOSABS 0.0 12/19/2022 05:35 AM    BASOSABS 0.2 12/19/2022 05:35 AM     CMP:    Lab Results   Component Value Date/Time     12/19/2022 07:00 AM    K 3.3 12/19/2022 07:00 AM    K 3.3 12/18/2022 08:30 PM    CL 98 12/19/2022 07:00 AM    CO2 27 12/19/2022 07:00 AM    BUN 10 12/19/2022 07:00 AM    CREATININE 0.73 12/19/2022 07:00 AM    GFRAA >60.0 10/17/2022 06:35 AM    LABGLOM >60.0 12/19/2022 07:00 AM    GLUCOSE 94 12/19/2022 07:00 AM    PROT 5.8 12/19/2022 07:00 AM    LABALBU 3.3 12/19/2022 07:00 AM    CALCIUM 8.5 12/19/2022 07:00 AM    BILITOT 0.4 12/19/2022 07:00 AM    ALKPHOS 111 12/19/2022 07:00 AM    AST 23 12/19/2022 07:00 AM    ALT 12 12/19/2022 07:00 AM     BMP:    Lab Results   Component Value Date/Time     12/19/2022 07:00 AM    K 3.3 12/19/2022 07:00 AM    K 3.3 12/18/2022 08:30 PM    CL 98 12/19/2022 07:00 AM    CO2 27 12/19/2022 07:00 AM    BUN 10 12/19/2022 07:00 AM    LABALBU 3.3 12/19/2022 07:00 AM    CREATININE 0.73 12/19/2022 07:00 AM    CALCIUM 8.5 12/19/2022 07:00 AM    GFRAA >60.0 10/17/2022 06:35 AM    LABGLOM >60.0 12/19/2022 07:00 AM    GLUCOSE 94 12/19/2022 07:00 AM     Magnesium:    Lab Results   Component Value Date/Time    MG 2.2 12/18/2022 08:30 PM     Troponin:    Lab Results   Component Value Date/Time    TROPONINI 0.096 12/18/2022 06:45 AM        Active Hospital Problems    Diagnosis Date Noted    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 no respiratory distress  Saddle PE- Heaprin gtt. - probably provoked from [place] but also w underlying clotting disorder from cancer. Will benefit from long term anticoagulation. B/L DVR. Right side extensive Thrombus   Trop mildly elevated - will give low zuniga BB. Hold ASA due to mild Thrombocytopenia. Hypokalemia- replace iv and PO.    Check Echo - P  Colon Cancer s/p surgery and Ostomy in place       Electronically signed by Erich Godfrey MD on 12/19/2022 at 11:38 AM

## 2022-12-19 NOTE — CONSULTS
Subjective:      Patient ID: Armin Pyle is a 81 y.o. female who presents today for seizure. Sharonda Del Castillo HPI 81 right-handed female admitted with syncopal episodes and diagnosed with bilateral pulmonary embolism. While she was in the CT room there appeared to be some shaking but no confusion episode was described. The patient tells us that she was just scared in the CT room and that may have caused some shaking though this is not very clear. She otherwise has not reported any headache and denies any is. History of seizures. The syncopal episode did not suggest seizure either. Patient has colon cancer undergoing chemotherapy. Patient is in isolation therefore evaluations were obtained through the nurse and charts. Review of Systems   Constitutional:  Negative for fever. HENT:  Negative for ear pain, tinnitus and trouble swallowing. Eyes:  Negative for photophobia and visual disturbance. Respiratory:  Negative for choking and shortness of breath. Cardiovascular:  Negative for chest pain and palpitations. Gastrointestinal:  Negative for nausea and vomiting. Musculoskeletal:  Negative for back pain, gait problem, joint swelling, myalgias, neck pain and neck stiffness. Skin:  Negative for color change. Allergic/Immunologic: Negative for food allergies. Neurological:  Negative for dizziness, tremors, seizures, syncope, facial asymmetry, speech difficulty, weakness, light-headedness, numbness and headaches. Psychiatric/Behavioral:  Negative for behavioral problems, confusion, hallucinations and sleep disturbance.     Your system was performed by the nurse  Past Medical History:   Diagnosis Date    Arthritis     Cancer ([facility] [address]     SKIN    Hyperlipidemia      Past Surgical History:   Procedure Laterality Date    CATARACT REMOVAL      CHOLECYSTECTOMY      COLECTOMY N/A 10/3/2022    LOW ANTERIOR RESECTION WITH LOOP ILEOSTOMY performed by Rui Bar MD at [place] [id number] N/A 9/16/2022 COLORECTAL CANCER SCREENING, NOT HIGH RISK performed by Luke Keating MD at [facility]    COLONOSCOPY N/A 9/26/2022    FLEXIBLE SIGMOIDOSCOPY WITH SNARE POLYPECTOMY performed by Samantha Roman MD at [address] N/A 11/3/2022    INFUSAPORT performed by Samantha Roman MD at [address]      R LOWER LEG    TUBAL LIGATION       Social History     Socioeconomic History    Marital status:      Spouse name: Not on file    Number of children: Not on file    Years of education: Not on file    Highest education level: Not on file   Occupational History    Not on file   Tobacco Use    Smoking status: Never    Smokeless tobacco: Never   Vaping Use    Vaping Use: Never used   Substance and Sexual Activity    Alcohol use: No    Drug use: No    Sexual activity: Not on file   Other Topics Concern    Not on file   Social History Narrative    Not on file     Social Determinants of Health     Financial Resource Strain: Low Risk     Difficulty of Paying Living Expenses: Not hard at all   Food Insecurity: No Food Insecurity    Worried About Running Out of Food in the Last Year: Never true    [address] in the Last Year: Never true   Transportation Needs: No Transportation Needs    Lack of Transportation (Medical): No    Lack of Transportation (Non-Medical):  No   Physical Activity: Not on file   Stress: Not on file   Social Connections: Not on file   Intimate Partner Violence: Not on file   Housing Stability: Not on file     Family History   Problem Relation Age of Onset    Diabetes Mother     Breast Cancer Maternal Aunt     Colon Cancer Neg Hx      Allergies   Allergen Reactions    Morphine Itching     Current Facility-Administered Medications   Medication Dose Route Frequency Provider Last Rate Last Admin    sodium chloride flush 0.9 % injection 5-40 mL  5-40 mL IntraVENous 2 times per day Martha Cooney MD        sodium chloride flush 0.9 % injection 5-40 mL  5-40 mL IntraVENous PRN Norberto Mireles MD        0.9 % sodium chloride infusion   IntraVENous PRN Norberto Mireles MD        heparin 25,000 units in dextrose 5% 250 mL (premix) infusion  5-30 Units/kg/hr IntraVENous Continuous Aby Rogel MD 10.6 mL/hr at 12/19/22 1009 14 Units/kg/hr at 12/19/22 1009    heparin (porcine) injection 3,040 Units  40 Units/kg IntraVENous PRN Aby Rogel MD        heparin (porcine) injection 6,070 Units  80 Units/kg IntraVENous PRN Aby Rogel MD        sodium chloride flush 0.9 % injection 5-40 mL  5-40 mL IntraVENous 2 times per day Denyce Seda, PA-C        sodium chloride flush 0.9 % injection 5-40 mL  5-40 mL IntraVENous PRN Heidy Stack, PA-C        0.9 % sodium chloride infusion   IntraVENous PRN Heidy Stack, PA-C        sodium chloride flush 0.9 % injection 5-40 mL  5-40 mL IntraVENous 2 times per day Aby Rogel MD        sodium chloride flush 0.9 % injection 5-40 mL  5-40 mL IntraVENous PRN Aby Rogel MD        0.9 % sodium chloride infusion   IntraVENous PRN Aby Rogel MD        ondansetron (ZOFRAN-ODT) disintegrating tablet 4 mg  4 mg Oral Q8H PRN Aby Rogel MD        Or    ondansetron [facility]) injection 4 mg  4 mg IntraVENous Q6H PRN Aby Rogel MD        polyethylene glycol [facility]) packet 17 g  17 g Oral Daily PRN Aby Rogel MD        acetaminophen (TYLENOL) tablet 650 mg  650 mg Oral Q6H PRN Aby Rogel MD        Or    acetaminophen (TYLENOL) suppository 650 mg  650 mg Rectal Q6H PRN Aby Rogel MD        metoprolol succinate (TOPROL XL) extended release tablet 25 mg  25 mg Oral Daily Norberto Mireles MD   25 mg at 12/19/22 0819    levETIRAcetam (KEPPRA) tablet 500 mg  500 mg Oral BID Aby Rogel MD   500 mg at 12/19/22 1125        Objective:   BP (!) 108/48   Pulse 96   Temp 98.2 °F (36.8 °C) (Oral)   Resp 22   Ht 4' 11\" (1.499 m)   Wt 167 lb 5.3 oz (75.9 kg)   SpO2 95%   BMI 33.80 kg/m² Physical Exam lamination according to nurse patient is oriented to place month and year she has a normal speech and moves all her extremities. We have not walked any as yet there is no reports of any headache had patient is in isolation and therefore examined by the nurse to avoid COVID transmission    XR PELVIS (1-2 VIEWS)    Result Date: 12/18/2022  EXAMINATION: ONE XRAY VIEW OF THE PELVIS 12/18/2022 10:04 am COMPARISON: None. HISTORY: ORDERING SYSTEM PROVIDED HISTORY: fall TECHNOLOGIST PROVIDED HISTORY: Reason for exam:->fall What reading provider will be dictating this exam?->CRC FINDINGS: No acute fracture or malalignment. Mild degenerative changes of the hips. Degenerative changes of the visualized spine. Surgical clips project over the left pelvis. The bladder is opacified with excreted IV contrast.     No acute fracture or malalignment. CT HEAD WO CONTRAST    Result Date: 12/18/2022  EXAMINATION: CT OF THE HEAD WITHOUT CONTRAST  12/18/2022 8:30 am TECHNIQUE: CT of the head was performed without the administration of intravenous contrast. Automated exposure control, iterative reconstruction, and/or weight based adjustment of the mA/kV was utilized to reduce the radiation dose to as low as reasonably achievable. COMPARISON: None. HISTORY: ORDERING SYSTEM PROVIDED HISTORY: syncope, fall TECHNOLOGIST PROVIDED HISTORY: Reason for exam:->syncope, fall Has a \"code stroke\" or \"stroke alert\" been called? ->No Decision Support Exception - unselect if not a suspected or confirmed emergency medical condition->Emergency Medical Condition ([place]) What reading provider will be dictating this exam?->CRC FINDINGS: BRAIN/VENTRICLES: There is no acute intracranial hemorrhage, mass effect or midline shift. No abnormal extra-axial fluid collection. The gray-white differentiation is maintained without evidence of an acute infarct. There is no evidence of hydrocephalus.  The ventricles, cisterns and sulci are prominent consistent with atrophy. There is decreased attenuation within the periventricular white matter consistent with periventricular leukomalacia. ORBITS: The visualized portion of the orbits demonstrate no acute abnormality. SINUSES: The visualized paranasal sinuses and mastoid air cells demonstrate no acute abnormality. There is mild mucosal thickening seen within the right ethmoid air cells. SOFT TISSUES/SKULL:  No acute abnormality of the visualized skull or soft tissues. 1. There is no acute intracranial abnormality. Specifically, there is no intracranial hemorrhage. 2. Atrophy and periventricular leukomalacia,     CTA CHEST W WO CONTRAST PE Eval    Result Date: 12/18/2022  EXAMINATION: CTA OF THE CHEST WITH AND WITHOUT CONTRAST 12/18/2022 8:30 am TECHNIQUE: CTA of the chest was performed before and after the administration of intravenous contrast.  Multiplanar reformatted images are provided for review. MIP images are provided for review. Automated exposure control, iterative reconstruction, and/or weight based adjustment of the mA/kV was utilized to reduce the radiation dose to as low as reasonably achievable. COMPARISON: None. HISTORY: ORDERING SYSTEM PROVIDED HISTORY: PE, syncope, hx of cancer, rule out PE; cough evaluate for infiltrate TECHNOLOGIST PROVIDED HISTORY: Reason for exam:->PE Reason for exam:->syncope, hx of cancer, rule out PE; cough evaluate for infiltrate Decision Support Exception - unselect if not a suspected or confirmed emergency medical condition->Emergency Medical Condition (MA) What reading provider will be dictating this exam?->CRC FINDINGS: Pulmonary Arteries: Pulmonary arteries are adequately opacified for evaluation. There is abnormal filling defect seen within the main pulmonary arteries bilaterally with slight saddle embolism identified.   There is opacification identified within all the segmental and subsegmental branches most pronounced within the right middle and lower lobe pulmonary arteries. Emboli as well seen within the lingular branch. There is evidence of right heart strain within RV LV ratio of 1.6. There is prominence identified of the main pulmonary artery. Mediastinum: No evidence of mediastinal lymphadenopathy. The heart and pericardium demonstrate no acute abnormality. There is no acute abnormality of the thoracic aorta. Lungs/pleura: Mild ground-glass opacity identified in the right lower lobe suggesting possibly an area of infarction given the extensive thrombus within the pulmonary arteries on the right. Atelectatic change cannot be excluded. There is minimal atelectatic changes seen within the lingula. No definite pleural effusion or pneumothorax. Upper Abdomen: Limited images of the upper abdomen are unremarkable. Soft Tissues/Bones: Abnormal increased density identified within the subcutaneous tissues surrounding the luke catheter on the left anterior chest wall to suggest extravasation of intravenous contrast.     Extensive pulmonary emboli with saddle embolism identified. There is evidence of right heart strain within RV LV ratio of 1.6. Mild ground-glass seen at the right lung base in which early infarction cannot be excluded. There is increased density seen surrounding the left luke catheter in the subcutaneous tissues to suggest extravasation of intravenous contrast. Findings were discussed with Dr. Kiel Nunn at time of interpretation of the examination. CT CERVICAL SPINE WO CONTRAST    Result Date: 12/18/2022  EXAMINATION: CT OF THE CERVICAL SPINE WITHOUT CONTRAST 12/18/2022 8:30 am TECHNIQUE: CT of the cervical spine was performed without the administration of intravenous contrast. Multiplanar reformatted images are provided for review. Automated exposure control, iterative reconstruction, and/or weight based adjustment of the mA/kV was utilized to reduce the radiation dose to as low as reasonably achievable. COMPARISON: None.  HISTORY: ORDERING SYSTEM PROVIDED HISTORY: syncope, fall TECHNOLOGIST PROVIDED HISTORY: Reason for exam:->syncope, fall Decision Support Exception - unselect if not a suspected or confirmed emergency medical condition->Emergency Medical Condition ([place]) What reading provider will be dictating this exam?->CRC FINDINGS: The ring of C1 is intact as is the dense. There is no compression fracture of the cervical spine. No jumped or perched facet is noted. Multilevel degenerative disc and degenerative joint disease is noted. There are posterior degenerative endplate spurs seen at the C3-4 and C6-7 levels. There is mild central canal stenosis at the C6-7 level and mild bilateral neural foraminal narrowing. The prevertebral soft tissues are unremarkable. The airway is widely patent. Images through the lung apices are negative for a pneumothorax. 1. There is no acute compression fracture or subluxation of the cervical spine. 2. Multilevel degenerative disc and degenerative joint disease. .     CT THORACIC SPINE WO CONTRAST    Result Date: 12/18/2022  EXAMINATION: CT OF THE THORACIC SPINE WITHOUT CONTRAST  12/18/2022 8:30 am: TECHNIQUE: CT of the thoracic spine was performed without the administration of intravenous contrast. Multiplanar reformatted images are provided for review. Automated exposure control, iterative reconstruction, and/or weight based adjustment of the mA/kV was utilized to reduce the radiation dose to as low as reasonably achievable. COMPARISON: None. HISTORY: ORDERING SYSTEM PROVIDED HISTORY: syncope, fall TECHNOLOGIST PROVIDED HISTORY: Reason for exam:->syncope, fall What reading provider will be dictating this exam?->CRC FINDINGS: BONES/ALIGNMENT: There is no acute compression fracture of the thoracic spine. There is dextroscoliosis of the thoracic spine. Mild multilevel degenerative disc and degenerative joint disease is noted.   There are large anterior and lateral degenerative endplate spurs seen throughout the course of the thoracic spine. .  There is no osseous lesion. SOFT TISSUES: No paraspinal mass is seen. 1. There is no acute compression fracture of the thoracic spine. 2. Dextroscoliosis 3. Multilevel degenerative changes. CT LUMBAR SPINE WO CONTRAST    Result Date: 12/18/2022  EXAMINATION: CT OF THE LUMBAR SPINE WITHOUT CONTRAST  12/18/2022 TECHNIQUE: CT of the lumbar spine was performed without the administration of intravenous contrast. Multiplanar reformatted images are provided for review. Adjustment of mA and/or kV according to patient size was utilized. Automated exposure control, iterative reconstruction, and/or weight based adjustment of the mA/kV was utilized to reduce the radiation dose to as low as reasonably achievable. COMPARISON: None. HISTORY: ORDERING SYSTEM PROVIDED HISTORY: syncope, fall TECHNOLOGIST PROVIDED HISTORY: Reason for exam:->syncope, fall Decision Support Exception - unselect if not a suspected or confirmed emergency medical condition->Emergency Medical Condition (MA) What reading provider will be dictating this exam?->CRC FINDINGS: Vertebral alignment is normal.  Mild disc space narrowing and discovertebral degenerative changes are identified. Facet arthritis is also seen. No acute vertebral fracture is visualized. No spondylolysis is noted on the right or left. The sacroiliac joints are intact. The paraspinal soft tissues appear unremarkable. The urinary bladder is dilated. There are mild right hydronephrosis and hydroureter. There is colonic diverticulosis. There are postsurgical changes at the colorectal junction. Disc space narrowing and degenerative changes. No acute vertebral fracture or subluxation. XR CHEST PORTABLE    Result Date: 12/18/2022  EXAMINATION: ONE XRAY VIEW OF THE CHEST 12/18/2022 9:59 am COMPARISON: The previous study performed 11/03/2022.  HISTORY: ORDERING SYSTEM PROVIDED HISTORY: fall, syncope TECHNOLOGIST PROVIDED HISTORY: Reason for exam:->fall, syncope What reading provider will be dictating this exam?->CRC FINDINGS: There is a large, ill-defined density overlying the left shoulder and mid to upper lateral left hemithorax. This may be external to the patient. It limits full evaluation of the surrounding soft tissue and osseous structures. There is no evidence of acute consolidation or infiltrate. No active pleural disease is seen. The cardiac silhouette and mediastinal structures are unremarkable. The tracheobronchial tree is midline and patent. A left-sided MediPort catheter is again noted, with the tip in the proximal SVC. There is again noted to be a thoracic scoliosis, with convexity to the right. Osteo-degenerative changes are again noted involving the thoracic spine. Large, ill-defined density overlying the left shoulder and mid to upper lateral left hemithorax, which limits full evaluation of the immediate surrounding soft tissue and osseous structures. No acute consolidation or infiltrate. Repeat chest radiograph can be performed further evaluation.        Lab Results   Component Value Date/Time    WBC 9.1 12/19/2022 05:35 AM    RBC 4.18 12/19/2022 05:35 AM    HGB 12.3 12/19/2022 05:35 AM    HCT 36.5 12/19/2022 05:35 AM    MCV 87.3 12/19/2022 05:35 AM    MCH 29.5 12/19/2022 05:35 AM    MCHC 33.8 12/19/2022 05:35 AM    RDW 16.1 12/19/2022 05:35 AM     12/19/2022 05:35 AM    MPV 9.0 10/28/2014 05:58 AM     Lab Results   Component Value Date/Time     12/19/2022 07:00 AM    K 3.3 12/19/2022 07:00 AM    K 3.3 12/18/2022 08:30 PM    CL 98 12/19/2022 07:00 AM    CO2 27 12/19/2022 07:00 AM    BUN 10 12/19/2022 07:00 AM    CREATININE 0.73 12/19/2022 07:00 AM    GFRAA >60.0 10/17/2022 06:35 AM    LABGLOM >60.0 12/19/2022 07:00 AM    GLUCOSE 94 12/19/2022 07:00 AM    PROT 5.8 12/19/2022 07:00 AM    LABALBU 3.3 12/19/2022 07:00 AM    CALCIUM 8.5 12/19/2022 07:00 AM    BILITOT 0.4 12/19/2022 07:00 AM    ALKPHOS 111 12/19/2022 07:00 AM    AST 23 12/19/2022 07:00 AM    ALT 12 12/19/2022 07:00 AM     Lab Results   Component Value Date/Time    PROTIME 15.4 12/19/2022 07:00 AM    INR 1.2 12/19/2022 07:00 AM     Lab Results   Component Value Date/Time    TSH 2.310 10/26/2014 07:19 PM     Lab Results   Component Value Date/Time    TRIG 192 05/10/2017 05:57 AM    HDL 38 05/10/2017 05:57 AM    LDLCALC 150 05/10/2017 05:57 AM     Lab Results   Component Value Date/Time    LABAMPH Neg 05/09/2017 10:30 AM    BARBSCNU Neg 05/09/2017 10:30 AM    LABBENZ Neg 05/09/2017 10:30 AM    OPIATESCREENURINE Neg 05/09/2017 10:30 AM    PHENCYCLIDINESCREENURINE Neg 05/09/2017 10:30 AM    ETOH <10 05/09/2017 10:30 AM     No results found for: LITHIUM, DILFRTOT, VALPROATE    Assessment:   Convulsive syncope without any true suggestion of seizure. Patient had some shaking episode in the CT and she reported that she was scared. No postictal state was described there is no incontinence or tongue trauma. She does not have a headache. At this time we will continue on Keppra till her EEG is done and if there is nothing obvious we will discontinue the Keppra as this is an isolated single event and will watch her for the same. Patient had extensive elation and for syncopal episodes and CT scans were reviewed including her spine CTs. Gabino Christianson MD, Jere Sharma, American Board of Psychiatry & Neurology  Board Certified in Vascular Neurology  Board Certified in Neuromuscular Medicine  Certified in [address] [place] [address] Drive:

## 2022-12-19 NOTE — PLAN OF CARE
Nutrition Problem #1: Unintended weight loss  Intervention: Food and/or Nutrient Delivery: Continue NPO  Nutritional

## 2022-12-19 NOTE — CONSULTS
Spiritual Care Services     Summary of Visit:  Pt completed HCPOA, prayed for the pt before leaving the room, pt appreciated the visit and for completing her [employer], made a copy of HCPOA for pt chart and pt was given the original and one copy for family members           Encounter Summary  Encounter Overview/Reason : Advance Care Planning  Service Provided For[de-identified] Patient  Referral/Consult From[de-identified] Physician, Nurse, Family  Support System: Children, Family members  Last Encounter : 12/18/22  Complexity of Encounter: Low  Begin Time: 1700  End Time : 1730  Total Time Calculated: 30 min  Encounter   Type: Follow up  Crisis  Type: Rapid Response  Spiritual/Emotional needs  Type: Spiritual Support, Emotional Distress                 Advance Care Planning  Type: ACP conversation, Completed AD/ACP document(s)    Spiritual Assessment/Intervention/Outcomes:    Assessment: Coping    Intervention: Active listening, Prayer (assurance of)/[place], Sustaining Presence/Ministry of presence    Outcome: Engaged in conversation, [address]:    Plan and Referrals  Plan/Referrals: Continue to visit, (comment)          Spiritual Care Services   Electronically signed by Chaplain Damion on 12/19/2022 at 5:49 PM.    To reach a  for emotional and spiritual support, place an [facility] consult request.   If a  is needed immediately, dial 0 and ask to page the on-call .

## 2022-12-19 NOTE — PROGRESS NOTES
Hematology/Oncology   Progress Note        CHIEF COMPLAINT/HPI:  Denies any chest pain or shortness of breath . Appetite is poor. Feels weak . She is currently on Lovenox SQ for PE. She received one dose of Neupogen yesterday. CBC today showed WBC 9100 with ANc 7600, H & H : 12.3g%/36.5% and platelets 299848. Potassium was 3.3 meq/L    REVIEW OF SYSTEMS:    Unremarkable except for symptoms mentioned in HPI.     Current Inpatient Medications:    Current Facility-Administered Medications   Medication Dose Route Frequency Provider Last Rate Last Admin    sodium chloride flush 0.9 % injection 5-40 mL  5-40 mL IntraVENous 2 times per day Anika Mirza MD        sodium chloride flush 0.9 % injection 5-40 mL  5-40 mL IntraVENous PRN Anika Mirza MD        0.9 % sodium chloride infusion   IntraVENous PRN Anika Mirza MD        ondansetron [facility]) injection 4 mg  4 mg IntraVENous Q6H PRN Glenn Heard DO        enoxaparin (LOVENOX) injection 80 mg  1 mg/kg SubCUTAneous BID Glenn Heard, DO   80 mg at 12/19/22 1654    sodium chloride flush 0.9 % injection 5-40 mL  5-40 mL IntraVENous 2 times per day Jimmy Suazo PA-C        sodium chloride flush 0.9 % injection 5-40 mL  5-40 mL IntraVENous PRN Jimmy Suazo PA-C        0.9 % sodium chloride infusion   IntraVENous PRN Jimmy Suazo PA-C        sodium chloride flush 0.9 % injection 5-40 mL  5-40 mL IntraVENous 2 times per day Pedro Holley MD        sodium chloride flush 0.9 % injection 5-40 mL  5-40 mL IntraVENous PRN Pedro Holley MD        0.9 % sodium chloride infusion   IntraVENous PRN Pedro Holley MD        ondansetron (ZOFRAN-ODT) disintegrating tablet 4 mg  4 mg Oral Q8H PRN Pedro Holley MD        Or    ondansetron [facility]) injection 4 mg  4 mg IntraVENous Q6H PRN Pedro Holley MD        polyethylene glycol [facility]) packet 17 g  17 g Oral Daily PRN Pedro Holley MD        acetaminophen (TYLENOL) tablet 650 mg  650 mg Oral Q6H PRN Alfonso Balbuena MD        Or    acetaminophen (TYLENOL) suppository 650 mg  650 mg Rectal Q6H PRN Alfonso Balbuena MD        metoprolol succinate (TOPROL XL) extended release tablet 25 mg  25 mg Oral Daily Cinda Plaza MD   25 mg at 12/19/22 0045    levETIRAcetam (KEPPRA) tablet 500 mg  500 mg Oral BID Alfonso Balbuena MD   500 mg at 12/19/22 3731       PHYSICAL EXAM:    EYES:  Lids and lashes normal, pupils equal, round and reactive to light, extra ocular muscles intact, sclera clear, conjunctiva normal    ENT:  Normocephalic, without obvious abnormality, atraumatic, sinuses nontender on palpation, external ears without lesions, oral pharynx with moist mucus membranes,  gums normal and poordentition. NECK:  Supple, symmetrical, trachea midline, no adenopathy, thyroid symmetric, not enlarged and no tenderness, skin normal    CHEST:    LUNGS:  No increased work of breathing, good air exchange, clear to auscultation bilaterally, no crackles or wheezing    CARDIOVASCULAR:  Normal apical impulse, regular rate and rhythm, normal S1 and S2, no S3 or S4, and no murmur noted    ABDOMEN:  No scars, normal bowel sounds, soft, non-distended, non-tender, no masses palpated, no hepatosplenomegally. Colostomy is functioning well . MUSCULOSKELETAL:  There is no redness, warmth, or swelling of the joints. Full range of motion noted. Motor strength is 5 out of 5 all extremities bilaterally. Tone is normal.  EXTREMITIES:no edema or calf tenderness . NEURO:No focal neuro deficit.      DATA:      PT/INR:  No results found for: PTINR  PTT:    Lab Results   Component Value Date/Time    APTT 96.7 12/19/2022 07:00 AM     CMP:    Lab Results   Component Value Date/Time     12/19/2022 07:00 AM    K 3.3 12/19/2022 07:00 AM    K 3.3 12/18/2022 08:30 PM    CL 98 12/19/2022 07:00 AM    CO2 27 12/19/2022 07:00 AM    BUN 10 12/19/2022 07:00 AM    PROT 5.8 12/19/2022 07:00 AM     Magnesium:    Lab Results   Component Value Date/Time    MG 2.2 12/18/2022 08:30 PM     Phosphorus:  No components found for: PO4  Calcium:  No results found for: CA  CBC:    Lab Results   Component Value Date/Time    WBC 9.1 12/19/2022 05:35 AM    RBC 4.18 12/19/2022 05:35 AM    HGB 12.3 12/19/2022 05:35 AM    HCT 36.5 12/19/2022 05:35 AM    MCV 87.3 12/19/2022 05:35 AM    RDW 16.1 12/19/2022 05:35 AM     12/19/2022 05:35 AM     DIFF:    Lab Results   Component Value Date/Time    MCV 87.3 12/19/2022 05:35 AM    RDW 16.1 12/19/2022 05:35 AM      Narrative   EXAMINATION:  BILATERAL LOWER EXTREMITY VENOUS DUPLEX       CLINICAL HISTORY:  SAGITTAL PULMONARY EMBOLISM       COMPARISONS:  NONE AVAILABLE       TECHNIQUE:  B-mode, color flow and spectral Doppler       FINDINGS:  Bilateral lower extremity venous systems were interrogated extending from the common femoral vein to its adjacent segments of the proximal popliteal vein. On the right side the common femoral vein, femoral vein as well as the popliteal vein and into the peroneal vein there is an adequate compression augmentation as well as phasic flow consistent with the thrombus. Left lower extremity there is abnormal compression augmentation as well as phasic flow posterior tibial and into the peroneal veins. Impression   RIGHT LOWER EXTREMITY EXTENSIVE THE THIGH DEEP VEIN THROMBOSIS EXTENDING INTO THE CALF. LEFT LOWER EXTREMITY EDEMA DISTAL DEEP VEIN THROMBOSIS INVOLVING THE CALF REGION. Pathology: Reviewed where indicated      ASSESSMENT:  Principal Problem:    Acute saddle pulmonary embolism with acute cor pulmonale (HCC)  Active Problems:    Convulsive syncope  Resolved Problems:    * No resolved hospital problems.  *    Patient Active Problem List   Diagnosis    Cancer ([address]    Hyperlipidemia    Elevated blood pressure reading    Colonic mass    Rectal polyp    Rectal [facility]    Malignant neoplasm of rectum (HCC)    Acute saddle pulmonary embolism with acute cor pulmonale (HCC)    Convulsive syncope         Stage 3 Colon cancer on adjuvant chemotherapy          Leukopenia secondary to chemotherapy or viral illness         Thrombocytopenia -drug induced . Covid-19 infection               PLAN:  I shall monitor CBC and patient will receive Neupogen if necessary . Agree with Direct oral anticoagulants as ordered .            Electronically signed by Ken Duff MD on 12/19/22 at 6:24 PM EST

## 2022-12-19 NOTE — INTERDISCIPLINARY ROUNDS
Spiritual Care Services     Summary of Visit:  Attended interdisciplinary rounds. Pt awake and alert. Spiritual Care available for support and to assist with AD if requested. Encounter Summary  Encounter Overview/Reason : Interdisciplinary rounds  Service Provided For[de-identified] Patient  Referral/Consult From[de-identified] Multi-disciplinary team  Support System: Children, Family members  Last Encounter : 12/17/22  Complexity of Encounter: Low  Begin Time: 1400  End Time : 1415  Total Time Calculated: 15 min  Encounter   Type: Follow up  Crisis  Type: Rapid Response  Spiritual/Emotional needs  Type: Spiritual Support, Emotional Distress                 Advance Care Planning  Type: ACP conversation (Patient has not reviewed documents yet. She did name her children as her medical decision makers.)    Spiritual Assessment/Intervention/Outcomes:    Assessment: Calm, Coping    Intervention: Sustaining Presence/Ministry of presence (Discussed AD)    Outcome: Expressed Gratitude, Engaged in conversation, Coping      Care Plan:    Plan and Referrals  Plan/Referrals: Continue Support (comment)      [id number] [address]   Electronically signed by Dominik Sheriff [facility] on 12/19/2022 at 10:08 AM.    To reach a  for emotional and spiritual support, place an [facility] consult request.   If a  is needed immediately, dial 0 and ask to page the on-call .

## 2022-12-19 NOTE — CARE COORDINATION
CHART REVIEWED AND PT SEEN IN ER AND CMI DONE. SHE LIVES W SON AND HAD [address] FOR COL[address]OM[address] CARE. I CALLED THEM AND SHE IS ACTIVE WITH THEM FOR SN AND THEY WOULD NEED A WHOLE NEW ORDER BECAUSE SHE WAS DUE FOR RECERT ON [employer] AND PT/OT HAD DCD HER. WE WILL GET PT/OT EVAL ONCE ABLE DURING THIS STAY TO HELP DETERMINE DC PLAN.

## 2022-12-19 NOTE — CONSULTS
Chief Complaint   Patient presents with    Fall     Pt fell in the bathroom, + LOC, + Head Injury, - Thinners. Pt does not remember the event. Chief complaint: Pulmonary embolism    Interventional cardiology consultation. Patient is a 81 y.o. female who presents with a chief complaint of syncope. Patient is followed on a regular basis by Dr. Charito Huertas DO. Patient with past medical story of hyperlipidemia, colon cancer. Was noted to have saddle pulmonary embolism with evidence of RV strain in the emergency department initiated on heparin drip. She did have mildly elevated cardiac enzymes as well. Patient had syncopal episode at home as well as in the radiology department while receiving a CT scan of the chest.  She was noted to have COVID-19 positive screen. Patient undergoing treatment for colon cancer with chemotherapy. Lower extremity venous duplex ultrasound preliminary reading with extensive right extremity DVT and left popliteal DVT. Patient evaluated by pulmonary as well as general cardiology and felt that patient would be an appropriate candidate for PE thrombectomy.     Past Medical History:   Diagnosis Date    Arthritis     Cancer ([address]     SKIN    Hyperlipidemia       Patient Active Problem List   Diagnosis    Cancer ([address]    Hyperlipidemia    Elevated blood pressure reading    Colonic mass    Rectal polyp    Rectal cancer ([address]    Malignant neoplasm of rectum (HCC)    Bilateral pulmonary embolism ([address]       Past Surgical History:   Procedure Laterality Date    CATARACT REMOVAL      CHOLECYSTECTOMY      COLECTOMY N/A 10/3/2022    LOW ANTERIOR RESECTION WITH LOOP ILEOSTOMY performed by Viviana Aldana MD at [id number] [place] [address] N/A 9/16/2022    COLORECTAL CANCER SCREENING, NOT HIGH RISK performed by Charlie Alva MD at [facility]    COLONOSCOPY N/A 9/26/2022    FLEXIBLE SIGMOIDOSCOPY WITH SNARE POLYPECTOMY performed by Viviana Aldana MD at [address] [place] [address] N/A 11/3/2022    INFUSAPORT performed by Belen Thompson MD at [facility]      R LOWER LEG    TUBAL LIGATION         Social History     Socioeconomic History    Marital status:    Tobacco Use    Smoking status: Never    Smokeless tobacco: Never   Vaping Use    Vaping Use: Never used   Substance and Sexual Activity    Alcohol use: No    Drug use: No     Social Determinants of Health     Financial Resource Strain: Low Risk     Difficulty of Paying Living Expenses: Not hard at all   Food Insecurity: No Food Insecurity    Worried About Running Out of Food in the Last Year: Never true    Ran Out of Food in the Last Year: Never true   Transportation Needs: No Transportation Needs    Lack of Transportation (Medical): No    Lack of Transportation (Non-Medical): No       Family History   Problem Relation Age of Onset    Diabetes Mother     Breast Cancer Maternal Aunt     Colon Cancer Neg Hx        Current Facility-Administered Medications   Medication Dose Route Frequency Provider Last Rate Last Admin    sodium chloride flush 0.9 % injection 5-40 mL  5-40 mL IntraVENous 2 times per day Karlee Nelson MD        sodium chloride flush 0.9 % injection 5-40 mL  5-40 mL IntraVENous PRN Karlee Nelson MD        0.9 % sodium chloride infusion   IntraVENous PRN Karlee Nelson MD        heparin 25,000 units in dextrose 5% 250 mL (premix) infusion  5-30 Units/kg/hr IntraVENous Continuous Adilene Foy MD 10.6 mL/hr at 12/19/22 1009 14 Units/kg/hr at 12/19/22 1009    heparin (porcine) injection 3,040 Units  40 Units/kg IntraVENous PRN Adilene Foy MD        heparin (porcine) injection 6,070 Units  80 Units/kg IntraVENous PRN Adilene Foy MD        sodium chloride flush 0.9 % injection 5-40 mL  5-40 mL IntraVENous 2 times per day Alondra Greene PA-C        sodium chloride flush 0.9 % injection 5-40 mL  5-40 mL IntraVENous PRN Alondra Greene PA-C        0.9 % sodium chloride infusion   IntraVENous PRN Arsen Mace, VENUS        sodium chloride flush 0.9 % injection 5-40 mL  5-40 mL IntraVENous 2 times per day Ramsey Mendez MD        sodium chloride flush 0.9 % injection 5-40 mL  5-40 mL IntraVENous PRN Ramsey Mendez MD        0.9 % sodium chloride infusion   IntraVENous PRN Ramsey Mendez MD        ondansetron (ZOFRAN-ODT) disintegrating tablet 4 mg  4 mg Oral Q8H PRN Ramsey Mendez MD        Or    [facility]) injection 4 mg  4 mg IntraVENous Q6H PRN Ramsey Mendez MD        polyethylene glycol [facility]) packet 17 g  17 g Oral Daily PRN Ramsey Mendez MD        acetaminophen (TYLENOL) tablet 650 mg  650 mg Oral Q6H PRN Ramsey Mendez MD        Or    acetaminophen (TYLENOL) suppository 650 mg  650 mg Rectal Q6H PRN Ramsey Mendez MD        metoprolol succinate (TOPROL XL) extended release tablet 25 mg  25 mg Oral Daily Caryn Alcala MD   25 mg at 12/19/22 9938    levETIRAcetam (KEPPRA) tablet 500 mg  500 mg Oral BID Ramsey Mendez MD   500 mg at 12/19/22 0642       ALLERGIES: Morphine    Review of Systems   Constitutional: Negative. Negative for chills and fever. HENT: Negative. Eyes: Negative. Respiratory:  Positive for shortness of breath. Negative for wheezing. Cardiovascular:  Negative for chest pain, palpitations and leg swelling. Gastrointestinal: Negative. Negative for abdominal pain, nausea and vomiting. Endocrine: Negative. Genitourinary: Negative. Musculoskeletal: Negative. Skin: Negative. Negative for rash. Allergic/Immunologic: Negative. Neurological:  Positive for syncope. Negative for dizziness, weakness and headaches. Hematological: Negative. Psychiatric/Behavioral: Negative. VITALS:  Blood pressure (!) 108/48, pulse 96, temperature 98.2 °F (36.8 °C), temperature source Oral, resp. rate 22, height 4' 11\" (1.499 m), weight 167 lb 5.3 oz (75.9 kg), SpO2 95 %, not currently breastfeeding.   Body mass index is 33.8 kg/m². Physical Exam    As per H&P.   Limited contact secondary COVID-19    LABS:  Recent Results (from the past 24 hour(s))   Anti-Xa, Unfractionated Heparin    Collection Time: 12/18/22  8:29 PM   Result Value Ref Range    Anti-XA Unfrac Heparin >=2.00 IU/mL   Comprehensive Metabolic Panel w/ Reflex to MG    Collection Time: 12/18/22  8:30 PM   Result Value Ref Range    Sodium 137 135 - 144 mEq/L    Potassium reflex Magnesium 3.3 (L) 3.4 - 4.9 mEq/L    Chloride 98 95 - 107 mEq/L    CO2 26 20 - 31 mEq/L    Anion Gap 13 9 - 15 mEq/L    Glucose 169 (H) 70 - 99 mg/dL    BUN 9 8 - 23 mg/dL    Creatinine 0.65 0.50 - 0.90 mg/dL    Est, Glom Filt Rate >60.0 >60    Calcium 8.6 8.5 - 9.9 mg/dL    Total Protein 6.0 (L) 6.3 - 8.0 g/dL    Albumin 3.6 3.5 - 4.6 g/dL    Total Bilirubin 0.4 0.2 - 0.7 mg/dL    Alkaline Phosphatase 116 40 - 130 U/L    ALT 14 0 - 33 U/L    AST 20 0 - 35 U/L    Globulin 2.4 2.3 - 3.5 g/dL   Magnesium    Collection Time: 12/18/22  8:30 PM   Result Value Ref Range    Magnesium 2.2 1.7 - 2.4 mg/dL   CBC with Auto Differential    Collection Time: 12/19/22  5:35 AM   Result Value Ref Range    WBC 9.1 4.8 - 10.8 K/uL    RBC 4.18 (L) 4.20 - 5.40 M/uL    Hemoglobin 12.3 12.0 - 16.0 g/dL    Hematocrit 36.5 (L) 37.0 - 47.0 %    MCV 87.3 79.4 - 94.8 fL    MCH 29.5 27.0 - 31.3 pg    MCHC 33.8 33.0 - 37.0 %    RDW 16.1 (H) 11.5 - 14.5 %    Platelets 927 (L) 688 - 400 K/uL    PLATELET SLIDE REVIEW Decreased     Neutrophils % 83.0 %    Lymphocytes % 14.0 %    Monocytes % 1.0 %    Eosinophils % 0.4 %    Basophils % 2.0 %    Neutrophils Absolute 7.6 (H) 1.4 - 6.5 K/uL    Lymphocytes Absolute 1.3 1.0 - 4.8 K/uL    Monocytes Absolute 0.1 (L) 0.2 - 0.8 K/uL    Eosinophils Absolute 0.0 0.0 - 0.7 K/uL    Basophils Absolute 0.2 0.0 - 0.2 K/uL    Anisocytosis 2+     Microcytes 2+     Polychromasia 1+    Comprehensive Metabolic Panel    Collection Time: 12/19/22  7:00 AM   Result Value Ref Range    Sodium 135 135 - 144 mEq/L    Potassium 3.3 (L) 3.4 - 4.9 mEq/L    Chloride 98 95 - 107 mEq/L    CO2 27 20 - 31 mEq/L    Anion Gap 10 9 - 15 mEq/L    Glucose 94 70 - 99 mg/dL    BUN 10 8 - 23 mg/dL    Creatinine 0.73 0.50 - 0.90 mg/dL    Est, Glom Filt Rate >60.0 >60    Calcium 8.5 8.5 - 9.9 mg/dL    Total Protein 5.8 (L) 6.3 - 8.0 g/dL    Albumin 3.3 (L) 3.5 - 4.6 g/dL    Total Bilirubin 0.4 0.2 - 0.7 mg/dL    Alkaline Phosphatase 111 40 - 130 U/L    ALT 12 0 - 33 U/L    AST 23 0 - 35 U/L    Globulin 2.5 2.3 - 3.5 g/dL   Anti-Xa, Unfractionated Heparin    Collection Time: 12/19/22  7:00 AM   Result Value Ref Range    Anti-XA Unfrac Heparin 0.72 IU/mL   Protime-INR    Collection Time: 12/19/22  7:00 AM   Result Value Ref Range    Protime 15.4 (H) 12.3 - 14.9 sec    INR 1.2    APTT    Collection Time: 12/19/22  7:00 AM   Result Value Ref Range    aPTT 96.7 (H) 24.4 - 36.8 sec     Troponin:   Lab Results   Component Value Date/Time    TROPONINI 0.096 12/18/2022 06:45 AM           ASSESSMENT:    Active Hospital Problems    Diagnosis Date Noted    Bilateral pulmonary embolism ([facility] [address] [I26.99] 12/18/2022     Priority: Medium     Saddle pulmonary embolism with evidence of RV strain. Elevated cardiac enzymes secondary to pulm embolism    Extensive right lower extremity DVT    Left popliteal DVT    COVID-19 positive    Colon cancer    Thrombocytopenia    PLAN:   We will plan for PE thrombectomy today  Right lower extremity DVT thrombectomy to follow  Possible IVC filter insertion  Continue with heparin drip, transition to full dose oral anticoagulation prior to discharge  Await 2D echocardiogram  Monitor on telemetry  ICU supportive care and management  Pulmonary as well as general cardiology recommendations  Further recommendations to follow    Thank you for allowing me to participate in the care of your patient, please don't hesitate to contact me if you have any further questions.     Electronically signed by Rodney Reyes DO on 12/19/2022 at 10:46 AM

## 2022-12-19 NOTE — CONSULTS
Pulmonary and Critical Care Medicine  Consult Note  Encounter Date: 2022 8:16 AM    Ms. Aida Macdonald is a 81 y.o. female  : 1950  Requesting Provider: Rodríugez Waters MD    Reason for request: ICU management            HISTORY OF PRESENT ILLNESS:    Patient is 81 y.o. presents with syncope, she is currently awake and alert, she denies chest pain, no difficulty breathing, no coughing, patient was sitting on the toilet urinating, and emptying her colostomy bag she felt lightheaded when she stood up, and later remembered that she woke up on the floor. Patient reported head trauma, no similar prior exam.  Reported dry cough, no fever, no sick contact. No focal neurodeficit. She is currently on 2 L O2 saturation 93%, temperature 98.9, she is currently on heparin drip. Past Medical History:        Diagnosis Date    Arthritis     Cancer ([facility] [address]     SKIN    Hyperlipidemia        Past Surgical History:        Procedure Laterality Date    CATARACT REMOVAL      CHOLECYSTECTOMY      COLECTOMY N/A 10/3/2022    LOW ANTERIOR RESECTION WITH LOOP ILEOSTOMY performed by Efrain Casey MD at [address] [facility] N/A 2022    COLORECTAL CANCER SCREENING, NOT HIGH RISK performed by Todd Wisdom MD at [address] N/A 2022    FLEXIBLE SIGMOIDOSCOPY WITH SNARE POLYPECTOMY performed by Efrain Casey MD at [address] N/A 11/3/2022    INFUSAPORT performed by Efrain Casey MD at [facility] 2 LEG    TUBAL LIGATION         Social History:     reports that she has never smoked. She has never used smokeless tobacco. She reports that she does not drink alcohol and does not use drugs.     Family History:       Problem Relation Age of Onset    Diabetes Mother     Breast Cancer Maternal Aunt     Colon Cancer Neg Hx        Allergies:  Morphine        MEDICATIONS during current hospitalization:    Continuous Infusions:   sodium chloride sodium chloride      sodium chloride      heparin (PORCINE) Infusion 15 Units/kg/hr (12/18/22 2300)       Scheduled Meds:   sodium chloride flush  5-40 mL IntraVENous 2 times per day    sodium chloride flush  5-40 mL IntraVENous 2 times per day    sodium chloride flush  5-40 mL IntraVENous 2 times per day    metoprolol succinate  25 mg Oral Daily    levETIRAcetam  500 mg Oral BID       PRN Meds:sodium chloride flush, sodium chloride, sodium chloride flush, sodium chloride, sodium chloride flush, sodium chloride, ondansetron **OR** ondansetron, polyethylene glycol, acetaminophen **OR** acetaminophen, heparin (porcine), heparin (porcine)        REVIEW OF SYSTEMS:  ROS: 10 organs review of system is done including general, psychological, ENT, hematological, endocrine, respiratory, cardiovascular, gastrointestinal, musculoskeletal, neurological,  allergy and Immunology is done and is otherwise negative. PHYSICAL EXAM:    Vitals:  BP (!) 102/57   Pulse (!) 103   Temp 98.9 °F (37.2 °C)   Resp 19   Ht 4' 11\" (1.499 m)   Wt 167 lb 5.3 oz (75.9 kg)   SpO2 95%   BMI 33.80 kg/m²     General: alert, cooperative, no distress  Head: normocephalic, atraumatic  Eyes:No gross abnormalities. ENT:  MMM no lesions  Neck:  supple and no masses  Chest : clear to auscultation bilaterally- no wheezes, rales or rhonchi, normal air movement, no respiratory distress  Heart[de-identified] Heart sounds are normal.  Regular rate and rhythm without murmur, gallop or rub. ABD:  symmetric, soft, non-tender, colostomy bag  Musculoskeletal : no cyanosis, no clubbing, and no edema  Neuro:  Grossly normal  Skin: No rashes or nodules noted.   Lymph node:  no cervical nodes  Urology: No Balbuena   Psychiatric: appropriate        Data Review  Recent Labs     12/18/22  0645 12/19/22  0535   WBC 1.9* 9.1   HGB 12.8 12.3   HCT 39.3 36.5*   PLT 94* 115*      Recent Labs     12/18/22  0645 12/18/22  2030   * 137   K 2.8* 3.3*   CL 96 98   CO2 24 26   BUN 9 9   CREATININE 0.66 0.65   GLUCOSE 98 169*       MV Settings: ABGs: No results for input(s): PHART, UIS0DRY, PO2ART, QCW9RFL, BEART, G2YLWSVG, HTL6SMW in the last 72 hours. O2 Device: Nasal cannula  O2 Flow Rate (L/min): 2 L/min  Lab Results   Component Value Date/Time    LACTA 1.2 12/18/2022 06:45 AM       Radiology  I personally reviewed imaging studies and CT chest shows saddle pulmonary emboli, dilated right ventricle        Assessment, plan: This is a critically ill patient        Submassive high risk PE, with increased troponin, and dilated right ventricle on CT chest, at risk of worsening.   PE provoked by COVID-19 infection, however with underlying history of colon cancer, likely will need extended treatment  Increased troponin, secondary to PE and right ventricle strain  History of colon cancer status post colectomy     Recommendation  Interventional cardiology consult for mechanical thrombectomy  Continue heparin drip  Watch closely in ICU for the first 24 hours, at risk of deterioration  Monitor blood sugar target 140-180  Echo  Bilateral lower extremity ultrasound  Can transfer to 1 W. postprocedure if no issues      Thank you for consultation      Electronically signed by Angelito Garcia MD, [facility],  on 12/19/2022 at 8:16 AM

## 2022-12-19 NOTE — PROGRESS NOTES
PHARMACY NOTE:   ICU Rounds Attended (10-15 minutes in patient room):    Pt diagnosis: PE/COVID    IV Fluids: none   Renal:   Recent Labs     12/18/22  0645 12/18/22 2030 12/19/22  0700   CREATININE 0.66 0.65 0.73    Estimated Creatinine Clearance: 67 mL/min (based on SCr of 0.73 mg/dL).         Antimicrobial Therapy:   none    Recent Labs     12/18/22  0645 12/19/22  0535   WBC 1.9* 9.1        Insulin Therapy (goal: 140-180): none  Recent Labs     12/18/22  0645 12/18/22 2030 12/19/22  0700   GLUCOSE 98 169* 94       Steroid Therapy: none  Stress Ulcer Prophylaxis:   none    DVT Prophylaxis/Anticoagulant Therapy: heparin drip  Recent Labs     12/18/22  0645 12/19/22  0535   PLT 94* 115*     Recent Labs     12/18/22  0840 12/19/22  0700   INR 1.2 1.2         Bowel Regimen:   Miralax PRN    Follow up/Changes:   -follow up BG monitor  -F/u simvastatin PTA   -f/u SUP    MAILE Love D [facility] PharmD

## 2022-12-19 NOTE — PROGRESS NOTES
0845 am assessment completed as noted. Vss, will continue to monitor. Electronically signed by Celso Donato RN on 12/19/2022 at 9:02 AM  1600 left groin stopcock removed and dsd placed. Pt tolerated well.  Electronically signed by Celso Donato RN on 12/19/2022 at 4:23 PM

## 2022-12-19 NOTE — PROGRESS NOTES
Comprehensive Nutrition Assessment    Type and Reason for Visit:  Initial, Positive Nutrition Screen (+ malnutrition screen)    Nutrition Recommendations/Plan:   Continue NPO  Advance to General diet as tolerated,. Post procedure  Follow up for pt interview, determination of supplement accpetance     Malnutrition Assessment:  Malnutrition Status:  Insufficient data (12/19/22 1054)    Context:  Chronic Illness     Findings of the 6 clinical characteristics of malnutrition:  Energy Intake:  Unable to assess  Weight Loss:  Greater than 7.5% over 3 months     Body Fat Loss:  Unable to assess     Muscle Mass Loss:  Unable to assess    Fluid Accumulation:  No significant fluid accumulation     Strength:  Not Performed    Nutrition Assessment:    Pt admitted with suspected malnutrition, hx of significant weight loss ~10% x 2-3 months, with suspected inadeqauate po intake PTA, current off floor for procedure, + droplet precautions for COVID,    Nutrition Related Findings:    \"presents with a hx of colon/rectal cancer undergoing active chemotherapy, ( colectomy 10/3/22),hld, oa, who presents after a fall while walking from the bathroom after changing her colostomy bag. She lost consciousness for an unknown amount of time. She was then brought to the ER and had a second syncopal episode. \", currently NPO for thrombectomy, labs noted, meds reviewed Wound Type: None       Current Nutrition Intake & Therapies:    Average Meal Intake: NPO  Average Supplements Intake: NPO  Diet NPO Exceptions are: Sips of Water with Meds    Anthropometric Measures:  Height: 4' 11\" (149.9 cm)  Ideal Body Weight (IBW): 95 lbs (43 kg)    Admission Body Weight: 173 lb (78.5 kg) (stated)  Current Body Weight: 167 lb (75.8 kg),   IBW.  Weight Source: Bed Scale  Current BMI (kg/m2): 33.7  Usual Body Weight: 192 lb (87.1 kg) (( 11/2021), 185# ( 10/22))  % Weight Change (Calculated): -13  Weight Adjustment For: No Adjustment                 BMI Categories: Obese Class 1 (BMI 30.0-34. 9)    Estimated Daily Nutrient Needs:  Energy Requirements Based On: Kcal/kg  Weight Used for Energy Requirements: Current  Energy (kcal/day): 3843-2483 kcals @ 16-18 kcal/kg  Weight Used for Protein Requirements: Ideal  Protein (g/day): 52-56 g protein @ 1.2-1.3 g/kg IBW  Method Used for Fluid Requirements: ml/Kg  Fluid (ml/day): ~1900 ml @25 ml/kg    Nutrition Diagnosis:   Unintended weight loss related to inadequate protein-energy intake, increase demand for energy/nutrients as evidenced by > 7.5% weight loss < 4 months    Nutrition Interventions:   Food and/or Nutrient Delivery: Continue NPO  Nutrition Education/Counseling: Education not indicated  Coordination of Nutrition Care: Continue to monitor while inpatient       Goals:     Goals: Initiate PO diet, by next RD assessment       Nutrition Monitoring and Evaluation:   Behavioral-Environmental Outcomes: None Identified  Food/Nutrient Intake Outcomes: Diet Advancement/Tolerance, Food and Nutrient Intake  Physical Signs/Symptoms Outcomes: GI Status, Weight, Meal Time Behavior, Nutrition Focused Physical Findings    Discharge Planning:     Too soon to determine     Zamzam Aguilar RD, LD

## 2022-12-19 NOTE — FLOWSHEET NOTE
Spoke with Aislinn STALLWORTH, regarding pt infiltration of left Port during CT exam on 12/18/22. No issues noted with left port site at this time. Port not in use, PICC line accessed.

## 2022-12-20 ENCOUNTER — APPOINTMENT (OUTPATIENT)
Dept: INTERVENTIONAL RADIOLOGY/VASCULAR | Age: 72
DRG: 163 | End: 2022-12-20
Payer: COMMERCIAL

## 2022-12-20 LAB
ALBUMIN SERPL-MCNC: 3.4 G/DL (ref 3.5–4.6)
ANION GAP SERPL CALCULATED.3IONS-SCNC: 12 MEQ/L (ref 9–15)
BASOPHILS ABSOLUTE: 0 K/UL (ref 0–0.2)
BASOPHILS RELATIVE PERCENT: 0.3 %
BUN BLDV-MCNC: 18 MG/DL (ref 8–23)
CALCIUM SERPL-MCNC: 8.7 MG/DL (ref 8.5–9.9)
CHLORIDE BLD-SCNC: 97 MEQ/L (ref 95–107)
CO2: 24 MEQ/L (ref 20–31)
CREAT SERPL-MCNC: 1.16 MG/DL (ref 0.5–0.9)
EKG ATRIAL RATE: 107 BPM
EKG P AXIS: 55 DEGREES
EKG P-R INTERVAL: 134 MS
EKG Q-T INTERVAL: 342 MS
EKG QRS DURATION: 96 MS
EKG QTC CALCULATION (BAZETT): 456 MS
EKG R AXIS: 113 DEGREES
EKG T AXIS: 26 DEGREES
EKG VENTRICULAR RATE: 107 BPM
EOSINOPHILS ABSOLUTE: 0.1 K/UL (ref 0–0.7)
EOSINOPHILS RELATIVE PERCENT: 0.6 %
GFR SERPL CREATININE-BSD FRML MDRD: 50.6 ML/MIN/{1.73_M2}
GLUCOSE BLD-MCNC: 86 MG/DL (ref 70–99)
HCT VFR BLD CALC: 34 % (ref 37–47)
HEMOGLOBIN: 10.9 G/DL (ref 12–16)
LYMPHOCYTES ABSOLUTE: 2 K/UL (ref 1–4.8)
LYMPHOCYTES RELATIVE PERCENT: 16.2 %
MCH RBC QN AUTO: 28.5 PG (ref 27–31.3)
MCHC RBC AUTO-ENTMCNC: 32.2 % (ref 33–37)
MCV RBC AUTO: 88.4 FL (ref 79.4–94.8)
MONOCYTES ABSOLUTE: 0.6 K/UL (ref 0.2–0.8)
MONOCYTES RELATIVE PERCENT: 4.5 %
NEUTROPHILS ABSOLUTE: 9.8 K/UL (ref 1.4–6.5)
NEUTROPHILS RELATIVE PERCENT: 78.4 %
PDW BLD-RTO: 15.9 % (ref 11.5–14.5)
PHOSPHORUS: 3.3 MG/DL (ref 2.3–4.8)
PLATELET # BLD: 103 K/UL (ref 130–400)
POTASSIUM SERPL-SCNC: 3.7 MEQ/L (ref 3.4–4.9)
RBC # BLD: 3.84 M/UL (ref 4.2–5.4)
SODIUM BLD-SCNC: 133 MEQ/L (ref 135–144)
WBC # BLD: 12.5 K/UL (ref 4.8–10.8)

## 2022-12-20 PROCEDURE — 2580000003 HC RX 258: Performed by: INTERNAL MEDICINE

## 2022-12-20 PROCEDURE — 93010 ELECTROCARDIOGRAM REPORT: CPT | Performed by: INTERNAL MEDICINE

## 2022-12-20 PROCEDURE — 6360000004 HC RX CONTRAST MEDICATION: Performed by: RADIOLOGY

## 2022-12-20 PROCEDURE — 6360000002 HC RX W HCPCS: Performed by: INTERNAL MEDICINE

## 2022-12-20 PROCEDURE — 2060000000 HC ICU INTERMEDIATE R&B

## 2022-12-20 PROCEDURE — 93308 TTE F-UP OR LMTD: CPT

## 2022-12-20 PROCEDURE — 6370000000 HC RX 637 (ALT 250 FOR IP): Performed by: INTERNAL MEDICINE

## 2022-12-20 PROCEDURE — 6360000002 HC RX W HCPCS: Performed by: FAMILY MEDICINE

## 2022-12-20 PROCEDURE — 99233 SBSQ HOSP IP/OBS HIGH 50: CPT | Performed by: INTERNAL MEDICINE

## 2022-12-20 PROCEDURE — 36598 INJ W/FLUOR EVAL CV DEVICE: CPT

## 2022-12-20 PROCEDURE — 6360000002 HC RX W HCPCS: Performed by: RADIOLOGY

## 2022-12-20 PROCEDURE — 2709999900 IR CONTRAST INJECTION  EXISTING CV ACCESS DEVICE EVALUATION W FLUORO

## 2022-12-20 PROCEDURE — 2700000000 HC OXYGEN THERAPY PER DAY

## 2022-12-20 PROCEDURE — 6370000000 HC RX 637 (ALT 250 FOR IP): Performed by: FAMILY MEDICINE

## 2022-12-20 PROCEDURE — 05HN33Z INSERTION OF INFUSION DEVICE INTO LEFT INTERNAL JUGULAR VEIN, PERCUTANEOUS APPROACH: ICD-10-PCS | Performed by: RADIOLOGY

## 2022-12-20 PROCEDURE — 2580000003 HC RX 258: Performed by: FAMILY MEDICINE

## 2022-12-20 PROCEDURE — 99233 SBSQ HOSP IP/OBS HIGH 50: CPT | Performed by: PSYCHIATRY & NEUROLOGY

## 2022-12-20 PROCEDURE — 2580000003 HC RX 258: Performed by: STUDENT IN AN ORGANIZED HEALTH CARE EDUCATION/TRAINING PROGRAM

## 2022-12-20 PROCEDURE — 80069 RENAL FUNCTION PANEL: CPT

## 2022-12-20 PROCEDURE — 85025 COMPLETE CBC W/AUTO DIFF WBC: CPT

## 2022-12-20 RX ORDER — HEPARIN SODIUM (PORCINE) LOCK FLUSH IV SOLN 100 UNIT/ML 100 UNIT/ML
SOLUTION INTRAVENOUS
Status: COMPLETED | OUTPATIENT
Start: 2022-12-20 | End: 2022-12-20

## 2022-12-20 RX ORDER — ENOXAPARIN SODIUM 100 MG/ML
1 INJECTION SUBCUTANEOUS 2 TIMES DAILY
Status: DISCONTINUED | OUTPATIENT
Start: 2022-12-20 | End: 2022-12-22

## 2022-12-20 RX ADMIN — Medication 10 ML: at 07:37

## 2022-12-20 RX ADMIN — ENOXAPARIN SODIUM 80 MG: 100 INJECTION SUBCUTANEOUS at 21:10

## 2022-12-20 RX ADMIN — METOPROLOL SUCCINATE 25 MG: 25 TABLET, FILM COATED, EXTENDED RELEASE ORAL at 07:36

## 2022-12-20 RX ADMIN — Medication 10 ML: at 21:10

## 2022-12-20 RX ADMIN — HEPARIN 5 ML: 100 SYRINGE at 14:52

## 2022-12-20 RX ADMIN — ENOXAPARIN SODIUM 80 MG: 100 INJECTION SUBCUTANEOUS at 10:19

## 2022-12-20 RX ADMIN — LEVETIRACETAM 500 MG: 500 TABLET, FILM COATED ORAL at 07:38

## 2022-12-20 RX ADMIN — IOPAMIDOL 18 ML: 612 INJECTION, SOLUTION INTRAVENOUS at 14:53

## 2022-12-20 RX ADMIN — ONDANSETRON 4 MG: 2 INJECTION INTRAMUSCULAR; INTRAVENOUS at 08:54

## 2022-12-20 ASSESSMENT — ENCOUNTER SYMPTOMS
BLOOD IN STOOL: 0
WHEEZING: 0
SHORTNESS OF BREATH: 0
GASTROINTESTINAL NEGATIVE: 1
NAUSEA: 0
EYES NEGATIVE: 1
RESPIRATORY NEGATIVE: 1
STRIDOR: 0
CHEST TIGHTNESS: 0
COUGH: 0

## 2022-12-20 ASSESSMENT — PAIN SCALES - GENERAL
PAINLEVEL_OUTOF10: 0

## 2022-12-20 NOTE — PROGRESS NOTES
Hospitalist Progress Note      Date of Admission: 12/18/2022  Chief Complaint:    Chief Complaint   Patient presents with    Fall     Pt fell in the bathroom, + LOC, + Head Injury, - Thinners. Pt does not remember the event.       Subjective:  No complaints, nausea or chest pain or headache    Medications:    Infusion Medications    sodium chloride      sodium chloride      sodium chloride       Scheduled Medications    enoxaparin  1 mg/kg SubCUTAneous BID    sodium chloride flush  5-40 mL IntraVENous 2 times per day    sodium chloride flush  5-40 mL IntraVENous 2 times per day    sodium chloride flush  5-40 mL IntraVENous 2 times per day    metoprolol succinate  25 mg Oral Daily     PRN Meds: sodium chloride flush, sodium chloride, ondansetron, sodium chloride flush, sodium chloride, sodium chloride flush, sodium chloride, ondansetron **OR** ondansetron, polyethylene glycol, acetaminophen **OR** acetaminophen    Intake/Output Summary (Last 24 hours) at 12/20/2022 1734  Last data filed at 12/20/2022 1022  Gross per 24 hour   Intake 240 ml   Output 1300 ml   Net -1060 ml       Exam:  /70   Pulse 89   Temp 97.6 °F (36.4 °C) (Oral)   Resp 18   Ht 4' 11\" (1.499 m)   Wt 167 lb 5.3 oz (75.9 kg)   SpO2 98%   BMI 33.80 kg/m²   Head: Normocephalic, atraumatic  Sclera clear  Neck JVD flat  Lungs: Normal effort of breathing    Labs:   Recent Labs     12/18/22  0645 12/19/22  0535 12/20/22  0449   WBC 1.9* 9.1 12.5*   HGB 12.8 12.3 10.9*   HCT 39.3 36.5* 34.0*   PLT 94* 115* 103*       Recent Labs     12/18/22  0645 12/18/22  2030 12/19/22  0700 12/20/22  0448   * 137 135 133*   K 2.8* 3.3* 3.3* 3.7   CL 96 98 98 97   CO2 24 26 27 24   BUN 9 9 10 18   CREATININE 0.66 0.65 0.73 1.16*   CALCIUM 8.7 8.6 8.5 8.7   PHOS  --   --   --  3.3   AST 21 20 23  --    ALT 16 14 12  --    BILITOT 0.6 0.4 0.4  --    ALKPHOS 125 116 111  --        Recent Labs     12/18/22  0840 12/19/22  0700   INR 1.2 1.2       Recent Labs 12/18/22  0645   TROPONINI 0.096*       Radiology:  IR CONTRAST INJECTION  EXISTING CV ACCESS DEVICE EVALUATION W FLUORO   Final Result   Left [id number] with an internal jugular vein catheter terminating at the   midline with its tip directed toward the right near the confluence of the   innominate veins. Redundancy of the catheter in the neck. Good blood return when aspirating the port but significant resistance when   flushing the port. Patent and intact catheter with no contrast extravasation. Suggestion of thrombus and or fibrin sheath at the distal end of the catheter. US DUP LOWER EXTREMITIES BILATERAL VENOUS   Final Result   RIGHT LOWER EXTREMITY EXTENSIVE THE THIGH DEEP VEIN THROMBOSIS EXTENDING INTO THE CALF. LEFT LOWER EXTREMITY EDEMA DISTAL DEEP VEIN THROMBOSIS INVOLVING THE CALF REGION. XR PELVIS (1-2 VIEWS)   Final Result   No acute fracture or malalignment. CTA CHEST W WO CONTRAST PE Eval   Final Result   Extensive pulmonary emboli with saddle embolism identified. There is   evidence of right heart strain within RV LV ratio of 1.6. Mild ground-glass   seen at the right lung base in which early infarction cannot be excluded. There is increased density seen surrounding the left luke catheter in the   subcutaneous tissues to suggest extravasation of intravenous contrast.      Findings were discussed with Dr. Samira Jones at time of interpretation of the   examination. XR CHEST PORTABLE   Final Result   Large, ill-defined density overlying the left shoulder and mid to upper   lateral left hemithorax, which limits full evaluation of the immediate   surrounding soft tissue and osseous structures. No acute consolidation or   infiltrate. Repeat chest radiograph can be performed further evaluation. CT HEAD WO CONTRAST   Final Result   1. There is no acute intracranial abnormality. Specifically, there is no   intracranial hemorrhage.    2. Atrophy and periventricular leukomalacia,         CT CERVICAL SPINE WO CONTRAST   Final Result   1. There is no acute compression fracture or subluxation of the cervical   spine. 2. Multilevel degenerative disc and degenerative joint disease. .         CT THORACIC SPINE WO CONTRAST   Final Result   1. There is no acute compression fracture of the thoracic spine. 2. Dextroscoliosis   3. Multilevel degenerative changes. CT LUMBAR SPINE WO CONTRAST   Final Result   Disc space narrowing and degenerative changes. No acute vertebral fracture or subluxation. IR PICC WO SQ PORT/PUMP > 5 YEARS    (Results Pending)     Assessment/Plan:    Pulmonary embolism: Large. On Anticoagulation . 12/19: Thrombectomy done by cardiology. tentatively scheduled for leg thrombectomy (extensive right leg DVT) on 12/21    Hypercoagulable state most likely secondary to active cancer. Rectal cancer  stage III status post resection neoadjuvant chemotherapy, as per oncology    Thrombocytopenia most likely secondary to chemotherapy. Mild. Continue to monitor platelet count. Appreciate hematology input.     hemoglobin/hematocrit drop noted, in the background of anticoag. Monitor h/h q8h next two days, monitor for bleeding. No evidence of active bleeding.     COVID-positive    35 minutes total care time, >1/2 in unit/floor time and care coordination       Aggie Schroeder MD ,MD

## 2022-12-20 NOTE — PROGRESS NOTES
INPATIENT PROGRESS NOTES    PATIENT NAME: Dell Kasper  MRN: [id number]  SERVICE DATE:  December 20, 2022   SERVICE TIME:  8:55 AM      PRIMARY SERVICE: Pulmonary Disease    CHIEF COMPLAINTS: Submassive PE    INTERVAL HPI: Patient seen and examined at bedside, Interval Notes, orders reviewed. Nursing notes noted        New information updated in the note today, rest of the examination did not change compared to yesterday. Patient report no chest pain, no shortness of breath, she feels better, she is on 2 L O2 saturation 98%, blood pressure is stable, no fever, no nausea no vomiting, she had mechanical thrombectomy done yesterday, was uneventful. Review of system:     GI Abdominal pain No  Skin Rash No    Social History     Tobacco Use    Smoking status: Never    Smokeless tobacco: Never   Substance Use Topics    Alcohol use: No         Problem Relation Age of Onset    Diabetes Mother     Breast Cancer Maternal Aunt     Colon Cancer Neg Hx          OBJECTIVE    Body mass index is 33.8 kg/m². PHYSICAL EXAM:  Vitals:  /60   Pulse 90   Temp 98.1 °F (36.7 °C) (Axillary)   Resp 21   Ht 4' 11\" (1.499 m)   Wt 167 lb 5.3 oz (75.9 kg)   SpO2 98%   BMI 33.80 kg/m²     General: alert, cooperative, no distress  Head: normocephalic, atraumatic  Eyes:No gross abnormalities. ENT:  MMM no lesions  Neck:  supple and no masses  Chest : clear to auscultation bilaterally- no wheezes, rales or rhonchi, normal air movement, no respiratory distress  Heart[de-identified] Heart sounds are normal.  Regular rate and rhythm without murmur, gallop or rub. ABD:  symmetric, soft, non-tender  Musculoskeletal : no cyanosis, no clubbing, and no edema  Neuro:  Grossly normal  Skin: No rashes or nodules noted.   Lymph node:  no cervical nodes  Urology: No [place]   Psychiatric: appropriate    DATA:   Recent Labs     12/19/22  0535 12/20/22  0449   WBC 9.1 12.5*   HGB 12.3 10.9*   HCT 36.5* 34.0*   MCV 87.3 88.4   * 103*     Recent Labs 12/18/22  2030 12/19/22  0700 12/20/22  0448    135 133*   K 3.3* 3.3* 3.7   CL 98 98 97   CO2 26 27 24   BUN 9 10 18   CREATININE 0.65 0.73 1.16*   GLUCOSE 169* 94 86   CALCIUM 8.6 8.5 8.7   PROT 6.0* 5.8*  --    LABALBU 3.6 3.3* 3.4*   BILITOT 0.4 0.4  --    ALKPHOS 116 111  --    AST 20 23  --    ALT 14 12  --    LABGLOM >60.0 >60.0 50.6*   GLOB 2.4 2.5  --        MV Settings:          No results for input(s): PHART, QXM3KEE, PO2ART, MGG9WUM, BEART, P3KDXISN in the last 72 hours. O2 Device: Nasal cannula  O2 Flow Rate (L/min): 2 L/min    ADULT DIET; Regular; Low Fat/Low Chol/High Fiber/PADMA     MEDICATIONS during current hospitalization:    Continuous Infusions:   sodium chloride      sodium chloride      sodium chloride         Scheduled Meds:   sodium chloride flush  5-40 mL IntraVENous 2 times per day    enoxaparin  1 mg/kg SubCUTAneous BID    sodium chloride flush  5-40 mL IntraVENous 2 times per day    sodium chloride flush  5-40 mL IntraVENous 2 times per day    metoprolol succinate  25 mg Oral Daily    levETIRAcetam  500 mg Oral BID       PRN Meds:sodium chloride flush, sodium chloride, ondansetron, sodium chloride flush, sodium chloride, sodium chloride flush, sodium chloride, ondansetron **OR** ondansetron, polyethylene glycol, acetaminophen **OR** acetaminophen    Radiology  XR PELVIS (1-2 VIEWS)    Result Date: 12/18/2022  EXAMINATION: ONE XRAY VIEW OF THE PELVIS 12/18/2022 10:04 am COMPARISON: None. HISTORY: ORDERING SYSTEM PROVIDED HISTORY: fall TECHNOLOGIST PROVIDED HISTORY: Reason for exam:->fall What reading provider will be dictating this exam?->CRC FINDINGS: No acute fracture or malalignment. Mild degenerative changes of the hips. Degenerative changes of the visualized spine. Surgical clips project over the left pelvis. The bladder is opacified with excreted IV contrast.     No acute fracture or malalignment.      CT HEAD WO CONTRAST    Result Date: 12/18/2022  EXAMINATION: CT OF THE HEAD WITHOUT CONTRAST  12/18/2022 8:30 am TECHNIQUE: CT of the head was performed without the administration of intravenous contrast. Automated exposure control, iterative reconstruction, and/or weight based adjustment of the mA/kV was utilized to reduce the radiation dose to as low as reasonably achievable. COMPARISON: None. HISTORY: ORDERING SYSTEM PROVIDED HISTORY: syncope, fall TECHNOLOGIST PROVIDED HISTORY: Reason for exam:->syncope, fall Has a \"code stroke\" or \"stroke alert\" been called? ->No Decision Support Exception - unselect if not a suspected or confirmed emergency medical condition->Emergency Medical Condition ([place]) What reading provider will be dictating this exam?->CRC FINDINGS: BRAIN/VENTRICLES: There is no acute intracranial hemorrhage, mass effect or midline shift. No abnormal extra-axial fluid collection. The gray-white differentiation is maintained without evidence of an acute infarct. There is no evidence of hydrocephalus. The ventricles, cisterns and sulci are prominent consistent with atrophy. There is decreased attenuation within the periventricular white matter consistent with periventricular leukomalacia. ORBITS: The visualized portion of the orbits demonstrate no acute abnormality. SINUSES: The visualized paranasal sinuses and mastoid air cells demonstrate no acute abnormality. There is mild mucosal thickening seen within the right ethmoid air cells. SOFT TISSUES/SKULL:  No acute abnormality of the visualized skull or soft tissues. 1. There is no acute intracranial abnormality. Specifically, there is no intracranial hemorrhage. 2. Atrophy and periventricular leukomalacia,     CTA CHEST W WO CONTRAST PE Eval    Result Date: 12/18/2022  EXAMINATION: CTA OF THE CHEST WITH AND WITHOUT CONTRAST 12/18/2022 8:30 am TECHNIQUE: CTA of the chest was performed before and after the administration of intravenous contrast.  Multiplanar reformatted images are provided for review.   MIP images are provided for review. Automated exposure control, iterative reconstruction, and/or weight based adjustment of the mA/kV was utilized to reduce the radiation dose to as low as reasonably achievable. COMPARISON: None. HISTORY: ORDERING SYSTEM PROVIDED HISTORY: PE, syncope, hx of cancer, rule out PE; cough evaluate for infiltrate TECHNOLOGIST PROVIDED HISTORY: Reason for exam:->PE Reason for exam:->syncope, hx of cancer, rule out PE; cough evaluate for infiltrate Decision Support Exception - unselect if not a suspected or confirmed emergency medical condition->Emergency Medical Condition ([place]) What reading provider will be dictating this exam?->CRC FINDINGS: Pulmonary Arteries: Pulmonary arteries are adequately opacified for evaluation. There is abnormal filling defect seen within the main pulmonary arteries bilaterally with slight saddle embolism identified. There is opacification identified within all the segmental and subsegmental branches most pronounced within the right middle and lower lobe pulmonary arteries. Emboli as well seen within the lingular branch. There is evidence of right heart strain within RV LV ratio of 1.6. There is prominence identified of the main pulmonary artery. Mediastinum: No evidence of mediastinal lymphadenopathy. The heart and pericardium demonstrate no acute abnormality. There is no acute abnormality of the thoracic aorta. Lungs/pleura: Mild ground-glass opacity identified in the right lower lobe suggesting possibly an area of infarction given the extensive thrombus within the pulmonary arteries on the right. Atelectatic change cannot be excluded. There is minimal atelectatic changes seen within the lingula. No definite pleural effusion or pneumothorax. Upper Abdomen: Limited images of the upper abdomen are unremarkable.  Soft Tissues/Bones: Abnormal increased density identified within the subcutaneous tissues surrounding the luke catheter on the left anterior chest wall to suggest extravasation of intravenous contrast.     Extensive pulmonary emboli with saddle embolism identified. There is evidence of right heart strain within RV LV ratio of 1.6. Mild ground-glass seen at the right lung base in which early infarction cannot be excluded. There is increased density seen surrounding the left luke catheter in the subcutaneous tissues to suggest extravasation of intravenous contrast. Findings were discussed with Dr. Louis Castillo at time of interpretation of the examination. CT CERVICAL SPINE WO CONTRAST    Result Date: 12/18/2022  EXAMINATION: CT OF THE CERVICAL SPINE WITHOUT CONTRAST 12/18/2022 8:30 am TECHNIQUE: CT of the cervical spine was performed without the administration of intravenous contrast. Multiplanar reformatted images are provided for review. Automated exposure control, iterative reconstruction, and/or weight based adjustment of the mA/kV was utilized to reduce the radiation dose to as low as reasonably achievable. COMPARISON: None. HISTORY: ORDERING SYSTEM PROVIDED HISTORY: syncope, fall TECHNOLOGIST PROVIDED HISTORY: Reason for exam:->syncope, fall Decision Support Exception - unselect if not a suspected or confirmed emergency medical condition->Emergency Medical Condition ([place]) What reading provider will be dictating this exam?->CRC FINDINGS: The ring of C1 is intact as is the dense. There is no compression fracture of the cervical spine. No jumped or perched facet is noted. Multilevel degenerative disc and degenerative joint disease is noted. There are posterior degenerative endplate spurs seen at the C3-4 and C6-7 levels. There is mild central canal stenosis at the C6-7 level and mild bilateral neural foraminal narrowing. The prevertebral soft tissues are unremarkable. The airway is widely patent. Images through the lung apices are negative for a pneumothorax. 1. There is no acute compression fracture or subluxation of the cervical spine.  2. Multilevel degenerative disc and degenerative joint disease. .     CT THORACIC SPINE WO CONTRAST    Result Date: 12/18/2022  EXAMINATION: CT OF THE THORACIC SPINE WITHOUT CONTRAST  12/18/2022 8:30 am: TECHNIQUE: CT of the thoracic spine was performed without the administration of intravenous contrast. Multiplanar reformatted images are provided for review. Automated exposure control, iterative reconstruction, and/or weight based adjustment of the mA/kV was utilized to reduce the radiation dose to as low as reasonably achievable. COMPARISON: None. HISTORY: ORDERING SYSTEM PROVIDED HISTORY: syncope, fall TECHNOLOGIST PROVIDED HISTORY: Reason for exam:->syncope, fall What reading provider will be dictating this exam?->CRC FINDINGS: BONES/ALIGNMENT: There is no acute compression fracture of the thoracic spine. There is dextroscoliosis of the thoracic spine. Mild multilevel degenerative disc and degenerative joint disease is noted. There are large anterior and lateral degenerative endplate spurs seen throughout the course of the thoracic spine. .  There is no osseous lesion. SOFT TISSUES: No paraspinal mass is seen. 1. There is no acute compression fracture of the thoracic spine. 2. Dextroscoliosis 3. Multilevel degenerative changes. CT LUMBAR SPINE WO CONTRAST    Result Date: 12/18/2022  EXAMINATION: CT OF THE LUMBAR SPINE WITHOUT CONTRAST  12/18/2022 TECHNIQUE: CT of the lumbar spine was performed without the administration of intravenous contrast. Multiplanar reformatted images are provided for review. Adjustment of mA and/or kV according to patient size was utilized. Automated exposure control, iterative reconstruction, and/or weight based adjustment of the mA/kV was utilized to reduce the radiation dose to as low as reasonably achievable. COMPARISON: None.  HISTORY: ORDERING SYSTEM PROVIDED HISTORY: syncope, fall TECHNOLOGIST PROVIDED HISTORY: Reason for exam:->syncope, fall Decision Support Exception - unselect if not a suspected or confirmed emergency medical condition->Emergency Medical Condition ([place]) What reading provider will be dictating this exam?->CRC FINDINGS: Vertebral alignment is normal.  Mild disc space narrowing and discovertebral degenerative changes are identified. Facet arthritis is also seen. No acute vertebral fracture is visualized. No spondylolysis is noted on the right or left. The sacroiliac joints are intact. The paraspinal soft tissues appear unremarkable. The urinary bladder is dilated. There are mild right hydronephrosis and hydroureter. There is colonic diverticulosis. There are postsurgical changes at the colorectal junction. Disc space narrowing and degenerative changes. No acute vertebral fracture or subluxation. XR CHEST PORTABLE    Result Date: 12/18/2022  EXAMINATION: ONE XRAY VIEW OF THE CHEST 12/18/2022 9:59 am COMPARISON: The previous study performed 11/03/2022. HISTORY: ORDERING SYSTEM PROVIDED HISTORY: fall, syncope TECHNOLOGIST PROVIDED HISTORY: Reason for exam:->fall, syncope What reading provider will be dictating this exam?->CRC FINDINGS: There is a large, ill-defined density overlying the left shoulder and mid to upper lateral left hemithorax. This may be external to the patient. It limits full evaluation of the surrounding soft tissue and osseous structures. There is no evidence of acute consolidation or infiltrate. No active pleural disease is seen. The cardiac silhouette and mediastinal structures are unremarkable. The tracheobronchial tree is midline and patent. A left-sided MediPort catheter is again noted, with the tip in the proximal SVC. There is again noted to be a thoracic scoliosis, with convexity to the right. Osteo-degenerative changes are again noted involving the thoracic spine.      Large, ill-defined density overlying the left shoulder and mid to upper lateral left hemithorax, which limits full evaluation of the immediate surrounding soft tissue and osseous structures. No acute consolidation or infiltrate. Repeat chest radiograph can be performed further evaluation. US DUP LOWER EXTREMITIES BILATERAL VENOUS    Result Date: 12/19/2022  EXAMINATION:  BILATERAL LOWER EXTREMITY VENOUS DUPLEX CLINICAL HISTORY:  SAGITTAL PULMONARY EMBOLISM COMPARISONS:  NONE AVAILABLE TECHNIQUE:  B-mode, color flow and spectral Doppler FINDINGS:  Bilateral lower extremity venous systems were interrogated extending from the common femoral vein to its adjacent segments of the proximal popliteal vein. On the right side the common femoral vein, femoral vein as well as the popliteal vein and into the peroneal vein there is an adequate compression augmentation as well as phasic flow consistent with the thrombus. Left lower extremity there is abnormal compression augmentation as well as phasic flow posterior tibial and into the peroneal veins. RIGHT LOWER EXTREMITY EXTENSIVE THE THIGH DEEP VEIN THROMBOSIS EXTENDING INTO THE CALF. LEFT LOWER EXTREMITY EDEMA DISTAL DEEP VEIN THROMBOSIS INVOLVING THE CALF REGION. Lower ext US   RIGHT LOWER EXTREMITY EXTENSIVE THE THIGH DEEP VEIN THROMBOSIS EXTENDING INTO THE CALF. LEFT LOWER EXTREMITY EDEMA DISTAL DEEP VEIN THROMBOSIS INVOLVING THE CALF REGION.        IMPRESSION AND SUGGESTION:  Patient is at risk due to   Submassive high risk PE, S/P thrombectomy   PE provoked by COVID-19 infection, however with underlying history of colon cancer, likely will need extended treatment  Increased troponin, secondary to PE and right ventricle strain  History of colon cancer status post colectomy  BECKY, likely contrast-induced  Leukocytosis, likely reactive     Recommendation    Transition to Eliquis  Monitor blood sugar target 140-180  Follow-up echo  Bilateral lower extremity ultrasound  Gentle hydration and monitor renal function and urine output    Discussed with Dr. Lynch Round    I spent 30 min with this patient, greater the 50% of this time was spent in counseling and/or coordinating of care.     Electronically signed by Gabriele Jauregiu MD,  [facility]   on 12/20/2022 at 8:55 AM

## 2022-12-20 NOTE — PROGRESS NOTES
0715 am assessment completed as noted. Vss, will continue to monitor. Electronically signed by Sonam Winslow RN on 12/20/2022 at 10:25 AM  1320 report called to jax.  Electronically signed by Sonam Winslow RN on 12/20/2022 at 1:23 PM

## 2022-12-20 NOTE — OR NURSING
1430- Patient assisted onto IR table. Procedure explained. Consent signed. No allergy to IV contrast dye. [address] had previously accessed, but visibly, most of needle is outside of body. Dressing removed, [place] needle removed. Site cleaned generously with chloroprep. Once site is dry, port accessed with Tyros Needle 20g x 1in. Unable to aspirate blood. Unable to flush port with normal saline. Needle removed. Image taken. 1435- Imaging done to be able to ara site. Port accessed successfully with [id number] llanes needle. Blood return noted, able to be flushed with saline, but was a difficult flush. 26- Timeout completed for fluoroscopy guided contrast injection through existing left chest mediport. 1- Dr. Jeremy Shipman injected with Isovue contrast and visualized with fluoro guidance. Port placement verified that it does not end in the SVC. Possible fibrin sheath at the end of the port. [place] flushed with 10cc of normal saline then instilled with 5cc Heparinized saline and Port deaccessed. Patient instructed to speak to Oncologist regarding further usage. Pt awaiting transport back to her room. Report will be given to receiving RN.

## 2022-12-20 NOTE — PROGRESS NOTES
Handoff report received from [facility], [address] [place] [address]. Pt remains on droplet plus precautions for covid. Assessments completed, see flowsheets. Left femoral site appears clean and dry, no s/s of hematoma noted. Ileostomy bag changed without incidence. PICC dressing changed, no s/s of infection noted. No s/s of distress noted, safety measures remained in place throughout shift. Handoff report given to on coming RN.

## 2022-12-20 NOTE — PROGRESS NOTES
PHARMACY NOTE:   ICU Rounds Attended (10-15 minutes in patient room):    Pt diagnosis: PE/COVID    PHARMACY NOTE:   Interdisciplinary Rounds Completed       Changes made today by Pharmacy:   Full dose lovenox changed to eliquis treatment dosing DVT/PE         Yana Gilliland, [id number] PharmD

## 2022-12-20 NOTE — INTERDISCIPLINARY ROUNDS
Spiritual Care Services     Summary of Visit:  Attended interdisciplinary rounds. Pt continues to improve. Spiritual Care available for ongoing needs and support. Encounter Summary  Encounter Overview/Reason : Interdisciplinary rounds  Service Provided For[de-identified] Patient  Referral/Consult From[de-identified] Physician, Nurse, Family  Support System: Children, Family members  Last Encounter : 12/18/22  Complexity of Encounter: Low  Begin Time: 1700  End Time : 1730  Total Time Calculated: 30 min  Encounter   Type: Follow up  Crisis  Type: Rapid Response  Spiritual/Emotional needs  Type: Spiritual Support, Emotional Distress                 Advance Care Planning  Type: ACP conversation, Completed AD/ACP document(s)    Spiritual Assessment/Intervention/Outcomes:    Assessment: Coping    Intervention: Active listening, Prayer (assurance of)/[place], Sustaining Presence/Ministry of presence    Outcome: Engaged in conversation, [address]:    Plan and Referrals  Plan/Referrals: Continue to visit, (comment)      Spiritual Care Services   Electronically signed by Inessa Jackson, [address] [employer] on 12/20/2022 at 10:54 AM.    To reach a  for emotional and spiritual support, place an [facility] consult request.   If a  is needed immediately, dial 0 and ask to page the on-call .

## 2022-12-20 NOTE — FLOWSHEET NOTE
Confirmed wit Aislinn RN, pt left chest port site without swelling, bruising or redness after infiltration on 12/18 during CT exam with contrast injection.

## 2022-12-20 NOTE — CARE COORDINATION
QUALITY ROUNDS COMPLETED. PT FOR RLE THROMBECTOMY TOMORROW. WILL NEED P/OT ORDERED AFTER. PLAN IS HOME WITH [facility] VS [facility] PENDING NEEDS. CM/LSW TO FOLLOW.

## 2022-12-20 NOTE — PROGRESS NOTES
Neurology Follow up    SUBJECTIVE: Patient still in isolation and history and evaluation obtained from the nurse. Patient is remained stable without any confusional episodes and nonfocal otherwise no fever is reported. No seizures are reported.   EEG was completed  Current Facility-Administered Medications   Medication Dose Route Frequency Provider Last Rate Last Admin    enoxaparin (LOVENOX) injection 80 mg  1 mg/kg SubCUTAneous BID Jeffrey Jordan MD   80 mg at 12/20/22 1019    sodium chloride flush 0.9 % injection 5-40 mL  5-40 mL IntraVENous 2 times per day Jeffrey Jordan MD   10 mL at 12/20/22 0737    sodium chloride flush 0.9 % injection 5-40 mL  5-40 mL IntraVENous PRN Jeffrey Jordan MD        0.9 % sodium chloride infusion   IntraVENous PRN Jeffrey Jordan MD        ondansetron [facility]) injection 4 mg  4 mg IntraVENous Q6H PRN Glenn Heard,         sodium chloride flush 0.9 % injection 5-40 mL  5-40 mL IntraVENous 2 times per day Talya Rojas, PA-C   10 mL at 12/19/22 2100    sodium chloride flush 0.9 % injection 5-40 mL  5-40 mL IntraVENous PRN Dae Betisin, PA-C        0.9 % sodium chloride infusion   IntraVENous PRN Dae Cousin, PA-C        sodium chloride flush 0.9 % injection 5-40 mL  5-40 mL IntraVENous 2 times per day Nancie Marie MD   10 mL at 12/20/22 0737    sodium chloride flush 0.9 % injection 5-40 mL  5-40 mL IntraVENous PRN Nancie Marie MD        0.9 % sodium chloride infusion   IntraVENous PRN Nancie Marie MD        ondansetron (ZOFRAN-ODT) disintegrating tablet 4 mg  4 mg Oral Q8H PRN Nancie Marie MD        Or    ondansetron [facility]) injection 4 mg  4 mg IntraVENous Q6H PRN Nancie Marie MD   4 mg at 12/20/22 0854    polyethylene glycol (GLYCOLAX) packet 17 g  17 g Oral Daily PRN Nancie Marie MD        acetaminophen (TYLENOL) tablet 650 mg  650 mg Oral Q6H PRN Nancie Marie MD        Or    acetaminophen (TYLENOL) suppository 650 mg  650 mg Rectal Q6H PRN Luz Jenkins MD        metoprolol succinate (TOPROL XL) extended release tablet 25 mg  25 mg Oral Daily Brea Rincon MD   25 mg at 12/20/22 1254       PHYSICAL EXAM:    /85   Pulse 81   Temp 98.1 °F (36.7 °C) (Axillary)   Resp 12   Ht 4' 11\" (1.499 m)   Wt 167 lb 5.3 oz (75.9 kg)   SpO2 98%   BMI 33.80 kg/m²    General Appearance:      Skin:  normal  CVS - Normal sounds, No murmurs , No carotid Bruits  RS -CTA  Abdomen Soft, BS present  Review of Systems   Unable to perform ROS: Other    Review of system done through the nurse and has no reports of anything except some shortness of breath    Mental Status Exam: Patient remains oriented according to the nurse            Level of Alertness:   awake            Orientation:   person, place,  Funduscopic Exam:     Cranial Nerves            Cranial nerve III           Pupils:  equal, round, reactive to light      Cranial nerves III, IV, VI           Extraocular Movements: intact        Motor: Moves all her extremities to commands according to the nurse  Drift:  absent  Motor exam is symmetrical 5 out of 5 all extremities bilaterally  Tone:  normal  Abnormal Movements:  absent            Sensory: Deferred                 Vibration                         Touch            Proprioception                 Coordination: Deferred                  Gait:                       Casual:            Reflexes: Deferred             Deep Tendon Reflexes:             Reflexes are 2 +             Plantar response:                Right:  downgoing               Left:  downgoing    Vascular:  Cardiac Exam:  normal         XR PELVIS (1-2 VIEWS)    Result Date: 12/18/2022  EXAMINATION: ONE XRAY VIEW OF THE PELVIS 12/18/2022 10:04 am COMPARISON: None. HISTORY: ORDERING SYSTEM PROVIDED HISTORY: fall TECHNOLOGIST PROVIDED HISTORY: Reason for exam:->fall What reading provider will be dictating this exam?->CRC FINDINGS: No acute fracture or malalignment.  Mild degenerative changes of the hips. Degenerative changes of the visualized spine. Surgical clips project over the left pelvis. The bladder is opacified with excreted IV contrast.     No acute fracture or malalignment. CT HEAD WO CONTRAST    Result Date: 12/18/2022  EXAMINATION: CT OF THE HEAD WITHOUT CONTRAST  12/18/2022 8:30 am TECHNIQUE: CT of the head was performed without the administration of intravenous contrast. Automated exposure control, iterative reconstruction, and/or weight based adjustment of the mA/kV was utilized to reduce the radiation dose to as low as reasonably achievable. COMPARISON: None. HISTORY: ORDERING SYSTEM PROVIDED HISTORY: syncope, fall TECHNOLOGIST PROVIDED HISTORY: Reason for exam:->syncope, fall Has a \"code stroke\" or \"stroke alert\" been called? ->No Decision Support Exception - unselect if not a suspected or confirmed emergency medical condition->Emergency Medical Condition ([place]) What reading provider will be dictating this exam?->CRC FINDINGS: BRAIN/VENTRICLES: There is no acute intracranial hemorrhage, mass effect or midline shift. No abnormal extra-axial fluid collection. The gray-white differentiation is maintained without evidence of an acute infarct. There is no evidence of hydrocephalus. The ventricles, cisterns and sulci are prominent consistent with atrophy. There is decreased attenuation within the periventricular white matter consistent with periventricular leukomalacia. ORBITS: The visualized portion of the orbits demonstrate no acute abnormality. SINUSES: The visualized paranasal sinuses and mastoid air cells demonstrate no acute abnormality. There is mild mucosal thickening seen within the right ethmoid air cells. SOFT TISSUES/SKULL:  No acute abnormality of the visualized skull or soft tissues. 1. There is no acute intracranial abnormality. Specifically, there is no intracranial hemorrhage.  2. Atrophy and periventricular leukomalacia,     CTA CHEST W WO CONTRAST PE Eval    Result Date: 12/18/2022  EXAMINATION: CTA OF THE CHEST WITH AND WITHOUT CONTRAST 12/18/2022 8:30 am TECHNIQUE: CTA of the chest was performed before and after the administration of intravenous contrast.  Multiplanar reformatted images are provided for review. MIP images are provided for review. Automated exposure control, iterative reconstruction, and/or weight based adjustment of the mA/kV was utilized to reduce the radiation dose to as low as reasonably achievable. COMPARISON: None. HISTORY: ORDERING SYSTEM PROVIDED HISTORY: PE, syncope, hx of cancer, rule out PE; cough evaluate for infiltrate TECHNOLOGIST PROVIDED HISTORY: Reason for exam:->PE Reason for exam:->syncope, hx of cancer, rule out PE; cough evaluate for infiltrate Decision Support Exception - unselect if not a suspected or confirmed emergency medical condition->Emergency Medical Condition (MA) What reading provider will be dictating this exam?->CRC FINDINGS: Pulmonary Arteries: Pulmonary arteries are adequately opacified for evaluation. There is abnormal filling defect seen within the main pulmonary arteries bilaterally with slight saddle embolism identified. There is opacification identified within all the segmental and subsegmental branches most pronounced within the right middle and lower lobe pulmonary arteries. Emboli as well seen within the lingular branch. There is evidence of right heart strain within RV LV ratio of 1.6. There is prominence identified of the main pulmonary artery. Mediastinum: No evidence of mediastinal lymphadenopathy. The heart and pericardium demonstrate no acute abnormality. There is no acute abnormality of the thoracic aorta. Lungs/pleura: Mild ground-glass opacity identified in the right lower lobe suggesting possibly an area of infarction given the extensive thrombus within the pulmonary arteries on the right. Atelectatic change cannot be excluded.  There is minimal atelectatic changes seen within the lingula. No definite pleural effusion or pneumothorax. Upper Abdomen: Limited images of the upper abdomen are unremarkable. Soft Tissues/Bones: Abnormal increased density identified within the subcutaneous tissues surrounding the luke catheter on the left anterior chest wall to suggest extravasation of intravenous contrast.     Extensive pulmonary emboli with saddle embolism identified. There is evidence of right heart strain within RV LV ratio of 1.6. Mild ground-glass seen at the right lung base in which early infarction cannot be excluded. There is increased density seen surrounding the left luke catheter in the subcutaneous tissues to suggest extravasation of intravenous contrast. Findings were discussed with Dr. Giang July at time of interpretation of the examination. CT CERVICAL SPINE WO CONTRAST    Result Date: 12/18/2022  EXAMINATION: CT OF THE CERVICAL SPINE WITHOUT CONTRAST 12/18/2022 8:30 am TECHNIQUE: CT of the cervical spine was performed without the administration of intravenous contrast. Multiplanar reformatted images are provided for review. Automated exposure control, iterative reconstruction, and/or weight based adjustment of the mA/kV was utilized to reduce the radiation dose to as low as reasonably achievable. COMPARISON: None. HISTORY: ORDERING SYSTEM PROVIDED HISTORY: syncope, fall TECHNOLOGIST PROVIDED HISTORY: Reason for exam:->syncope, fall Decision Support Exception - unselect if not a suspected or confirmed emergency medical condition->Emergency Medical Condition ([place]) What reading provider will be dictating this exam?->CRC FINDINGS: The ring of C1 is intact as is the dense. There is no compression fracture of the cervical spine. No jumped or perched facet is noted. Multilevel degenerative disc and degenerative joint disease is noted. There are posterior degenerative endplate spurs seen at the C3-4 and C6-7 levels.  There is mild central canal stenosis at the C6-7 level and mild bilateral neural foraminal narrowing. The prevertebral soft tissues are unremarkable. The airway is widely patent. Images through the lung apices are negative for a pneumothorax. 1. There is no acute compression fracture or subluxation of the cervical spine. 2. Multilevel degenerative disc and degenerative joint disease. .     CT THORACIC SPINE WO CONTRAST    Result Date: 12/18/2022  EXAMINATION: CT OF THE THORACIC SPINE WITHOUT CONTRAST  12/18/2022 8:30 am: TECHNIQUE: CT of the thoracic spine was performed without the administration of intravenous contrast. Multiplanar reformatted images are provided for review. Automated exposure control, iterative reconstruction, and/or weight based adjustment of the mA/kV was utilized to reduce the radiation dose to as low as reasonably achievable. COMPARISON: None. HISTORY: ORDERING SYSTEM PROVIDED HISTORY: syncope, fall TECHNOLOGIST PROVIDED HISTORY: Reason for exam:->syncope, fall What reading provider will be dictating this exam?->CRC FINDINGS: BONES/ALIGNMENT: There is no acute compression fracture of the thoracic spine. There is dextroscoliosis of the thoracic spine. Mild multilevel degenerative disc and degenerative joint disease is noted. There are large anterior and lateral degenerative endplate spurs seen throughout the course of the thoracic spine. .  There is no osseous lesion. SOFT TISSUES: No paraspinal mass is seen. 1. There is no acute compression fracture of the thoracic spine. 2. Dextroscoliosis 3. Multilevel degenerative changes. CT LUMBAR SPINE WO CONTRAST    Result Date: 12/18/2022  EXAMINATION: CT OF THE LUMBAR SPINE WITHOUT CONTRAST  12/18/2022 TECHNIQUE: CT of the lumbar spine was performed without the administration of intravenous contrast. Multiplanar reformatted images are provided for review. Adjustment of mA and/or kV according to patient size was utilized.   Automated exposure control, iterative reconstruction, and/or weight based adjustment of the mA/kV was utilized to reduce the radiation dose to as low as reasonably achievable. COMPARISON: None. HISTORY: ORDERING SYSTEM PROVIDED HISTORY: syncope, fall TECHNOLOGIST PROVIDED HISTORY: Reason for exam:->syncope, fall Decision Support Exception - unselect if not a suspected or confirmed emergency medical condition->Emergency Medical Condition ([place]) What reading provider will be dictating this exam?->CRC FINDINGS: Vertebral alignment is normal.  Mild disc space narrowing and discovertebral degenerative changes are identified. Facet arthritis is also seen. No acute vertebral fracture is visualized. No spondylolysis is noted on the right or left. The sacroiliac joints are intact. The paraspinal soft tissues appear unremarkable. The urinary bladder is dilated. There are mild right hydronephrosis and hydroureter. There is colonic diverticulosis. There are postsurgical changes at the colorectal junction. Disc space narrowing and degenerative changes. No acute vertebral fracture or subluxation. XR CHEST PORTABLE    Result Date: 12/18/2022  EXAMINATION: ONE XRAY VIEW OF THE CHEST 12/18/2022 9:59 am COMPARISON: The previous study performed 11/03/2022. HISTORY: ORDERING SYSTEM PROVIDED HISTORY: fall, syncope TECHNOLOGIST PROVIDED HISTORY: Reason for exam:->fall, syncope What reading provider will be dictating this exam?->CRC FINDINGS: There is a large, ill-defined density overlying the left shoulder and mid to upper lateral left hemithorax. This may be external to the patient. It limits full evaluation of the surrounding soft tissue and osseous structures. There is no evidence of acute consolidation or infiltrate. No active pleural disease is seen. The cardiac silhouette and mediastinal structures are unremarkable. The tracheobronchial tree is midline and patent. A left-sided MediPort catheter is again noted, with the tip in the proximal SVC.   There is again noted to be a thoracic scoliosis, with convexity to the right. Osteo-degenerative changes are again noted involving the thoracic spine. Large, ill-defined density overlying the left shoulder and mid to upper lateral left hemithorax, which limits full evaluation of the immediate surrounding soft tissue and osseous structures. No acute consolidation or infiltrate. Repeat chest radiograph can be performed further evaluation. US DUP LOWER EXTREMITIES BILATERAL VENOUS    Result Date: 12/19/2022  EXAMINATION:  BILATERAL LOWER EXTREMITY VENOUS DUPLEX CLINICAL HISTORY:  SAGITTAL PULMONARY EMBOLISM COMPARISONS:  NONE AVAILABLE TECHNIQUE:  B-mode, color flow and spectral Doppler FINDINGS:  Bilateral lower extremity venous systems were interrogated extending from the common femoral vein to its adjacent segments of the proximal popliteal vein. On the right side the common femoral vein, femoral vein as well as the popliteal vein and into the peroneal vein there is an adequate compression augmentation as well as phasic flow consistent with the thrombus. Left lower extremity there is abnormal compression augmentation as well as phasic flow posterior tibial and into the peroneal veins. RIGHT LOWER EXTREMITY EXTENSIVE THE THIGH DEEP VEIN THROMBOSIS EXTENDING INTO THE CALF. LEFT LOWER EXTREMITY EDEMA DISTAL DEEP VEIN THROMBOSIS INVOLVING THE CALF REGION.        Recent Labs     12/18/22  0645 12/19/22  0535 12/20/22  0449   WBC 1.9* 9.1 12.5*   HGB 12.8 12.3 10.9*   PLT 94* 115* 103*     Recent Labs     12/18/22 2030 12/19/22  0700 12/20/22  0448    135 133*   K 3.3* 3.3* 3.7   CL 98 98 97   CO2 26 27 24   BUN 9 10 18   CREATININE 0.65 0.73 1.16*   GLUCOSE 169* 94 86     Recent Labs     12/18/22  0645 12/18/22 2030 12/19/22  0700   BILITOT 0.6 0.4 0.4   ALKPHOS 125 116 111   AST 21 20 23   ALT 16 14 12     Lab Results   Component Value Date/Time    PROTIME 15.4 12/19/2022 07:00 AM    INR 1.2 12/19/2022 07:00 AM     No results found for: LITHIUM, DILFRTOT, VALPROATE    ASSESSMENT AND PLAN  Questionable seizure though I truly feel that the patient reported that she was scared and that she was shaking in the CT room. EEG is entirely normal and therefore we have discontinued her anticonvulsants and does not require any intervention unless something new at develops. Patient is remained nonfocal and she is COVID-positive and in isolation and therefore evaluations done through the nurse. Patient may be transferred to regular medical floor      Gabino Giron MD, Naga Hernández, American Board of Psychiatry & Neurology  Board Certified in Vascular Neurology  Board Certified in Neuromuscular Medicine  Certified in . [place]

## 2022-12-20 NOTE — PROGRESS NOTES
Progress Note  Patient: Rivera Pong  Unit/Bed: 05/IC05-01  YOB: 1950  MRN: [id number]  Acct: [de-identified]   Admitting Diagnosis: Syncope and collapse [R55]  Hypokalemia [E87.6]  Thrombocytopenia (HCC) [D69.6]  Elevated troponin [R77.8]  Bilateral pulmonary embolism (HCC) [I26.99]  Seizure-like activity (HCC) [R56.9]  History of rectal cancer [Z85.048]  Acute saddle pulmonary embolism with acute cor pulmonale (HCC) [I26.02]  Neutropenia, unspecified type ([facility] [address] [D70.9]  [id number] [U07.1]  Admit Date:  12/18/2022  Hospital Day: 2    Chief Complaint: PE DVT    Histories:  Past Medical History:   Diagnosis Date    Arthritis     Cancer ([facility] [address]     SKIN    Hyperlipidemia      Past Surgical History:   Procedure Laterality Date    CATARACT REMOVAL      CHOLECYSTECTOMY      COLECTOMY N/A 10/3/2022    LOW ANTERIOR RESECTION WITH LOOP ILEOSTOMY performed by Jimenez Maloney MD at [id number] [place] [address] N/A 9/16/2022    COLORECTAL CANCER SCREENING, NOT HIGH RISK performed by Ward Mcmanus MD at [facility]    COLONOSCOPY N/A 9/26/2022    FLEXIBLE SIGMOIDOSCOPY WITH SNARE POLYPECTOMY performed by Jimenez Maloney MD at [address] [place] [address] N/A 11/3/2022    INFUSAPORT performed by Jimenez Maloney MD at [address] [place] [address] LOWER LEG    TUBAL LIGATION       Family History   Problem Relation Age of Onset    Diabetes Mother     Breast Cancer Maternal Aunt     Colon Cancer Neg Hx      Social History     Socioeconomic History    Marital status:      Spouse name: None    Number of children: None    Years of education: None    Highest education level: None   Tobacco Use    Smoking status: Never    Smokeless tobacco: Never   Vaping Use    Vaping Use: Never used   Substance and Sexual Activity    Alcohol use: No    Drug use: No     Social Determinants of Health     Financial Resource Strain: Low Risk     Difficulty of Paying Living Expenses: Not hard at all   Food Insecurity: No Food Insecurity    Worried About Running Out of Food in the Last Year: Never true    Ran Out of Food in the Last Year: Never true   Transportation Needs: No Transportation Needs    Lack of Transportation (Medical): No    Lack of Transportation (Non-Medical): No       Subjective/HPI  stable overnight. On no O2 no fever. No CP not SOB at rest. Left groin tender. Unremarkable exam of Left Groin. Minimal Ecchymosis. EKG: SR 82        Review of Systems:   Review of Systems   Constitutional: Negative. Negative for diaphoresis and fatigue. HENT: Negative. Eyes: Negative. Respiratory: Negative. Negative for cough, chest tightness, shortness of breath, wheezing and stridor. Cardiovascular: Negative. Negative for chest pain, palpitations and leg swelling. Gastrointestinal: Negative. Negative for blood in stool and nausea. Genitourinary: Negative. Musculoskeletal: Negative. Skin: Negative. Neurological: Negative. Negative for dizziness, syncope, weakness and light-headedness. Hematological: Negative. Psychiatric/Behavioral: Negative. Physical Examination:    /85   Pulse 81   Temp 98.1 °F (36.7 °C) (Axillary)   Resp 12   Ht 4' 11\" (1.499 m)   Wt 167 lb 5.3 oz (75.9 kg)   SpO2 98%   BMI 33.80 kg/m²    Physical Exam   Constitutional: She appears healthy. No distress. HENT:   Normal cephalic and Atraumatic   Eyes: Pupils are equal, round, and reactive to light. Neck: Thyroid normal. No JVD present. No neck adenopathy. No thyromegaly present. Cardiovascular: Normal rate, regular rhythm, intact distal pulses and normal pulses. Murmur heard. Pulmonary/Chest: Effort normal and breath sounds normal. She has no wheezes. She has no rales. She exhibits no tenderness. Abdominal: Soft. Bowel sounds are normal. There is no abdominal tenderness. Musculoskeletal:         General: No tenderness or edema. Normal range of motion.       Cervical back: Normal range of motion and neck supple. Neurological: She is alert and oriented to person, place, and time. Skin: Skin is warm. No cyanosis. Nails show no clubbing.      LABS:  CBC:   Lab Results   Component Value Date/Time    WBC 12.5 12/20/2022 04:49 AM    RBC 3.84 12/20/2022 04:49 AM    HGB 10.9 12/20/2022 04:49 AM    HCT 34.0 12/20/2022 04:49 AM    MCV 88.4 12/20/2022 04:49 AM    MCH 28.5 12/20/2022 04:49 AM    MCHC 32.2 12/20/2022 04:49 AM    RDW 15.9 12/20/2022 04:49 AM     12/20/2022 04:49 AM    MPV 9.0 10/28/2014 05:58 AM     CBC with Differential:    Lab Results   Component Value Date/Time    WBC 12.5 12/20/2022 04:49 AM    RBC 3.84 12/20/2022 04:49 AM    HGB 10.9 12/20/2022 04:49 AM    HCT 34.0 12/20/2022 04:49 AM     12/20/2022 04:49 AM    MCV 88.4 12/20/2022 04:49 AM    MCH 28.5 12/20/2022 04:49 AM    MCHC 32.2 12/20/2022 04:49 AM    RDW 15.9 12/20/2022 04:49 AM    BANDSPCT 6 12/18/2022 06:45 AM    LYMPHOPCT 16.2 12/20/2022 04:49 AM    MONOPCT 4.5 12/20/2022 04:49 AM    BASOPCT 0.3 12/20/2022 04:49 AM    MONOSABS 0.6 12/20/2022 04:49 AM    LYMPHSABS 2.0 12/20/2022 04:49 AM    EOSABS 0.1 12/20/2022 04:49 AM    BASOSABS 0.0 12/20/2022 04:49 AM     CMP:    Lab Results   Component Value Date/Time     12/20/2022 04:48 AM    K 3.7 12/20/2022 04:48 AM    K 3.3 12/18/2022 08:30 PM    CL 97 12/20/2022 04:48 AM    CO2 24 12/20/2022 04:48 AM    BUN 18 12/20/2022 04:48 AM    CREATININE 1.16 12/20/2022 04:48 AM    GFRAA >60.0 10/17/2022 06:35 AM    LABGLOM 50.6 12/20/2022 04:48 AM    GLUCOSE 86 12/20/2022 04:48 AM    PROT 5.8 12/19/2022 07:00 AM    LABALBU 3.4 12/20/2022 04:48 AM    CALCIUM 8.7 12/20/2022 04:48 AM    BILITOT 0.4 12/19/2022 07:00 AM    ALKPHOS 111 12/19/2022 07:00 AM    AST 23 12/19/2022 07:00 AM    ALT 12 12/19/2022 07:00 AM     BMP:    Lab Results   Component Value Date/Time     12/20/2022 04:48 AM    K 3.7 12/20/2022 04:48 AM    K 3.3 12/18/2022 08:30 PM    CL 97 12/20/2022 04:48 AM    CO2 24 12/20/2022 04:48 AM    BUN 18 12/20/2022 04:48 AM    LABALBU 3.4 12/20/2022 04:48 AM    CREATININE 1.16 12/20/2022 04:48 AM    CALCIUM 8.7 12/20/2022 04:48 AM    GFRAA >60.0 10/17/2022 06:35 AM    LABGLOM 50.6 12/20/2022 04:48 AM    GLUCOSE 86 12/20/2022 04:48 AM     Magnesium:    Lab Results   Component Value Date/Time    MG 2.2 12/18/2022 08:30 PM     Troponin:    Lab Results   Component Value Date/Time    TROPONINI 0.096 12/18/2022 06:45 AM        Active Hospital Problems    Diagnosis Date Noted    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 no respiratory distress  Saddle PE- Heaprin gtt. - probably provoked from [place] but also w underlying clotting disorder from cancer. Will benefit from long term anticoagulation. B/L DVT  Right side extensive Thrombus - plan for RLE DVT Thrombectomy tomorrow. Continue Lovenox until this is complete. Trop mildly elevated - will give low zuniga BB. Hold ASA due to mild Thrombocytopenia. Hypokalemia- replace iv and PO.    Check Echo - P  Colon Cancer s/p surgery and Ostomy in place       Electronically signed by Reza Rios MD on 12/20/2022 at 10:05 AM

## 2022-12-21 ENCOUNTER — APPOINTMENT (OUTPATIENT)
Dept: ULTRASOUND IMAGING | Age: 72
DRG: 163 | End: 2022-12-21
Payer: COMMERCIAL

## 2022-12-21 ENCOUNTER — APPOINTMENT (OUTPATIENT)
Dept: CARDIAC CATH/INVASIVE PROCEDURES | Age: 72
DRG: 163 | End: 2022-12-21
Payer: COMMERCIAL

## 2022-12-21 LAB
ALBUMIN SERPL-MCNC: 3.5 G/DL (ref 3.5–4.6)
ANION GAP SERPL CALCULATED.3IONS-SCNC: 11 MEQ/L (ref 9–15)
BASOPHILS ABSOLUTE: 0 K/UL (ref 0–0.2)
BASOPHILS RELATIVE PERCENT: 0.5 %
BUN BLDV-MCNC: 20 MG/DL (ref 8–23)
CALCIUM SERPL-MCNC: 8.7 MG/DL (ref 8.5–9.9)
CHLORIDE BLD-SCNC: 97 MEQ/L (ref 95–107)
CO2: 25 MEQ/L (ref 20–31)
CREAT SERPL-MCNC: 0.81 MG/DL (ref 0.5–0.9)
EOSINOPHILS ABSOLUTE: 0.1 K/UL (ref 0–0.7)
EOSINOPHILS RELATIVE PERCENT: 1.5 %
GFR SERPL CREATININE-BSD FRML MDRD: >60 ML/MIN/{1.73_M2}
GFR SERPL CREATININE-BSD FRML MDRD: >60 ML/MIN/{1.73_M2}
GLUCOSE BLD-MCNC: 92 MG/DL (ref 70–99)
HCT VFR BLD CALC: 30.7 % (ref 37–47)
HCT VFR BLD CALC: 31.2 % (ref 37–47)
HEMOGLOBIN: 10.4 G/DL (ref 12–16)
HEMOGLOBIN: 10.6 G/DL (ref 12–16)
LYMPHOCYTES ABSOLUTE: 2.1 K/UL (ref 1–4.8)
LYMPHOCYTES RELATIVE PERCENT: 26.3 %
MCH RBC QN AUTO: 29.4 PG (ref 27–31.3)
MCHC RBC AUTO-ENTMCNC: 33.9 % (ref 33–37)
MCV RBC AUTO: 86.8 FL (ref 79.4–94.8)
MONOCYTES ABSOLUTE: 0.5 K/UL (ref 0.2–0.8)
MONOCYTES RELATIVE PERCENT: 6.9 %
NEUTROPHILS ABSOLUTE: 5.2 K/UL (ref 1.4–6.5)
NEUTROPHILS RELATIVE PERCENT: 64.8 %
PDW BLD-RTO: 15.6 % (ref 11.5–14.5)
PERFORMED ON: NORMAL
PHOSPHORUS: 3.2 MG/DL (ref 2.3–4.8)
PLATELET # BLD: 117 K/UL (ref 130–400)
POC CREATININE: 0.7 MG/DL (ref 0.6–1.2)
POC SAMPLE TYPE: NORMAL
POTASSIUM SERPL-SCNC: 3.9 MEQ/L (ref 3.4–4.9)
RBC # BLD: 3.6 M/UL (ref 4.2–5.4)
SODIUM BLD-SCNC: 133 MEQ/L (ref 135–144)
WBC # BLD: 8 K/UL (ref 4.8–10.8)

## 2022-12-21 PROCEDURE — 75820 VEIN X-RAY ARM/LEG: CPT

## 2022-12-21 PROCEDURE — B5191ZZ FLUOROSCOPY OF INFERIOR VENA CAVA USING LOW OSMOLAR CONTRAST: ICD-10-PCS | Performed by: INTERNAL MEDICINE

## 2022-12-21 PROCEDURE — 6360000004 HC RX CONTRAST MEDICATION: Performed by: INTERNAL MEDICINE

## 2022-12-21 PROCEDURE — C1757 CATH, THROMBECTOMY/EMBOLECT: HCPCS

## 2022-12-21 PROCEDURE — 2709999900 HC NON-CHARGEABLE SUPPLY

## 2022-12-21 PROCEDURE — 76937 US GUIDE VASCULAR ACCESS: CPT

## 2022-12-21 PROCEDURE — 06CM3ZZ EXTIRPATION OF MATTER FROM RIGHT FEMORAL VEIN, PERCUTANEOUS APPROACH: ICD-10-PCS | Performed by: INTERNAL MEDICINE

## 2022-12-21 PROCEDURE — 2580000003 HC RX 258: Performed by: INTERNAL MEDICINE

## 2022-12-21 PROCEDURE — 99232 SBSQ HOSP IP/OBS MODERATE 35: CPT | Performed by: INTERNAL MEDICINE

## 2022-12-21 PROCEDURE — 36005 INJECTION EXT VENOGRAPHY: CPT

## 2022-12-21 PROCEDURE — C1769 GUIDE WIRE: HCPCS

## 2022-12-21 PROCEDURE — 2580000003 HC RX 258

## 2022-12-21 PROCEDURE — B549ZZ3 ULTRASONOGRAPHY OF INFERIOR VENA CAVA, INTRAVASCULAR: ICD-10-PCS | Performed by: INTERNAL MEDICINE

## 2022-12-21 PROCEDURE — 6360000002 HC RX W HCPCS: Performed by: INTERNAL MEDICINE

## 2022-12-21 PROCEDURE — 85014 HEMATOCRIT: CPT

## 2022-12-21 PROCEDURE — 99212 OFFICE O/P EST SF 10 MIN: CPT

## 2022-12-21 PROCEDURE — 2580000003 HC RX 258: Performed by: FAMILY MEDICINE

## 2022-12-21 PROCEDURE — C1753 CATH, INTRAVAS ULTRASOUND: HCPCS

## 2022-12-21 PROCEDURE — 37187 VENOUS MECH THROMBECTOMY: CPT

## 2022-12-21 PROCEDURE — 80069 RENAL FUNCTION PANEL: CPT

## 2022-12-21 PROCEDURE — 6360000002 HC RX W HCPCS

## 2022-12-21 PROCEDURE — 85018 HEMOGLOBIN: CPT

## 2022-12-21 PROCEDURE — 85025 COMPLETE CBC W/AUTO DIFF WBC: CPT

## 2022-12-21 PROCEDURE — C1894 INTRO/SHEATH, NON-LASER: HCPCS

## 2022-12-21 PROCEDURE — 2060000000 HC ICU INTERMEDIATE R&B

## 2022-12-21 PROCEDURE — 2500000003 HC RX 250 WO HCPCS

## 2022-12-21 PROCEDURE — 2580000003 HC RX 258: Performed by: STUDENT IN AN ORGANIZED HEALTH CARE EDUCATION/TRAINING PROGRAM

## 2022-12-21 PROCEDURE — 6370000000 HC RX 637 (ALT 250 FOR IP): Performed by: INTERNAL MEDICINE

## 2022-12-21 RX ORDER — SODIUM CHLORIDE 0.9 % (FLUSH) 0.9 %
5-40 SYRINGE (ML) INJECTION PRN
Status: DISCONTINUED | OUTPATIENT
Start: 2022-12-21 | End: 2022-12-26 | Stop reason: HOSPADM

## 2022-12-21 RX ORDER — SODIUM CHLORIDE 9 MG/ML
INJECTION, SOLUTION INTRAVENOUS PRN
Status: DISCONTINUED | OUTPATIENT
Start: 2022-12-21 | End: 2022-12-26 | Stop reason: HOSPADM

## 2022-12-21 RX ORDER — SODIUM CHLORIDE 0.9 % (FLUSH) 0.9 %
5-40 SYRINGE (ML) INJECTION EVERY 12 HOURS SCHEDULED
Status: DISCONTINUED | OUTPATIENT
Start: 2022-12-21 | End: 2022-12-26 | Stop reason: HOSPADM

## 2022-12-21 RX ADMIN — ENOXAPARIN SODIUM 80 MG: 100 INJECTION SUBCUTANEOUS at 20:20

## 2022-12-21 RX ADMIN — ENOXAPARIN SODIUM 80 MG: 100 INJECTION SUBCUTANEOUS at 10:44

## 2022-12-21 RX ADMIN — Medication 10 ML: at 20:20

## 2022-12-21 RX ADMIN — Medication 10 ML: at 08:43

## 2022-12-21 RX ADMIN — IOPAMIDOL 36 ML: 612 INJECTION, SOLUTION INTRAVENOUS at 14:46

## 2022-12-21 RX ADMIN — Medication 10 ML: at 08:40

## 2022-12-21 RX ADMIN — METOPROLOL SUCCINATE 25 MG: 25 TABLET, FILM COATED, EXTENDED RELEASE ORAL at 08:38

## 2022-12-21 RX ADMIN — Medication 10 ML: at 08:42

## 2022-12-21 ASSESSMENT — ENCOUNTER SYMPTOMS
WHEEZING: 0
RESPIRATORY NEGATIVE: 1
GASTROINTESTINAL NEGATIVE: 1
SHORTNESS OF BREATH: 0
BLOOD IN STOOL: 0
EYES NEGATIVE: 1
NAUSEA: 0
CHEST TIGHTNESS: 0
STRIDOR: 0
COUGH: 0

## 2022-12-21 ASSESSMENT — PAIN SCALES - GENERAL
PAINLEVEL_OUTOF10: 0
PAINLEVEL_OUTOF10: 3
PAINLEVEL_OUTOF10: 0

## 2022-12-21 NOTE — CARE COORDINATION
PLAN FOR THROMBECTOMY TODAY. WILL NEED PT/OT AFTER- PLAN FOR HOME [address] VS [facility] PENDING PT/OT EVALS.

## 2022-12-21 NOTE — PROGRESS NOTES
Hospitalist Progress Note      Date of Admission: 12/18/2022  Chief Complaint:    Chief Complaint   Patient presents with    Fall     Pt fell in the bathroom, + LOC, + Head Injury, - Thinners. Pt does not remember the event.       Subjective:  No complaints, nausea or chest pain or headache    Medications:    Infusion Medications    sodium chloride      sodium chloride      sodium chloride      sodium chloride       Scheduled Medications    sodium chloride flush  5-40 mL IntraVENous 2 times per day    enoxaparin  1 mg/kg SubCUTAneous BID    sodium chloride flush  5-40 mL IntraVENous 2 times per day    sodium chloride flush  5-40 mL IntraVENous 2 times per day    sodium chloride flush  5-40 mL IntraVENous 2 times per day    metoprolol succinate  25 mg Oral Daily     PRN Meds: sodium chloride flush, sodium chloride, sodium chloride flush, sodium chloride, ondansetron, sodium chloride flush, sodium chloride, sodium chloride flush, sodium chloride, ondansetron **OR** ondansetron, polyethylene glycol, acetaminophen **OR** acetaminophen    Intake/Output Summary (Last 24 hours) at 12/21/2022 1738  Last data filed at 12/21/2022 1720  Gross per 24 hour   Intake 720 ml   Output 400 ml   Net 320 ml       Exam:  /74   Pulse (!) 102   Temp 97.7 °F (36.5 °C) (Oral)   Resp 18   Ht 4' 11\" (1.499 m)   Wt 167 lb 5.3 oz (75.9 kg)   SpO2 98%   BMI 33.80 kg/m²   Head: Normocephalic, atraumatic  Sclera clear  Neck JVD flat  Lungs: Normal effort of breathing    Labs:   Recent Labs     12/19/22  0535 12/20/22  0449 12/21/22  0145 12/21/22  0600   WBC 9.1 12.5*  --  8.0   HGB 12.3 10.9* 10.4* 10.6*   HCT 36.5* 34.0* 30.7* 31.2*   * 103*  --  117*       Recent Labs     12/18/22  2030 12/19/22  0700 12/20/22  0448 12/21/22  0600    135 133* 133*   K 3.3* 3.3* 3.7 3.9   CL 98 98 97 97   CO2 26 27 24 25   BUN 9 10 18 20   CREATININE 0.65 0.73 1.16* 0.81   CALCIUM 8.6 8.5 8.7 8.7   PHOS  --   --  3.3 3.2   AST 20 23  -- --    ALT 14 12  --   --    BILITOT 0.4 0.4  --   --    ALKPHOS 116 111  --   --        Recent Labs     12/19/22  0700   INR 1.2       No results for input(s): Miguel Nolan in the last 72 hours. Radiology:  IR CONTRAST INJECTION  EXISTING CV ACCESS DEVICE EVALUATION W FLUORO   Final Result   Left Yjeujt-U-Chbi with an internal jugular vein catheter terminating at the   midline with its tip directed toward the right near the confluence of the   innominate veins. Redundancy of the catheter in the neck. Good blood return when aspirating the port but significant resistance when   flushing the port. Patent and intact catheter with no contrast extravasation. Suggestion of thrombus and or fibrin sheath at the distal end of the catheter. US DUP LOWER EXTREMITIES BILATERAL VENOUS   Final Result   RIGHT LOWER EXTREMITY EXTENSIVE THE THIGH DEEP VEIN THROMBOSIS EXTENDING INTO THE CALF. LEFT LOWER EXTREMITY EDEMA DISTAL DEEP VEIN THROMBOSIS INVOLVING THE CALF REGION. XR PELVIS (1-2 VIEWS)   Final Result   No acute fracture or malalignment. IR PICC WO SQ PORT/PUMP > 5 YEARS   Final Result   Successful ultrasound and fluoroscopy guided PICC placement. CTA CHEST W WO CONTRAST PE Eval   Final Result   Extensive pulmonary emboli with saddle embolism identified. There is   evidence of right heart strain within RV LV ratio of 1.6. Mild ground-glass   seen at the right lung base in which early infarction cannot be excluded. There is increased density seen surrounding the left luke catheter in the   subcutaneous tissues to suggest extravasation of intravenous contrast.      Findings were discussed with Dr. Louis Castillo at time of interpretation of the   examination.          XR CHEST PORTABLE   Final Result   Large, ill-defined density overlying the left shoulder and mid to upper   lateral left hemithorax, which limits full evaluation of the immediate   surrounding soft tissue and osseous structures. No acute consolidation or   infiltrate. Repeat chest radiograph can be performed further evaluation. CT HEAD WO CONTRAST   Final Result   1. There is no acute intracranial abnormality. Specifically, there is no   intracranial hemorrhage. 2. Atrophy and periventricular leukomalacia,         CT CERVICAL SPINE WO CONTRAST   Final Result   1. There is no acute compression fracture or subluxation of the cervical   spine. 2. Multilevel degenerative disc and degenerative joint disease. .         CT THORACIC SPINE WO CONTRAST   Final Result   1. There is no acute compression fracture of the thoracic spine. 2. Dextroscoliosis   3. Multilevel degenerative changes. CT LUMBAR SPINE WO CONTRAST   Final Result   Disc space narrowing and degenerative changes. No acute vertebral fracture or subluxation. US GUIDED VASCULAR ACCESS    (Results Pending)     Assessment/Plan:    Pulmonary embolism: Large. On Anticoagulation . 12/19: Thrombectomy done by cardiology. T12/21: s/p leg thrombectomy (extensive right leg DVT) on 12/21    Hypercoagulable state most likely secondary to active cancer. Rectal cancer  stage III status post resection neoadjuvant chemotherapy, as per oncology    Thrombocytopenia most likely secondary to chemotherapy. Mild. Continue to monitor platelet count. Appreciate hematology input.     hemoglobin/hematocrit drop noted, in the background of anticoag. Monitor h/h q8h next two days, monitor for bleeding. No evidence of active bleeding.     COVID-positive    35 minutes total care time, >1/2 in unit/floor time and care coordination       João Pack MD ,MD

## 2022-12-21 NOTE — PROGRESS NOTES
Wound Ostomy Continence Nurse                                                                                Ostomy Referral Progress Note      NAME:  [address] RECORD NUMBER:  [id number]  AGE: 81 y.o. GENDER:  female  :  1950  TODAY'S DATE:  2022    Hillary De La Torre is a 81 y.o. female referred by:   [x] Physician  [] Nursing  [] Other:      Ileostomy and Established    PAST MEDICAL HISTORY:        Diagnosis Date    Arthritis     Cancer ([facility] [address]     SKIN    Hyperlipidemia        MEDICATIONS:    No current facility-administered medications on file prior to encounter.      Current Outpatient Medications on File Prior to Encounter   Medication Sig Dispense Refill    lidocaine-prilocaine (EMLA) 2.5-2.5 % cream       prochlorperazine (COMPAZINE) 10 MG tablet       SUTAB [id number] MG TABS  (Patient not taking: Reported on 2022)      simvastatin (ZOCOR) 40 MG tablet TAKE ONE TABLET BY MOUTH EVERY DAY IN THE EVENING 90 tablet 3    aspirin 325 MG tablet Take 325 mg by mouth daily (Patient not taking: No sig reported)      meloxicam (MOBIC) 15 MG tablet Take 1 tablet by mouth daily (Patient not taking: No sig reported) 90 tablet 3    Handicap Placard MISC by Does not apply route Good for 5 years 1 each 0       ALLERGIES:    Allergies   Allergen Reactions    Morphine Itching       PAST SURGICAL HISTORY:    Past Surgical History:   Procedure Laterality Date    CATARACT REMOVAL      CHOLECYSTECTOMY      COLECTOMY N/A 10/3/2022    LOW ANTERIOR RESECTION WITH LOOP ILEOSTOMY performed by Val Delgado MD at [facility] N/A 2022    COLORECTAL CANCER SCREENING, NOT HIGH RISK performed by Moishe Seip, MD at [address] N/A 2022    FLEXIBLE SIGMOIDOSCOPY WITH SNARE POLYPECTOMY performed by Val Delgado MD at [address] N/A 11/3/2022    INFUSAPORT performed by Val Delgado MD at [address] [place] [address]      R LOWER LEG    TUBAL LIGATION         FAMILY HISTORY:    family history includes Breast Cancer in her maternal aunt; Diabetes in her mother.     SOCIAL HISTORY:    Social History     Tobacco Use    Smoking status: Never    Smokeless tobacco: Never   Vaping Use    Vaping Use: Never used   Substance Use Topics    Alcohol use: No    Drug use: No       LABS:  WBC:    Lab Results   Component Value Date/Time    WBC 8.0 12/21/2022 06:00 AM     H/H:    Lab Results   Component Value Date/Time    HGB 10.6 12/21/2022 06:00 AM    HCT 31.2 12/21/2022 06:00 AM     BMP:    Lab Results   Component Value Date/Time     12/21/2022 06:00 AM    K 3.9 12/21/2022 06:00 AM    K 3.3 12/18/2022 08:30 PM    CL 97 12/21/2022 06:00 AM    CO2 25 12/21/2022 06:00 AM    BUN 20 12/21/2022 06:00 AM    LABALBU 3.5 12/21/2022 06:00 AM    CREATININE 0.81 12/21/2022 06:00 AM    CALCIUM 8.7 12/21/2022 06:00 AM    GFRAA >60.0 10/17/2022 06:35 AM    LABGLOM >60.0 12/21/2022 06:00 AM    GLUCOSE 92 12/21/2022 06:00 AM     PTT:    Lab Results   Component Value Date/Time    APTT 96.7 12/19/2022 07:00 AM   [APTT}  PT/INR:    Lab Results   Component Value Date/Time    PROTIME 15.4 12/19/2022 07:00 AM    INR 1.2 12/19/2022 07:00 AM       Objective    /64   Pulse 92   Temp 97.8 °F (36.6 °C)   Resp 18   Ht 4' 11\" (1.499 m)   Wt 167 lb 5.3 oz (75.9 kg)   SpO2 99%   BMI 33.80 kg/m²     Kenneth Risk Score Kenneth Scale Score: 17    Assessment   Surgeon - Dr. Fred Pompa       Ileostomy/Jejunostomy RLQ Loop ileostomy (Active)   Stomal Appliance 1 piece;Clean, dry & intact 12/21/22 0930   Flange Size (inches) 1.25 Inches 12/21/22 0930   Stoma  Assessment Red;Moist 12/21/22 0930   Peristomal Assessment GERA 12/21/22 0930   Treatment Bag change;Stoma powder;Liquid skin barrier 12/19/22 0845   Stool Appearance Watery 12/21/22 0930   Stool Color Green 12/21/22 0930   Stool Amount Medium 12/21/22 0930   Output (mL) 300 ml 12/21/22 0930   Number of days: 66     Patient known to this [address]  Nurse from her original colectomy with ileostomy back in October of 2022. Patient for reasons unrelated to the ostomy, but apparently have been experiencing issue with the appliance leaking for the past couple days. Nursing appliance an appliance last night and used the ostomy support belt - this appliance has remained intact and appears to have a good seal at the time of this evaluation. Patient mentions, she can tell when its going to leak and she does not feel any discomfort or irritation around the ostomy that would suggest a breach in appliance seal. This [facility] Nurse will follow-up this afternoon, and supplies used at home will be provided (patient mentions she gets 4-7days of wear time out of home supplies). Intake/Output Summary (Last 24 hours) at 12/21/2022 1040  Last data filed at 12/21/2022 0930  Gross per 24 hour   Intake --   Output 300 ml   Net -300 ml       DATE OF LAST BM=  ileostomy is functioning    Plan   Plan for Ostomy Care: See above    Ostomy Plan of Care  [] Supplies/Instructions left in room  [] Patient using home supplies  [x] Brand/supplies at bedside - [employer]    Current Diet: ADULT DIET;  Regular; Low Fat/Low Chol/High Fiber/PADMA  Dietician consult:  Yes    Discharge Plan:  Placement for patient upon discharge: TBD   Outpatient visit plan N/A  Supplies given Yes   Samples requested N/A    Referrals:  []   [] [facility]  [] Supplies  [] Other    Patient/Caregiver Teaching:  Written Instructions given to patient/family- Patient  Teaching provided:  [] Reviewed GI and A&P        [] Supplies  [x] Pouch emptying      [] Manipulate closure  [x] Routine Care         [] Comment  [] Pouch maintenance           Level of patient/caregiver understanding able to:  [] Indicates understanding       [] Needs reinforcement  [] Unsuccessful      [] Verbal Understanding  [x] Demonstrated understanding       [] No evidence of learning  [] Refused teaching         [] N/A    Electronically signed by GÓMEZ OrtizN, RN, Kenton Harada on 12/21/2022 at 10:47 AM

## 2022-12-21 NOTE — PROGRESS NOTES
Progress Note  Patient: Daljit Pang  Unit/Bed: Z109/L937-96  YOB: 1950  MRN: [id number]  Acct: [de-identified]   Admitting Diagnosis: Syncope and collapse [R55]  Hypokalemia [E87.6]  Thrombocytopenia (HCC) [D69.6]  Elevated troponin [R77.8]  Bilateral pulmonary embolism (HCC) [I26.99]  Seizure-like activity (HCC) [R56.9]  History of rectal cancer [Z85.048]  Acute saddle pulmonary embolism with acute cor pulmonale (HCC) [I26.02]  Neutropenia, unspecified type ([address] [D70.9]  MLDYV-65 [U07.1]  Admit Date:  12/18/2022  Hospital Day: 3    Chief Complaint: PE DVT    Histories:  Past Medical History:   Diagnosis Date    Arthritis     Cancer ([facility] [address]     SKIN    Hyperlipidemia      Past Surgical History:   Procedure Laterality Date    CATARACT REMOVAL      CHOLECYSTECTOMY      COLECTOMY N/A 10/3/2022    LOW ANTERIOR RESECTION WITH LOOP ILEOSTOMY performed by Shirley Quintanilla MD at [address] [place] [address] N/A 9/16/2022    COLORECTAL CANCER SCREENING, NOT HIGH RISK performed by Eris Logan MD at [facility]    COLONOSCOPY N/A 9/26/2022    FLEXIBLE SIGMOIDOSCOPY WITH SNARE POLYPECTOMY performed by Shirley Quintanilla MD at [address] [place] [address] N/A 11/3/2022    INFUSAPORT performed by Shirley Quintanilla MD at [address] [place] [address] LOWER LEG    TUBAL LIGATION       Family History   Problem Relation Age of Onset    Diabetes Mother     Breast Cancer Maternal Aunt     Colon Cancer Neg Hx      Social History     Socioeconomic History    Marital status:      Spouse name: None    Number of children: None    Years of education: None    Highest education level: None   Tobacco Use    Smoking status: Never    Smokeless tobacco: Never   Vaping Use    Vaping Use: Never used   Substance and Sexual Activity    Alcohol use: No    Drug use: No     Social Determinants of Health     Financial Resource Strain: Low Risk     Difficulty of Paying Living Expenses: Not hard at all   Food Insecurity: No Food Insecurity    Worried About Running Out of Food in the Last Year: Never true    Ran Out of Food in the Last Year: Never true   Transportation Needs: No Transportation Needs    Lack of Transportation (Medical): No    Lack of Transportation (Non-Medical): No       Subjective/HPI  stable overnight. On 2l  O2 no fever. No CP not SOB at rest. No acute events      EKG: SR 82        Review of Systems:   Review of Systems   Constitutional: Negative. Negative for diaphoresis and fatigue. HENT: Negative. Eyes: Negative. Respiratory: Negative. Negative for cough, chest tightness, shortness of breath, wheezing and stridor. Cardiovascular: Negative. Negative for chest pain, palpitations and leg swelling. Gastrointestinal: Negative. Negative for blood in stool and nausea. Genitourinary: Negative. Musculoskeletal: Negative. Skin: Negative. Neurological: Negative. Negative for dizziness, syncope, weakness and light-headedness. Hematological: Negative. Psychiatric/Behavioral: Negative. Physical Examination:    /65   Pulse 81   Temp 97.4 °F (36.3 °C) (Oral)   Resp 18   Ht 4' 11\" (1.499 m)   Wt 167 lb 5.3 oz (75.9 kg)   SpO2 100%   BMI 33.80 kg/m²    Physical Exam   Constitutional: She appears healthy. No distress. HENT:   Normal cephalic and Atraumatic   Eyes: Pupils are equal, round, and reactive to light. Neck: Thyroid normal. No JVD present. No neck adenopathy. No thyromegaly present. Cardiovascular: Normal rate, regular rhythm, intact distal pulses and normal pulses. Murmur heard. Pulmonary/Chest: Effort normal and breath sounds normal. She has no wheezes. She has no rales. She exhibits no tenderness. Abdominal: Soft. Bowel sounds are normal. There is no abdominal tenderness. Musculoskeletal:         General: No tenderness or edema. Normal range of motion. Cervical back: Normal range of motion and neck supple.    Neurological: She is alert and oriented to person, place, and time. Skin: Skin is warm. No cyanosis. Nails show no clubbing.      LABS:  CBC:   Lab Results   Component Value Date/Time    WBC 8.0 12/21/2022 06:00 AM    RBC 3.60 12/21/2022 06:00 AM    HGB 10.6 12/21/2022 06:00 AM    HCT 31.2 12/21/2022 06:00 AM    MCV 86.8 12/21/2022 06:00 AM    MCH 29.4 12/21/2022 06:00 AM    MCHC 33.9 12/21/2022 06:00 AM    RDW 15.6 12/21/2022 06:00 AM     12/21/2022 06:00 AM    MPV 9.0 10/28/2014 05:58 AM     CBC with Differential:    Lab Results   Component Value Date/Time    WBC 8.0 12/21/2022 06:00 AM    RBC 3.60 12/21/2022 06:00 AM    HGB 10.6 12/21/2022 06:00 AM    HCT 31.2 12/21/2022 06:00 AM     12/21/2022 06:00 AM    MCV 86.8 12/21/2022 06:00 AM    MCH 29.4 12/21/2022 06:00 AM    MCHC 33.9 12/21/2022 06:00 AM    RDW 15.6 12/21/2022 06:00 AM    BANDSPCT 6 12/18/2022 06:45 AM    LYMPHOPCT 26.3 12/21/2022 06:00 AM    MONOPCT 6.9 12/21/2022 06:00 AM    BASOPCT 0.5 12/21/2022 06:00 AM    MONOSABS 0.5 12/21/2022 06:00 AM    LYMPHSABS 2.1 12/21/2022 06:00 AM    EOSABS 0.1 12/21/2022 06:00 AM    BASOSABS 0.0 12/21/2022 06:00 AM     CMP:    Lab Results   Component Value Date/Time     12/21/2022 06:00 AM    K 3.9 12/21/2022 06:00 AM    K 3.3 12/18/2022 08:30 PM    CL 97 12/21/2022 06:00 AM    CO2 25 12/21/2022 06:00 AM    BUN 20 12/21/2022 06:00 AM    CREATININE 0.81 12/21/2022 06:00 AM    GFRAA >60.0 10/17/2022 06:35 AM    LABGLOM >60.0 12/21/2022 06:00 AM    GLUCOSE 92 12/21/2022 06:00 AM    PROT 5.8 12/19/2022 07:00 AM    LABALBU 3.5 12/21/2022 06:00 AM    CALCIUM 8.7 12/21/2022 06:00 AM    BILITOT 0.4 12/19/2022 07:00 AM    ALKPHOS 111 12/19/2022 07:00 AM    AST 23 12/19/2022 07:00 AM    ALT 12 12/19/2022 07:00 AM     BMP:    Lab Results   Component Value Date/Time     12/21/2022 06:00 AM    K 3.9 12/21/2022 06:00 AM    K 3.3 12/18/2022 08:30 PM    CL 97 12/21/2022 06:00 AM    CO2 25 12/21/2022 06:00 AM    BUN 20 12/21/2022 06:00 AM LABALBU 3.5 12/21/2022 06:00 AM    CREATININE 0.81 12/21/2022 06:00 AM    CALCIUM 8.7 12/21/2022 06:00 AM    GFRAA >60.0 10/17/2022 06:35 AM    LABGLOM >60.0 12/21/2022 06:00 AM    GLUCOSE 92 12/21/2022 06:00 AM     Magnesium:    Lab Results   Component Value Date/Time    MG 2.2 12/18/2022 08:30 PM     Troponin:    Lab Results   Component Value Date/Time    TROPONINI 0.096 12/18/2022 06:45 AM        Active Hospital Problems    Diagnosis Date Noted    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 no respiratory distress  Saddle PE- on Lovenox. - probably provoked from [place] but also w underlying clotting disorder from cancer. Will benefit from long term anticoagulation. B/L DVT  Right side extensive Thrombus - plan for RLE DVT Thrombectomy later today. Continue Lovenox until this is complete then can transition to [address]  Trop mildly elevated - will give low zuniga BB. Hold ASA due to mild Thrombocytopenia.    Hypokalemia- replace iv and PO.   CEcho LVEF 55%  Colon Cancer s/p surgery and Ostomy in place       Electronically signed by Ky Moyer MD on 12/21/2022 at 9:00 AM

## 2022-12-21 NOTE — BRIEF OP NOTE
Section of Cardiology  Adult Brief LE angio Procedure Note        Procedure(s): Right lower extremity ascending venography, IVC venogram, IVUS of the inferior vena cava as well as the left lower extremity venous system, mechanical thrombectomy of the right common femoral vein as well as the proximal right femoral vein. Ultrasound-guided vascular access    Pre-operative Diagnosis: Acute right lower extremity DVT    H&P Status: Completed and reviewed. Post-operative Diagnosis:      Large thrombus noted in the right common femoral vein as well as proximal femoral vein. Intravascular ultrasound of the inferior vena cava, right iliac as well as femoral venous system showed significant compression. Venous fibrosis in the right common iliac/external iliac venous segments. Findings:  See full report    Complications:  none    Primary Proceduralist:   Dr. Muller Primus:  Continue with Lovenox, transition to oral anticoagulation prior to discharge    Patient will require bilateral common iliac vein/external iliac vein intravascular ultrasound in the near future with possible stenting. Patient with significant compression of the right iliac venous system.   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

## 2022-12-21 NOTE — PROGRESS NOTES
Pulmonary Progress Note    2022 7:41 AM    Subjective:   Admit Date: 2022  PCP: Pierre Harris DO    Chief Complaint   Patient presents with    Fall     Pt fell in the bathroom, + LOC, + Head Injury, - Thinners. Pt does not remember the event. Interval History: Transferred out of ICU yesterday. He is on 2 L of oxygen. Has been doing well. No fever overnight. Continues to be on anticoagulation. 14 points review of systems has been obtained and negative except to was mentioned in HPI.      Medications:   Scheduled Meds:   enoxaparin  1 mg/kg SubCUTAneous BID    sodium chloride flush  5-40 mL IntraVENous 2 times per day    sodium chloride flush  5-40 mL IntraVENous 2 times per day    sodium chloride flush  5-40 mL IntraVENous 2 times per day    metoprolol succinate  25 mg Oral Daily     Continuous Infusions:   sodium chloride      sodium chloride      sodium chloride           Objective:   Vitals:   Temp (24hrs), Av.6 °F (36.4 °C), Min:97.6 °F (36.4 °C), Max:97.6 °F (36.4 °C)    /70   Pulse 89   Temp 97.6 °F (36.4 °C) (Oral)   Resp 18   Ht 4' 11\" (1.499 m)   Wt 167 lb 5.3 oz (75.9 kg)   SpO2 98%   BMI 33.80 kg/m²   I/O:24HR INTAKE/OUTPUT:    Intake/Output Summary (Last 24 hours) at 2022 0741  Last data filed at 2022 1022  Gross per 24 hour   Intake 240 ml   Output 200 ml   Net 40 ml      0701 -  0700  In: 240 [P.O.:240]  Out: 300   CVP:           Physical Exam:  General appearance - alert, ill appearing, and in no distress  Mental status - alert, oriented to person, place, and time  Eyes - pupils equal and reactive, extraocular eye movements intact  Nose - normal and patent, no erythema, discharge or polyps  Neck - supple, no significant adenopathy  Chest -diminished bilaterally  to auscultation, no wheezes, rales or rhonchi, symmetric air entry  Heart - normal rate, regular rhythm, normal S1, S2, no murmurs, rubs, clicks or gallops  Abdomen - soft, nontender, nondistended, no masses or organomegaly  Rectal - deferred, not clinically indicated  Neurological - alert, oriented, normal speech, no focal findings or movement disorder noted, motor and sensory grossly normal bilaterally  Musculoskeletal - no joint tenderness, deformity or swelling  Extremities - peripheral pulses normal, no pedal edema, no clubbing or cyanosis  Skin - normal coloration and turgor, no rashes, no suspicious skin lesions noted              BMP:    Recent Labs     12/19/22  0700 12/20/22  0448 12/21/22  0600    133* 133*   K 3.3* 3.7 3.9   CL 98 97 97   CO2 27 24 25   BUN 10 18 20   CREATININE 0.73 1.16* 0.81   GLUCOSE 94 86 92    . MG:3,PHOS:3)@  Ionized Calcium: No results found for: IONCA  CBC:   Recent Labs     12/20/22  0449 12/21/22  0145 12/21/22  0600   WBC 12.5*  --  8.0   HGB 10.9* 10.4* 10.6*   *  --  117*      ABG: No results for input(s): PH, PCO2, PO2 in the last 72 hours. Assessment and Plan:       Impression:    -Acute provoked submassive pulmonary emboli status post thrombectomy. -RV strain due to pulmonary emboli. -COVID-19 pneumonia.  -Acute kidney injury. Recommendations:    -Continue anticoagulation. Can transition to [address]. Duration is yet to be decided but at least 6 months.  -Echocardiogram did not show evidence of pulm hypertension.  -Maintain on the dry side. Kidney function has improved.            Electronically signed by Arthur Spencer MD on 12/21/2022 at 7:41 AM

## 2022-12-22 ENCOUNTER — APPOINTMENT (OUTPATIENT)
Dept: ULTRASOUND IMAGING | Age: 72
DRG: 163 | End: 2022-12-22
Payer: COMMERCIAL

## 2022-12-22 LAB
ALBUMIN SERPL-MCNC: 3.5 G/DL (ref 3.5–4.6)
ANION GAP SERPL CALCULATED.3IONS-SCNC: 13 MEQ/L (ref 9–15)
BUN BLDV-MCNC: 19 MG/DL (ref 8–23)
CALCIUM SERPL-MCNC: 8.6 MG/DL (ref 8.5–9.9)
CHLORIDE BLD-SCNC: 95 MEQ/L (ref 95–107)
CO2: 24 MEQ/L (ref 20–31)
CREAT SERPL-MCNC: 0.76 MG/DL (ref 0.5–0.9)
GFR SERPL CREATININE-BSD FRML MDRD: >60 ML/MIN/{1.73_M2}
GLUCOSE BLD-MCNC: 154 MG/DL (ref 70–99)
GLUCOSE BLD-MCNC: 94 MG/DL (ref 70–99)
HCT VFR BLD CALC: 28.5 % (ref 37–47)
HCT VFR BLD CALC: 29 % (ref 37–47)
HEMOGLOBIN: 9.4 G/DL (ref 12–16)
HEMOGLOBIN: 9.6 G/DL (ref 12–16)
PERFORMED ON: ABNORMAL
PHOSPHORUS: 3 MG/DL (ref 2.3–4.8)
POTASSIUM SERPL-SCNC: 3.7 MEQ/L (ref 3.4–4.9)
SODIUM BLD-SCNC: 132 MEQ/L (ref 135–144)

## 2022-12-22 PROCEDURE — 97167 OT EVAL HIGH COMPLEX 60 MIN: CPT

## 2022-12-22 PROCEDURE — 93880 EXTRACRANIAL BILAT STUDY: CPT | Performed by: INTERNAL MEDICINE

## 2022-12-22 PROCEDURE — 97163 PT EVAL HIGH COMPLEX 45 MIN: CPT

## 2022-12-22 PROCEDURE — 2060000000 HC ICU INTERMEDIATE R&B

## 2022-12-22 PROCEDURE — 99232 SBSQ HOSP IP/OBS MODERATE 35: CPT | Performed by: INTERNAL MEDICINE

## 2022-12-22 PROCEDURE — 85018 HEMOGLOBIN: CPT

## 2022-12-22 PROCEDURE — 2709999900 HC NON-CHARGEABLE SUPPLY

## 2022-12-22 PROCEDURE — 2580000003 HC RX 258: Performed by: FAMILY MEDICINE

## 2022-12-22 PROCEDURE — 36415 COLL VENOUS BLD VENIPUNCTURE: CPT

## 2022-12-22 PROCEDURE — 99233 SBSQ HOSP IP/OBS HIGH 50: CPT | Performed by: INTERNAL MEDICINE

## 2022-12-22 PROCEDURE — 6360000002 HC RX W HCPCS: Performed by: FAMILY MEDICINE

## 2022-12-22 PROCEDURE — 2580000003 HC RX 258: Performed by: STUDENT IN AN ORGANIZED HEALTH CARE EDUCATION/TRAINING PROGRAM

## 2022-12-22 PROCEDURE — 85014 HEMATOCRIT: CPT

## 2022-12-22 PROCEDURE — 80069 RENAL FUNCTION PANEL: CPT

## 2022-12-22 PROCEDURE — 93880 EXTRACRANIAL BILAT STUDY: CPT

## 2022-12-22 PROCEDURE — 2580000003 HC RX 258: Performed by: INTERNAL MEDICINE

## 2022-12-22 PROCEDURE — 2700000000 HC OXYGEN THERAPY PER DAY

## 2022-12-22 PROCEDURE — 99211 OFF/OP EST MAY X REQ PHY/QHP: CPT

## 2022-12-22 PROCEDURE — 6370000000 HC RX 637 (ALT 250 FOR IP): Performed by: INTERNAL MEDICINE

## 2022-12-22 PROCEDURE — 6360000002 HC RX W HCPCS: Performed by: INTERNAL MEDICINE

## 2022-12-22 RX ORDER — METOPROLOL SUCCINATE 25 MG/1
25 TABLET, EXTENDED RELEASE ORAL DAILY
Qty: 30 TABLET | Refills: 3 | Status: SHIPPED | OUTPATIENT
Start: 2022-12-23

## 2022-12-22 RX ADMIN — Medication 10 ML: at 08:20

## 2022-12-22 RX ADMIN — Medication 10 ML: at 21:27

## 2022-12-22 RX ADMIN — ONDANSETRON 4 MG: 2 INJECTION INTRAMUSCULAR; INTRAVENOUS at 21:27

## 2022-12-22 RX ADMIN — Medication 5 ML: at 21:27

## 2022-12-22 RX ADMIN — ENOXAPARIN SODIUM 80 MG: 100 INJECTION SUBCUTANEOUS at 08:17

## 2022-12-22 RX ADMIN — APIXABAN 10 MG: 5 TABLET, FILM COATED ORAL at 21:27

## 2022-12-22 RX ADMIN — METOPROLOL SUCCINATE 25 MG: 25 TABLET, FILM COATED, EXTENDED RELEASE ORAL at 08:18

## 2022-12-22 RX ADMIN — Medication 10 ML: at 08:21

## 2022-12-22 ASSESSMENT — PAIN SCALES - GENERAL
PAINLEVEL_OUTOF10: 0

## 2022-12-22 ASSESSMENT — ENCOUNTER SYMPTOMS
RESPIRATORY NEGATIVE: 1
SHORTNESS OF BREATH: 0
WHEEZING: 0
CHEST TIGHTNESS: 0
COUGH: 0
STRIDOR: 0
BLOOD IN STOOL: 0
GASTROINTESTINAL NEGATIVE: 1
NAUSEA: 0
EYES NEGATIVE: 1

## 2022-12-22 NOTE — CARE COORDINATION
CALL FROM AMELIA AT [facility]. CAN ACCEPT FOR [facility] WITH DISCHARGE DATE TOMORROW.

## 2022-12-22 NOTE — PROGRESS NOTES
Hospitalist Progress Note      Date of Admission: 12/18/2022  Chief Complaint:    Chief Complaint   Patient presents with    Fall     Pt fell in the bathroom, + LOC, + Head Injury, - Thinners. Pt does not remember the event.       Subjective:  No complaints, nausea or chest pain or headache    Medications:    Infusion Medications    sodium chloride      sodium chloride      sodium chloride      sodium chloride       Scheduled Medications    apixaban  10 mg Oral BID    Followed by    Kenn Helling ON 12/29/2022] apixaban  5 mg Oral BID    sodium chloride flush  5-40 mL IntraVENous 2 times per day    sodium chloride flush  5-40 mL IntraVENous 2 times per day    sodium chloride flush  5-40 mL IntraVENous 2 times per day    sodium chloride flush  5-40 mL IntraVENous 2 times per day    metoprolol succinate  25 mg Oral Daily     PRN Meds: sodium chloride flush, sodium chloride, sodium chloride flush, sodium chloride, ondansetron, sodium chloride flush, sodium chloride, sodium chloride flush, sodium chloride, ondansetron **OR** ondansetron, polyethylene glycol, acetaminophen **OR** acetaminophen    Intake/Output Summary (Last 24 hours) at 12/22/2022 1636  Last data filed at 12/22/2022 1604  Gross per 24 hour   Intake 720 ml   Output 460 ml   Net 260 ml       Exam:  BP (!) 120/52   Pulse (!) 109   Temp 97.9 °F (36.6 °C) (Oral)   Resp 16   Ht 4' 11\" (1.499 m)   Wt 167 lb 5.3 oz (75.9 kg)   SpO2 100%   BMI 33.80 kg/m²   Head: Normocephalic, atraumatic  Sclera clear  Neck JVD flat  Lungs: Normal effort of breathing    Labs:   Recent Labs     12/20/22  0449 12/21/22  0145 12/21/22  0600 12/22/22  0608   WBC 12.5*  --  8.0  --    HGB 10.9* 10.4* 10.6* 9.6*   HCT 34.0* 30.7* 31.2* 28.5*   *  --  117*  --        Recent Labs     12/20/22  0448 12/21/22  0600 12/22/22  0600   * 133* 132*   K 3.7 3.9 3.7   CL 97 97 95   CO2 24 25 24   BUN 18 20 19   CREATININE 1.16* 0.81 0.76   CALCIUM 8.7 8.7 8.6   PHOS 3.3 3.2 3.0 No results for input(s): INR in the last 72 hours. No results for input(s): Luba Osman in the last 72 hours. Radiology:  US CAROTID ARTERY BILATERAL   Final Result   Mild scattered atherosclerosis without obstructive lesion. Areas of intimal thickening. Bilateral ICAs less than 50% stenosis. IR CONTRAST INJECTION  EXISTING CV ACCESS DEVICE EVALUATION W FLUORO   Final Result   Left Veopqc-N-Oqqs with an internal jugular vein catheter terminating at the   midline with its tip directed toward the right near the confluence of the   innominate veins. Redundancy of the catheter in the neck. Good blood return when aspirating the port but significant resistance when   flushing the port. Patent and intact catheter with no contrast extravasation. Suggestion of thrombus and or fibrin sheath at the distal end of the catheter. US DUP LOWER EXTREMITIES BILATERAL VENOUS   Final Result   RIGHT LOWER EXTREMITY EXTENSIVE THE THIGH DEEP VEIN THROMBOSIS EXTENDING INTO THE CALF. LEFT LOWER EXTREMITY EDEMA DISTAL DEEP VEIN THROMBOSIS INVOLVING THE CALF REGION. XR PELVIS (1-2 VIEWS)   Final Result   No acute fracture or malalignment. IR PICC WO SQ PORT/PUMP > 5 YEARS   Final Result   Successful ultrasound and fluoroscopy guided PICC placement. CTA CHEST W WO CONTRAST PE Eval   Final Result   Extensive pulmonary emboli with saddle embolism identified. There is   evidence of right heart strain within RV LV ratio of 1.6. Mild ground-glass   seen at the right lung base in which early infarction cannot be excluded. There is increased density seen surrounding the left luke catheter in the   subcutaneous tissues to suggest extravasation of intravenous contrast.      Findings were discussed with Dr. Eliezer Palma at time of interpretation of the   examination.          XR CHEST PORTABLE   Final Result   Large, ill-defined density overlying the left shoulder and mid to upper   lateral left hemithorax, which limits full evaluation of the immediate   surrounding soft tissue and osseous structures. No acute consolidation or   infiltrate. Repeat chest radiograph can be performed further evaluation. CT HEAD WO CONTRAST   Final Result   1. There is no acute intracranial abnormality. Specifically, there is no   intracranial hemorrhage. 2. Atrophy and periventricular leukomalacia,         CT CERVICAL SPINE WO CONTRAST   Final Result   1. There is no acute compression fracture or subluxation of the cervical   spine. 2. Multilevel degenerative disc and degenerative joint disease. .         CT THORACIC SPINE WO CONTRAST   Final Result   1. There is no acute compression fracture of the thoracic spine. 2. Dextroscoliosis   3. Multilevel degenerative changes. CT LUMBAR SPINE WO CONTRAST   Final Result   Disc space narrowing and degenerative changes. No acute vertebral fracture or subluxation. US GUIDED VASCULAR ACCESS    (Results Pending)     Assessment/Plan:    Syncope: plan for was for dc today. However syncope episode. Cardiology at bedside during rapid response. Plan to hold DC and reassess in am.      Pulmonary embolism: Large. On Anticoagulation . 12/19: Thrombectomy done by cardiology. S/p leg thrombectomy (extensive right leg DVT) on 12/21    Hypercoagulable state most likely secondary to active cancer. Rectal cancer  stage III status post resection neoadjuvant chemotherapy, as per oncology    Thrombocytopenia most likely secondary to chemotherapy. Mild. Continue to monitor platelet count. Appreciate hematology input.     hemoglobin/hematocrit drop noted, in the background of anticoag. Monitor h/h q8h next two days, monitor for bleeding. No evidence of active bleeding.     COVID-positive    35 minutes total care time, >1/2 in unit/floor time and care coordination       David Reagan MD ,MD

## 2022-12-22 NOTE — CARE COORDINATION
Spoke with patient. She would like to go home with [facility] as previously established. She feels she is ready to go home. IMM reviewed and verbalized understanding.

## 2022-12-22 NOTE — PROGRESS NOTES
Physical Therapy Med Surg Initial Assessment  [facility]/Department: [facility]  [facility]       NAME: Momo Mathews  : 1950 (81 y.o.)  MRN: [id number]  CODE STATUS: Full Code    Date of Service: 2022    Patient Diagnosis(es): Syncope and collapse [R55]  Hypokalemia [E87.6]  Thrombocytopenia (HCC) [D69.6]  Elevated troponin [R77.8]  Bilateral pulmonary embolism (HCC) [I26.99]  Seizure-like activity (HCC) [R56.9]  History of rectal cancer [Z85.048]  Acute saddle pulmonary embolism with acute cor pulmonale (HCC) [I26.02]  Neutropenia, unspecified type ([address] [D70.9]  COVID-19 [U07.1]   Chief Complaint   Patient presents with    Fall     Pt fell in the bathroom, + LOC, + Head Injury, - Thinners. Pt does not remember the event.       Patient Active Problem List    Diagnosis Date Noted    Convulsive syncope 2022    Bilateral pulmonary embolism ([address] 2022    Malignant neoplasm of rectum ([address] 10/11/2022    Rectal cancer ([address] 10/03/2022    Rectal polyp 2022    Colonic mass 2022    Elevated blood pressure reading 07/15/2022    Cancer ([address]     Hyperlipidemia 2003        Past Medical History:   Diagnosis Date    Arthritis     Cancer ([address]     SKIN    Hyperlipidemia      Past Surgical History:   Procedure Laterality Date    CATARACT REMOVAL      CHOLECYSTECTOMY      COLECTOMY N/A 10/3/2022    LOW ANTERIOR RESECTION WITH LOOP ILEOSTOMY performed by Tom Rodriguez MD at [place] [address] N/A 2022    COLORECTAL CANCER SCREENING, NOT HIGH RISK performed by Marie Rondon MD at [address] N/A 2022    FLEXIBLE SIGMOIDOSCOPY WITH SNARE POLYPECTOMY performed by oTm Rodriguez MD at [address] [facility] N/A 11/3/2022    INFUSAPORT performed by Tom [facility] MD at [facility] 2 LEG    TUBAL LIGATION         Chart Reviewed: Yes  Family / Caregiver Present: Yes (pts nurse)    Restrictions:  Restrictions/Precautions: Isolation, Fall Risk     SUBJECTIVE:        Pain  Pain: Pt states no c/o pain. Prior Level of Function:  Social/Functional History  Lives With: Son (granddaughter)  Type of Home: House  Home Layout: Two level, Able to Live on Main level with bedroom/bathroom, Performs ADL's on one level, 1/2 bath on main level  Home Access: Stairs to enter with rails  Entrance Stairs - Number of Steps: 4  Bathroom Shower/Tub:  (Pt has been sponge bathing since october)  Bathroom Equipment: Toilet raiser  Home Equipment: [employer], [address] bed  ADL Assistance: Needs assistance  Bath: Minimal assistance  Dressing: Minimal assistance  Homemaking Assistance: Needs assistance  Homemaking Responsibilities: No  Ambulation Assistance: Independent (wwalker)  Transfer Assistance: Independent  Additional Comments: Pt reports that she is rarely at home alone    OBJECTIVE:   Vision  Vision: Impaired  Vision Exceptions: Wears glasses for reading  H[facility]: [facility] to [facility]  Hearing Exceptions: Hard of hearing/hearing concerns; No hearing aid    Cognition:  Overall Orientation Status: Within Functional Limits (Simultaneous filing.  User may not have seen previous data.)  Orientation Level: Oriented X4  Follows Commands: Within Functional Limits  Overall Cognitive Status: WFL  Cognition Comment: follows commands consistently         ROM:  AROM: Generally decreased, functional    Strength:  Strength: Generally decreased, functional    Neuro:  Balance  Sitting - Static: Fair;-  Sitting - Dynamic: Poor (reaching and LE movements resulted in LOB)                      Coordination: Generally decreased, functional         Bed mobility  Rolling to Left: Moderate assistance  Rolling to Right: Moderate assistance  Supine to Sit: Maximum assistance;2 Person assistance  Sit to Supine: Maximum assistance;2 Person assistance  Scooting: Maximal assistance;2 Person assistance (+2 along edge of bed with poor weight acceptance on LE's)  Bed Mobility Comments: Pt requires rest between each activity    Transfers  Sit to Stand: Unable to assess  Stand to Sit: Unable to assess    Ambulation  Assistance: Unable to assess                   Activity Tolerance  Activity Tolerance: Patient limited by endurance;Treatment limited secondary to medical complications  Activity Tolerance Comments: Pts nurse present and taking vital signs throughout all movement tasks. Activity limited to above tasks to accomodate BP and HR changes. No standing or gait attempted. Rest between tasks required    Patient Education  Education Given To: Patient  Education Provided: Role of Therapy;Plan of Care  Education Method: Demonstration;Verbal  Barriers to Learning: None  Education Outcome: Verbalized understanding;Continued education needed       ASSESSMENT:   Body Structures, Functions, Activity Limitations Requiring Skilled Therapeutic Intervention: Decreased functional mobility ; Decreased strength;Decreased safe awareness;Decreased balance;Decreased endurance  Decision Making: High Complexity  History: high  Exam: high  Clinical Presentation: high    Barriers to Learning: none         DISCHARGE RECOMMENDATIONS:  Discharge Recommendations: Continue to assess pending progress    Assessment: Pt demonstrates deficits as listed. She is requiring assistance with all mobility at this time.  Pt is in need of continued PT prior to safe return to home  Requires PT Follow-Up: Yes       PLAN OF CARE:  Physcial Therapy Plan  General Plan: 1 time a day 3-6 times a week  Current Treatment Recommendations: Strengthening, Balance training, Functional mobility training, Transfer training, Gait training, Endurance training, Neuromuscular re-education, Manual, Safety education & training, Patient/Caregiver education & training, Equipment evaluation, education, & procurement  Additional Comments: +2 for ww trial potentially    Safety Devices  Type of Devices: Call light within reach, Left in bed  Restraints  Restraints Initially in Place: No    Goals:  Short Term Goals  Short Term Goal 1: min assist bed mobility  Short Term Goal 2: mod assist sit to stand  Short Term Goal 3: pt able to maintain sitting on EOB with SBA for 5 min with LE ex performed  Short Term Goal 4: Pt able to take steps with ww in place with mod assist +2    AMPAC (6 CLICK) BASIC MOBILITY  AM-PAC Inpatient Mobility Raw Score : 8     Therapy Time:   Individual   Time In 1425   Time Out 1450   Minutes [address] [place] [address], 12/22/22 at 3:15 PM         Definitions for assistance levels  Independent = pt does not require any physical supervision or assistance from another person for activity completion. Device may be needed.   Stand by assistance = pt requires verbal cues or instructions from another person, close to but not touching, to perform the activity  Minimal assistance= pt performs 75% or more of the activity; assistance is required to complete the activity  Moderate assistance= pt performs 50% of the activity; assistance is required to complete the activity  Maximal assistance = pt performs 25% of the activity; assistance is required to complete the activity  Dependent = pt requires total physical assistance to accomplish the task

## 2022-12-22 NOTE — PROGRESS NOTES
[facility] Occupational Therapy      Date: 2022  Patient Name: Meseret Roberts        MRN: [id number]  Account: [de-identified]   : 1950  (81 y.o.)  Room: [place] [address]    Chart reviewed, attempted OT at [address] [place] [address] for evaluation. Patient not seen 2° to:    Pt. off floor for test/procedure    Spoke to FEDERICA Juárez, RN aware. Will attempt again when able.     Electronically signed by JUDITH Lugo on 10/44/1531 at 1:12 PM

## 2022-12-22 NOTE — PROGRESS NOTES
Physical Therapy Missed Treatment   Facility/Department: [facility] MED SURG T482/G204-39    NAME: Charito Doe    : 1950 (81 y.o.)  MRN: [id number]    Account: [de-identified]  Gender: female      Pt off unit for testing Nursing staff notified. Will follow and attempt PT evaluation again at earliest availability.        Shaina Bazan, PT, 22 at 1:12 PM

## 2022-12-22 NOTE — PROGRESS NOTES
Code Note:      PlZ see Nurse Sunita Coates note. Pt got up w assist became LH then passed out. She came to readily. She was initially tachycardic 120s but resolved. BLD sugar stable. BP stable. O2 sat stable at %    Pt alert and oriented. Denies pain. Did not fall. Post pone discharge. Continue Telemetry. Try to work w PT and walk again w assistance. If more events such as Tachy Hypotension of respiratory distress w Hypoxia then we will repeat CTA Chest.     We will order Carotid Duplex. Discussed with Dr. Bryanna Dorado regarding plans.   Cct> 30

## 2022-12-22 NOTE — FLOWSHEET NOTE
Patient was set for discharge after establishing ambulation per Dr. Samra Johnson and Dr. Peg Pereira. The LPN helped the patient to the sitting position at the edge of the bed with no complaints of dizziness. The patient then stood with a walker at bedside in which she then stated that she began to feel dizzy. The patient proceeded to lose consciousness and experienced a syncopal episode. The LPN lowered the patient back into the bed and notified the RN. The LPN obtained vital signs which were initially 121/75, 123, 16, 100% RA. The LPN sternal rubbed the patient in which regained consciousness with an A&O x 3 and slight diaphoresis. The patient's blood sugar is 154. The patient sustained no injury as a result of the syncopal episode. Physical Therapy to follow up later today and resume ambulation per Dr. Peg Pereira.     2176: 121/75, 100% RA, 16, 123  1051: 144/69, 99% RA, 20, 97    PT arrived at 1430 to assist the patient with sitting and standing. The LPN obtained orthostatic BP as follows:     Supine: 113/61. , SPO2 100 RA, RR 18    Sittin/80, , SPO2 100 RA, RR 21    Standing (attempted): 120/52, , SPO2 100 RA, RR 18    Dr. Peg Pereira also notified.

## 2022-12-22 NOTE — PROGRESS NOTES
[facility] OCCUPATIONAL THERAPY EVALUATION - ACUTE     NAME: Nelson Gomez  : 1950 (81 y.o.)  MRN: [id number]  CODE STATUS: Full Code  Room: [place] [address]    Date of Service: 2022    Patient Diagnosis(es): Syncope and collapse [R55]  Hypokalemia [E87.6]  Thrombocytopenia ([address] [D69.6]  Elevated troponin [R77.8]  Bilateral pulmonary embolism ([address] [I26.99]  Seizure-like activity ([address] [R56.9]  History of rectal cancer [Z85.048]  Acute saddle pulmonary embolism with acute cor pulmonale (HCC) [I26.02]  Neutropenia, unspecified type ([address] [D70.9]  COVID-19 [U07.1]   Patient Active Problem List    Diagnosis Date Noted    Convulsive syncope 2022    Bilateral pulmonary embolism ([address] 2022    Malignant neoplasm of rectum ([address] 10/11/2022    Rectal cancer ([address] 10/03/2022    Rectal polyp 2022    Colonic mass 2022    Elevated blood pressure reading 07/15/2022    Cancer ([address]     Hyperlipidemia 2003        Past Medical History:   Diagnosis Date    Arthritis     Cancer ([address]     SKIN    Hyperlipidemia      Past Surgical History:   Procedure Laterality Date    CATARACT REMOVAL      CHOLECYSTECTOMY      COLECTOMY N/A 10/3/2022    LOW ANTERIOR RESECTION WITH LOOP ILEOSTOMY performed by Galdino Shaikh MD at [place] [address] N/A 2022    COLORECTAL CANCER SCREENING, NOT HIGH RISK performed by Melvina Wynn MD at [address] [facility] N/A 2022    FLEXIBLE SIGMOIDOSCOPY WITH [address] POLYPECTOMY performed by Galdino Shaikh MD at [address] [place] [address] N/A 11/3/2022    INFUSAPORT performed by Galdino Shaikh MD at [place] [address]          Restrictions  Restrictions/Precautions: Fall Risk, Isolation              Safety Devices: Safety Devices  Type of Devices: All fall risk precautions in place;Call light within reach; Left in bed     Patient's date of birth confirmed: Yes    General:       Subjective:\"I know that I need to do this. \"  Pt pleasant and cooperative. Pain at start of treatment: No    Pain at end of treatment: No    Location:   Nursing notified: Not Applicable  RN:   Intervention: Repositioned    Prior Level of Function:  Social/Functional History  Lives With: Son (granddaughter)  Type of Home: House  Home Layout: Two level, Able to Live on Main level with bedroom/bathroom, Performs ADL's on one level, 1/2 bath on main level  Home Access: Stairs to enter with rails  Entrance Stairs - Number of Steps: 4  Bathroom Shower/Tub:  (Pt has been sponge bathing since october)  Bathroom Equipment: Toilet raiser  Home Equipment: Rollator, Walker, [address] bed  Has the patient had two or more falls in the past year or any fall with injury in the past year?: Yes  ADL Assistance: Needs assistance  Bath: Minimal assistance  Dressing: Minimal assistance  Homemaking Assistance: Needs assistance  Homemaking Responsibilities: No  Ambulation Assistance: Independent (wwalker)  Transfer Assistance: Independent  Additional Comments: Pt reports that she is rarely at home alone    OBJECTIVE:     Orientation Status:  Orientation  Overall Orientation Status: Within Functional Limits (Simultaneous filing. User may not have seen previous data.)  Orientation Level: Oriented X4    Observation:  Observation/Palpation  Posture: Fair  Observation: flexed posture. SOB and increased heart rate noted with minimal exertion (Simultaneous filing. User may not have seen previous data.)    Cognition Status:  Cognition  Cognition Comment: follows commands consistently    Perception Status:  Perception  Overall Perceptual Status: WFL    Vision and Hearing Status:  Vision  Vision Exceptions: Wears glasses for reading  Hearing  Hearing: Exceptions to [facility]  Hearing Exceptions: Hard of hearing/hearing concerns; No hearing aid   Vision - Basic Assessment  Prior Vision: Wears glasses only for reading  Visual History: No significant visual history  Patient Visual Report: No visual complaint reported. Visual Field Cut: No  Oculo Motor Control: WNL    GROSS ASSESSMENT AROM/PROM:  AROM: Within functional limits       ROM:   LUE AROM (degrees)  LUE AROM : WFL  Left Hand AROM (degrees)  Left Hand AROM: WFL  RUE AROM (degrees)  RUE AROM : WFL  Right Hand AROM (degrees)  Right Hand AROM: WFL    UE STRENGTH:  Strength: Within functional limits (4-/5 bilateral UE)    UE COORDINATION:  Coordination: Within functional limits    UE TONE:  Tone: Normal    UE SENSATION:  Sensation: Intact    Hand Dominance:  Hand Dominance  Hand Dominance: Right    ADL Status:  ADL  Feeding: Setup  Grooming: Moderate assistance; Increased time to complete  UE Bathing: Maximum assistance; Increased time to complete  LE Bathing: Maximum assistance; Increased time to complete  UE Dressing: Maximum assistance; Increased time to complete  LE Dressing: Dependent/Total  Toileting: Unable to assess(comment)    Functional Mobility:           Bed Mobility  Bed mobility  Rolling to Left: Moderate assistance  Rolling to Right: Moderate assistance  Supine to Sit: Maximum assistance;2 Person assistance  Sit to Supine: Maximum assistance;2 Person assistance  Scooting: Maximal assistance;2 Person assistance    Seated and Standing Balance:  Balance  Sitting:  (Supervision)    Functional Endurance:  Activity Tolerance  Activity Tolerance: Treatment limited secondary to medical complications (free text); Patient limited by fatigue    D/C Recommendations:  OT D/C RECOMMENDATIONS  REQUIRES OT FOLLOW-UP: Yes    Equipment Recommendations:       OT Education:        OT Follow Up:    OT D/C RECOMMENDATIONS  REQUIRES OT FOLLOW-UP: Yes       Assessment/Discharge Disposition:     Performance deficits / Impairments: Decreased functional mobility , Decreased balance, Decreased posture, Decreased ADL status, Decreased endurance, Decreased strength       AMPAC (Six Click) Self care Score    How much help for putting on and taking off regular lower body clothing?: Total  How much help for Bathing?: A Lot  How much help for Toileting?: Total  How much help for putting on and taking off regular upper body clothing?: A Lot  How much help for taking care of personal grooming?: A Lot  How much help for eating meals?: None  AM-[facility] Inpatient Daily Activity Raw Score: 12  AM-PAC Inpatient ADL T-Scale Score : 30.6  ADL Inpatient CMS 0-100% Score: 66.57    Therapy key for assistance levels -   Independent/Mod I = Pt. is able to perform task with no assistance but may require a device   Stand by assistance = Pt. does not perform task at an independent level but does not need physical assistance, requires verbal cues  Minimal, Moderate, Maximal Assistance = Pt. requires physical assistance (25%, 50%, 75% assist from helper) for task but is able to actively participate in task   Dependent = Pt. requires total assistance with task and is not able to actively participate with task completion     Plan:  Occupational Therapy Plan  Times Per Week: 1-4x/wk  Current Treatment Recommendations: Strengthening, Balance training, Functional mobility training, Endurance training, Neuromuscular re-education, Self-Care / ADL    Goals:   Patient will:    - Improve functional endurance to tolerate/complete 8-15 mins of ADL's  - Be Min A in UB ADLs   - Be Mod A in LB ADLs  - Be Min A in ADL transfers without LOB  - Be Min A in toileting tasks  - Improve bilateral UE strength and endurance to FAir in order to participate in self-care activities as projected. Patient Goal: Patient goals :  To return to home with family when ready      Discussed and agreed upon: Yes Comments:       Therapy Time:   Individual   Time In 1425   Time Out 1452   Minutes 27          Eval: 27 minutes     Electronically signed by:    KATIE Herrera/ANKITA,   68/79/5247, 3:13 PM Electronically signed by KATIE Herrera/L on 65/40/88 at 3:14 PM EST

## 2022-12-22 NOTE — PROGRESS NOTES
Neurology Follow up    SUBJECTIVE: Patient still in isolation and history and evaluation obtained from the nurse. Patient is remained stable without any confusional episodes and nonfocal otherwise no fever is reported. No seizures are reported.   EEG was completed    Exam unchanged   Current Facility-Administered Medications   Medication Dose Route Frequency Provider Last Rate Last Admin    apixaban (ELIQUIS) tablet 10 mg  10 mg Oral BID Adelso Santamaria MD        Followed by    Hill Cedillo ON 12/29/2022] apixaban (ELIQUIS) tablet 5 mg  5 mg Oral BID Adelso Santamaria MD        sodium chloride flush 0.9 % injection 5-40 mL  5-40 mL IntraVENous 2 times per day Adelso Santamaria MD   10 mL at 12/22/22 0820    sodium chloride flush 0.9 % injection 5-40 mL  5-40 mL IntraVENous PRN Adelso Santamaria MD        0.9 % sodium chloride infusion   IntraVENous PRN Adelso Santamaria MD        sodium chloride flush 0.9 % injection 5-40 mL  5-40 mL IntraVENous 2 times per day Adelso Santamaria MD   10 mL at 12/22/22 0820    sodium chloride flush 0.9 % injection 5-40 mL  5-40 mL IntraVENous PRN Adelso Santamaria MD        0.9 % sodium chloride infusion   IntraVENous PRN Adelso Santamaria MD        ondansetron [facility]) injection 4 mg  4 mg IntraVENous Q6H PRN Glenn Heard DO        sodium chloride flush 0.9 % injection 5-40 mL  5-40 mL IntraVENous 2 times per day Akbar Tolliver PA-C   10 mL at 12/22/22 3986    sodium chloride flush 0.9 % injection 5-40 mL  5-40 mL IntraVENous PRN Akbar Tolliver PA-C        0.9 % sodium chloride infusion   IntraVENous PRN Akbar Tolliver PA-C        sodium chloride flush 0.9 % injection 5-40 mL  5-40 mL IntraVENous 2 times per day Lisa Banegas MD   10 mL at 12/22/22 0820    sodium chloride flush 0.9 % injection 5-40 mL  5-40 mL IntraVENous PRN Lisa Banegas MD        0.9 % sodium chloride infusion   IntraVENous PRN Lisa Banegas MD        ondansetron (ZOFRAN-ODT) disintegrating tablet 4 mg  4 mg Oral Q8H PRN Cory Soni Esmer Hannon MD        Or    ondansetron [facility]) injection 4 mg  4 mg IntraVENous Q6H PRN Promise Anthony MD   4 mg at 12/20/22 0854    polyethylene glycol (GLYCOLAX) packet 17 g  17 g Oral Daily PRN Promise Anthony MD        acetaminophen (TYLENOL) tablet 650 mg  650 mg Oral Q6H PRN Promise Anthony MD        Or    acetaminophen (TYLENOL) suppository 650 mg  650 mg Rectal Q6H PRN Promise Anthony MD        metoprolol succinate (TOPROL XL) extended release tablet 25 mg  25 mg Oral Daily Evelyn Dixon MD   25 mg at 12/22/22 0818       PHYSICAL EXAM:    /60   Pulse 80   Temp 97.9 °F (36.6 °C) (Oral)   Resp 18   Ht 4' 11\" (1.499 m)   Wt 167 lb 5.3 oz (75.9 kg)   SpO2 100%   BMI 33.80 kg/m²    General Appearance:      Skin:  normal  CVS - Normal sounds, No murmurs , No carotid Bruits  RS -CTA  Abdomen Soft, BS present  Review of Systems   Unable to perform ROS: Other    Review of system done through the nurse and has no reports of anything except some shortness of breath    Mental Status Exam: Patient remains oriented according to the nurse            Level of Alertness:   awake            Orientation:   person, place,  Funduscopic Exam:     Cranial Nerves            Cranial nerve III           Pupils:  equal, round, reactive to light      Cranial nerves III, IV, VI           Extraocular Movements: intact        Motor: Moves all her extremities to commands according to the nurse  Drift:  absent  Motor exam is symmetrical 5 out of 5 all extremities bilaterally  Tone:  normal  Abnormal Movements:  absent            Sensory: Deferred                 Vibration                         Touch            Proprioception                 Coordination: Deferred                  Gait:                       Casual:            Reflexes: Deferred             Deep Tendon Reflexes:             Reflexes are 2 +             Plantar response:                Right:  downgoing               Left:  downgoing    Vascular: Cardiac Exam:  normal         XR PELVIS (1-2 VIEWS)    Result Date: 12/18/2022  EXAMINATION: ONE XRAY VIEW OF THE PELVIS 12/18/2022 10:04 am COMPARISON: None. HISTORY: ORDERING SYSTEM PROVIDED HISTORY: fall TECHNOLOGIST PROVIDED HISTORY: Reason for exam:->fall What reading provider will be dictating this exam?->CRC FINDINGS: No acute fracture or malalignment. Mild degenerative changes of the hips. Degenerative changes of the visualized spine. Surgical clips project over the left pelvis. The bladder is opacified with excreted IV contrast.     No acute fracture or malalignment. CT HEAD WO CONTRAST    Result Date: 12/18/2022  EXAMINATION: CT OF THE HEAD WITHOUT CONTRAST  12/18/2022 8:30 am TECHNIQUE: CT of the head was performed without the administration of intravenous contrast. Automated exposure control, iterative reconstruction, and/or weight based adjustment of the mA/kV was utilized to reduce the radiation dose to as low as reasonably achievable. COMPARISON: None. HISTORY: ORDERING SYSTEM PROVIDED HISTORY: syncope, fall TECHNOLOGIST PROVIDED HISTORY: Reason for exam:->syncope, fall Has a \"code stroke\" or \"stroke alert\" been called? ->No Decision Support Exception - unselect if not a suspected or confirmed emergency medical condition->Emergency Medical Condition ([place]) What reading provider will be dictating this exam?->CRC FINDINGS: BRAIN/VENTRICLES: There is no acute intracranial hemorrhage, mass effect or midline shift. No abnormal extra-axial fluid collection. The gray-white differentiation is maintained without evidence of an acute infarct. There is no evidence of hydrocephalus. The ventricles, cisterns and sulci are prominent consistent with atrophy. There is decreased attenuation within the periventricular white matter consistent with periventricular leukomalacia. ORBITS: The visualized portion of the orbits demonstrate no acute abnormality.  SINUSES: The visualized paranasal sinuses and mastoid air cells demonstrate no acute abnormality. There is mild mucosal thickening seen within the right ethmoid air cells. SOFT TISSUES/SKULL:  No acute abnormality of the visualized skull or soft tissues. 1. There is no acute intracranial abnormality. Specifically, there is no intracranial hemorrhage. 2. Atrophy and periventricular leukomalacia,     CTA CHEST W WO CONTRAST PE Eval    Result Date: 12/18/2022  EXAMINATION: CTA OF THE CHEST WITH AND WITHOUT CONTRAST 12/18/2022 8:30 am TECHNIQUE: CTA of the chest was performed before and after the administration of intravenous contrast.  Multiplanar reformatted images are provided for review. MIP images are provided for review. Automated exposure control, iterative reconstruction, and/or weight based adjustment of the mA/kV was utilized to reduce the radiation dose to as low as reasonably achievable. COMPARISON: None. HISTORY: ORDERING SYSTEM PROVIDED HISTORY: PE, syncope, hx of cancer, rule out PE; cough evaluate for infiltrate TECHNOLOGIST PROVIDED HISTORY: Reason for exam:->PE Reason for exam:->syncope, hx of cancer, rule out PE; cough evaluate for infiltrate Decision Support Exception - unselect if not a suspected or confirmed emergency medical condition->Emergency Medical Condition ([place]) What reading provider will be dictating this exam?->CRC FINDINGS: Pulmonary Arteries: Pulmonary arteries are adequately opacified for evaluation. There is abnormal filling defect seen within the main pulmonary arteries bilaterally with slight saddle embolism identified. There is opacification identified within all the segmental and subsegmental branches most pronounced within the right middle and lower lobe pulmonary arteries. Emboli as well seen within the lingular branch. There is evidence of right heart strain within RV LV ratio of 1.6. There is prominence identified of the main pulmonary artery. Mediastinum: No evidence of mediastinal lymphadenopathy.   The heart and pericardium demonstrate no acute abnormality. There is no acute abnormality of the thoracic aorta. Lungs/pleura: Mild ground-glass opacity identified in the right lower lobe suggesting possibly an area of infarction given the extensive thrombus within the pulmonary arteries on the right. Atelectatic change cannot be excluded. There is minimal atelectatic changes seen within the lingula. No definite pleural effusion or pneumothorax. Upper Abdomen: Limited images of the upper abdomen are unremarkable. Soft Tissues/Bones: Abnormal increased density identified within the subcutaneous tissues surrounding the luke catheter on the left anterior chest wall to suggest extravasation of intravenous contrast.     Extensive pulmonary emboli with saddle embolism identified. There is evidence of right heart strain within RV LV ratio of 1.6. Mild ground-glass seen at the right lung base in which early infarction cannot be excluded. There is increased density seen surrounding the left luke catheter in the subcutaneous tissues to suggest extravasation of intravenous contrast. Findings were discussed with Dr. Russ Garcia at time of interpretation of the examination. CT CERVICAL SPINE WO CONTRAST    Result Date: 12/18/2022  EXAMINATION: CT OF THE CERVICAL SPINE WITHOUT CONTRAST 12/18/2022 8:30 am TECHNIQUE: CT of the cervical spine was performed without the administration of intravenous contrast. Multiplanar reformatted images are provided for review. Automated exposure control, iterative reconstruction, and/or weight based adjustment of the mA/kV was utilized to reduce the radiation dose to as low as reasonably achievable. COMPARISON: None.  HISTORY: ORDERING SYSTEM PROVIDED HISTORY: syncope, fall TECHNOLOGIST PROVIDED HISTORY: Reason for exam:->syncope, fall Decision Support Exception - unselect if not a suspected or confirmed emergency medical condition->Emergency Medical Condition ([place]) What reading provider will be dictating this exam?->CRC FINDINGS: The ring of C1 is intact as is the dense. There is no compression fracture of the cervical spine. No jumped or perched facet is noted. Multilevel degenerative disc and degenerative joint disease is noted. There are posterior degenerative endplate spurs seen at the C3-4 and C6-7 levels. There is mild central canal stenosis at the C6-7 level and mild bilateral neural foraminal narrowing. The prevertebral soft tissues are unremarkable. The airway is widely patent. Images through the lung apices are negative for a pneumothorax. 1. There is no acute compression fracture or subluxation of the cervical spine. 2. Multilevel degenerative disc and degenerative joint disease. .     CT THORACIC SPINE WO CONTRAST    Result Date: 12/18/2022  EXAMINATION: CT OF THE THORACIC SPINE WITHOUT CONTRAST  12/18/2022 8:30 am: TECHNIQUE: CT of the thoracic spine was performed without the administration of intravenous contrast. Multiplanar reformatted images are provided for review. Automated exposure control, iterative reconstruction, and/or weight based adjustment of the mA/kV was utilized to reduce the radiation dose to as low as reasonably achievable. COMPARISON: None. HISTORY: ORDERING SYSTEM PROVIDED HISTORY: syncope, fall TECHNOLOGIST PROVIDED HISTORY: Reason for exam:->syncope, fall What reading provider will be dictating this exam?->CRC FINDINGS: BONES/ALIGNMENT: There is no acute compression fracture of the thoracic spine. There is dextroscoliosis of the thoracic spine. Mild multilevel degenerative disc and degenerative joint disease is noted. There are large anterior and lateral degenerative endplate spurs seen throughout the course of the thoracic spine. .  There is no osseous lesion. SOFT TISSUES: No paraspinal mass is seen. 1. There is no acute compression fracture of the thoracic spine. 2. Dextroscoliosis 3. Multilevel degenerative changes.      CT LUMBAR SPINE WO CONTRAST    Result Date: 12/18/2022  EXAMINATION: CT OF THE LUMBAR SPINE WITHOUT CONTRAST  12/18/2022 TECHNIQUE: CT of the lumbar spine was performed without the administration of intravenous contrast. Multiplanar reformatted images are provided for review. Adjustment of mA and/or kV according to patient size was utilized. Automated exposure control, iterative reconstruction, and/or weight based adjustment of the mA/kV was utilized to reduce the radiation dose to as low as reasonably achievable. COMPARISON: None. HISTORY: ORDERING SYSTEM PROVIDED HISTORY: syncope, fall TECHNOLOGIST PROVIDED HISTORY: Reason for exam:->syncope, fall Decision Support Exception - unselect if not a suspected or confirmed emergency medical condition->Emergency Medical Condition ([place]) What reading provider will be dictating this exam?->CRC FINDINGS: Vertebral alignment is normal.  Mild disc space narrowing and discovertebral degenerative changes are identified. Facet arthritis is also seen. No acute vertebral fracture is visualized. No spondylolysis is noted on the right or left. The sacroiliac joints are intact. The paraspinal soft tissues appear unremarkable. The urinary bladder is dilated. There are mild right hydronephrosis and hydroureter. There is colonic diverticulosis. There are postsurgical changes at the colorectal junction. Disc space narrowing and degenerative changes. No acute vertebral fracture or subluxation. XR CHEST PORTABLE    Result Date: 12/18/2022  EXAMINATION: ONE XRAY VIEW OF THE CHEST 12/18/2022 9:59 am COMPARISON: The previous study performed 11/03/2022. HISTORY: ORDERING SYSTEM PROVIDED HISTORY: fall, syncope TECHNOLOGIST PROVIDED HISTORY: Reason for exam:->fall, syncope What reading provider will be dictating this exam?->CRC FINDINGS: There is a large, ill-defined density overlying the left shoulder and mid to upper lateral left hemithorax. This may be external to the patient.   It limits full evaluation of the surrounding soft tissue and osseous structures. There is no evidence of acute consolidation or infiltrate. No active pleural disease is seen. The cardiac silhouette and mediastinal structures are unremarkable. The tracheobronchial tree is midline and patent. A left-sided MediPort catheter is again noted, with the tip in the proximal SVC. There is again noted to be a thoracic scoliosis, with convexity to the right. Osteo-degenerative changes are again noted involving the thoracic spine. Large, ill-defined density overlying the left shoulder and mid to upper lateral left hemithorax, which limits full evaluation of the immediate surrounding soft tissue and osseous structures. No acute consolidation or infiltrate. Repeat chest radiograph can be performed further evaluation. US DUP LOWER EXTREMITIES BILATERAL VENOUS    Result Date: 12/19/2022  EXAMINATION:  BILATERAL LOWER EXTREMITY VENOUS DUPLEX CLINICAL HISTORY:  SAGITTAL PULMONARY EMBOLISM COMPARISONS:  NONE AVAILABLE TECHNIQUE:  B-mode, color flow and spectral Doppler FINDINGS:  Bilateral lower extremity venous systems were interrogated extending from the common femoral vein to its adjacent segments of the proximal popliteal vein. On the right side the common femoral vein, femoral vein as well as the popliteal vein and into the peroneal vein there is an adequate compression augmentation as well as phasic flow consistent with the thrombus. Left lower extremity there is abnormal compression augmentation as well as phasic flow posterior tibial and into the peroneal veins. RIGHT LOWER EXTREMITY EXTENSIVE THE THIGH DEEP VEIN THROMBOSIS EXTENDING INTO THE CALF. LEFT LOWER EXTREMITY EDEMA DISTAL DEEP VEIN THROMBOSIS INVOLVING THE CALF REGION.        Recent Labs     12/20/22  0449 12/21/22  0145 12/21/22  0600 12/22/22  0608   WBC 12.5*  --  8.0  --    HGB 10.9* 10.4* 10.6* 9.6*   *  --  117*  --        Recent Labs     12/20/22  0448 12/21/22  0600 12/22/22  0600   * 133* 132*   K 3.7 3.9 3.7   CL 97 97 95   CO2 24 25 24   BUN 18 20 19   CREATININE 1.16* 0.81 0.76   GLUCOSE 86 92 94       No results for input(s): BILITOT, ALKPHOS, AST, ALT in the last 72 hours. Lab Results   Component Value Date/Time    PROTIME 15.4 12/19/2022 07:00 AM    INR 1.2 12/19/2022 07:00 AM     No results found for: LITHIUM, DILFRTOT, VALPROATE    ASSESSMENT AND PLAN  Questionable seizure though I truly feel that the patient reported that she was scared and that she was shaking in the CT room. EEG is entirely normal and therefore we have discontinued her anticonvulsants and does not require any intervention unless something new at develops. Patient is remained nonfocal and she is COVID-positive and in isolation and therefore evaluations done through the nurse. Patient may be transferred to regular medical floor    12/22/22  Questionable seizure however feel that this was related to some anxiety with having c scan.    EEG is normal; anticonvulsants discontinued   Remains nonfocal  Continues on COVID isolation   Patient may discharge from neurology standpoint   Will follow from a distance

## 2022-12-22 NOTE — PROGRESS NOTES
Physical Therapy Missed Treatment   Facility/Department: [facility] MED SURG [id number]    NAME: Concepcion Harrison    : 1950 (81 y.o.)  MRN: [id number]    Account: [de-identified]  Gender: female      Attempted to see pt for PT eval. Pt recently experienced syncopal episode. Pts nurse requests delay in eval for am.       Will follow and attempt PT evaluation again at earliest availability.        Janelle Coe, PT, 22 at 11:11 AM

## 2022-12-22 NOTE — PROGRESS NOTES
Progress Note  Patient: Arabella Lou  Unit/Bed: K266/J367-73  YOB: 1950  MRN: [id number]  Acct: [de-identified]   Admitting Diagnosis: Syncope and collapse [R55]  Hypokalemia [E87.6]  Thrombocytopenia (HCC) [D69.6]  Elevated troponin [R77.8]  Bilateral pulmonary embolism (HCC) [I26.99]  Seizure-like activity (HCC) [R56.9]  History of rectal cancer [Z85.048]  Acute saddle pulmonary embolism with acute cor pulmonale (HCC) [I26.02]  Neutropenia, unspecified type ([address] [D70.9]  GMGPA-89 [U07.1]  Admit Date:  12/18/2022  Hospital Day: 4    Chief Complaint: PE DVT    Histories:  Past Medical History:   Diagnosis Date    Arthritis     Cancer ([facility] [address]     SKIN    Hyperlipidemia      Past Surgical History:   Procedure Laterality Date    CATARACT REMOVAL      CHOLECYSTECTOMY      COLECTOMY N/A 10/3/2022    LOW ANTERIOR RESECTION WITH LOOP ILEOSTOMY performed by Andreina Wise MD at [address] [place] [address] N/A 9/16/2022    COLORECTAL CANCER SCREENING, NOT HIGH RISK performed by David Be MD at [facility]    COLONOSCOPY N/A 9/26/2022    FLEXIBLE SIGMOIDOSCOPY WITH SNARE POLYPECTOMY performed by Andreina Wise MD at [place] [address] N/A 11/3/2022    INFUSAPORT performed by Andreina Wise MD at [address] [place] [address] LOWER LEG    TUBAL LIGATION       Family History   Problem Relation Age of Onset    Diabetes Mother     Breast Cancer Maternal Aunt     Colon Cancer Neg Hx      Social History     Socioeconomic History    Marital status:      Spouse name: None    Number of children: None    Years of education: None    Highest education level: None   Tobacco Use    Smoking status: Never    Smokeless tobacco: Never   Vaping Use    Vaping Use: Never used   Substance and Sexual Activity    Alcohol use: No    Drug use: No     Social Determinants of Health     Financial Resource Strain: Low Risk     Difficulty of Paying Living Expenses: Not hard at all   Food Insecurity: No Food Insecurity    Worried About Running Out of Food in the Last Year: Never true    Ran Out of Food in the Last Year: Never true   Transportation Needs: No Transportation Needs    Lack of Transportation (Medical): No    Lack of Transportation (Non-Medical): No       Subjective/HPI  stable overnight. On 2l  O2 no fever. No CP not SOB at rest. No acute events. Feels well. Has not walked. EKG: SR 80-90 Tele        Review of Systems:   Review of Systems   Constitutional: Negative. Negative for diaphoresis and fatigue. HENT: Negative. Eyes: Negative. Respiratory: Negative. Negative for cough, chest tightness, shortness of breath, wheezing and stridor. Cardiovascular: Negative. Negative for chest pain, palpitations and leg swelling. Gastrointestinal: Negative. Negative for blood in stool and nausea. Genitourinary: Negative. Musculoskeletal: Negative. Skin: Negative. Neurological: Negative. Negative for dizziness, syncope, weakness and light-headedness. Hematological: Negative. Psychiatric/Behavioral: Negative. Physical Examination:    /60   Pulse 80   Temp 97.7 °F (36.5 °C) (Oral)   Resp 18   Ht 4' 11\" (1.499 m)   Wt 167 lb 5.3 oz (75.9 kg)   SpO2 98%   BMI 33.80 kg/m²    Physical Exam   Constitutional: She appears healthy. No distress. HENT:   Normal cephalic and Atraumatic   Eyes: Pupils are equal, round, and reactive to light. Neck: Thyroid normal. No JVD present. No neck adenopathy. No thyromegaly present. Cardiovascular: Normal rate, regular rhythm, intact distal pulses and normal pulses. Murmur heard. Pulmonary/Chest: Effort normal and breath sounds normal. She has no wheezes. She has no rales. She exhibits no tenderness. Abdominal: Soft. Bowel sounds are normal. There is no abdominal tenderness. Musculoskeletal:         General: No tenderness or edema. Normal range of motion. Cervical back: Normal range of motion and neck supple. Neurological: She is alert and oriented to person, place, and time. Skin: Skin is warm. No cyanosis. Nails show no clubbing.      LABS:  CBC:   Lab Results   Component Value Date/Time    WBC 8.0 12/21/2022 06:00 AM    RBC 3.60 12/21/2022 06:00 AM    HGB 9.6 12/22/2022 06:08 AM    HCT 28.5 12/22/2022 06:08 AM    MCV 86.8 12/21/2022 06:00 AM    MCH 29.4 12/21/2022 06:00 AM    MCHC 33.9 12/21/2022 06:00 AM    RDW 15.6 12/21/2022 06:00 AM     12/21/2022 06:00 AM    MPV 9.0 10/28/2014 05:58 AM     CBC with Differential:    Lab Results   Component Value Date/Time    WBC 8.0 12/21/2022 06:00 AM    RBC 3.60 12/21/2022 06:00 AM    HGB 9.6 12/22/2022 06:08 AM    HCT 28.5 12/22/2022 06:08 AM     12/21/2022 06:00 AM    MCV 86.8 12/21/2022 06:00 AM    MCH 29.4 12/21/2022 06:00 AM    MCHC 33.9 12/21/2022 06:00 AM    RDW 15.6 12/21/2022 06:00 AM    BANDSPCT 6 12/18/2022 06:45 AM    LYMPHOPCT 26.3 12/21/2022 06:00 AM    MONOPCT 6.9 12/21/2022 06:00 AM    BASOPCT 0.5 12/21/2022 06:00 AM    MONOSABS 0.5 12/21/2022 06:00 AM    LYMPHSABS 2.1 12/21/2022 06:00 AM    EOSABS 0.1 12/21/2022 06:00 AM    BASOSABS 0.0 12/21/2022 06:00 AM     CMP:    Lab Results   Component Value Date/Time     12/22/2022 06:00 AM    K 3.7 12/22/2022 06:00 AM    K 3.3 12/18/2022 08:30 PM    CL 95 12/22/2022 06:00 AM    CO2 24 12/22/2022 06:00 AM    BUN 19 12/22/2022 06:00 AM    CREATININE 0.76 12/22/2022 06:00 AM    GFRAA >60.0 10/17/2022 06:35 AM    LABGLOM >60.0 12/22/2022 06:00 AM    GLUCOSE 94 12/22/2022 06:00 AM    PROT 5.8 12/19/2022 07:00 AM    LABALBU 3.5 12/22/2022 06:00 AM    CALCIUM 8.6 12/22/2022 06:00 AM    BILITOT 0.4 12/19/2022 07:00 AM    ALKPHOS 111 12/19/2022 07:00 AM    AST 23 12/19/2022 07:00 AM    ALT 12 12/19/2022 07:00 AM     BMP:    Lab Results   Component Value Date/Time     12/22/2022 06:00 AM    K 3.7 12/22/2022 06:00 AM    K 3.3 12/18/2022 08:30 PM    CL 95 12/22/2022 06:00 AM    CO2 24 12/22/2022 06:00 AM BUN 19 12/22/2022 06:00 AM    LABALBU 3.5 12/22/2022 06:00 AM    CREATININE 0.76 12/22/2022 06:00 AM    CALCIUM 8.6 12/22/2022 06:00 AM    GFRAA >60.0 10/17/2022 06:35 AM    LABGLOM >60.0 12/22/2022 06:00 AM    GLUCOSE 94 12/22/2022 06:00 AM     Magnesium:    Lab Results   Component Value Date/Time    MG 2.2 12/18/2022 08:30 PM     Troponin:    Lab Results   Component Value Date/Time    TROPONINI 0.096 12/18/2022 06:45 AM        Active Hospital Problems    Diagnosis Date Noted    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 no respiratory distress  Saddle PE- on Lovenox. - probably provoked from [place] but also w underlying clotting disorder from cancer. Will benefit from long term anticoagulation. B/L DVT  Right side extensive Thrombus -s/p RLE DVT Thrombectomy. Trop mildly elevated - will give low zuniga BB. Hold ASA due to mild Thrombocytopenia.    Hypokalemia- replace iv and PO.   CEcho LVEF 55%  Colon Cancer s/p surgery and Ostomy in place  [place] for dc - will change to DOAC       Electronically signed by Aníbal Dyer MD on 12/22/2022 at 9:01 AM

## 2022-12-22 NOTE — PROGRESS NOTES
Wound Ostomy Continence Nurse  Ostomy Referral Follow-up Progress Note      NAME:  [address] RECORD NUMBER:  [id number]  AGE: 81 y.o. GENDER:  female  :  1950  TODAY'S DATE:  2022    Subjective: Perry Morgan is a 81 y.o. female referred by:   [x] Physician  [] Nursing  [] Other:     ileostomy and Established    Objective    BP (!) 98/59   Pulse 92   Temp 97.6 °F (36.4 °C) (Oral)   Resp 16   Ht 4' 11\" (1.499 m)   Wt 167 lb 5.3 oz (75.9 kg)   SpO2 98%   BMI 33.80 kg/m²     Kenneth Risk Score Kenneth Scale Score: 18    Assessment   Surgeon - Dr. Mckeon Yosvany       Ileostomy/Jejunostomy RLQ Loop ileostomy (Active)   Stomal Appliance 1 piece;Clean, dry & intact 22   Flange Size (inches) 1.25 Inches 22   Stoma  Assessment Red;Moist;Protrudes 22   Peristomal Assessment Clean, dry & intact 22   Treatment Bag change;Stoma powder;Liquid skin barrier 22 08   Stool Appearance Watery 22   Stool Color Green;Brown 22   Stool Amount Small 22   Output (mL) 100 ml 22 09   Number of days: 80     No change in stoma appearance and ileostomy continues to function well. Patient's appliance leaked last night, but states this was caused from the position she had to lay in during her procedure yesterday afternoon. Current appliance is clean and intact. This [address] [address] [place] [address] Nurse will plan to change the appliance either later today prior to discharge or tomorrow before the holiday weekend. There is extra supplies at the bedside.  Patient denies any needs at this time      Intake/Output Summary (Last 24 hours) at 2022 1347  Last data filed at 2022 1052  Gross per 24 hour   Intake 960 ml   Output 200 ml   Net 760 ml       Plan:   Plan for Ostomy Care: See above    Ostomy Plan of Care  [] Supplies/Instructions left in room  [] Patient using home supplies  [] Brand/supplies at bedside - [employer]    Current Diet: ADULT DIET;  Regular; Low Fat/Low Chol/High Fiber/PADMA  Dietician consult:  N/A    Discharge Plan:  Placement for patient upon discharge: TBD   Outpatient visit plan N/A  Supplies given Yes   Samples requested N/A    Referrals:  []   [] [facility]  [] Supplies  [] Other    Patient/Caregiver Teaching:  Written Instructions given to patient/family- Patient  Teaching provided:  [] Reviewed GI and A&P        [] Supplies  [] Pouch emptying      [] Manipulate closure  [x] Routine Care         [] Comment  [] Pouch maintenance           Level of patient/caregiver understanding able to:  [] Indicates understanding       [] Needs reinforcement  [] Unsuccessful      [] Verbal Understanding  [] Demonstrated understanding       [] No evidence of learning  [] Refused teaching         [x] N/A    Electronically signed by GÓMEZ AlanN, RN, Marichuy Lozada on 12/22/2022 at 1:51 PM

## 2022-12-22 NOTE — CARE COORDINATION
CALL TO [facility] AND SPOKE WITH NANCY. REFERRAL MADE. THEY WILL CALL TO LET US KNOW IF THEY CAN ACCEPT REFERRAL.      KAYLINIS CARD TO CHART

## 2022-12-22 NOTE — PROGRESS NOTES
Pulmonary Progress Note    2022 10:21 AM    Subjective:   Admit Date: 2022  PCP: Beti Nance DO    Chief Complaint   Patient presents with    Fall     Pt fell in the bathroom, + LOC, + Head Injury, - Thinners. Pt does not remember the event. Interval History: She has been doing well. She is currently on room air. She denies any shortness of breath or cough. She is currently on Xarelto. 14 points review of systems has been obtained and negative except to was mentioned in HPI.      Medications:   Scheduled Meds:   apixaban  10 mg Oral BID    Followed by    Dorys March ON 2022] apixaban  5 mg Oral BID    sodium chloride flush  5-40 mL IntraVENous 2 times per day    sodium chloride flush  5-40 mL IntraVENous 2 times per day    sodium chloride flush  5-40 mL IntraVENous 2 times per day    sodium chloride flush  5-40 mL IntraVENous 2 times per day    metoprolol succinate  25 mg Oral Daily     Continuous Infusions:   sodium chloride      sodium chloride      sodium chloride      sodium chloride           Objective:   Vitals:   Temp (24hrs), Av.9 °F (36.6 °C), Min:97.7 °F (36.5 °C), Max:98.4 °F (36.9 °C)    /60   Pulse 80   Temp 97.9 °F (36.6 °C) (Oral)   Resp 18   Ht 4' 11\" (1.499 m)   Wt 167 lb 5.3 oz (75.9 kg)   SpO2 100%   BMI 33.80 kg/m²   I/O:24HR INTAKE/OUTPUT:    Intake/Output Summary (Last 24 hours) at 2022 1021  Last data filed at 2022 0813  Gross per 24 hour   Intake 720 ml   Output 200 ml   Net 520 ml        0701 -  0700  In: 720 [P.O.:720]  Out: 500 [Urine:50]  CVP:           Physical Exam:  General appearance - alert, ill appearing, and in no distress  Mental status - alert, oriented to person, place, and time  Eyes - pupils equal and reactive, extraocular eye movements intact  Nose - normal and patent, no erythema, discharge or polyps  Neck - supple, no significant adenopathy  Chest -diminished bilaterally  to auscultation, no wheezes, rales or rhonchi, symmetric air entry  Heart - normal rate, regular rhythm, normal S1, S2, no murmurs, rubs, clicks or gallops  Abdomen - soft, nontender, nondistended, no masses or organomegaly  Rectal - deferred, not clinically indicated  Neurological - alert, oriented, normal speech, no focal findings or movement disorder noted, motor and sensory grossly normal bilaterally  Musculoskeletal - no joint tenderness, deformity or swelling  Extremities - peripheral pulses normal, no pedal edema, no clubbing or cyanosis  Skin - normal coloration and turgor, no rashes, no suspicious skin lesions noted              BMP:    Recent Labs     12/20/22  0448 12/21/22  0600 12/22/22  0600   * 133* 132*   K 3.7 3.9 3.7   CL 97 97 95   CO2 24 25 24   BUN 18 20 19   CREATININE 1.16* 0.81 0.76   GLUCOSE 86 92 94      . MG:3,PHOS:3)@  Ionized Calcium: No results found for: IONCA  CBC:   Recent Labs     12/20/22 0449 12/21/22  0145 12/21/22  0600 12/22/22  0608   WBC 12.5*  --  8.0  --    HGB 10.9*   < > 10.6* 9.6*   *  --  117*  --     < > = values in this interval not displayed. ABG: No results for input(s): PH, PCO2, PO2 in the last 72 hours. Assessment and Plan:       Impression:    - Acute provoked submassive pulmonary emboli status post thrombectomy. -RV strain due to pulmonary emboli. -COVID-19 pneumonia.  -Acute kidney injury. Recommendations:    -Continue anticoagulation. He was switched to [address]. Duration should be at least 6 months.  -Echocardiogram did not show evidence of pulm hypertension.  -Maintain on the dry side. Kidney function has improved. -Can be discharged from pulm standpoint.            Electronically signed by Juan Carlos Teague MD on 12/22/2022 at 10:21 AM

## 2022-12-23 PROBLEM — U07.1 COVID-19: Status: ACTIVE | Noted: 2022-12-23

## 2022-12-23 PROCEDURE — 99212 OFFICE O/P EST SF 10 MIN: CPT

## 2022-12-23 PROCEDURE — 99232 SBSQ HOSP IP/OBS MODERATE 35: CPT | Performed by: INTERNAL MEDICINE

## 2022-12-23 PROCEDURE — 2060000000 HC ICU INTERMEDIATE R&B

## 2022-12-23 PROCEDURE — 6370000000 HC RX 637 (ALT 250 FOR IP): Performed by: INTERNAL MEDICINE

## 2022-12-23 PROCEDURE — 97110 THERAPEUTIC EXERCISES: CPT

## 2022-12-23 PROCEDURE — 99233 SBSQ HOSP IP/OBS HIGH 50: CPT | Performed by: INTERNAL MEDICINE

## 2022-12-23 PROCEDURE — 6360000002 HC RX W HCPCS: Performed by: INTERNAL MEDICINE

## 2022-12-23 PROCEDURE — 2580000003 HC RX 258: Performed by: INTERNAL MEDICINE

## 2022-12-23 RX ORDER — METOPROLOL SUCCINATE 25 MG/1
25 TABLET, EXTENDED RELEASE ORAL NIGHTLY
Status: DISCONTINUED | OUTPATIENT
Start: 2022-12-23 | End: 2022-12-26 | Stop reason: HOSPADM

## 2022-12-23 RX ADMIN — METOPROLOL SUCCINATE 25 MG: 25 TABLET, FILM COATED, EXTENDED RELEASE ORAL at 22:00

## 2022-12-23 RX ADMIN — APIXABAN 10 MG: 5 TABLET, FILM COATED ORAL at 08:47

## 2022-12-23 RX ADMIN — APIXABAN 10 MG: 5 TABLET, FILM COATED ORAL at 21:46

## 2022-12-23 RX ADMIN — Medication 10 ML: at 21:46

## 2022-12-23 RX ADMIN — TRIMETHOBENZAMIDE HYDROCHLORIDE 200 MG: 100 INJECTION INTRAMUSCULAR at 03:53

## 2022-12-23 ASSESSMENT — ENCOUNTER SYMPTOMS
NAUSEA: 0
RESPIRATORY NEGATIVE: 1
COUGH: 0
BLOOD IN STOOL: 0
GASTROINTESTINAL NEGATIVE: 1
EYES NEGATIVE: 1
STRIDOR: 0
WHEEZING: 0
SHORTNESS OF BREATH: 0
CHEST TIGHTNESS: 0

## 2022-12-23 NOTE — PROGRESS NOTES
Progress Note  Patient: Susan Rangel  Unit/Bed: H132/X110-12  YOB: 1950  MRN: [id number]  Acct: [de-identified]   Admitting Diagnosis: Syncope and collapse [R55]  Hypokalemia [E87.6]  Thrombocytopenia (HCC) [D69.6]  Elevated troponin [R77.8]  Bilateral pulmonary embolism (HCC) [I26.99]  Seizure-like activity (HCC) [R56.9]  History of rectal cancer [Z85.048]  Acute saddle pulmonary embolism with acute cor pulmonale (HCC) [I26.02]  Neutropenia, unspecified type ([address] [D70.9]  COVID-19 [U07.1]  Admit Date:  12/18/2022  Hospital Day: 5    Chief Complaint: PE DVT    Histories:  Past Medical History:   Diagnosis Date    Arthritis     Cancer ([facility] [address]     SKIN    Hyperlipidemia      Past Surgical History:   Procedure Laterality Date    CATARACT REMOVAL      CHOLECYSTECTOMY      COLECTOMY N/A 10/3/2022    LOW ANTERIOR RESECTION WITH LOOP ILEOSTOMY performed by Isabel Campos MD at [address] [place] [address] N/A 9/16/2022    COLORECTAL CANCER SCREENING, NOT HIGH RISK performed by Carol Reed MD at [facility]    COLONOSCOPY N/A 9/26/2022    FLEXIBLE SIGMOIDOSCOPY WITH SNARE POLYPECTOMY performed by Isabel Campos MD at [address] [place] [address] N/A 11/3/2022    INFUSAPORT performed by Isabel Campos MD at [address] [place] [address] LOWER LEG    TUBAL LIGATION       Family History   Problem Relation Age of Onset    Diabetes Mother     Breast Cancer Maternal Aunt     Colon Cancer Neg Hx      Social History     Socioeconomic History    Marital status:      Spouse name: None    Number of children: None    Years of education: None    Highest education level: None   Tobacco Use    Smoking status: Never    Smokeless tobacco: Never   Vaping Use    Vaping Use: Never used   Substance and Sexual Activity    Alcohol use: No    Drug use: No     Social Determinants of Health     Financial Resource Strain: Low Risk     Difficulty of Paying Living Expenses: Not hard at all   Food Insecurity: No Food Insecurity    Worried About Running Out of Food in the Last Year: Never true    Ran Out of Food in the Last Year: Never true   Transportation Needs: No Transportation Needs    Lack of Transportation (Medical): No    Lack of Transportation (Non-Medical): No       Subjective/HPI  stable overnight. On No O2 no fever. No CP not SOB at rest. No acute events. Feels well. Had near syncope while standing with PT yesterday. CUS checked and negative. No arrhythmia overnight. EKG: SR  Tele        Review of Systems:   Review of Systems   Constitutional: Negative. Negative for diaphoresis and fatigue. HENT: Negative. Eyes: Negative. Respiratory: Negative. Negative for cough, chest tightness, shortness of breath, wheezing and stridor. Cardiovascular: Negative. Negative for chest pain, palpitations and leg swelling. Gastrointestinal: Negative. Negative for blood in stool and nausea. Genitourinary: Negative. Musculoskeletal: Negative. Skin: Negative. Neurological:  Positive for weakness. Negative for dizziness, syncope and light-headedness. Hematological: Negative. Psychiatric/Behavioral: Negative. Physical Examination:    /72   Pulse (!) 112   Temp 98.1 °F (36.7 °C) (Oral)   Resp 18   Ht 4' 11\" (1.499 m)   Wt 167 lb 5.3 oz (75.9 kg)   SpO2 96%   BMI 33.80 kg/m²    Physical Exam   Constitutional: She appears healthy. No distress. HENT:   Normal cephalic and Atraumatic   Eyes: Pupils are equal, round, and reactive to light. Neck: Thyroid normal. No JVD present. No neck adenopathy. No thyromegaly present. Cardiovascular: Normal rate, regular rhythm, intact distal pulses and normal pulses. Murmur heard. Pulmonary/Chest: Effort normal and breath sounds normal. She has no wheezes. She has no rales. She exhibits no tenderness. Abdominal: Soft. Bowel sounds are normal. There is no abdominal tenderness.    Musculoskeletal:         General: No tenderness or edema. Normal range of motion. Cervical back: Normal range of motion and neck supple. Neurological: She is alert and oriented to person, place, and time. Skin: Skin is warm. No cyanosis. Nails show no clubbing.      LABS:  CBC:   Lab Results   Component Value Date/Time    WBC 8.0 12/21/2022 06:00 AM    RBC 3.60 12/21/2022 06:00 AM    HGB 9.4 12/22/2022 05:52 PM    HCT 29.0 12/22/2022 05:52 PM    MCV 86.8 12/21/2022 06:00 AM    MCH 29.4 12/21/2022 06:00 AM    MCHC 33.9 12/21/2022 06:00 AM    RDW 15.6 12/21/2022 06:00 AM     12/21/2022 06:00 AM    MPV 9.0 10/28/2014 05:58 AM     CBC with Differential:    Lab Results   Component Value Date/Time    WBC 8.0 12/21/2022 06:00 AM    RBC 3.60 12/21/2022 06:00 AM    HGB 9.4 12/22/2022 05:52 PM    HCT 29.0 12/22/2022 05:52 PM     12/21/2022 06:00 AM    MCV 86.8 12/21/2022 06:00 AM    MCH 29.4 12/21/2022 06:00 AM    MCHC 33.9 12/21/2022 06:00 AM    RDW 15.6 12/21/2022 06:00 AM    BANDSPCT 6 12/18/2022 06:45 AM    LYMPHOPCT 26.3 12/21/2022 06:00 AM    MONOPCT 6.9 12/21/2022 06:00 AM    BASOPCT 0.5 12/21/2022 06:00 AM    MONOSABS 0.5 12/21/2022 06:00 AM    LYMPHSABS 2.1 12/21/2022 06:00 AM    EOSABS 0.1 12/21/2022 06:00 AM    BASOSABS 0.0 12/21/2022 06:00 AM     CMP:    Lab Results   Component Value Date/Time     12/22/2022 06:00 AM    K 3.7 12/22/2022 06:00 AM    K 3.3 12/18/2022 08:30 PM    CL 95 12/22/2022 06:00 AM    CO2 24 12/22/2022 06:00 AM    BUN 19 12/22/2022 06:00 AM    CREATININE 0.76 12/22/2022 06:00 AM    GFRAA >60.0 10/17/2022 06:35 AM    LABGLOM >60.0 12/22/2022 06:00 AM    GLUCOSE 94 12/22/2022 06:00 AM    PROT 5.8 12/19/2022 07:00 AM    LABALBU 3.5 12/22/2022 06:00 AM    CALCIUM 8.6 12/22/2022 06:00 AM    BILITOT 0.4 12/19/2022 07:00 AM    ALKPHOS 111 12/19/2022 07:00 AM    AST 23 12/19/2022 07:00 AM    ALT 12 12/19/2022 07:00 AM     BMP:    Lab Results   Component Value Date/Time     12/22/2022 06:00 AM    K 3.7 12/22/2022 06:00 AM    K 3.3 12/18/2022 08:30 PM    CL 95 12/22/2022 06:00 AM    CO2 24 12/22/2022 06:00 AM    BUN 19 12/22/2022 06:00 AM    LABALBU 3.5 12/22/2022 06:00 AM    CREATININE 0.76 12/22/2022 06:00 AM    CALCIUM 8.6 12/22/2022 06:00 AM    GFRAA >60.0 10/17/2022 06:35 AM    LABGLOM >60.0 12/22/2022 06:00 AM    GLUCOSE 94 12/22/2022 06:00 AM     Magnesium:    Lab Results   Component Value Date/Time    MG 2.2 12/18/2022 08:30 PM     Troponin:    Lab Results   Component Value Date/Time    TROPONINI 0.096 12/18/2022 06:45 AM        Active Hospital Problems    Diagnosis Date Noted    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 no respiratory distress  Saddle PE- on Lovenox. - probably provoked from [place] but also w underlying clotting disorder from cancer. Will benefit from long term anticoagulation. B/L DVT  Right side extensive Thrombus -s/p RLE DVT Thrombectomy. Trop mildly elevated - will give low zuniga BB. Hold ASA due to mild Thrombocytopenia. Hypokalemia- replace iv and PO. Echo LVEF 55%  Near syncope yesterday - BP is on low side but she is on tachy side and with recent [place] ischemia she will benefit form low dose BB. We will change to QHS. Again ambulate pt and see how she does. If she does well with no events then can dc home on Eliquis and BB.          Electronically signed by Dangelo Simmons MD on 12/23/2022 at 7:35 AM

## 2022-12-23 NOTE — PROGRESS NOTES
Wound Ostomy Continence Nurse  Ostomy Referral Follow-up Progress Note      NAME:  [address] RECORD NUMBER:  [id number]  AGE: 81 y.o. GENDER:  female  :  1950  TODAY'S DATE:  2022    Subjective: Gilda Alegre is a 81 y.o. female referred by:   [] Physician  [x] Nursing  [] Other:     ileostomy and Established    Objective    /76   Pulse (!) 108   Temp 97.9 °F (36.6 °C)   Resp 18   Ht 4' 11\" (1.499 m)   Wt 167 lb 5.3 oz (75.9 kg)   SpO2 100%   BMI 33.80 kg/m²     Kenneth Risk Score Kenneth Scale Score: 18    Assessment   Surgeon - Dr. Jay Peak       Ileostomy/Jejunostomy RLQ Loop ileostomy (Active)   Stomal Appliance 1 piece;Clean, dry & intact 22 1259   Flange Size (inches) 1.25 Inches 22 1259   Stoma  Assessment Red;Moist 22 1259   Peristomal Assessment GERA 22 1259   Treatment Bag change;Stoma powder;Liquid skin barrier 22 0845   Stool Appearance Watery 22 1259   Stool Color Green 22 1259   Stool Amount Medium 22 1259   Output (mL) 250 ml 22 1259   Number of days: 80     No change in stoma appearence and the ileostomy continues to function well. Patient's current appliance is clean and intact, patient verbalizes it \"does not need changed yet\" - this [id number]  Nurse evaluated and seal looks good, no signs of effluent undermining the barrier. Bag emptied at this time, 250mL of watery green/brown effluent. Patient is anticipating discharge soon. Supplies provided for discharge at this time.  No other needs form this [id number] Nurse      Intake/Output Summary (Last 24 hours) at 2022 1301  Last data filed at 2022 1259  Gross per 24 hour   Intake 720 ml   Output 1010 ml   Net -290 ml       Plan:   Plan for Ostomy Care: See above        Ostomy Plan of Care  [] Supplies/Instructions left in room  [] Patient using home supplies  [x] Brand/supplies at bedside -[employer]    Current Diet: ADULT DIET;  Regular; Low Fat/Low Chol/High Fiber/PADMA  Dietician consult:  N/A    Discharge Plan:  Placement for patient upon discharge: TBD  Outpatient visit plan N/A  Supplies given Yes   Samples requested N/A    Referrals:  []   [x] [facility]  [] Supplies  [] Other      Patient/Caregiver Teaching:  Written Instructions given to patient/family: N/A  Teaching provided:  [] Reviewed GI and A&P        [] Supplies  [] Pouch emptying      [] Manipulate closure  [] Routine Care         [] Comment  [] Pouch maintenance           Level of patient/caregiver understanding able to:  [] Indicates understanding       [] Needs reinforcement  [] Unsuccessful      [] Verbal Understanding  [] Demonstrated understanding       [] No evidence of learning  [] Refused teaching         [x] N/A    Electronically signed by Seabron Bosworth, BSN, RN, Lizet Cardozo on 12/23/2022 at 1:05 PM

## 2022-12-23 NOTE — PROGRESS NOTES
Patient c/o nausea. Zofran was administered at 2130. Patient stated that medication was not effective. Nurse contacted hospitalist for another antiemetic.

## 2022-12-23 NOTE — CARE COORDINATION
SPOKE WITH PATIENT AND AGREEABLE TO SNF. [facility] OFFERED AND CHOSE [facility]. SHE STATES SHE HAS BEEN THERE BEFORE AND TRUSTS THEM. CALL TO SUPERVISOR PAULINE. SHE WILL LOOK AT REFERRAL AND CALL CM BACK.

## 2022-12-23 NOTE — CARE COORDINATION
SPOKE WITH PAULINE AT [facility] AND IS AWARE OF DC ORDER AND WILL REVIEW REFERRAL SOON.      PTA PAGED FOR UPDATED PT EVAL FOR PRECERT PURPOSES

## 2022-12-23 NOTE — PROGRESS NOTES
Physical Therapy Med Surg Daily Treatment Note  Facility/Department: [address] [place] [address]  Room: [place] [id number]       NAME: Aida Macdonald  : 1950 (81 y.o.)  MRN: [id number]  CODE STATUS: Full Code    Date of Service: 2022    Patient Diagnosis(es): Syncope and collapse [R55]  Hypokalemia [E87.6]  Thrombocytopenia (HCC) [D69.6]  Elevated troponin [R77.8]  Bilateral pulmonary embolism (HCC) [I26.99]  Seizure-like activity (HCC) [R56.9]  History of rectal cancer [Z85.048]  Acute saddle pulmonary embolism with acute cor pulmonale (HCC) [I26.02]  Neutropenia, unspecified type ([address] [D70.9]  COVID-19 [U07.1]   Chief Complaint   Patient presents with    Fall     Pt fell in the bathroom, + LOC, + Head Injury, - Thinners. Pt does not remember the event.       Patient Active Problem List    Diagnosis Date Noted    Convulsive syncope 2022    Bilateral pulmonary embolism ([address] 2022    Malignant neoplasm of rectum ([address] 10/11/2022    Rectal cancer ([address] 10/03/2022    Rectal polyp 2022    Colonic mass 2022    Elevated blood pressure reading 07/15/2022    Cancer ([address]     Hyperlipidemia 2003        Past Medical History:   Diagnosis Date    Arthritis     Cancer ([address]     SKIN    Hyperlipidemia      Past Surgical History:   Procedure Laterality Date    CATARACT REMOVAL      CHOLECYSTECTOMY      COLECTOMY N/A 10/3/2022    LOW ANTERIOR RESECTION WITH LOOP ILEOSTOMY performed by Efrain Casey MD at [address] N/A 2022    COLORECTAL CANCER SCREENING, NOT HIGH RISK performed by Todd Wisdom MD at [address] N/A 2022    FLEXIBLE SIGMOIDOSCOPY WITH SNARE POLYPECTOMY performed by Efrain Casey MD at [address] [place] [address] N/A 11/3/2022    INFUSAPORT performed by Efrain Casey MD at [facility] 2 LEG    TUBAL LIGATION              Restrictions:  Restrictions/Precautions: Fall Risk;Isolation    SUBJECTIVE: Subjective: \"I haven't really gotten up without passing out. \"    Pain   0/10 pre and post    OBJECTIVE:        Bed mobility  Supine to Sit: Maximum assistance;2 Person assistance  Sit to Supine: Maximum assistance;2 Person assistance  Scooting: Maximal assistance;2 Person assistance  Bed Mobility Comments: Requires rest between tasks. Increased time and effort. Transfers  Sit to Stand: Unable to assess  Stand to Sit: Unable to assess  Comment: Patient with light dizziness when seated EOB. Tolerates seated EOB x ~8 minutes. Ambulation  Assistance: Unable to assess                   PT Exercises  A/AROM Exercises: seated LAQ; x 20,  Static Sitting Balance Exercises: Seated EOB with SBA x 8 minutes F/R, Lat rocking. ASSESSMENT   Assessment: Patient requires increased rest between tasks to recover. Able to tolerate sitting EOB for ~ 8 minutes at SBA assist during therex. Needs assitance for all mobility at this time. No extreme bouts of dizziness or feelings of passing out while at EOB. Discharge Recommendations:  Continue to assess pending progress         Goals  Short Term Goals  Short Term Goal 1: min assist bed mobility  Short Term Goal 2: mod assist sit to stand  Short Term Goal 3: pt able to maintain sitting on EOB with SBA for 5 min with LE ex performed  Short Term Goal 4: Pt able to take steps with ww in place with mod assist +2    PLAN    General Plan: 1 time a day 3-6 times a week  Safety Devices  Type of Devices: Call light within reach, Left in bed     [facility] (6 CLICK) BASIC MOBILITY  AM-PAC Inpatient Mobility Raw Score : 8     Therapy Time   Individual   Time In 1334   Time Out 1355   Minutes 21     Timed Code Treatment Minutes: 21 Minutes     Tr/Bm: 6  TherEx: [facility], 12/23/22 at 2:09 PM         Definitions for assistance levels  Independent = pt does not require any physical supervision or assistance from another person for activity completion.  Device may be needed.   Stand by assistance = pt requires verbal cues or instructions from another person, close to but not touching, to perform the activity  Minimal assistance= pt performs 75% or more of the activity; assistance is required to complete the activity  Moderate assistance= pt performs 50% of the activity; assistance is required to complete the activity  Maximal assistance = pt performs 25% of the activity; assistance is required to complete the activity  Dependent = pt requires total physical assistance to accomplish the task

## 2022-12-23 NOTE — PROGRESS NOTES
Hospitalist Progress Note      Date of Admission: 12/18/2022  Chief Complaint:    Chief Complaint   Patient presents with    Fall     Pt fell in the bathroom, + LOC, + Head Injury, - Thinners. Pt does not remember the event. Subjective:  No complaints, nausea or chest pain or headache    Medications:    Infusion Medications    sodium chloride      sodium chloride      sodium chloride      sodium chloride       Scheduled Medications    metoprolol succinate  25 mg Oral Nightly    apixaban  10 mg Oral BID    Followed by    Rodo Saldivar ON 12/29/2022] apixaban  5 mg Oral BID    sodium chloride flush  5-40 mL IntraVENous 2 times per day    sodium chloride flush  5-40 mL IntraVENous 2 times per day    sodium chloride flush  5-40 mL IntraVENous 2 times per day    sodium chloride flush  5-40 mL IntraVENous 2 times per day     PRN Meds: trimethobenzamide, sodium chloride flush, sodium chloride, sodium chloride flush, sodium chloride, ondansetron, sodium chloride flush, sodium chloride, sodium chloride flush, sodium chloride, ondansetron **OR** ondansetron, polyethylene glycol, acetaminophen **OR** acetaminophen    Intake/Output Summary (Last 24 hours) at 12/23/2022 1537  Last data filed at 12/23/2022 1259  Gross per 24 hour   Intake 720 ml   Output 1010 ml   Net -290 ml       Exam:  /76   Pulse (!) 108   Temp 97.9 °F (36.6 °C)   Resp 18   Ht 4' 11\" (1.499 m)   Wt 167 lb 5.3 oz (75.9 kg)   SpO2 100%   BMI 33.80 kg/m²   Head: Normocephalic, atraumatic  Sclera clear  Neck JVD flat  Lungs: Normal effort of breathing    Labs:   Recent Labs     12/21/22  0600 12/22/22  0608 12/22/22  1752   WBC 8.0  --   --    HGB 10.6* 9.6* 9.4*   HCT 31.2* 28.5* 29.0*   *  --   --        Recent Labs     12/21/22  0600 12/22/22  0600   * 132*   K 3.9 3.7   CL 97 95   CO2 25 24   BUN 20 19   CREATININE 0.81 0.76   CALCIUM 8.7 8.6   PHOS 3.2 3.0       No results for input(s): INR in the last 72 hours.     No results for input(s): Normajean Cochran in the last 72 hours. Radiology:  US CAROTID ARTERY BILATERAL   Final Result   Mild scattered atherosclerosis without obstructive lesion. Areas of intimal thickening. Bilateral ICAs less than 50% stenosis. IR CONTRAST INJECTION  EXISTING CV ACCESS DEVICE EVALUATION W FLUORO   Final Result   Left Rnaakw-D-Fdcj with an internal jugular vein catheter terminating at the   midline with its tip directed toward the right near the confluence of the   innominate veins. Redundancy of the catheter in the neck. Good blood return when aspirating the port but significant resistance when   flushing the port. Patent and intact catheter with no contrast extravasation. Suggestion of thrombus and or fibrin sheath at the distal end of the catheter. US DUP LOWER EXTREMITIES BILATERAL VENOUS   Final Result   RIGHT LOWER EXTREMITY EXTENSIVE THE THIGH DEEP VEIN THROMBOSIS EXTENDING INTO THE CALF. LEFT LOWER EXTREMITY EDEMA DISTAL DEEP VEIN THROMBOSIS INVOLVING THE CALF REGION. XR PELVIS (1-2 VIEWS)   Final Result   No acute fracture or malalignment. IR PICC WO SQ PORT/PUMP > 5 YEARS   Final Result   Successful ultrasound and fluoroscopy guided PICC placement. CTA CHEST W WO CONTRAST PE Eval   Final Result   Extensive pulmonary emboli with saddle embolism identified. There is   evidence of right heart strain within RV LV ratio of 1.6. Mild ground-glass   seen at the right lung base in which early infarction cannot be excluded. There is increased density seen surrounding the left luke catheter in the   subcutaneous tissues to suggest extravasation of intravenous contrast.      Findings were discussed with Dr. Rick Cool at time of interpretation of the   examination.          XR CHEST PORTABLE   Final Result   Large, ill-defined density overlying the left shoulder and mid to upper   lateral left hemithorax, which limits full evaluation of the immediate   surrounding soft tissue and osseous structures. No acute consolidation or   infiltrate. Repeat chest radiograph can be performed further evaluation. CT HEAD WO CONTRAST   Final Result   1. There is no acute intracranial abnormality. Specifically, there is no   intracranial hemorrhage. 2. Atrophy and periventricular leukomalacia,         CT CERVICAL SPINE WO CONTRAST   Final Result   1. There is no acute compression fracture or subluxation of the cervical   spine. 2. Multilevel degenerative disc and degenerative joint disease. .         CT THORACIC SPINE WO CONTRAST   Final Result   1. There is no acute compression fracture of the thoracic spine. 2. Dextroscoliosis   3. Multilevel degenerative changes. CT LUMBAR SPINE WO CONTRAST   Final Result   Disc space narrowing and degenerative changes. No acute vertebral fracture or subluxation. US GUIDED VASCULAR ACCESS    (Results Pending)     Assessment/Plan:    Syncope: plan for was for dc today. However syncope episode. Cardiology at bedside during rapid response. Plan to hold DC and reassess in am.      Pulmonary embolism: Large. On Anticoagulation . 12/19: Thrombectomy done by cardiology. S/p leg thrombectomy (extensive right leg DVT) on 12/21    Hypercoagulable state most likely secondary to active cancer. Rectal cancer  stage III status post resection neoadjuvant chemotherapy, as per oncology    Thrombocytopenia most likely secondary to chemotherapy. Mild. Continue to monitor platelet count. Appreciate hematology input.     hemoglobin/hematocrit stable.       COVID-positive    35 minutes total care time, >1/2 in unit/floor time and care coordination       João Pack MD ,MD

## 2022-12-23 NOTE — PROGRESS NOTES
INPATIENT PROGRESS NOTES    PATIENT NAME: Diana Brenner  MRN: [id number]  SERVICE DATE:  December 23, 2022   SERVICE TIME:  4:11 PM      PRIMARY SERVICE: Pulmonary Disease    CHIEF COMPLAIN: COVID-19 infection      INTERVAL HPI: Patient seen and examined at bedside, Interval Notes, orders reviewed. Nursing notes noted  She denies having short of breath currently on room air O2 saturation 100%. No cough or sputum production. No fever or chills. She is on Xarelto for pulmonary embolism. OBJECTIVE    Body mass index is 33.8 kg/m². PHYSICAL EXAM:  Vitals:  /76   Pulse (!) 108   Temp 97.9 °F (36.6 °C)   Resp 18   Ht 4' 11\" (1.499 m)   Wt 167 lb 5.3 oz (75.9 kg)   SpO2 100%   BMI 33.80 kg/m²   General: Alert, awake . comfortable in bed, No distress. Head: Atraumatic , Normocephalic   Eyes: PERRL. No sclera icterus. No conjunctival injection. No discharge   ENT: No nasal  discharge. Pharynx clear. Neck:  Trachea midline. No thyromegaly, no JVD, No cervical adenopathy. Chest : Bilaterally symmetrical ,Normal effort,  No accessory muscle use  Lung : . Fair BS bilateral, decreased BS at bases. No Rales. No wheezing. No rhonchi. Heart[de-identified] Normal  rate. Regular rhythm. No mumur ,  Rub or gallop  ABD: Non-tender. Non-distended. No masses. No organmegaly. Normal bowel sounds. No hernia. Ext : No Pitting both leg , No Cyanosis No clubbing  Neuro: no focal weakness          DATA:   Recent Labs     12/21/22  0600 12/22/22  0608 12/22/22  1752   WBC 8.0  --   --    HGB 10.6* 9.6* 9.4*   HCT 31.2* 28.5* 29.0*   MCV 86.8  --   --    *  --   --      Recent Labs     12/21/22  0600 12/22/22  0600   * 132*   K 3.9 3.7   CL 97 95   CO2 25 24   BUN 20 19   CREATININE 0.81 0.76   GLUCOSE 92 94   CALCIUM 8.7 8.6   LABALBU 3.5 3.5   LABGLOM >60.0 >60.0       MV Settings:          No results for input(s): PHART, LXV8FBB, PO2ART, NYS0FLS, BEART, U4ZEWDGM in the last 72 hours.     O2 Device: None (Room air)  O2 Flow Rate (L/min): 2 L/min    ADULT DIET; Regular; Low Fat/Low Chol/High Fiber/PADMA     MEDICATIONS during current hospitalization:    Continuous Infusions:   sodium chloride      sodium chloride      sodium chloride      sodium chloride         Scheduled Meds:   metoprolol succinate  25 mg Oral Nightly    apixaban  10 mg Oral BID    Followed by    Chris Backbone ON 12/29/2022] apixaban  5 mg Oral BID    sodium chloride flush  5-40 mL IntraVENous 2 times per day    sodium chloride flush  5-40 mL IntraVENous 2 times per day    sodium chloride flush  5-40 mL IntraVENous 2 times per day    sodium chloride flush  5-40 mL IntraVENous 2 times per day       PRN Meds:trimethobenzamide, sodium chloride flush, sodium chloride, sodium chloride flush, sodium chloride, ondansetron, sodium chloride flush, sodium chloride, sodium chloride flush, sodium chloride, ondansetron **OR** ondansetron, polyethylene glycol, acetaminophen **OR** acetaminophen    Radiology  XR PELVIS (1-2 VIEWS)    Result Date: 12/18/2022  EXAMINATION: ONE XRAY VIEW OF THE PELVIS 12/18/2022 10:04 am COMPARISON: None. HISTORY: ORDERING SYSTEM PROVIDED HISTORY: fall TECHNOLOGIST PROVIDED HISTORY: Reason for exam:->fall What reading provider will be dictating this exam?->CRC FINDINGS: No acute fracture or malalignment. Mild degenerative changes of the hips. Degenerative changes of the visualized spine. Surgical clips project over the left pelvis. The bladder is opacified with excreted IV contrast.     No acute fracture or malalignment. CT HEAD WO CONTRAST    Result Date: 12/18/2022  EXAMINATION: CT OF THE HEAD WITHOUT CONTRAST  12/18/2022 8:30 am TECHNIQUE: CT of the head was performed without the administration of intravenous contrast. Automated exposure control, iterative reconstruction, and/or weight based adjustment of the mA/kV was utilized to reduce the radiation dose to as low as reasonably achievable. COMPARISON: None.  HISTORY: ORDERING SYSTEM PROVIDED HISTORY: syncope, fall TECHNOLOGIST PROVIDED HISTORY: Reason for exam:->syncope, fall Has a \"code stroke\" or \"stroke alert\" been called? ->No Decision Support Exception - unselect if not a suspected or confirmed emergency medical condition->Emergency Medical Condition ([place]) What reading provider will be dictating this exam?->CRC FINDINGS: BRAIN/VENTRICLES: There is no acute intracranial hemorrhage, mass effect or midline shift. No abnormal extra-axial fluid collection. The gray-white differentiation is maintained without evidence of an acute infarct. There is no evidence of hydrocephalus. The ventricles, cisterns and sulci are prominent consistent with atrophy. There is decreased attenuation within the periventricular white matter consistent with periventricular leukomalacia. ORBITS: The visualized portion of the orbits demonstrate no acute abnormality. SINUSES: The visualized paranasal sinuses and mastoid air cells demonstrate no acute abnormality. There is mild mucosal thickening seen within the right ethmoid air cells. SOFT TISSUES/SKULL:  No acute abnormality of the visualized skull or soft tissues. 1. There is no acute intracranial abnormality. Specifically, there is no intracranial hemorrhage. 2. Atrophy and periventricular leukomalacia,     CTA CHEST W WO CONTRAST PE Eval    Result Date: 12/18/2022  EXAMINATION: CTA OF THE CHEST WITH AND WITHOUT CONTRAST 12/18/2022 8:30 am TECHNIQUE: CTA of the chest was performed before and after the administration of intravenous contrast.  Multiplanar reformatted images are provided for review. MIP images are provided for review. Automated exposure control, iterative reconstruction, and/or weight based adjustment of the mA/kV was utilized to reduce the radiation dose to as low as reasonably achievable. COMPARISON: None.  HISTORY: ORDERING SYSTEM PROVIDED HISTORY: PE, syncope, hx of cancer, rule out PE; cough evaluate for infiltrate TECHNOLOGIST PROVIDED HISTORY: Reason for exam:->PE Reason for exam:->syncope, hx of cancer, rule out PE; cough evaluate for infiltrate Decision Support Exception - unselect if not a suspected or confirmed emergency medical condition->Emergency Medical Condition (MA) What reading provider will be dictating this exam?->CRC FINDINGS: Pulmonary Arteries: Pulmonary arteries are adequately opacified for evaluation. There is abnormal filling defect seen within the main pulmonary arteries bilaterally with slight saddle embolism identified. There is opacification identified within all the segmental and subsegmental branches most pronounced within the right middle and lower lobe pulmonary arteries. Emboli as well seen within the lingular branch. There is evidence of right heart strain within RV LV ratio of 1.6. There is prominence identified of the main pulmonary artery. Mediastinum: No evidence of mediastinal lymphadenopathy. The heart and pericardium demonstrate no acute abnormality. There is no acute abnormality of the thoracic aorta. Lungs/pleura: Mild ground-glass opacity identified in the right lower lobe suggesting possibly an area of infarction given the extensive thrombus within the pulmonary arteries on the right. Atelectatic change cannot be excluded. There is minimal atelectatic changes seen within the lingula. No definite pleural effusion or pneumothorax. Upper Abdomen: Limited images of the upper abdomen are unremarkable. Soft Tissues/Bones: Abnormal increased density identified within the subcutaneous tissues surrounding the luke catheter on the left anterior chest wall to suggest extravasation of intravenous contrast.     Extensive pulmonary emboli with saddle embolism identified. There is evidence of right heart strain within RV LV ratio of 1.6. Mild ground-glass seen at the right lung base in which early infarction cannot be excluded.  There is increased density seen surrounding the left luke catheter in the subcutaneous tissues to suggest extravasation of intravenous contrast. Findings were discussed with Dr. Rayo Torres at time of interpretation of the examination. CT CERVICAL SPINE WO CONTRAST    Result Date: 12/18/2022  EXAMINATION: CT OF THE CERVICAL SPINE WITHOUT CONTRAST 12/18/2022 8:30 am TECHNIQUE: CT of the cervical spine was performed without the administration of intravenous contrast. Multiplanar reformatted images are provided for review. Automated exposure control, iterative reconstruction, and/or weight based adjustment of the mA/kV was utilized to reduce the radiation dose to as low as reasonably achievable. COMPARISON: None. HISTORY: ORDERING SYSTEM PROVIDED HISTORY: syncope, fall TECHNOLOGIST PROVIDED HISTORY: Reason for exam:->syncope, fall Decision Support Exception - unselect if not a suspected or confirmed emergency medical condition->Emergency Medical Condition ([place]) What reading provider will be dictating this exam?->CRC FINDINGS: The ring of C1 is intact as is the dense. There is no compression fracture of the cervical spine. No jumped or perched facet is noted. Multilevel degenerative disc and degenerative joint disease is noted. There are posterior degenerative endplate spurs seen at the C3-4 and C6-7 levels. There is mild central canal stenosis at the C6-7 level and mild bilateral neural foraminal narrowing. The prevertebral soft tissues are unremarkable. The airway is widely patent. Images through the lung apices are negative for a pneumothorax. 1. There is no acute compression fracture or subluxation of the cervical spine. 2. Multilevel degenerative disc and degenerative joint disease. .     CT THORACIC SPINE WO CONTRAST    Result Date: 12/18/2022  EXAMINATION: CT OF THE THORACIC SPINE WITHOUT CONTRAST  12/18/2022 8:30 am: TECHNIQUE: CT of the thoracic spine was performed without the administration of intravenous contrast. Multiplanar reformatted images are provided for review.  Automated exposure control, iterative reconstruction, and/or weight based adjustment of the mA/kV was utilized to reduce the radiation dose to as low as reasonably achievable. COMPARISON: None. HISTORY: ORDERING SYSTEM PROVIDED HISTORY: syncope, fall TECHNOLOGIST PROVIDED HISTORY: Reason for exam:->syncope, fall What reading provider will be dictating this exam?->CRC FINDINGS: BONES/ALIGNMENT: There is no acute compression fracture of the thoracic spine. There is dextroscoliosis of the thoracic spine. Mild multilevel degenerative disc and degenerative joint disease is noted. There are large anterior and lateral degenerative endplate spurs seen throughout the course of the thoracic spine. .  There is no osseous lesion. SOFT TISSUES: No paraspinal mass is seen. 1. There is no acute compression fracture of the thoracic spine. 2. Dextroscoliosis 3. Multilevel degenerative changes. CT LUMBAR SPINE WO CONTRAST    Result Date: 12/18/2022  EXAMINATION: CT OF THE LUMBAR SPINE WITHOUT CONTRAST  12/18/2022 TECHNIQUE: CT of the lumbar spine was performed without the administration of intravenous contrast. Multiplanar reformatted images are provided for review. Adjustment of mA and/or kV according to patient size was utilized. Automated exposure control, iterative reconstruction, and/or weight based adjustment of the mA/kV was utilized to reduce the radiation dose to as low as reasonably achievable. COMPARISON: None. HISTORY: ORDERING SYSTEM PROVIDED HISTORY: syncope, fall TECHNOLOGIST PROVIDED HISTORY: Reason for exam:->syncope, fall Decision Support Exception - unselect if not a suspected or confirmed emergency medical condition->Emergency Medical Condition ([place]) What reading provider will be dictating this exam?->CRC FINDINGS: Vertebral alignment is normal.  Mild disc space narrowing and discovertebral degenerative changes are identified. Facet arthritis is also seen. No acute vertebral fracture is visualized.   No spondylolysis is noted on the right or left. The sacroiliac joints are intact. The paraspinal soft tissues appear unremarkable. The urinary bladder is dilated. There are mild right hydronephrosis and hydroureter. There is colonic diverticulosis. There are postsurgical changes at the colorectal junction. Disc space narrowing and degenerative changes. No acute vertebral fracture or subluxation. XR CHEST PORTABLE    Result Date: 12/18/2022  EXAMINATION: ONE XRAY VIEW OF THE CHEST 12/18/2022 9:59 am COMPARISON: The previous study performed 11/03/2022. HISTORY: ORDERING SYSTEM PROVIDED HISTORY: fall, syncope TECHNOLOGIST PROVIDED HISTORY: Reason for exam:->fall, syncope What reading provider will be dictating this exam?->CRC FINDINGS: There is a large, ill-defined density overlying the left shoulder and mid to upper lateral left hemithorax. This may be external to the patient. It limits full evaluation of the surrounding soft tissue and osseous structures. There is no evidence of acute consolidation or infiltrate. No active pleural disease is seen. The cardiac silhouette and mediastinal structures are unremarkable. The tracheobronchial tree is midline and patent. A left-sided MediPort catheter is again noted, with the tip in the proximal SVC. There is again noted to be a thoracic scoliosis, with convexity to the right. Osteo-degenerative changes are again noted involving the thoracic spine. Large, ill-defined density overlying the left shoulder and mid to upper lateral left hemithorax, which limits full evaluation of the immediate surrounding soft tissue and osseous structures. No acute consolidation or infiltrate. Repeat chest radiograph can be performed further evaluation. IR PICC WO SQ PORT/PUMP > 5 YEARS    Result Date: 12/21/2022  PROCEDURE: ULTRASOUND GUIDED VASCULAR ACCESS. FLUOROSCOPY GUIDED PICC PLACEMENT 12/18/2022.  HISTORY: ORDERING SYSTEM PROVIDED HISTORY: limited acess TECHNOLOGIST PROVIDED HISTORY: Reason for exam:->limited acess How many lumens are being requested?->2 What site is the preferred site? ->No preference What side should this line be placed?->Right What reading provider will be dictating this exam?->CRC SEDATION: No sedation was administered. FLUOROSCOPY DOSE AND TYPE OR TIME AND EXPOSURES: 53 seconds of fluoroscopy were provided. A single fluoroscopic spot image was obtained. TECHNIQUE: Informed consent was obtained after a detailed explanation of the procedure including risks, benefits, and alternatives. Universal protocol was observed. The right arm was prepped and draped in sterile fashion using maximum sterile barrier technique. Local anesthesia was achieved with lidocaine. A micropuncture needle was used to access the right basilic vein using ultrasound guidance. An ultrasound image demonstrating patency of the vein with needle tip located within it. An image was obtained and stored in PACs. A 0.018 guidewire was used to place a peel-a-way sheath and a 5 [employer] Danni dual lumen PICC was advanced with fluoroscopic guidance with the tip at the cavo-atrial junction. The catheter flushed easily and there was a good blood return. The catheter was secured to the skin. The patient tolerated the procedure well and there were no immediate complications. FINDINGS: Fluoroscopic image demonstrates the tip of the catheter at the cavo-atrial junction. Successful ultrasound and fluoroscopy guided PICC placement. IR CONTRAST INJECTION  EXISTING CV ACCESS DEVICE EVALUATION W FLUORO    Result Date: 12/20/2022  PROCEDURE: IR CONTRAST INJECTION EXISTING CV ACCESS DEVICE EVALUATION W FLUORO 12/20/2022 HISTORY: ORDERING SYSTEM PROVIDED HISTORY: check  port TECHNOLOGIST PROVIDED HISTORY: Reason for exam:->check  port What reading provider will be dictating this exam?->CRC TECHNIQUE: The port was accessed using sterile technique. Contrast was then administered during fluoroscopic observation.   The port was subsequently flushed with saline then locked with heparin. CONTRAST: 18 mL Isovue 200. SEDATION: No sedation was administered. FLUOROSCOPY DOSE AND TYPE OR TIME AND EXPOSURES: Fluoroscopy time was 27 seconds. A fluoroscopic cine loop was acquired. FINDINGS: There is an Jwwyty-U-Ecgd on the left with an internal jugular vein catheter terminating at the midline with its tip directed toward the right near the confluence of the innominate veins. The catheter is redundant in the neck. Aspiration of the port yields good blood return. Significant resistance was met when flushing the port. The catheter is patent and intact. No extravasation of contrast is identified. There is the suggestion of thrombus and or fibrin sheath at the distal end of the catheter. Left Avsdit-O-Baxj with an internal jugular vein catheter terminating at the midline with its tip directed toward the right near the confluence of the innominate veins. Redundancy of the catheter in the neck. Good blood return when aspirating the port but significant resistance when flushing the port. Patent and intact catheter with no contrast extravasation. Suggestion of thrombus and or fibrin sheath at the distal end of the catheter. US CAROTID ARTERY BILATERAL    Result Date: 12/22/2022  EXAMINATION - Carotid Duplex Ultrasound COMPARISON: May 9, 2017 DIAGNOSIS: Syncope  TECHNIQUE: Duplex carotid imaging was performed of both carotid systems using real time, peak velocity and color flow Doppler analysis. FINDINGS: Doppler findings: Right ICA PSV: 90 cm/sec. Left ICA PSV: 124 cm/sec Right CCA PSV: 82 cm/sec. Left CCA PSV:  103 cm/sec. Right ICA /CCA ratio: 1.02. Left ICA/CCA ratio: 1.5. Right ECA PSV: 49 cm/sec. Left: ECA PSV: 62 cm/sec. Antegrade flow is demonstrated in both vertebral arteries. Mild scattered atherosclerosis without obstructive lesion. Areas of intimal thickening. Bilateral ICAs less than 50% stenosis.      US DUP LOWER EXTREMITIES BILATERAL VENOUS    Result Date: 12/19/2022  EXAMINATION:  BILATERAL LOWER EXTREMITY VENOUS DUPLEX CLINICAL HISTORY:  SAGITTAL PULMONARY EMBOLISM COMPARISONS:  NONE AVAILABLE TECHNIQUE:  B-mode, color flow and spectral Doppler FINDINGS:  Bilateral lower extremity venous systems were interrogated extending from the common femoral vein to its adjacent segments of the proximal popliteal vein. On the right side the common femoral vein, femoral vein as well as the popliteal vein and into the peroneal vein there is an adequate compression augmentation as well as phasic flow consistent with the thrombus. Left lower extremity there is abnormal compression augmentation as well as phasic flow posterior tibial and into the peroneal veins. RIGHT LOWER EXTREMITY EXTENSIVE THE THIGH DEEP VEIN THROMBOSIS EXTENDING INTO THE CALF. LEFT LOWER EXTREMITY EDEMA DISTAL DEEP VEIN THROMBOSIS INVOLVING THE CALF REGION. IMPRESSION AND SUGGESTION:  Acute provoked submassive PE status post thrombectomy  COVID-19 pneumonia  Acute kidney injury improving    Continue oral anticoagulation therapy for 6-month. 2D echo did not show evidence of pulm hypertension. She is currently on room air O2 saturation is 100%. Discharge when okay by all physician. NOTE: This report was transcribed using voice recognition software. Every effort was made to ensure accuracy; however, inadvertent computerized transcription errors may be present.       Electronically signed by Emmanuel Bermudez MD, FCCP on 12/23/2022 at 4:11 PM

## 2022-12-24 LAB
ALBUMIN SERPL-MCNC: 3.5 G/DL (ref 3.5–4.6)
ANION GAP SERPL CALCULATED.3IONS-SCNC: 14 MEQ/L (ref 9–15)
ANISOCYTOSIS: ABNORMAL
ATYPICAL LYMPHOCYTE RELATIVE PERCENT: 4 %
BANDED NEUTROPHILS RELATIVE PERCENT: 1 % (ref 5–11)
BASOPHILS ABSOLUTE: 0 K/UL (ref 0–0.2)
BASOPHILS RELATIVE PERCENT: 0.3 %
BUN BLDV-MCNC: 18 MG/DL (ref 8–23)
CALCIUM SERPL-MCNC: 8.4 MG/DL (ref 8.5–9.9)
CHLORIDE BLD-SCNC: 93 MEQ/L (ref 95–107)
CO2: 24 MEQ/L (ref 20–31)
CREAT SERPL-MCNC: 0.77 MG/DL (ref 0.5–0.9)
EOSINOPHILS ABSOLUTE: 0 K/UL (ref 0–0.7)
EOSINOPHILS RELATIVE PERCENT: 1 %
GFR SERPL CREATININE-BSD FRML MDRD: >60 ML/MIN/{1.73_M2}
GLUCOSE BLD-MCNC: 106 MG/DL (ref 70–99)
HCT VFR BLD CALC: 23.6 % (ref 37–47)
HEMOGLOBIN: 8.3 G/DL (ref 12–16)
LYMPHOCYTES ABSOLUTE: 2.6 K/UL (ref 1–4.8)
LYMPHOCYTES RELATIVE PERCENT: 46 %
MCH RBC QN AUTO: 30.4 PG (ref 27–31.3)
MCHC RBC AUTO-ENTMCNC: 35.2 % (ref 33–37)
MCV RBC AUTO: 86.3 FL (ref 79.4–94.8)
MONOCYTES ABSOLUTE: 0.3 K/UL (ref 0.2–0.8)
MONOCYTES RELATIVE PERCENT: 4.8 %
NEUTROPHILS ABSOLUTE: 2.4 K/UL (ref 1.4–6.5)
NEUTROPHILS RELATIVE PERCENT: 45 %
NUCLEATED RED BLOOD CELLS: 1 /100 WBC
PDW BLD-RTO: 16.1 % (ref 11.5–14.5)
PHOSPHORUS: 2.5 MG/DL (ref 2.3–4.8)
PLATELET # BLD: 247 K/UL (ref 130–400)
PLATELET SLIDE REVIEW: ADEQUATE
POTASSIUM SERPL-SCNC: 3.5 MEQ/L (ref 3.4–4.9)
RBC # BLD: 2.74 M/UL (ref 4.2–5.4)
SODIUM BLD-SCNC: 131 MEQ/L (ref 135–144)
WBC # BLD: 5.2 K/UL (ref 4.8–10.8)

## 2022-12-24 PROCEDURE — 2060000000 HC ICU INTERMEDIATE R&B

## 2022-12-24 PROCEDURE — 99233 SBSQ HOSP IP/OBS HIGH 50: CPT | Performed by: PSYCHIATRY & NEUROLOGY

## 2022-12-24 PROCEDURE — 2580000003 HC RX 258: Performed by: STUDENT IN AN ORGANIZED HEALTH CARE EDUCATION/TRAINING PROGRAM

## 2022-12-24 PROCEDURE — 85025 COMPLETE CBC W/AUTO DIFF WBC: CPT

## 2022-12-24 PROCEDURE — 80069 RENAL FUNCTION PANEL: CPT

## 2022-12-24 PROCEDURE — 6370000000 HC RX 637 (ALT 250 FOR IP): Performed by: INTERNAL MEDICINE

## 2022-12-24 PROCEDURE — 2580000003 HC RX 258: Performed by: FAMILY MEDICINE

## 2022-12-24 PROCEDURE — 6370000000 HC RX 637 (ALT 250 FOR IP): Performed by: FAMILY MEDICINE

## 2022-12-24 PROCEDURE — 99232 SBSQ HOSP IP/OBS MODERATE 35: CPT | Performed by: INTERNAL MEDICINE

## 2022-12-24 PROCEDURE — 36592 COLLECT BLOOD FROM PICC: CPT

## 2022-12-24 PROCEDURE — 2580000003 HC RX 258: Performed by: INTERNAL MEDICINE

## 2022-12-24 RX ADMIN — Medication 10 ML: at 21:34

## 2022-12-24 RX ADMIN — APIXABAN 10 MG: 5 TABLET, FILM COATED ORAL at 10:51

## 2022-12-24 RX ADMIN — APIXABAN 10 MG: 5 TABLET, FILM COATED ORAL at 21:33

## 2022-12-24 RX ADMIN — ONDANSETRON 4 MG: 4 TABLET, ORALLY DISINTEGRATING ORAL at 11:00

## 2022-12-24 RX ADMIN — Medication 10 ML: at 21:38

## 2022-12-24 RX ADMIN — METOPROLOL SUCCINATE 25 MG: 25 TABLET, FILM COATED, EXTENDED RELEASE ORAL at 21:33

## 2022-12-24 ASSESSMENT — ENCOUNTER SYMPTOMS
CHEST TIGHTNESS: 0
COUGH: 0
STRIDOR: 0
EYES NEGATIVE: 1
BLOOD IN STOOL: 0
NAUSEA: 0
WHEEZING: 0
RESPIRATORY NEGATIVE: 1
SHORTNESS OF BREATH: 0
GASTROINTESTINAL NEGATIVE: 1

## 2022-12-24 ASSESSMENT — PAIN SCALES - GENERAL
PAINLEVEL_OUTOF10: 0
PAINLEVEL_OUTOF10: 0

## 2022-12-24 NOTE — PROGRESS NOTES
Hospitalist Progress Note      Date of Admission: 12/18/2022  Chief Complaint:    Chief Complaint   Patient presents with    Fall     Pt fell in the bathroom, + LOC, + Head Injury, - Thinners. Pt does not remember the event. Subjective:  No complaints, nausea or chest pain or headache    Medications:    Infusion Medications    sodium chloride      sodium chloride      sodium chloride      sodium chloride       Scheduled Medications    metoprolol succinate  25 mg Oral Nightly    apixaban  10 mg Oral BID    Followed by    Elke Mac ON 12/29/2022] apixaban  5 mg Oral BID    sodium chloride flush  5-40 mL IntraVENous 2 times per day    sodium chloride flush  5-40 mL IntraVENous 2 times per day    sodium chloride flush  5-40 mL IntraVENous 2 times per day    sodium chloride flush  5-40 mL IntraVENous 2 times per day     PRN Meds: trimethobenzamide, sodium chloride flush, sodium chloride, sodium chloride flush, sodium chloride, ondansetron, sodium chloride flush, sodium chloride, sodium chloride flush, sodium chloride, ondansetron **OR** ondansetron, polyethylene glycol, acetaminophen **OR** acetaminophen    Intake/Output Summary (Last 24 hours) at 12/24/2022 1656  Last data filed at 12/24/2022 1644  Gross per 24 hour   Intake --   Output 1250 ml   Net -1250 ml       Exam:  /62   Pulse (!) 105   Temp 98.1 °F (36.7 °C) (Oral)   Resp 18   Ht 4' 11\" (1.499 m)   Wt 172 lb (78 kg)   SpO2 100%   BMI 34.74 kg/m²   Head: Normocephalic, atraumatic  Sclera clear  Neck JVD flat  Lungs: Normal effort of breathing    Labs:   Recent Labs     12/22/22  0608 12/22/22  1752 12/24/22  0600   WBC  --   --  5.2   HGB 9.6* 9.4* 8.3*   HCT 28.5* 29.0* 23.6*   PLT  --   --  247       Recent Labs     12/22/22  0600 12/24/22  0600   * 131*   K 3.7 3.5   CL 95 93*   CO2 24 24   BUN 19 18   CREATININE 0.76 0.77   CALCIUM 8.6 8.4*   PHOS 3.0 2.5       No results for input(s): INR in the last 72 hours.     No results for input(s): Miguel Nolan in the last 72 hours. Radiology:  US CAROTID ARTERY BILATERAL   Final Result   Mild scattered atherosclerosis without obstructive lesion. Areas of intimal thickening. Bilateral ICAs less than 50% stenosis. IR CONTRAST INJECTION  EXISTING CV ACCESS DEVICE EVALUATION W FLUORO   Final Result   Left Zoenhk-E-Glzi with an internal jugular vein catheter terminating at the   midline with its tip directed toward the right near the confluence of the   innominate veins. Redundancy of the catheter in the neck. Good blood return when aspirating the port but significant resistance when   flushing the port. Patent and intact catheter with no contrast extravasation. Suggestion of thrombus and or fibrin sheath at the distal end of the catheter. US DUP LOWER EXTREMITIES BILATERAL VENOUS   Final Result   RIGHT LOWER EXTREMITY EXTENSIVE THE THIGH DEEP VEIN THROMBOSIS EXTENDING INTO THE CALF. LEFT LOWER EXTREMITY EDEMA DISTAL DEEP VEIN THROMBOSIS INVOLVING THE CALF REGION. XR PELVIS (1-2 VIEWS)   Final Result   No acute fracture or malalignment. IR PICC WO SQ PORT/PUMP > 5 YEARS   Final Result   Successful ultrasound and fluoroscopy guided PICC placement. CTA CHEST W WO CONTRAST PE Eval   Final Result   Extensive pulmonary emboli with saddle embolism identified. There is   evidence of right heart strain within RV LV ratio of 1.6. Mild ground-glass   seen at the right lung base in which early infarction cannot be excluded. There is increased density seen surrounding the left luke catheter in the   subcutaneous tissues to suggest extravasation of intravenous contrast.      Findings were discussed with Dr. Louis Castillo at time of interpretation of the   examination.          XR CHEST PORTABLE   Final Result   Large, ill-defined density overlying the left shoulder and mid to upper   lateral left hemithorax, which limits full evaluation of the immediate surrounding soft tissue and osseous structures. No acute consolidation or   infiltrate. Repeat chest radiograph can be performed further evaluation. CT HEAD WO CONTRAST   Final Result   1. There is no acute intracranial abnormality. Specifically, there is no   intracranial hemorrhage. 2. Atrophy and periventricular leukomalacia,         CT CERVICAL SPINE WO CONTRAST   Final Result   1. There is no acute compression fracture or subluxation of the cervical   spine. 2. Multilevel degenerative disc and degenerative joint disease. .         CT THORACIC SPINE WO CONTRAST   Final Result   1. There is no acute compression fracture of the thoracic spine. 2. Dextroscoliosis   3. Multilevel degenerative changes. CT LUMBAR SPINE WO CONTRAST   Final Result   Disc space narrowing and degenerative changes. No acute vertebral fracture or subluxation. US GUIDED VASCULAR ACCESS    (Results Pending)     Assessment/Plan:    Syncope: plan for was for dc today. However syncope episode. Cardiology at bedside during rapid response. Plan to hold DC and reassess in am.      Pulmonary embolism: Large. On Anticoagulation . 12/19: Thrombectomy done by cardiology. S/p leg thrombectomy (extensive right leg DVT) on 12/21    Hypercoagulable state most likely secondary to active cancer. Rectal cancer  stage III status post resection neoadjuvant chemotherapy, as per oncology    Thrombocytopenia most likely secondary to chemotherapy. Mild. Continue to monitor platelet count. Appreciate hematology input.     hemoglobin/hematocrit stable. COVID-positive    25 minutes total care time, >1/2 in unit/floor time and care coordination     Dc plan: awaiting placement.      Maryam Gil MD ,MD

## 2022-12-24 NOTE — PROGRESS NOTES
Progress Note  Patient: Perry Morgan  Unit/Bed: V076/H285-42  YOB: 1950  MRN: [id number]  Acct: [de-identified]   Admitting Diagnosis: Syncope and collapse [R55]  Hypokalemia [E87.6]  Thrombocytopenia (HCC) [D69.6]  Elevated troponin [R77.8]  Bilateral pulmonary embolism (HCC) [I26.99]  Seizure-like activity (HCC) [R56.9]  History of rectal cancer [Z85.048]  Acute saddle pulmonary embolism with acute cor pulmonale (HCC) [I26.02]  Neutropenia, unspecified type ([address] [D70.9]  HBSGI-74 [U07.1]  Admit Date:  12/18/2022  Hospital Day: 6    Chief Complaint: PE DVT    Histories:  Past Medical History:   Diagnosis Date    Arthritis     Cancer ([address]     SKIN    Hyperlipidemia      Past Surgical History:   Procedure Laterality Date    CATARACT REMOVAL      CHOLECYSTECTOMY      COLECTOMY N/A 10/3/2022    LOW ANTERIOR RESECTION WITH LOOP ILEOSTOMY performed by Reema Chandra MD at [address] [place] [address] N/A 9/16/2022    COLORECTAL CANCER SCREENING, NOT HIGH RISK performed by Clinton Rivas MD at [facility]    COLONOSCOPY N/A 9/26/2022    FLEXIBLE SIGMOIDOSCOPY WITH SNARE POLYPECTOMY performed by Reema Chandra MD at [address] [facility] N/A 11/3/2022    INFUSAPORT performed by Reema Chandra MD at [facility] LOWER LEG    TUBAL LIGATION       Family History   Problem Relation Age of Onset    Diabetes Mother     Breast Cancer Maternal Aunt     Colon Cancer Neg Hx      Social History     Socioeconomic History    Marital status:      Spouse name: None    Number of children: None    Years of education: None    Highest education level: None   Tobacco Use    Smoking status: Never    Smokeless tobacco: Never   Vaping Use    Vaping Use: Never used   Substance and Sexual Activity    Alcohol use: No    Drug use: No     Social Determinants of Health     Financial Resource Strain: Low Risk     Difficulty of Paying Living Expenses: Not hard at all   Food Insecurity: No Food Insecurity    Worried About Running Out of Food in the Last Year: Never true    Ran Out of Food in the Last Year: Never true   Transportation Needs: No Transportation Needs    Lack of Transportation (Medical): No    Lack of Transportation (Non-Medical): No       Subjective/HPI  stable overnight. On No O2 no fever. No CP not SOB at rest. No acute events. Feels well. Eating fine      EKG: SR 90 on Tele      Review of Systems:   Review of Systems   Constitutional: Negative. Negative for diaphoresis and fatigue. HENT: Negative. Eyes: Negative. Respiratory: Negative. Negative for cough, chest tightness, shortness of breath, wheezing and stridor. Cardiovascular: Negative. Negative for chest pain, palpitations and leg swelling. Gastrointestinal: Negative. Negative for blood in stool and nausea. Genitourinary: Negative. Musculoskeletal: Negative. Skin: Negative. Neurological:  Positive for weakness. Negative for dizziness, syncope and light-headedness. Hematological: Negative. Psychiatric/Behavioral: Negative. Physical Examination:    /60   Pulse 100   Temp 97.7 °F (36.5 °C) (Axillary)   Resp 18   Ht 4' 11\" (1.499 m)   Wt 172 lb (78 kg)   SpO2 98%   BMI 34.74 kg/m²    Physical Exam   Constitutional: She appears healthy. No distress. HENT:   Normal cephalic and Atraumatic   Eyes: Pupils are equal, round, and reactive to light. Neck: Thyroid normal. No JVD present. No neck adenopathy. No thyromegaly present. Cardiovascular: Normal rate, regular rhythm, intact distal pulses and normal pulses. Murmur heard. Pulmonary/Chest: Effort normal and breath sounds normal. She has no wheezes. She has no rales. She exhibits no tenderness. Abdominal: Soft. Bowel sounds are normal. There is no abdominal tenderness. Musculoskeletal:         General: No tenderness or edema. Normal range of motion. Cervical back: Normal range of motion and neck supple. AM    BUN 18 12/24/2022 06:00 AM    LABALBU 3.5 12/24/2022 06:00 AM    CREATININE 0.77 12/24/2022 06:00 AM    CALCIUM 8.4 12/24/2022 06:00 AM    GFRAA >60.0 10/17/2022 06:35 AM    LABGLOM >60.0 12/24/2022 06:00 AM    GLUCOSE 106 12/24/2022 06:00 AM     Magnesium:    Lab Results   Component Value Date/Time    MG 2.2 12/18/2022 08:30 PM     Troponin:    Lab Results   Component Value Date/Time    TROPONINI 0.096 12/18/2022 06:45 AM        Active Hospital Problems    Diagnosis Date Noted    COVID-19 [U07.1] 12/23/2022     Priority: Medium    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 no respiratory distress  Saddle PE- on Lovenox. - probably provoked from [place] but also w underlying clotting disorder from cancer. Will benefit from long term anticoagulation. B/L DVT  Right side extensive Thrombus -s/p RLE DVT Thrombectomy. Trop mildly elevated - will give low zuniga BB. Hold ASA due to mild Thrombocytopenia. Hypokalemia- replace iv and PO. Echo LVEF 55%  Near syncope yesterday - BP is on low side but she is on tachy side and with recent [place] ischemia she will benefit form low dose BB. We will change to Q. DC planning - SNF when precert complete.  OK to DC from CV        Electronically signed by Sola Elias MD on 12/24/2022 at 8:24 AM

## 2022-12-24 NOTE — PROGRESS NOTES
Neurology Follow up    SUBJECTIVE: Patient still in isolation and history and evaluation obtained from the nurse. Patient is remained stable without any confusional episodes and nonfocal otherwise no fever is reported. No seizures are reported.   EEG was completed    Exam unchanged     Patient feeling much better today and remained stable no session of any seizure  Current Facility-Administered Medications   Medication Dose Route Frequency Provider Last Rate Last Admin    trimethobenzamide (TIGAN) injection 200 mg  200 mg IntraMUSCular Q6H PRN Quentin Valle Sedar, DO   200 mg at 12/23/22 0353    metoprolol succinate (TOPROL XL) extended release tablet 25 mg  25 mg Oral Nightly Kathleen Lomas MD   25 mg at 12/23/22 2200    apixaban (ELIQUIS) tablet 10 mg  10 mg Oral BID Kathleen Lomas MD   10 mg at 12/24/22 1051    Followed by    Magali Osullivan ON 12/29/2022] apixaban (ELIQUIS) tablet 5 mg  5 mg Oral BID Kathleen Lomas MD        sodium chloride flush 0.9 % injection 5-40 mL  5-40 mL IntraVENous 2 times per day Kathleen Lomas MD   10 mL at 12/23/22 2146    sodium chloride flush 0.9 % injection 5-40 mL  5-40 mL IntraVENous PRN Kathleen Lomas MD        0.9 % sodium chloride infusion   IntraVENous PRN Kathleen Lomas MD        sodium chloride flush 0.9 % injection 5-40 mL  5-40 mL IntraVENous 2 times per day Kathleen Lomas MD   5 mL at 12/22/22 2127    sodium chloride flush 0.9 % injection 5-40 mL  5-40 mL IntraVENous PRN Kathleen Lomas MD        0.9 % sodium chloride infusion   IntraVENous PRN Kathleen Lomas MD        ondansetron [facility]) injection 4 mg  4 mg IntraVENous Q6H PRN Glenn Arceoiday, DO        sodium chloride flush 0.9 % injection 5-40 mL  5-40 mL IntraVENous 2 times per day SANDEEP Martinez-ACACIA   10 mL at 12/22/22 2127    sodium chloride flush 0.9 % injection 5-40 mL  5-40 mL IntraVENous PRN Glenys Brevinh, PA-C        0.9 % sodium chloride infusion   IntraVENous PRN Glenys Sharma, PA-C        sodium chloride flush 0.9 % injection 5-40 mL  5-40 mL IntraVENous 2 times per day Fawad Velasco MD   10 mL at 12/22/22 2127    sodium chloride flush 0.9 % injection 5-40 mL  5-40 mL IntraVENous PRN Fawad Velasco MD        0.9 % sodium chloride infusion   IntraVENous PRN Fawad Velasco MD        ondansetron (ZOFRAN-ODT) disintegrating tablet 4 mg  4 mg Oral Q8H PRN Fawad Velasco MD   4 mg at 12/24/22 1100    Or    [facility] 4 mg  4 mg IntraVENous Q6H PRN Fawad Velasco MD   4 mg at 12/22/22 2127    polyethylene glycol (GLYCOLAX) packet 17 g  17 g Oral Daily PRN Fawad Velasco MD        acetaminophen (TYLENOL) tablet 650 mg  650 mg Oral Q6H PRN Fawad Velasco MD        Or    acetaminophen (TYLENOL) suppository 650 mg  650 mg Rectal Q6H PRN Fawad Velasco MD           PHYSICAL EXAM:    /62   Pulse (!) 105   Temp 98.1 °F (36.7 °C) (Oral)   Resp 18   Ht 4' 11\" (1.499 m)   Wt 172 lb (78 kg)   SpO2 100%   BMI 34.74 kg/m²    General Appearance:      Skin:  normal  CVS - Normal sounds, No murmurs , No carotid Bruits  RS -CTA  Abdomen Soft, BS present  Review of Systems   Unable to perform ROS: Other    Review of system done through the nurse and has no reports of anything except some shortness of breath    Mental Status Exam: Patient remains oriented according to the nurse            Level of Alertness:   awake            Orientation:   person, place,  Funduscopic Exam:     Cranial Nerves            Cranial nerve III           Pupils:  equal, round, reactive to light      Cranial nerves III, IV, VI           Extraocular Movements: intact        Motor: Moves all her extremities to commands according to the nurse  Drift:  absent  Motor exam is symmetrical 5 out of 5 all extremities bilaterally  Tone:  normal  Abnormal Movements:  absent            Sensory: Deferred                 Vibration                         Touch            Proprioception                 Coordination: Deferred Gait:                       Casual:            Reflexes: Deferred             Deep Tendon Reflexes:             Reflexes are 2 +             Plantar response:                Right:  downgoing               Left:  downgoing    Vascular:  Cardiac Exam:  normal       Patient examined and has poor recall of events  XR PELVIS (1-2 VIEWS)    Result Date: 12/18/2022  EXAMINATION: ONE XRAY VIEW OF THE PELVIS 12/18/2022 10:04 am COMPARISON: None. HISTORY: ORDERING SYSTEM PROVIDED HISTORY: fall TECHNOLOGIST PROVIDED HISTORY: Reason for exam:->fall What reading provider will be dictating this exam?->CRC FINDINGS: No acute fracture or malalignment. Mild degenerative changes of the hips. Degenerative changes of the visualized spine. Surgical clips project over the left pelvis. The bladder is opacified with excreted IV contrast.     No acute fracture or malalignment. CT HEAD WO CONTRAST    Result Date: 12/18/2022  EXAMINATION: CT OF THE HEAD WITHOUT CONTRAST  12/18/2022 8:30 am TECHNIQUE: CT of the head was performed without the administration of intravenous contrast. Automated exposure control, iterative reconstruction, and/or weight based adjustment of the mA/kV was utilized to reduce the radiation dose to as low as reasonably achievable. COMPARISON: None. HISTORY: ORDERING SYSTEM PROVIDED HISTORY: syncope, fall TECHNOLOGIST PROVIDED HISTORY: Reason for exam:->syncope, fall Has a \"code stroke\" or \"stroke alert\" been called? ->No Decision Support Exception - unselect if not a suspected or confirmed emergency medical condition->Emergency Medical Condition ([place]) What reading provider will be dictating this exam?->CRC FINDINGS: BRAIN/VENTRICLES: There is no acute intracranial hemorrhage, mass effect or midline shift. No abnormal extra-axial fluid collection. The gray-white differentiation is maintained without evidence of an acute infarct. There is no evidence of hydrocephalus.  The ventricles, cisterns and sulci are prominent consistent with atrophy. There is decreased attenuation within the periventricular white matter consistent with periventricular leukomalacia. ORBITS: The visualized portion of the orbits demonstrate no acute abnormality. SINUSES: The visualized paranasal sinuses and mastoid air cells demonstrate no acute abnormality. There is mild mucosal thickening seen within the right ethmoid air cells. SOFT TISSUES/SKULL:  No acute abnormality of the visualized skull or soft tissues. 1. There is no acute intracranial abnormality. Specifically, there is no intracranial hemorrhage. 2. Atrophy and periventricular leukomalacia,     CTA CHEST W WO CONTRAST PE Eval    Result Date: 12/18/2022  EXAMINATION: CTA OF THE CHEST WITH AND WITHOUT CONTRAST 12/18/2022 8:30 am TECHNIQUE: CTA of the chest was performed before and after the administration of intravenous contrast.  Multiplanar reformatted images are provided for review. MIP images are provided for review. Automated exposure control, iterative reconstruction, and/or weight based adjustment of the mA/kV was utilized to reduce the radiation dose to as low as reasonably achievable. COMPARISON: None. HISTORY: ORDERING SYSTEM PROVIDED HISTORY: PE, syncope, hx of cancer, rule out PE; cough evaluate for infiltrate TECHNOLOGIST PROVIDED HISTORY: Reason for exam:->PE Reason for exam:->syncope, hx of cancer, rule out PE; cough evaluate for infiltrate Decision Support Exception - unselect if not a suspected or confirmed emergency medical condition->Emergency Medical Condition ([place]) What reading provider will be dictating this exam?->CRC FINDINGS: Pulmonary Arteries: Pulmonary arteries are adequately opacified for evaluation. There is abnormal filling defect seen within the main pulmonary arteries bilaterally with slight saddle embolism identified.   There is opacification identified within all the segmental and subsegmental branches most pronounced within the right middle and lower lobe pulmonary arteries. Emboli as well seen within the lingular branch. There is evidence of right heart strain within RV LV ratio of 1.6. There is prominence identified of the main pulmonary artery. Mediastinum: No evidence of mediastinal lymphadenopathy. The heart and pericardium demonstrate no acute abnormality. There is no acute abnormality of the thoracic aorta. Lungs/pleura: Mild ground-glass opacity identified in the right lower lobe suggesting possibly an area of infarction given the extensive thrombus within the pulmonary arteries on the right. Atelectatic change cannot be excluded. There is minimal atelectatic changes seen within the lingula. No definite pleural effusion or pneumothorax. Upper Abdomen: Limited images of the upper abdomen are unremarkable. Soft Tissues/Bones: Abnormal increased density identified within the subcutaneous tissues surrounding the luke catheter on the left anterior chest wall to suggest extravasation of intravenous contrast.     Extensive pulmonary emboli with saddle embolism identified. There is evidence of right heart strain within RV LV ratio of 1.6. Mild ground-glass seen at the right lung base in which early infarction cannot be excluded. There is increased density seen surrounding the left luke catheter in the subcutaneous tissues to suggest extravasation of intravenous contrast. Findings were discussed with Dr. Russ Garcia at time of interpretation of the examination. CT CERVICAL SPINE WO CONTRAST    Result Date: 12/18/2022  EXAMINATION: CT OF THE CERVICAL SPINE WITHOUT CONTRAST 12/18/2022 8:30 am TECHNIQUE: CT of the cervical spine was performed without the administration of intravenous contrast. Multiplanar reformatted images are provided for review. Automated exposure control, iterative reconstruction, and/or weight based adjustment of the mA/kV was utilized to reduce the radiation dose to as low as reasonably achievable. COMPARISON: None.  HISTORY: ORDERING SYSTEM PROVIDED HISTORY: syncope, fall TECHNOLOGIST PROVIDED HISTORY: Reason for exam:->syncope, fall Decision Support Exception - unselect if not a suspected or confirmed emergency medical condition->Emergency Medical Condition ([place]) What reading provider will be dictating this exam?->CRC FINDINGS: The ring of C1 is intact as is the dense. There is no compression fracture of the cervical spine. No jumped or perched facet is noted. Multilevel degenerative disc and degenerative joint disease is noted. There are posterior degenerative endplate spurs seen at the C3-4 and C6-7 levels. There is mild central canal stenosis at the C6-7 level and mild bilateral neural foraminal narrowing. The prevertebral soft tissues are unremarkable. The airway is widely patent. Images through the lung apices are negative for a pneumothorax. 1. There is no acute compression fracture or subluxation of the cervical spine. 2. Multilevel degenerative disc and degenerative joint disease. .     CT THORACIC SPINE WO CONTRAST    Result Date: 12/18/2022  EXAMINATION: CT OF THE THORACIC SPINE WITHOUT CONTRAST  12/18/2022 8:30 am: TECHNIQUE: CT of the thoracic spine was performed without the administration of intravenous contrast. Multiplanar reformatted images are provided for review. Automated exposure control, iterative reconstruction, and/or weight based adjustment of the mA/kV was utilized to reduce the radiation dose to as low as reasonably achievable. COMPARISON: None. HISTORY: ORDERING SYSTEM PROVIDED HISTORY: syncope, fall TECHNOLOGIST PROVIDED HISTORY: Reason for exam:->syncope, fall What reading provider will be dictating this exam?->CRC FINDINGS: BONES/ALIGNMENT: There is no acute compression fracture of the thoracic spine. There is dextroscoliosis of the thoracic spine. Mild multilevel degenerative disc and degenerative joint disease is noted.   There are large anterior and lateral degenerative endplate spurs seen throughout the course of the thoracic spine. .  There is no osseous lesion. SOFT TISSUES: No paraspinal mass is seen. 1. There is no acute compression fracture of the thoracic spine. 2. Dextroscoliosis 3. Multilevel degenerative changes. CT LUMBAR SPINE WO CONTRAST    Result Date: 12/18/2022  EXAMINATION: CT OF THE LUMBAR SPINE WITHOUT CONTRAST  12/18/2022 TECHNIQUE: CT of the lumbar spine was performed without the administration of intravenous contrast. Multiplanar reformatted images are provided for review. Adjustment of mA and/or kV according to patient size was utilized. Automated exposure control, iterative reconstruction, and/or weight based adjustment of the mA/kV was utilized to reduce the radiation dose to as low as reasonably achievable. COMPARISON: None. HISTORY: ORDERING SYSTEM PROVIDED HISTORY: syncope, fall TECHNOLOGIST PROVIDED HISTORY: Reason for exam:->syncope, fall Decision Support Exception - unselect if not a suspected or confirmed emergency medical condition->Emergency Medical Condition ([place]) What reading provider will be dictating this exam?->CRC FINDINGS: Vertebral alignment is normal.  Mild disc space narrowing and discovertebral degenerative changes are identified. Facet arthritis is also seen. No acute vertebral fracture is visualized. No spondylolysis is noted on the right or left. The sacroiliac joints are intact. The paraspinal soft tissues appear unremarkable. The urinary bladder is dilated. There are mild right hydronephrosis and hydroureter. There is colonic diverticulosis. There are postsurgical changes at the colorectal junction. Disc space narrowing and degenerative changes. No acute vertebral fracture or subluxation. XR CHEST PORTABLE    Result Date: 12/18/2022  EXAMINATION: ONE XRAY VIEW OF THE CHEST 12/18/2022 9:59 am COMPARISON: The previous study performed 11/03/2022.  HISTORY: ORDERING SYSTEM PROVIDED HISTORY: fall, syncope TECHNOLOGIST PROVIDED HISTORY: Reason for exam:->fall, syncope What reading provider will be dictating this exam?->CRC FINDINGS: There is a large, ill-defined density overlying the left shoulder and mid to upper lateral left hemithorax. This may be external to the patient. It limits full evaluation of the surrounding soft tissue and osseous structures. There is no evidence of acute consolidation or infiltrate. No active pleural disease is seen. The cardiac silhouette and mediastinal structures are unremarkable. The tracheobronchial tree is midline and patent. A left-sided MediPort catheter is again noted, with the tip in the proximal SVC. There is again noted to be a thoracic scoliosis, with convexity to the right. Osteo-degenerative changes are again noted involving the thoracic spine. Large, ill-defined density overlying the left shoulder and mid to upper lateral left hemithorax, which limits full evaluation of the immediate surrounding soft tissue and osseous structures. No acute consolidation or infiltrate. Repeat chest radiograph can be performed further evaluation. US DUP LOWER EXTREMITIES BILATERAL VENOUS    Result Date: 12/19/2022  EXAMINATION:  BILATERAL LOWER EXTREMITY VENOUS DUPLEX CLINICAL HISTORY:  SAGITTAL PULMONARY EMBOLISM COMPARISONS:  NONE AVAILABLE TECHNIQUE:  B-mode, color flow and spectral Doppler FINDINGS:  Bilateral lower extremity venous systems were interrogated extending from the common femoral vein to its adjacent segments of the proximal popliteal vein. On the right side the common femoral vein, femoral vein as well as the popliteal vein and into the peroneal vein there is an adequate compression augmentation as well as phasic flow consistent with the thrombus. Left lower extremity there is abnormal compression augmentation as well as phasic flow posterior tibial and into the peroneal veins. RIGHT LOWER EXTREMITY EXTENSIVE THE THIGH DEEP VEIN THROMBOSIS EXTENDING INTO THE CALF.  LEFT LOWER EXTREMITY EDEMA DISTAL DEEP VEIN THROMBOSIS INVOLVING THE CALF REGION. Recent Labs     12/22/22  0608 12/22/22  1752 12/24/22  0600   WBC  --   --  5.2   HGB 9.6* 9.4* 8.3*   PLT  --   --  247       Recent Labs     12/22/22  0600 12/24/22  0600   * 131*   K 3.7 3.5   CL 95 93*   CO2 24 24   BUN 19 18   CREATININE 0.76 0.77   GLUCOSE 94 106*       No results for input(s): BILITOT, ALKPHOS, AST, ALT in the last 72 hours. Lab Results   Component Value Date/Time    PROTIME 15.4 12/19/2022 07:00 AM    INR 1.2 12/19/2022 07:00 AM     No results found for: LITHIUM, DILFRTOT, VALPROATE    ASSESSMENT AND PLAN  Questionable seizure though I truly feel that the patient reported that she was scared and that she was shaking in the CT room. EEG is entirely normal and therefore we have discontinued her anticonvulsants and does not require any intervention unless something new at develops. Patient is remained nonfocal and she is COVID-positive and in isolation and therefore evaluations done through the nurse. Patient may be transferred to regular medical floor    12/22/22  Questionable seizure however feel that this was related to some anxiety with having c scan. EEG is normal; anticonvulsants discontinued   Remains nonfocal  Continues on COVID isolation   Patient may discharge from neurology standpoint   Will follow from a distance     12/24  Questionable seizures though I truly feel that this was a convulsive syncope. Patient remains nonfocal and COVID with negative exam.  We will follow at a distance if needed. Gabino Peres MD, Florencia Vogt, American Board of Psychiatry & Neurology  Board Certified in Vascular Neurology  Board Certified in Neuromuscular Medicine  Certified in [address]

## 2022-12-24 NOTE — PROGRESS NOTES
Neurology Follow up    SUBJECTIVE: Patient still in isolation and history and evaluation obtained from the nurse. Patient is remained stable without any confusional episodes and nonfocal otherwise no fever is reported. No seizures are reported.   EEG was completed    Exam unchanged     Patient feeling much better today and remained stable no session of any seizure  Current Facility-Administered Medications   Medication Dose Route Frequency Provider Last Rate Last Admin    trimethobenzamide (TIGAN) injection 200 mg  200 mg IntraMUSCular Q6H PRN Alberteen Bjornstad Sedar, DO   200 mg at 12/23/22 0353    metoprolol succinate (TOPROL XL) extended release tablet 25 mg  25 mg Oral Nightly Willa Menjivar MD   25 mg at 12/23/22 2200    apixaban (ELIQUIS) tablet 10 mg  10 mg Oral BID Willa Menjivar MD   10 mg at 12/24/22 1051    Followed by    Grisel Christianson ON 12/29/2022] apixaban (ELIQUIS) tablet 5 mg  5 mg Oral BID Willa Menjivar MD        sodium chloride flush 0.9 % injection 5-40 mL  5-40 mL IntraVENous 2 times per day Willa Menjivar MD   10 mL at 12/23/22 2146    sodium chloride flush 0.9 % injection 5-40 mL  5-40 mL IntraVENous PRN Willa Menjivar MD        0.9 % sodium chloride infusion   IntraVENous PRN Willa Menjivar MD        sodium chloride flush 0.9 % injection 5-40 mL  5-40 mL IntraVENous 2 times per day Willa Menjivar MD   5 mL at 12/22/22 2127    sodium chloride flush 0.9 % injection 5-40 mL  5-40 mL IntraVENous PRN Willa Menjivar MD        0.9 % sodium chloride infusion   IntraVENous PRN Willa Menjivar MD        ondansetron [facility]) injection 4 mg  4 mg IntraVENous Q6H PRN Glenn J Holiday, DO        sodium chloride flush 0.9 % injection 5-40 mL  5-40 mL IntraVENous 2 times per day Estelle George PA-C   10 mL at 12/22/22 2127    sodium chloride flush 0.9 % injection 5-40 mL  5-40 mL IntraVENous PRN Estelle George PA-C        0.9 % sodium chloride infusion   IntraVENous PRN Estelle George PA-C        sodium chloride flush 0.9 % injection 5-40 mL  5-40 mL IntraVENous 2 times per day Adilene Foy MD   10 mL at 12/22/22 2127    sodium chloride flush 0.9 % injection 5-40 mL  5-40 mL IntraVENous PRN Adilene Foy MD        0.9 % sodium chloride infusion   IntraVENous PRN Adilene Foy MD        ondansetron (ZOFRAN-ODT) disintegrating tablet 4 mg  4 mg Oral Q8H PRN Adilene Foy MD   4 mg at 12/24/22 1100    Or    [facility]ron [facility]) [facility] 4 mg  4 mg IntraVENous Q6H PRN Adilene Foy MD   4 mg at 12/22/22 2127    polyethylene glycol (GLYCOLAX) packet 17 g  17 g Oral Daily PRN Adilene Foy MD        acetaminophen (TYLENOL) tablet 650 mg  650 mg Oral Q6H PRN Adilene Foy MD        Or    acetaminophen (TYLENOL) suppository 650 mg  650 mg Rectal Q6H PRN Adilene Foy MD           PHYSICAL EXAM:    /62   Pulse (!) 105   Temp 98.1 °F (36.7 °C) (Oral)   Resp 18   Ht 4' 11\" (1.499 m)   Wt 172 lb (78 kg)   SpO2 100%   BMI 34.74 kg/m²    General Appearance:      Skin:  normal  CVS - Normal sounds, No murmurs , No carotid Bruits  RS -CTA  Abdomen Soft, BS present  Review of Systems   Unable to perform ROS: Other    Review of system done through the nurse and has no reports of anything except some shortness of breath    Mental Status Exam: Patient remains oriented according to the nurse            Level of Alertness:   awake            Orientation:   person, place,  Funduscopic Exam:     Cranial Nerves            Cranial nerve III           Pupils:  equal, round, reactive to light      Cranial nerves III, IV, VI           Extraocular Movements: intact        Motor: Moves all her extremities to commands according to the nurse  Drift:  absent  Motor exam is symmetrical 5 out of 5 all extremities bilaterally  Tone:  normal  Abnormal Movements:  absent            Sensory: Deferred                 Vibration                         Touch            Proprioception                 Coordination: Deferred Gait:                       Casual:            Reflexes: Deferred             Deep Tendon Reflexes:             Reflexes are 2 +             Plantar response:                Right:  downgoing               Left:  downgoing    Vascular:  Cardiac Exam:  normal       Patient examined and has poor recall of events  XR PELVIS (1-2 VIEWS)    Result Date: 12/18/2022  EXAMINATION: ONE XRAY VIEW OF THE PELVIS 12/18/2022 10:04 am COMPARISON: None. HISTORY: ORDERING SYSTEM PROVIDED HISTORY: fall TECHNOLOGIST PROVIDED HISTORY: Reason for exam:->fall What reading provider will be dictating this exam?->CRC FINDINGS: No acute fracture or malalignment. Mild degenerative changes of the hips. Degenerative changes of the visualized spine. Surgical clips project over the left pelvis. The bladder is opacified with excreted IV contrast.     No acute fracture or malalignment. CT HEAD WO CONTRAST    Result Date: 12/18/2022  EXAMINATION: CT OF THE HEAD WITHOUT CONTRAST  12/18/2022 8:30 am TECHNIQUE: CT of the head was performed without the administration of intravenous contrast. Automated exposure control, iterative reconstruction, and/or weight based adjustment of the mA/kV was utilized to reduce the radiation dose to as low as reasonably achievable. COMPARISON: None. HISTORY: ORDERING SYSTEM PROVIDED HISTORY: syncope, fall TECHNOLOGIST PROVIDED HISTORY: Reason for exam:->syncope, fall Has a \"code stroke\" or \"stroke alert\" been called? ->No Decision Support Exception - unselect if not a suspected or confirmed emergency medical condition->Emergency Medical Condition ([place]) What reading provider will be dictating this exam?->CRC FINDINGS: BRAIN/VENTRICLES: There is no acute intracranial hemorrhage, mass effect or midline shift. No abnormal extra-axial fluid collection. The gray-white differentiation is maintained without evidence of an acute infarct. There is no evidence of hydrocephalus.  The ventricles, cisterns and sulci are prominent consistent with atrophy. There is decreased attenuation within the periventricular white matter consistent with periventricular leukomalacia. ORBITS: The visualized portion of the orbits demonstrate no acute abnormality. SINUSES: The visualized paranasal sinuses and mastoid air cells demonstrate no acute abnormality. There is mild mucosal thickening seen within the right ethmoid air cells. SOFT TISSUES/SKULL:  No acute abnormality of the visualized skull or soft tissues. 1. There is no acute intracranial abnormality. Specifically, there is no intracranial hemorrhage. 2. Atrophy and periventricular leukomalacia,     CTA CHEST W WO CONTRAST PE Eval    Result Date: 12/18/2022  EXAMINATION: CTA OF THE CHEST WITH AND WITHOUT CONTRAST 12/18/2022 8:30 am TECHNIQUE: CTA of the chest was performed before and after the administration of intravenous contrast.  Multiplanar reformatted images are provided for review. MIP images are provided for review. Automated exposure control, iterative reconstruction, and/or weight based adjustment of the mA/kV was utilized to reduce the radiation dose to as low as reasonably achievable. COMPARISON: None. HISTORY: ORDERING SYSTEM PROVIDED HISTORY: PE, syncope, hx of cancer, rule out PE; cough evaluate for infiltrate TECHNOLOGIST PROVIDED HISTORY: Reason for exam:->PE Reason for exam:->syncope, hx of cancer, rule out PE; cough evaluate for infiltrate Decision Support Exception - unselect if not a suspected or confirmed emergency medical condition->Emergency Medical Condition ([place]) What reading provider will be dictating this exam?->CRC FINDINGS: Pulmonary Arteries: Pulmonary arteries are adequately opacified for evaluation. There is abnormal filling defect seen within the main pulmonary arteries bilaterally with slight saddle embolism identified.   There is opacification identified within all the segmental and subsegmental branches most pronounced within the right middle and lower lobe pulmonary arteries. Emboli as well seen within the lingular branch. There is evidence of right heart strain within RV LV ratio of 1.6. There is prominence identified of the main pulmonary artery. Mediastinum: No evidence of mediastinal lymphadenopathy. The heart and pericardium demonstrate no acute abnormality. There is no acute abnormality of the thoracic aorta. Lungs/pleura: Mild ground-glass opacity identified in the right lower lobe suggesting possibly an area of infarction given the extensive thrombus within the pulmonary arteries on the right. Atelectatic change cannot be excluded. There is minimal atelectatic changes seen within the lingula. No definite pleural effusion or pneumothorax. Upper Abdomen: Limited images of the upper abdomen are unremarkable. Soft Tissues/Bones: Abnormal increased density identified within the subcutaneous tissues surrounding the luke catheter on the left anterior chest wall to suggest extravasation of intravenous contrast.     Extensive pulmonary emboli with saddle embolism identified. There is evidence of right heart strain within RV LV ratio of 1.6. Mild ground-glass seen at the right lung base in which early infarction cannot be excluded. There is increased density seen surrounding the left luke catheter in the subcutaneous tissues to suggest extravasation of intravenous contrast. Findings were discussed with Dr. Juve Motley at time of interpretation of the examination. CT CERVICAL SPINE WO CONTRAST    Result Date: 12/18/2022  EXAMINATION: CT OF THE CERVICAL SPINE WITHOUT CONTRAST 12/18/2022 8:30 am TECHNIQUE: CT of the cervical spine was performed without the administration of intravenous contrast. Multiplanar reformatted images are provided for review. Automated exposure control, iterative reconstruction, and/or weight based adjustment of the mA/kV was utilized to reduce the radiation dose to as low as reasonably achievable. COMPARISON: None.  HISTORY: ORDERING SYSTEM PROVIDED HISTORY: syncope, fall TECHNOLOGIST PROVIDED HISTORY: Reason for exam:->syncope, fall Decision Support Exception - unselect if not a suspected or confirmed emergency medical condition->Emergency Medical Condition ([place]) What reading provider will be dictating this exam?->CRC FINDINGS: The ring of C1 is intact as is the dense. There is no compression fracture of the cervical spine. No jumped or perched facet is noted. Multilevel degenerative disc and degenerative joint disease is noted. There are posterior degenerative endplate spurs seen at the C3-4 and C6-7 levels. There is mild central canal stenosis at the C6-7 level and mild bilateral neural foraminal narrowing. The prevertebral soft tissues are unremarkable. The airway is widely patent. Images through the lung apices are negative for a pneumothorax. 1. There is no acute compression fracture or subluxation of the cervical spine. 2. Multilevel degenerative disc and degenerative joint disease. .     CT THORACIC SPINE WO CONTRAST    Result Date: 12/18/2022  EXAMINATION: CT OF THE THORACIC SPINE WITHOUT CONTRAST  12/18/2022 8:30 am: TECHNIQUE: CT of the thoracic spine was performed without the administration of intravenous contrast. Multiplanar reformatted images are provided for review. Automated exposure control, iterative reconstruction, and/or weight based adjustment of the mA/kV was utilized to reduce the radiation dose to as low as reasonably achievable. COMPARISON: None. HISTORY: ORDERING SYSTEM PROVIDED HISTORY: syncope, fall TECHNOLOGIST PROVIDED HISTORY: Reason for exam:->syncope, fall What reading provider will be dictating this exam?->CRC FINDINGS: BONES/ALIGNMENT: There is no acute compression fracture of the thoracic spine. There is dextroscoliosis of the thoracic spine. Mild multilevel degenerative disc and degenerative joint disease is noted.   There are large anterior and lateral degenerative endplate spurs seen throughout the course of the thoracic spine. .  There is no osseous lesion. SOFT TISSUES: No paraspinal mass is seen. 1. There is no acute compression fracture of the thoracic spine. 2. Dextroscoliosis 3. Multilevel degenerative changes. CT LUMBAR SPINE WO CONTRAST    Result Date: 12/18/2022  EXAMINATION: CT OF THE LUMBAR SPINE WITHOUT CONTRAST  12/18/2022 TECHNIQUE: CT of the lumbar spine was performed without the administration of intravenous contrast. Multiplanar reformatted images are provided for review. Adjustment of mA and/or kV according to patient size was utilized. Automated exposure control, iterative reconstruction, and/or weight based adjustment of the mA/kV was utilized to reduce the radiation dose to as low as reasonably achievable. COMPARISON: None. HISTORY: ORDERING SYSTEM PROVIDED HISTORY: syncope, fall TECHNOLOGIST PROVIDED HISTORY: Reason for exam:->syncope, fall Decision Support Exception - unselect if not a suspected or confirmed emergency medical condition->Emergency Medical Condition ([place]) What reading provider will be dictating this exam?->CRC FINDINGS: Vertebral alignment is normal.  Mild disc space narrowing and discovertebral degenerative changes are identified. Facet arthritis is also seen. No acute vertebral fracture is visualized. No spondylolysis is noted on the right or left. The sacroiliac joints are intact. The paraspinal soft tissues appear unremarkable. The urinary bladder is dilated. There are mild right hydronephrosis and hydroureter. There is colonic diverticulosis. There are postsurgical changes at the colorectal junction. Disc space narrowing and degenerative changes. No acute vertebral fracture or subluxation. XR CHEST PORTABLE    Result Date: 12/18/2022  EXAMINATION: ONE XRAY VIEW OF THE CHEST 12/18/2022 9:59 am COMPARISON: The previous study performed 11/03/2022.  HISTORY: ORDERING SYSTEM PROVIDED HISTORY: fall, syncope TECHNOLOGIST PROVIDED HISTORY: Reason for exam:->fall, syncope What reading provider will be dictating this exam?->CRC FINDINGS: There is a large, ill-defined density overlying the left shoulder and mid to upper lateral left hemithorax. This may be external to the patient. It limits full evaluation of the surrounding soft tissue and osseous structures. There is no evidence of acute consolidation or infiltrate. No active pleural disease is seen. The cardiac silhouette and mediastinal structures are unremarkable. The tracheobronchial tree is midline and patent. A left-sided MediPort catheter is again noted, with the tip in the proximal SVC. There is again noted to be a thoracic scoliosis, with convexity to the right. Osteo-degenerative changes are again noted involving the thoracic spine. Large, ill-defined density overlying the left shoulder and mid to upper lateral left hemithorax, which limits full evaluation of the immediate surrounding soft tissue and osseous structures. No acute consolidation or infiltrate. Repeat chest radiograph can be performed further evaluation. US DUP LOWER EXTREMITIES BILATERAL VENOUS    Result Date: 12/19/2022  EXAMINATION:  BILATERAL LOWER EXTREMITY VENOUS DUPLEX CLINICAL HISTORY:  SAGITTAL PULMONARY EMBOLISM COMPARISONS:  NONE AVAILABLE TECHNIQUE:  B-mode, color flow and spectral Doppler FINDINGS:  Bilateral lower extremity venous systems were interrogated extending from the common femoral vein to its adjacent segments of the proximal popliteal vein. On the right side the common femoral vein, femoral vein as well as the popliteal vein and into the peroneal vein there is an adequate compression augmentation as well as phasic flow consistent with the thrombus. Left lower extremity there is abnormal compression augmentation as well as phasic flow posterior tibial and into the peroneal veins. RIGHT LOWER EXTREMITY EXTENSIVE THE THIGH DEEP VEIN THROMBOSIS EXTENDING INTO THE CALF.  LEFT LOWER EXTREMITY EDEMA DISTAL DEEP VEIN THROMBOSIS INVOLVING THE CALF REGION. Recent Labs     12/22/22  0608 12/22/22  1752 12/24/22  0600   WBC  --   --  5.2   HGB 9.6* 9.4* 8.3*   PLT  --   --  247       Recent Labs     12/22/22  0600 12/24/22  0600   * 131*   K 3.7 3.5   CL 95 93*   CO2 24 24   BUN 19 18   CREATININE 0.76 0.77   GLUCOSE 94 106*       No results for input(s): BILITOT, ALKPHOS, AST, ALT in the last 72 hours. Lab Results   Component Value Date/Time    PROTIME 15.4 12/19/2022 07:00 AM    INR 1.2 12/19/2022 07:00 AM     No results found for: LITHIUM, DILFRTOT, VALPROATE    ASSESSMENT AND PLAN  Questionable seizure though I truly feel that the patient reported that she was scared and that she was shaking in the CT room. EEG is entirely normal and therefore we have discontinued her anticonvulsants and does not require any intervention unless something new at develops. Patient is remained nonfocal and she is COVID-positive and in isolation and therefore evaluations done through the nurse. Patient may be transferred to regular medical floor    12/22/22  Questionable seizure however feel that this was related to some anxiety with having c scan. EEG is normal; anticonvulsants discontinued   Remains nonfocal  Continues on COVID isolation   Patient may discharge from neurology standpoint   Will follow from a distance     12/24  Questionable seizures though I truly feel that this was a convulsive syncope. Patient remains nonfocal and COVID with negative exam.  We will follow at a distance if needed. Gabino Ivan MD, [place], American Board of Psychiatry & Neurology  Board Certified in Vascular Neurology  Board Certified in Neuromuscular Medicine  Certified in . [address]

## 2022-12-25 LAB
ALBUMIN SERPL-MCNC: 3.3 G/DL (ref 3.5–4.6)
ANION GAP SERPL CALCULATED.3IONS-SCNC: 11 MEQ/L (ref 9–15)
BASOPHILS ABSOLUTE: 0 K/UL (ref 0–0.2)
BASOPHILS RELATIVE PERCENT: 0.7 %
BUN BLDV-MCNC: 16 MG/DL (ref 8–23)
CALCIUM SERPL-MCNC: 8.6 MG/DL (ref 8.5–9.9)
CHLORIDE BLD-SCNC: 94 MEQ/L (ref 95–107)
CO2: 25 MEQ/L (ref 20–31)
CREAT SERPL-MCNC: 0.93 MG/DL (ref 0.5–0.9)
EOSINOPHILS ABSOLUTE: 0 K/UL (ref 0–0.7)
EOSINOPHILS RELATIVE PERCENT: 0.6 %
GFR SERPL CREATININE-BSD FRML MDRD: >60 ML/MIN/{1.73_M2}
GLUCOSE BLD-MCNC: 102 MG/DL (ref 70–99)
HCT VFR BLD CALC: 23.1 % (ref 37–47)
HEMOGLOBIN: 8.1 G/DL (ref 12–16)
LYMPHOCYTES ABSOLUTE: 2 K/UL (ref 1–4.8)
LYMPHOCYTES RELATIVE PERCENT: 37.7 %
MCH RBC QN AUTO: 30.8 PG (ref 27–31.3)
MCHC RBC AUTO-ENTMCNC: 35.1 % (ref 33–37)
MCV RBC AUTO: 87.8 FL (ref 79.4–94.8)
MONOCYTES ABSOLUTE: 0.9 K/UL (ref 0.2–0.8)
MONOCYTES RELATIVE PERCENT: 17.8 %
NEUTROPHILS ABSOLUTE: 2.3 K/UL (ref 1.4–6.5)
NEUTROPHILS RELATIVE PERCENT: 43.2 %
PDW BLD-RTO: 16.4 % (ref 11.5–14.5)
PHOSPHORUS: 2.9 MG/DL (ref 2.3–4.8)
PLATELET # BLD: 267 K/UL (ref 130–400)
POTASSIUM SERPL-SCNC: 3.7 MEQ/L (ref 3.4–4.9)
RBC # BLD: 2.63 M/UL (ref 4.2–5.4)
SODIUM BLD-SCNC: 130 MEQ/L (ref 135–144)
WBC # BLD: 5.3 K/UL (ref 4.8–10.8)

## 2022-12-25 PROCEDURE — 2580000003 HC RX 258: Performed by: INTERNAL MEDICINE

## 2022-12-25 PROCEDURE — 80069 RENAL FUNCTION PANEL: CPT

## 2022-12-25 PROCEDURE — 99232 SBSQ HOSP IP/OBS MODERATE 35: CPT | Performed by: INTERNAL MEDICINE

## 2022-12-25 PROCEDURE — 6360000002 HC RX W HCPCS: Performed by: INTERNAL MEDICINE

## 2022-12-25 PROCEDURE — 85025 COMPLETE CBC W/AUTO DIFF WBC: CPT

## 2022-12-25 PROCEDURE — 2060000000 HC ICU INTERMEDIATE R&B

## 2022-12-25 PROCEDURE — 6370000000 HC RX 637 (ALT 250 FOR IP): Performed by: INTERNAL MEDICINE

## 2022-12-25 PROCEDURE — 6370000000 HC RX 637 (ALT 250 FOR IP): Performed by: FAMILY MEDICINE

## 2022-12-25 PROCEDURE — 36592 COLLECT BLOOD FROM PICC: CPT

## 2022-12-25 RX ADMIN — Medication 10 ML: at 08:15

## 2022-12-25 RX ADMIN — APIXABAN 10 MG: 5 TABLET, FILM COATED ORAL at 08:14

## 2022-12-25 RX ADMIN — ONDANSETRON 4 MG: 2 INJECTION INTRAMUSCULAR; INTRAVENOUS at 17:15

## 2022-12-25 RX ADMIN — Medication 10 ML: at 08:16

## 2022-12-25 RX ADMIN — APIXABAN 10 MG: 5 TABLET, FILM COATED ORAL at 20:04

## 2022-12-25 RX ADMIN — METOPROLOL SUCCINATE 25 MG: 25 TABLET, FILM COATED, EXTENDED RELEASE ORAL at 20:05

## 2022-12-25 RX ADMIN — ACETAMINOPHEN 650 MG: 325 TABLET ORAL at 17:14

## 2022-12-25 ASSESSMENT — ENCOUNTER SYMPTOMS
WHEEZING: 0
CHEST TIGHTNESS: 0
GASTROINTESTINAL NEGATIVE: 1
COUGH: 0
RESPIRATORY NEGATIVE: 1
EYES NEGATIVE: 1
BLOOD IN STOOL: 0
SHORTNESS OF BREATH: 0
NAUSEA: 0
STRIDOR: 0

## 2022-12-25 ASSESSMENT — PAIN SCALES - GENERAL
PAINLEVEL_OUTOF10: 0
PAINLEVEL_OUTOF10: 3
PAINLEVEL_OUTOF10: 0

## 2022-12-25 ASSESSMENT — PAIN DESCRIPTION - LOCATION: LOCATION: ABDOMEN

## 2022-12-25 NOTE — PROGRESS NOTES
Hospitalist Progress Note      Date of Admission: 12/18/2022  Chief Complaint:    Chief Complaint   Patient presents with    Fall     Pt fell in the bathroom, + LOC, + Head Injury, - Thinners. Pt does not remember the event. Subjective:  No complaints, nausea or chest pain or headache    Medications:    Infusion Medications    sodium chloride      sodium chloride      sodium chloride      sodium chloride       Scheduled Medications    metoprolol succinate  25 mg Oral Nightly    apixaban  10 mg Oral BID    Followed by    Julianne Henriquez ON 12/29/2022] apixaban  5 mg Oral BID    sodium chloride flush  5-40 mL IntraVENous 2 times per day    sodium chloride flush  5-40 mL IntraVENous 2 times per day    sodium chloride flush  5-40 mL IntraVENous 2 times per day    sodium chloride flush  5-40 mL IntraVENous 2 times per day     PRN Meds: trimethobenzamide, sodium chloride flush, sodium chloride, sodium chloride flush, sodium chloride, ondansetron, sodium chloride flush, sodium chloride, sodium chloride flush, sodium chloride, ondansetron **OR** ondansetron, polyethylene glycol, acetaminophen **OR** acetaminophen    Intake/Output Summary (Last 24 hours) at 12/25/2022 1301  Last data filed at 12/25/2022 0754  Gross per 24 hour   Intake --   Output 900 ml   Net -900 ml       Exam:  BP (!) 98/52   Pulse 97   Temp 98.1 °F (36.7 °C) (Oral)   Resp 18   Ht 4' 11\" (1.499 m)   Wt 172 lb (78 kg)   SpO2 100%   BMI 34.74 kg/m²   Head: Normocephalic, atraumatic  Sclera clear  Neck JVD flat  Lungs: Normal effort of breathing    Labs:   Recent Labs     12/22/22  1752 12/24/22  0600 12/25/22  0600   WBC  --  5.2 5.3   HGB 9.4* 8.3* 8.1*   HCT 29.0* 23.6* 23.1*   PLT  --  247 267       Recent Labs     12/24/22  0600 12/25/22  0600   * 130*   K 3.5 3.7   CL 93* 94*   CO2 24 25   BUN 18 16   CREATININE 0.77 0.93*   CALCIUM 8.4* 8.6   PHOS 2.5 2.9       No results for input(s): INR in the last 72 hours.     No results for input(s): Arielle Crawley in the last 72 hours. Radiology:  US CAROTID ARTERY BILATERAL   Final Result   Mild scattered atherosclerosis without obstructive lesion. Areas of intimal thickening. Bilateral ICAs less than 50% stenosis. IR CONTRAST INJECTION  EXISTING CV ACCESS DEVICE EVALUATION W FLUORO   Final Result   Left [id number] with an internal jugular vein catheter terminating at the   midline with its tip directed toward the right near the confluence of the   innominate veins. Redundancy of the catheter in the neck. Good blood return when aspirating the port but significant resistance when   flushing the port. Patent and intact catheter with no contrast extravasation. Suggestion of thrombus and or fibrin sheath at the distal end of the catheter. US DUP LOWER EXTREMITIES BILATERAL VENOUS   Final Result   RIGHT LOWER EXTREMITY EXTENSIVE THE THIGH DEEP VEIN THROMBOSIS EXTENDING INTO THE CALF. LEFT LOWER EXTREMITY EDEMA DISTAL DEEP VEIN THROMBOSIS INVOLVING THE CALF REGION. XR PELVIS (1-2 VIEWS)   Final Result   No acute fracture or malalignment. IR PICC WO SQ PORT/PUMP > 5 YEARS   Final Result   Successful ultrasound and fluoroscopy guided PICC placement. CTA CHEST W WO CONTRAST PE Eval   Final Result   Extensive pulmonary emboli with saddle embolism identified. There is   evidence of right heart strain within RV LV ratio of 1.6. Mild ground-glass   seen at the right lung base in which early infarction cannot be excluded. There is increased density seen surrounding the left luke catheter in the   subcutaneous tissues to suggest extravasation of intravenous contrast.      Findings were discussed with Dr. Jerald Manuel at time of interpretation of the   examination.          XR CHEST PORTABLE   Final Result   Large, ill-defined density overlying the left shoulder and mid to upper   lateral left hemithorax, which limits full evaluation of the immediate surrounding soft tissue and osseous structures. No acute consolidation or   infiltrate. Repeat chest radiograph can be performed further evaluation. CT HEAD WO CONTRAST   Final Result   1. There is no acute intracranial abnormality. Specifically, there is no   intracranial hemorrhage. 2. Atrophy and periventricular leukomalacia,         CT CERVICAL SPINE WO CONTRAST   Final Result   1. There is no acute compression fracture or subluxation of the cervical   spine. 2. Multilevel degenerative disc and degenerative joint disease. .         CT THORACIC SPINE WO CONTRAST   Final Result   1. There is no acute compression fracture of the thoracic spine. 2. Dextroscoliosis   3. Multilevel degenerative changes. CT LUMBAR SPINE WO CONTRAST   Final Result   Disc space narrowing and degenerative changes. No acute vertebral fracture or subluxation. US GUIDED VASCULAR ACCESS    (Results Pending)     Assessment/Plan:    Syncope: plan for was for dc today. However syncope episode. Cardiology at bedside during rapid response. Plan to hold DC and reassess in am.      Pulmonary embolism: Large. On Anticoagulation . 12/19: Thrombectomy done by cardiology. 12/21: S/p leg thrombectomy (extensive right leg DVT)    Hypercoagulable state most likely secondary to active cancer. Rectal cancer  stage III status post resection neoadjuvant chemotherapy, as per oncology    Thrombocytopenia most likely secondary to chemotherapy. Mild. Continue to monitor platelet count. Appreciate hematology input.     hemoglobin/hematocrit stable. COVID-positive    25 minutes total care time, >1/2 in unit/floor time and care coordination     Dc plan: awaiting placement.      Akil Shore MD

## 2022-12-25 NOTE — FLOWSHEET NOTE
Registered Nurse Shift Summary  22   Patient Name: Hugh Bailon  Attending Provider: Araceli Bah MD      Admit Date: 2022  MRN: [id number]                           : 1950  Full Code      7:25 AM Assumed Care of Patient. Documentation initiated as per policy / SOP. Report Received from [facility]. Assessment Complete, please see flow sheets. Labs, orders, plan of care and meds reviewed.      Labs:   Recent Labs     22  1752 22  0600 22  0600   WBC  --  5.2 5.3   HGB 9.4* 8.3* 8.1*   HCT 29.0* 23.6* 23.1*   PLT  --  247 267     Recent Labs     22  0600 22  0600   * 130*   K 3.5 3.7   CL 93* 94*   CO2 24 25   BUN 18 16   CREATININE 0.77 0.93*   CALCIUM 8.4* 8.6   PHOS 2.5 2.9           Last set of vitals:  Enc Vitals  BP: 105/62 (22)  Heart Rate: (!) 111 (22)  Resp: 18 (22)  Temp: 98.1 °F (36.7 °C) (22)  Temp Source: Oral (22)  SpO2: 99 % (22)  Weight: 172 lb (78 kg) (22 0556)  Height: 4' 11\" (149.9 cm) (22 2771)

## 2022-12-25 NOTE — PROGRESS NOTES
INPATIENT PROGRESS NOTES    PATIENT NAME: Dell Kasper  MRN: [id number]  SERVICE DATE:  December 25, 2022   SERVICE TIME:  1:05 PM      PRIMARY SERVICE: Pulmonary Disease    CHIEF COMPLAINTS: PE    INTERVAL HPI: Patient seen and examined at bedside, Interval Notes, orders reviewed. Nursing notes noted        New information updated in the note today, rest of the examination did not change compared to yesterday. Patient report no complaint, she is talking to her grandson over the phone, no issues overnight, she is on room air saturation 90%    Review of system:     GI Abdominal pain No  Skin Rash No    Social History     Tobacco Use    Smoking status: Never    Smokeless tobacco: Never   Substance Use Topics    Alcohol use: No         Problem Relation Age of Onset    Diabetes Mother     Breast Cancer Maternal Aunt     Colon Cancer Neg Hx          OBJECTIVE    Body mass index is 34.74 kg/m². PHYSICAL EXAM:  Vitals:  BP (!) 98/52   Pulse 97   Temp 98.1 °F (36.7 °C) (Oral)   Resp 18   Ht 4' 11\" (1.499 m)   Wt 172 lb (78 kg)   SpO2 100%   BMI 34.74 kg/m²     General: alert, cooperative, no distress  Head: normocephalic, atraumatic  Eyes:No gross abnormalities  Neck: No apparent masses  Chest :   no respiratory distress, no tachypnea  Heart[de-identified] No JVD,  ABD: Not distended  Musculoskeletal : no cyanosis, no clubbing,    Neuro:  Grossly normal  Skin: No rashes    Lymph node:  no cervical nodes  Urology: No [place]   Psychiatric: appropriate    DATA:   Recent Labs     12/24/22  0600 12/25/22  0600   WBC 5.2 5.3   HGB 8.3* 8.1*   HCT 23.6* 23.1*   MCV 86.3 87.8    267     Recent Labs     12/24/22  0600 12/25/22  0600   * 130*   K 3.5 3.7   CL 93* 94*   CO2 24 25   BUN 18 16   CREATININE 0.77 0.93*   GLUCOSE 106* 102*   CALCIUM 8.4* 8.6   LABALBU 3.5 3.3*   LABGLOM >60.0 >60.0       MV Settings:          No results for input(s): PHART, WQN2ZHA, PO2ART, EXC9HYP, BEART, [id number] in the last 72 hours.     O2 Device: None (Room air)  O2 Flow Rate (L/min): 2 L/min    ADULT DIET; Regular; Low Fat/Low Chol/High Fiber/PADMA     MEDICATIONS during current hospitalization:    Continuous Infusions:   sodium chloride      sodium chloride      sodium chloride      sodium chloride         Scheduled Meds:   metoprolol succinate  25 mg Oral Nightly    apixaban  10 mg Oral BID    Followed by    Julianne Martinez ON 12/29/2022] apixaban  5 mg Oral BID    sodium chloride flush  5-40 mL IntraVENous 2 times per day    sodium chloride flush  5-40 mL IntraVENous 2 times per day    sodium chloride flush  5-40 mL IntraVENous 2 times per day    sodium chloride flush  5-40 mL IntraVENous 2 times per day       PRN Meds:trimethobenzamide, sodium chloride flush, sodium chloride, sodium chloride flush, sodium chloride, ondansetron, sodium chloride flush, sodium chloride, sodium chloride flush, sodium chloride, ondansetron **OR** ondansetron, polyethylene glycol, acetaminophen **OR** acetaminophen    Radiology  XR PELVIS (1-2 VIEWS)    Result Date: 12/18/2022  EXAMINATION: ONE XRAY VIEW OF THE PELVIS 12/18/2022 10:04 am COMPARISON: None. HISTORY: ORDERING SYSTEM PROVIDED HISTORY: fall TECHNOLOGIST PROVIDED HISTORY: Reason for exam:->fall What reading provider will be dictating this exam?->CRC FINDINGS: No acute fracture or malalignment. Mild degenerative changes of the hips. Degenerative changes of the visualized spine. Surgical clips project over the left pelvis. The bladder is opacified with excreted IV contrast.     No acute fracture or malalignment. CT HEAD WO CONTRAST    Result Date: 12/18/2022  EXAMINATION: CT OF THE HEAD WITHOUT CONTRAST  12/18/2022 8:30 am TECHNIQUE: CT of the head was performed without the administration of intravenous contrast. Automated exposure control, iterative reconstruction, and/or weight based adjustment of the mA/kV was utilized to reduce the radiation dose to as low as reasonably achievable. COMPARISON: None.  HISTORY: ORDERING SYSTEM PROVIDED HISTORY: syncope, fall TECHNOLOGIST PROVIDED HISTORY: Reason for exam:->syncope, fall Has a \"code stroke\" or \"stroke alert\" been called? ->No Decision Support Exception - unselect if not a suspected or confirmed emergency medical condition->Emergency Medical Condition ([place]) What reading provider will be dictating this exam?->CRC FINDINGS: BRAIN/VENTRICLES: There is no acute intracranial hemorrhage, mass effect or midline shift. No abnormal extra-axial fluid collection. The gray-white differentiation is maintained without evidence of an acute infarct. There is no evidence of hydrocephalus. The ventricles, cisterns and sulci are prominent consistent with atrophy. There is decreased attenuation within the periventricular white matter consistent with periventricular leukomalacia. ORBITS: The visualized portion of the orbits demonstrate no acute abnormality. SINUSES: The visualized paranasal sinuses and mastoid air cells demonstrate no acute abnormality. There is mild mucosal thickening seen within the right ethmoid air cells. SOFT TISSUES/SKULL:  No acute abnormality of the visualized skull or soft tissues. 1. There is no acute intracranial abnormality. Specifically, there is no intracranial hemorrhage. 2. Atrophy and periventricular leukomalacia,     CTA CHEST W WO CONTRAST PE Eval    Result Date: 12/18/2022  EXAMINATION: CTA OF THE CHEST WITH AND WITHOUT CONTRAST 12/18/2022 8:30 am TECHNIQUE: CTA of the chest was performed before and after the administration of intravenous contrast.  Multiplanar reformatted images are provided for review. MIP images are provided for review. Automated exposure control, iterative reconstruction, and/or weight based adjustment of the mA/kV was utilized to reduce the radiation dose to as low as reasonably achievable. COMPARISON: None.  HISTORY: ORDERING SYSTEM PROVIDED HISTORY: PE, syncope, hx of cancer, rule out PE; cough evaluate for infiltrate TECHNOLOGIST PROVIDED HISTORY: Reason for exam:->PE Reason for exam:->syncope, hx of cancer, rule out PE; cough evaluate for infiltrate Decision Support Exception - unselect if not a suspected or confirmed emergency medical condition->Emergency Medical Condition (MA) What reading provider will be dictating this exam?->CRC FINDINGS: Pulmonary Arteries: Pulmonary arteries are adequately opacified for evaluation. There is abnormal filling defect seen within the main pulmonary arteries bilaterally with slight saddle embolism identified. There is opacification identified within all the segmental and subsegmental branches most pronounced within the right middle and lower lobe pulmonary arteries. Emboli as well seen within the lingular branch. There is evidence of right heart strain within RV LV ratio of 1.6. There is prominence identified of the main pulmonary artery. Mediastinum: No evidence of mediastinal lymphadenopathy. The heart and pericardium demonstrate no acute abnormality. There is no acute abnormality of the thoracic aorta. Lungs/pleura: Mild ground-glass opacity identified in the right lower lobe suggesting possibly an area of infarction given the extensive thrombus within the pulmonary arteries on the right. Atelectatic change cannot be excluded. There is minimal atelectatic changes seen within the lingula. No definite pleural effusion or pneumothorax. Upper Abdomen: Limited images of the upper abdomen are unremarkable. Soft Tissues/Bones: Abnormal increased density identified within the subcutaneous tissues surrounding the luke catheter on the left anterior chest wall to suggest extravasation of intravenous contrast.     Extensive pulmonary emboli with saddle embolism identified. There is evidence of right heart strain within RV LV ratio of 1.6. Mild ground-glass seen at the right lung base in which early infarction cannot be excluded.  There is increased density seen surrounding the left luke catheter in the subcutaneous tissues to suggest extravasation of intravenous contrast. Findings were discussed with Dr. Maria Teresa Lim at time of interpretation of the examination. CT CERVICAL SPINE WO CONTRAST    Result Date: 12/18/2022  EXAMINATION: CT OF THE CERVICAL SPINE WITHOUT CONTRAST 12/18/2022 8:30 am TECHNIQUE: CT of the cervical spine was performed without the administration of intravenous contrast. Multiplanar reformatted images are provided for review. Automated exposure control, iterative reconstruction, and/or weight based adjustment of the mA/kV was utilized to reduce the radiation dose to as low as reasonably achievable. COMPARISON: None. HISTORY: ORDERING SYSTEM PROVIDED HISTORY: syncope, fall TECHNOLOGIST PROVIDED HISTORY: Reason for exam:->syncope, fall Decision Support Exception - unselect if not a suspected or confirmed emergency medical condition->Emergency Medical Condition ([place]) What reading provider will be dictating this exam?->CRC FINDINGS: The ring of C1 is intact as is the dense. There is no compression fracture of the cervical spine. No jumped or perched facet is noted. Multilevel degenerative disc and degenerative joint disease is noted. There are posterior degenerative endplate spurs seen at the C3-4 and C6-7 levels. There is mild central canal stenosis at the C6-7 level and mild bilateral neural foraminal narrowing. The prevertebral soft tissues are unremarkable. The airway is widely patent. Images through the lung apices are negative for a pneumothorax. 1. There is no acute compression fracture or subluxation of the cervical spine. 2. Multilevel degenerative disc and degenerative joint disease.  .     CT THORACIC SPINE WO CONTRAST    Result Date: 12/18/2022  EXAMINATION: CT OF THE THORACIC SPINE WITHOUT CONTRAST  12/18/2022 8:30 am: TECHNIQUE: CT of the thoracic spine was performed without the administration of intravenous contrast. Multiplanar reformatted images are provided for review. Automated exposure control, iterative reconstruction, and/or weight based adjustment of the mA/kV was utilized to reduce the radiation dose to as low as reasonably achievable. COMPARISON: None. HISTORY: ORDERING SYSTEM PROVIDED HISTORY: syncope, fall TECHNOLOGIST PROVIDED HISTORY: Reason for exam:->syncope, fall What reading provider will be dictating this exam?->CRC FINDINGS: BONES/ALIGNMENT: There is no acute compression fracture of the thoracic spine. There is dextroscoliosis of the thoracic spine. Mild multilevel degenerative disc and degenerative joint disease is noted. There are large anterior and lateral degenerative endplate spurs seen throughout the course of the thoracic spine. .  There is no osseous lesion. SOFT TISSUES: No paraspinal mass is seen. 1. There is no acute compression fracture of the thoracic spine. 2. Dextroscoliosis 3. Multilevel degenerative changes. CT LUMBAR SPINE WO CONTRAST    Result Date: 12/18/2022  EXAMINATION: CT OF THE LUMBAR SPINE WITHOUT CONTRAST  12/18/2022 TECHNIQUE: CT of the lumbar spine was performed without the administration of intravenous contrast. Multiplanar reformatted images are provided for review. Adjustment of mA and/or kV according to patient size was utilized. Automated exposure control, iterative reconstruction, and/or weight based adjustment of the mA/kV was utilized to reduce the radiation dose to as low as reasonably achievable. COMPARISON: None. HISTORY: ORDERING SYSTEM PROVIDED HISTORY: syncope, fall TECHNOLOGIST PROVIDED HISTORY: Reason for exam:->syncope, fall Decision Support Exception - unselect if not a suspected or confirmed emergency medical condition->Emergency Medical Condition ([place]) What reading provider will be dictating this exam?->CRC FINDINGS: Vertebral alignment is normal.  Mild disc space narrowing and discovertebral degenerative changes are identified. Facet arthritis is also seen.   No acute vertebral fracture is visualized. No spondylolysis is noted on the right or left. The sacroiliac joints are intact. The paraspinal soft tissues appear unremarkable. The urinary bladder is dilated. There are mild right hydronephrosis and hydroureter. There is colonic diverticulosis. There are postsurgical changes at the colorectal junction. Disc space narrowing and degenerative changes. No acute vertebral fracture or subluxation. XR CHEST PORTABLE    Result Date: 12/18/2022  EXAMINATION: ONE XRAY VIEW OF THE CHEST 12/18/2022 9:59 am COMPARISON: The previous study performed 11/03/2022. HISTORY: ORDERING SYSTEM PROVIDED HISTORY: fall, syncope TECHNOLOGIST PROVIDED HISTORY: Reason for exam:->fall, syncope What reading provider will be dictating this exam?->CRC FINDINGS: There is a large, ill-defined density overlying the left shoulder and mid to upper lateral left hemithorax. This may be external to the patient. It limits full evaluation of the surrounding soft tissue and osseous structures. There is no evidence of acute consolidation or infiltrate. No active pleural disease is seen. The cardiac silhouette and mediastinal structures are unremarkable. The tracheobronchial tree is midline and patent. A left-sided MediPort catheter is again noted, with the tip in the proximal SVC. There is again noted to be a thoracic scoliosis, with convexity to the right. Osteo-degenerative changes are again noted involving the thoracic spine. Large, ill-defined density overlying the left shoulder and mid to upper lateral left hemithorax, which limits full evaluation of the immediate surrounding soft tissue and osseous structures. No acute consolidation or infiltrate. Repeat chest radiograph can be performed further evaluation. IR PICC WO SQ PORT/PUMP > 5 YEARS    Result Date: 12/21/2022  PROCEDURE: ULTRASOUND GUIDED VASCULAR ACCESS. FLUOROSCOPY GUIDED PICC PLACEMENT 12/18/2022.  HISTORY: ORDERING SYSTEM PROVIDED HISTORY: limited acess TECHNOLOGIST PROVIDED HISTORY: Reason for exam:->limited acess How many lumens are being requested?->2 What site is the preferred site? ->No preference What side should this line be placed?->Right What reading provider will be dictating this exam?->CRC SEDATION: No sedation was administered. FLUOROSCOPY DOSE AND TYPE OR TIME AND EXPOSURES: 53 seconds of fluoroscopy were provided. A single fluoroscopic spot image was obtained. TECHNIQUE: Informed consent was obtained after a detailed explanation of the procedure including risks, benefits, and alternatives. Universal protocol was observed. The right arm was prepped and draped in sterile fashion using maximum sterile barrier technique. Local anesthesia was achieved with lidocaine. A micropuncture needle was used to access the right basilic vein using ultrasound guidance. An ultrasound image demonstrating patency of the vein with needle tip located within it. An image was obtained and stored in PACs. A 0.018 guidewire was used to place a peel-a-way sheath and a 5 [employer] Danni dual lumen PICC was advanced with fluoroscopic guidance with the tip at the cavo-atrial junction. The catheter flushed easily and there was a good blood return. The catheter was secured to the skin. The patient tolerated the procedure well and there were no immediate complications. FINDINGS: Fluoroscopic image demonstrates the tip of the catheter at the cavo-atrial junction. Successful ultrasound and fluoroscopy guided PICC placement. IR CONTRAST INJECTION  EXISTING CV ACCESS DEVICE EVALUATION W FLUORO    Result Date: 12/20/2022  PROCEDURE: IR CONTRAST INJECTION EXISTING CV ACCESS DEVICE EVALUATION W FLUORO 12/20/2022 HISTORY: ORDERING SYSTEM PROVIDED HISTORY: check  port TECHNOLOGIST PROVIDED HISTORY: Reason for exam:->check  port What reading provider will be dictating this exam?->CRC TECHNIQUE: The port was accessed using sterile technique.   Contrast was then administered during fluoroscopic observation. The port was subsequently flushed with saline then locked with heparin. CONTRAST: 18 mL Isovue 200. SEDATION: No sedation was administered. FLUOROSCOPY DOSE AND TYPE OR TIME AND EXPOSURES: Fluoroscopy time was 27 seconds. A fluoroscopic cine loop was acquired. FINDINGS: There is an Ilaudb-V-Bfzc on the left with an internal jugular vein catheter terminating at the midline with its tip directed toward the right near the confluence of the innominate veins. The catheter is redundant in the neck. Aspiration of the port yields good blood return. Significant resistance was met when flushing the port. The catheter is patent and intact. No extravasation of contrast is identified. There is the suggestion of thrombus and or fibrin sheath at the distal end of the catheter. Left Qydcek-[id number]-Rkgi with an internal jugular vein catheter terminating at the midline with its tip directed toward the right near the confluence of the innominate veins. Redundancy of the catheter in the neck. Good blood return when aspirating the port but significant resistance when flushing the port. Patent and intact catheter with no contrast extravasation. Suggestion of thrombus and or fibrin sheath at the distal end of the catheter. US CAROTID ARTERY BILATERAL    Result Date: 12/22/2022  EXAMINATION - Carotid Duplex Ultrasound COMPARISON: May 9, 2017 DIAGNOSIS: Syncope  TECHNIQUE: Duplex carotid imaging was performed of both carotid systems using real time, peak velocity and color flow Doppler analysis. FINDINGS: Doppler findings: Right ICA PSV: 90 cm/sec. Left ICA PSV: 124 cm/sec Right CCA PSV: 82 cm/sec. Left CCA PSV:  103 cm/sec. Right ICA /CCA ratio: 1.02. Left ICA/CCA ratio: 1.5. Right ECA PSV: 49 cm/sec. Left: ECA PSV: 62 cm/sec. Antegrade flow is demonstrated in both vertebral arteries. Mild scattered atherosclerosis without obstructive lesion. Areas of intimal thickening.  Bilateral ICAs less than 50% stenosis. US DUP LOWER EXTREMITIES BILATERAL VENOUS    Result Date: 12/19/2022  EXAMINATION:  BILATERAL LOWER EXTREMITY VENOUS DUPLEX CLINICAL HISTORY:  SAGITTAL PULMONARY EMBOLISM COMPARISONS:  NONE AVAILABLE TECHNIQUE:  B-mode, color flow and spectral Doppler FINDINGS:  Bilateral lower extremity venous systems were interrogated extending from the common femoral vein to its adjacent segments of the proximal popliteal vein. On the right side the common femoral vein, femoral vein as well as the popliteal vein and into the peroneal vein there is an adequate compression augmentation as well as phasic flow consistent with the thrombus. Left lower extremity there is abnormal compression augmentation as well as phasic flow posterior tibial and into the peroneal veins. RIGHT LOWER EXTREMITY EXTENSIVE THE THIGH DEEP VEIN THROMBOSIS EXTENDING INTO THE CALF. LEFT LOWER EXTREMITY EDEMA DISTAL DEEP VEIN THROMBOSIS INVOLVING THE CALF REGION.              IMPRESSION AND SUGGESTION:  Patient is at risk due to   Submassive PE, secondary to COVID-19 infection status post thrombectomy  COVID-19 infection    Recommendation  Continue anticoagulation  Encourage activity         Electronically signed by Haley Medeiros MD,  FCCP   on 12/25/2022 at 1:05 PM

## 2022-12-25 NOTE — PROGRESS NOTES
Progress Note  Patient: Miguel Landry  Unit/Bed: W557/N356-18  YOB: 1950  MRN: [id number]  Acct: [de-identified]   Admitting Diagnosis: Syncope and collapse [R55]  Hypokalemia [E87.6]  Thrombocytopenia (HCC) [D69.6]  Elevated troponin [R77.8]  Bilateral pulmonary embolism (HCC) [I26.99]  Seizure-like activity (HCC) [R56.9]  History of rectal cancer [Z85.048]  Acute saddle pulmonary embolism with acute cor pulmonale (HCC) [I26.02]  Neutropenia, unspecified type ([address] [D70.9]  RGGGY-92 [U07.1]  Admit Date:  12/18/2022  Hospital Day: 7    Chief Complaint: PE DVT    Histories:  Past Medical History:   Diagnosis Date    Arthritis     Cancer ([facility] [address]     SKIN    Hyperlipidemia      Past Surgical History:   Procedure Laterality Date    CATARACT REMOVAL      CHOLECYSTECTOMY      COLECTOMY N/A 10/3/2022    LOW ANTERIOR RESECTION WITH LOOP ILEOSTOMY performed by Jimenez Maloney MD at [address] [place] [address] N/A 9/16/2022    COLORECTAL CANCER SCREENING, NOT HIGH RISK performed by Ward Mcmanus MD at [facility]    COLONOSCOPY N/A 9/26/2022    FLEXIBLE SIGMOIDOSCOPY WITH SNARE POLYPECTOMY performed by Jimenez Maloney MD at [address] [place] [address] N/A 11/3/2022    INFUSAPORT performed by Jimenez Maloney MD at [address] [place] [address] LOWER LEG    TUBAL LIGATION       Family History   Problem Relation Age of Onset    Diabetes Mother     Breast Cancer Maternal Aunt     Colon Cancer Neg Hx      Social History     Socioeconomic History    Marital status:      Spouse name: None    Number of children: None    Years of education: None    Highest education level: None   Tobacco Use    Smoking status: Never    Smokeless tobacco: Never   Vaping Use    Vaping Use: Never used   Substance and Sexual Activity    Alcohol use: No    Drug use: No     Social Determinants of Health     Financial Resource Strain: Low Risk     Difficulty of Paying Living Expenses: Not hard at all   Food Insecurity: No Food Insecurity    Worried About Running Out of Food in the Last Year: Never true    Ran Out of Food in the Last Year: Never true   Transportation Needs: No Transportation Needs    Lack of Transportation (Medical): No    Lack of Transportation (Non-Medical): No       Subjective/HPI  s no new events overnight. Sinus rhythm heart rate of 88 bpm        Review of Systems:   Review of Systems   Constitutional: Negative. Negative for diaphoresis and fatigue. HENT: Negative. Eyes: Negative. Respiratory: Negative. Negative for cough, chest tightness, shortness of breath, wheezing and stridor. Cardiovascular: Negative. Negative for chest pain, palpitations and leg swelling. Gastrointestinal: Negative. Negative for blood in stool and nausea. Genitourinary: Negative. Musculoskeletal: Negative. Skin: Negative. Neurological:  Positive for weakness. Negative for dizziness, syncope and light-headedness. Hematological: Negative. Psychiatric/Behavioral: Negative. Physical Examination:    BP (!) 98/52   Pulse 97   Temp 98.1 °F (36.7 °C) (Oral)   Resp 18   Ht 4' 11\" (1.499 m)   Wt 172 lb (78 kg)   SpO2 100%   BMI 34.74 kg/m²    Physical Exam   Constitutional: She appears healthy. No distress. HENT:   Normal cephalic and Atraumatic   Eyes: Pupils are equal, round, and reactive to light. Neck: Thyroid normal. No JVD present. No neck adenopathy. No thyromegaly present. Cardiovascular: Normal rate, regular rhythm, intact distal pulses and normal pulses. Murmur heard. Pulmonary/Chest: Effort normal and breath sounds normal. She has no wheezes. She has no rales. She exhibits no tenderness. Abdominal: Soft. Bowel sounds are normal. There is no abdominal tenderness. Musculoskeletal:         General: No tenderness or edema. Normal range of motion. Cervical back: Normal range of motion and neck supple. Neurological: She is alert and oriented to person, place, and time. Skin: Skin is warm. No cyanosis. Nails show no clubbing.      LABS:  CBC:   Lab Results   Component Value Date/Time    WBC 5.3 12/25/2022 06:00 AM    RBC 2.63 12/25/2022 06:00 AM    HGB 8.1 12/25/2022 06:00 AM    HCT 23.1 12/25/2022 06:00 AM    MCV 87.8 12/25/2022 06:00 AM    MCH 30.8 12/25/2022 06:00 AM    MCHC 35.1 12/25/2022 06:00 AM    RDW 16.4 12/25/2022 06:00 AM     12/25/2022 06:00 AM    MPV 9.0 10/28/2014 05:58 AM     CBC with Differential:    Lab Results   Component Value Date/Time    WBC 5.3 12/25/2022 06:00 AM    RBC 2.63 12/25/2022 06:00 AM    HGB 8.1 12/25/2022 06:00 AM    HCT 23.1 12/25/2022 06:00 AM     12/25/2022 06:00 AM    MCV 87.8 12/25/2022 06:00 AM    MCH 30.8 12/25/2022 06:00 AM    MCHC 35.1 12/25/2022 06:00 AM    RDW 16.4 12/25/2022 06:00 AM    NRBC 1 12/24/2022 06:00 AM    BANDSPCT 1 12/24/2022 06:00 AM    LYMPHOPCT 37.7 12/25/2022 06:00 AM    MONOPCT 17.8 12/25/2022 06:00 AM    BASOPCT 0.7 12/25/2022 06:00 AM    MONOSABS 0.9 12/25/2022 06:00 AM    LYMPHSABS 2.0 12/25/2022 06:00 AM    EOSABS 0.0 12/25/2022 06:00 AM    BASOSABS 0.0 12/25/2022 06:00 AM     CMP:    Lab Results   Component Value Date/Time     12/25/2022 06:00 AM    K 3.7 12/25/2022 06:00 AM    K 3.3 12/18/2022 08:30 PM    CL 94 12/25/2022 06:00 AM    CO2 25 12/25/2022 06:00 AM    BUN 16 12/25/2022 06:00 AM    CREATININE 0.93 12/25/2022 06:00 AM    GFRAA >60.0 10/17/2022 06:35 AM    LABGLOM >60.0 12/25/2022 06:00 AM    GLUCOSE 102 12/25/2022 06:00 AM    PROT 5.8 12/19/2022 07:00 AM    LABALBU 3.3 12/25/2022 06:00 AM    CALCIUM 8.6 12/25/2022 06:00 AM    BILITOT 0.4 12/19/2022 07:00 AM    ALKPHOS 111 12/19/2022 07:00 AM    AST 23 12/19/2022 07:00 AM    ALT 12 12/19/2022 07:00 AM     BMP:    Lab Results   Component Value Date/Time     12/25/2022 06:00 AM    K 3.7 12/25/2022 06:00 AM    K 3.3 12/18/2022 08:30 PM    CL 94 12/25/2022 06:00 AM    CO2 25 12/25/2022 06:00 AM    BUN 16 12/25/2022 06:00 AM

## 2022-12-26 VITALS
WEIGHT: 172 LBS | SYSTOLIC BLOOD PRESSURE: 111 MMHG | TEMPERATURE: 99.8 F | HEIGHT: 59 IN | DIASTOLIC BLOOD PRESSURE: 61 MMHG | HEART RATE: 109 BPM | OXYGEN SATURATION: 100 % | RESPIRATION RATE: 20 BRPM | BODY MASS INDEX: 34.68 KG/M2

## 2022-12-26 PROBLEM — R56.9 SEIZURE-LIKE ACTIVITY (HCC): Status: ACTIVE | Noted: 2022-12-26

## 2022-12-26 PROBLEM — I26.02 ACUTE SADDLE PULMONARY EMBOLISM WITH ACUTE COR PULMONALE (HCC): Status: ACTIVE | Noted: 2022-12-26

## 2022-12-26 LAB
ALBUMIN SERPL-MCNC: 3.2 G/DL (ref 3.5–4.6)
ANION GAP SERPL CALCULATED.3IONS-SCNC: 12 MEQ/L (ref 9–15)
ANISOCYTOSIS: ABNORMAL
ATYPICAL LYMPHOCYTE RELATIVE PERCENT: 10 %
BASOPHILS ABSOLUTE: 0.1 K/UL (ref 0–0.2)
BASOPHILS RELATIVE PERCENT: 1 %
BUN BLDV-MCNC: 20 MG/DL (ref 8–23)
CALCIUM SERPL-MCNC: 8.4 MG/DL (ref 8.5–9.9)
CHLORIDE BLD-SCNC: 93 MEQ/L (ref 95–107)
CO2: 25 MEQ/L (ref 20–31)
CREAT SERPL-MCNC: 1.19 MG/DL (ref 0.5–0.9)
EOSINOPHILS ABSOLUTE: 0 K/UL (ref 0–0.7)
EOSINOPHILS RELATIVE PERCENT: 0.3 %
GFR SERPL CREATININE-BSD FRML MDRD: 49.1 ML/MIN/{1.73_M2}
GLUCOSE BLD-MCNC: 97 MG/DL (ref 70–99)
HCT VFR BLD CALC: 23.3 % (ref 37–47)
HEMOGLOBIN: 8 G/DL (ref 12–16)
LYMPHOCYTES ABSOLUTE: 1.4 K/UL (ref 1–4.8)
LYMPHOCYTES RELATIVE PERCENT: 16 %
MACROCYTES: ABNORMAL
MCH RBC QN AUTO: 30.5 PG (ref 27–31.3)
MCHC RBC AUTO-ENTMCNC: 34.4 % (ref 33–37)
MCV RBC AUTO: 88.6 FL (ref 79.4–94.8)
METAMYELOCYTES RELATIVE PERCENT: 2 %
MONOCYTES ABSOLUTE: 0.3 K/UL (ref 0.2–0.8)
MONOCYTES RELATIVE PERCENT: 4.8 %
NEUTROPHILS ABSOLUTE: 3.6 K/UL (ref 1.4–6.5)
NEUTROPHILS RELATIVE PERCENT: 64 %
NUCLEATED RED BLOOD CELLS: 2 /100 WBC
PDW BLD-RTO: 17.6 % (ref 11.5–14.5)
PHOSPHORUS: 3.3 MG/DL (ref 2.3–4.8)
PLATELET # BLD: 267 K/UL (ref 130–400)
PLATELET SLIDE REVIEW: NORMAL
POIKILOCYTES: ABNORMAL
POLYCHROMASIA: ABNORMAL
POTASSIUM SERPL-SCNC: 3.7 MEQ/L (ref 3.4–4.9)
RBC # BLD: 2.63 M/UL (ref 4.2–5.4)
SMUDGE CELLS: 14.4
SODIUM BLD-SCNC: 130 MEQ/L (ref 135–144)
WBC # BLD: 5.5 K/UL (ref 4.8–10.8)

## 2022-12-26 PROCEDURE — 6370000000 HC RX 637 (ALT 250 FOR IP): Performed by: INTERNAL MEDICINE

## 2022-12-26 PROCEDURE — 80069 RENAL FUNCTION PANEL: CPT

## 2022-12-26 PROCEDURE — 99232 SBSQ HOSP IP/OBS MODERATE 35: CPT | Performed by: PSYCHIATRY & NEUROLOGY

## 2022-12-26 PROCEDURE — 2580000003 HC RX 258: Performed by: INTERNAL MEDICINE

## 2022-12-26 PROCEDURE — 85025 COMPLETE CBC W/AUTO DIFF WBC: CPT

## 2022-12-26 PROCEDURE — 36592 COLLECT BLOOD FROM PICC: CPT

## 2022-12-26 PROCEDURE — 2580000003 HC RX 258: Performed by: STUDENT IN AN ORGANIZED HEALTH CARE EDUCATION/TRAINING PROGRAM

## 2022-12-26 PROCEDURE — 2580000003 HC RX 258: Performed by: FAMILY MEDICINE

## 2022-12-26 PROCEDURE — 99232 SBSQ HOSP IP/OBS MODERATE 35: CPT | Performed by: INTERNAL MEDICINE

## 2022-12-26 PROCEDURE — APPSS15 APP SPLIT SHARED TIME 0-15 MINUTES: Performed by: NURSE PRACTITIONER

## 2022-12-26 RX ADMIN — APIXABAN 10 MG: 5 TABLET, FILM COATED ORAL at 08:58

## 2022-12-26 RX ADMIN — Medication 10 ML: at 08:59

## 2022-12-26 RX ADMIN — Medication 10 ML: at 08:58

## 2022-12-26 ASSESSMENT — ENCOUNTER SYMPTOMS
GASTROINTESTINAL NEGATIVE: 1
WHEEZING: 0
SHORTNESS OF BREATH: 0
SHORTNESS OF BREATH: 1
COLOR CHANGE: 0
CHEST TIGHTNESS: 0
BLOOD IN STOOL: 0
TROUBLE SWALLOWING: 0
STRIDOR: 0
VOMITING: 0
EYES NEGATIVE: 1
NAUSEA: 0
COUGH: 0
RESPIRATORY NEGATIVE: 1

## 2022-12-26 NOTE — PLAN OF CARE
Problem: Discharge Planning  Goal: Discharge to home or other facility with appropriate resources  12/26/2022 1616 by Albin Duarte RN  Outcome: Completed  12/26/2022 1616 by Albin Duarte RN  Outcome: Adequate for Discharge     Problem: Pain  Goal: Verbalizes/displays adequate comfort level or baseline comfort level  12/26/2022 1616 by Albin Duarte RN  Outcome: Completed  12/26/2022 1616 by Albin Duarte RN  Outcome: Adequate for Discharge     Problem: Safety - Adult  Goal: Free from fall injury  12/26/2022 1616 by Albin Duarte RN  Outcome: Completed  12/26/2022 1616 by Albin Duarte RN  Outcome: Adequate for Discharge     Problem: Nutrition Deficit:  Goal: Optimize nutritional status  12/26/2022 1616 by Albin Duarte RN  Outcome: Completed  12/26/2022 1616 by Albin Duarte RN  Outcome: Adequate for Discharge  12/26/2022 1608 by Naz Wallace RD, LD  Flowsheets (Taken 12/19/2022 1057 by Malaika Wilcox RD, [place])  Nutrient intake appropriate for improving, restoring, or maintaining nutritional needs:   Assess nutritional status and recommend course of action   Recommend appropriate diets, oral nutritional supplements, and vitamin/mineral supplements   Monitor oral intake, labs, and treatment plans     Problem: Skin/Tissue Integrity  Goal: Absence of new skin breakdown  Description: 1. Monitor for areas of redness and/or skin breakdown  2. Assess vascular access sites hourly  3. Every 4-6 hours minimum:  Change oxygen saturation probe site  4. Every 4-6 hours:  If on nasal continuous positive airway pressure, respiratory therapy assess nares and determine need for appliance change or resting period.   12/26/2022 1616 by Albin Duarte RN  Outcome: Completed  12/26/2022 1616 by Albin Duarte RN  Outcome: Adequate for Discharge

## 2022-12-26 NOTE — PROGRESS NOTES
Progress Note  Patient: Wally Fritz  Unit/Bed: N226/Y233-71  YOB: 1950  MRN: [id number]  Acct: [de-identified]   Admitting Diagnosis: Syncope and collapse [R55]  Hypokalemia [E87.6]  Thrombocytopenia (HCC) [D69.6]  Elevated troponin [R77.8]  Bilateral pulmonary embolism (HCC) [I26.99]  Seizure-like activity (HCC) [R56.9]  History of rectal cancer [Z85.048]  Acute saddle pulmonary embolism with acute cor pulmonale (HCC) [I26.02]  Neutropenia, unspecified type ([facility] [address] [D70.9]  VJOQS-50 [U07.1]  Admit Date:  12/18/2022  Hospital Day: 8    Chief Complaint: PE DVT    Histories:  Past Medical History:   Diagnosis Date    Arthritis     Cancer ([facility] [address]     SKIN    Hyperlipidemia      Past Surgical History:   Procedure Laterality Date    CATARACT REMOVAL      CHOLECYSTECTOMY      COLECTOMY N/A 10/3/2022    LOW ANTERIOR RESECTION WITH LOOP ILEOSTOMY performed by Fiona Burroughs MD at [address] [place] [address] N/A 9/16/2022    COLORECTAL CANCER SCREENING, NOT HIGH RISK performed by Isaias Castro MD at [facility]    COLONOSCOPY N/A 9/26/2022    FLEXIBLE SIGMOIDOSCOPY WITH SNARE POLYPECTOMY performed by Fiona Burroughs MD at [address] [place] [address] N/A 11/3/2022    INFUSAPORT performed by Fiona Burroughs MD at [address] [place] [address] LOWER LEG    TUBAL LIGATION       Family History   Problem Relation Age of Onset    Diabetes Mother     Breast Cancer Maternal Aunt     Colon Cancer Neg Hx      Social History     Socioeconomic History    Marital status:      Spouse name: None    Number of children: None    Years of education: None    Highest education level: None   Tobacco Use    Smoking status: Never    Smokeless tobacco: Never   Vaping Use    Vaping Use: Never used   Substance and Sexual Activity    Alcohol use: No    Drug use: No     Social Determinants of Health     Financial Resource Strain: Low Risk     Difficulty of Paying Living Expenses: Not hard at all   Food Insecurity: No Food Insecurity    Worried About Running Out of Food in the Last Year: Never true    Ran Out of Food in the Last Year: Never true   Transportation Needs: No Transportation Needs    Lack of Transportation (Medical): No    Lack of Transportation (Non-Medical): No       Subjective/HPI  s no new events overnight. Sinus rhythm heart sinus tachycardia 101 bpm.        Review of Systems:   Review of Systems   Constitutional: Negative. Negative for diaphoresis and fatigue. HENT: Negative. Eyes: Negative. Respiratory: Negative. Negative for cough, chest tightness, shortness of breath, wheezing and stridor. Cardiovascular: Negative. Negative for chest pain, palpitations and leg swelling. Gastrointestinal: Negative. Negative for blood in stool and nausea. Genitourinary: Negative. Musculoskeletal: Negative. Skin: Negative. Neurological:  Positive for weakness. Negative for dizziness, syncope and light-headedness. Hematological: Negative. Psychiatric/Behavioral: Negative. Physical Examination:    BP (!) 115/58   Pulse (!) 107   Temp 99 °F (37.2 °C) (Oral)   Resp 29   Ht 4' 11\" (1.499 m)   Wt 172 lb (78 kg)   SpO2 96%   BMI 34.74 kg/m²    Physical Exam   Constitutional: She appears healthy. No distress. HENT:   Normal cephalic and Atraumatic   Eyes: Pupils are equal, round, and reactive to light. Neck: Thyroid normal. No JVD present. No neck adenopathy. No thyromegaly present. Cardiovascular: Normal rate, regular rhythm, intact distal pulses and normal pulses. Murmur heard. Pulmonary/Chest: Effort normal and breath sounds normal. She has no wheezes. She has no rales. She exhibits no tenderness. Abdominal: Soft. Bowel sounds are normal. There is no abdominal tenderness. Musculoskeletal:         General: No tenderness or edema. Normal range of motion. Cervical back: Normal range of motion and neck supple.    Neurological: She is alert and oriented to person, place, and time. Skin: Skin is warm. No cyanosis. Nails show no clubbing.      LABS:  CBC:   Lab Results   Component Value Date/Time    WBC 5.5 12/26/2022 05:45 AM    RBC 2.63 12/26/2022 05:45 AM    HGB 8.0 12/26/2022 05:45 AM    HCT 23.3 12/26/2022 05:45 AM    MCV 88.6 12/26/2022 05:45 AM    MCH 30.5 12/26/2022 05:45 AM    MCHC 34.4 12/26/2022 05:45 AM    RDW 17.6 12/26/2022 05:45 AM     12/26/2022 05:45 AM    MPV 9.0 10/28/2014 05:58 AM     CBC with Differential:    Lab Results   Component Value Date/Time    WBC 5.5 12/26/2022 05:45 AM    RBC 2.63 12/26/2022 05:45 AM    HGB 8.0 12/26/2022 05:45 AM    HCT 23.3 12/26/2022 05:45 AM     12/26/2022 05:45 AM    MCV 88.6 12/26/2022 05:45 AM    MCH 30.5 12/26/2022 05:45 AM    MCHC 34.4 12/26/2022 05:45 AM    RDW 17.6 12/26/2022 05:45 AM    NRBC 2 12/26/2022 05:45 AM    BANDSPCT 1 12/24/2022 06:00 AM    METASPCT 2 12/26/2022 05:45 AM    LYMPHOPCT 16.0 12/26/2022 05:45 AM    MONOPCT 4.8 12/26/2022 05:45 AM    BASOPCT 1.0 12/26/2022 05:45 AM    MONOSABS 0.3 12/26/2022 05:45 AM    LYMPHSABS 1.4 12/26/2022 05:45 AM    EOSABS 0.0 12/26/2022 05:45 AM    BASOSABS 0.1 12/26/2022 05:45 AM     CMP:    Lab Results   Component Value Date/Time     12/26/2022 05:56 AM    K 3.7 12/26/2022 05:56 AM    K 3.3 12/18/2022 08:30 PM    CL 93 12/26/2022 05:56 AM    CO2 25 12/26/2022 05:56 AM    BUN 20 12/26/2022 05:56 AM    CREATININE 1.19 12/26/2022 05:56 AM    GFRAA >60.0 10/17/2022 06:35 AM    LABGLOM 49.1 12/26/2022 05:56 AM    GLUCOSE 97 12/26/2022 05:56 AM    PROT 5.8 12/19/2022 07:00 AM    LABALBU 3.2 12/26/2022 05:56 AM    CALCIUM 8.4 12/26/2022 05:56 AM    BILITOT 0.4 12/19/2022 07:00 AM    ALKPHOS 111 12/19/2022 07:00 AM    AST 23 12/19/2022 07:00 AM    ALT 12 12/19/2022 07:00 AM     BMP:    Lab Results   Component Value Date/Time     12/26/2022 05:56 AM    K 3.7 12/26/2022 05:56 AM    K 3.3 12/18/2022 08:30 PM    CL 93 12/26/2022 05:56 AM    CO2 25 12/26/2022 05:56 AM    BUN 20 12/26/2022 05:56 AM    LABALBU 3.2 12/26/2022 05:56 AM    CREATININE 1.19 12/26/2022 05:56 AM    CALCIUM 8.4 12/26/2022 05:56 AM    GFRAA >60.0 10/17/2022 06:35 AM    LABGLOM 49.1 12/26/2022 05:56 AM    GLUCOSE 97 12/26/2022 05:56 AM     Magnesium:    Lab Results   Component Value Date/Time    MG 2.2 12/18/2022 08:30 PM     Troponin:    Lab Results   Component Value Date/Time    TROPONINI 0.096 12/18/2022 06:45 AM        Active Hospital Problems    Diagnosis Date Noted    Seizure-like activity ([facility] [address] [R56.9] 12/26/2022     Priority: Medium    COVID-19 [U07.1] 12/23/2022     Priority: Medium    Convulsive syncope [R55] 12/19/2022     Priority: Medium    Bilateral pulmonary embolism ([facility] [address] [I26.99] 12/18/2022     Priority: Medium        Assessment/Plan:  COVID - appears clinically mild with scant cough no fever. no respiratory distress  Saddle PE- on Lovenox. Status post PE thrombectomy- probably provoked from [place] but also w underlying clotting disorder from cancer. Will benefit from long term anticoagulation. B/L DVT  Right side extensive Thrombus -s/p RLE DVT Thrombectomy. Trop mildly elevated - will give low zuniga BB. Hold ASA due to mild Thrombocytopenia. Hypokalemia- replace iv and PO. Echo LVEF 55%  Near syncope yesterday - BP is on low side but she is on tachy side and with recent [place] ischemia she will benefit form low dose BB. We will change to QHS. Anemia  Stable to transfer to skilled nursing facility from cardiology standpoint.   We will sign off please call with questions       Electronically signed by Marylene Learn, DO on 12/26/2022 at 2:52 PM

## 2022-12-26 NOTE — PLAN OF CARE
Nutrition Problem #1: Unintended weight loss  Intervention: Food and/or Nutrient Delivery: Continue Current Diet, Continue Oral Nutrition Supplement (Discontinue Cardiac restrictions  Add high calorie ONS BID)

## 2022-12-26 NOTE — PROGRESS NOTES
Neurology Follow up    SUBJECTIVE: Neurology follow-up. Patient COVID-positive and on COVID isolation. HPI obtained from medical record and nursing staff. No seizure activity reported. Nonfocal.  Hypotensive at times. Afebrile. No syncopal episodes. Plans for discharge today to skilled nursing facility. Patient continues to show some respiratory issues but nothing else is noted.   Current Facility-Administered Medications   Medication Dose Route Frequency Provider Last Rate Last Admin    trimethobenzamide (TIGAN) injection 200 mg  200 mg IntraMUSCular Q6H PRN Linda Smithar, DO   200 mg at 12/23/22 0353    metoprolol succinate (TOPROL XL) extended release tablet 25 mg  25 mg Oral Nightly Jackie Palma MD   25 mg at 12/25/22 2005    apixaban (ELIQUIS) tablet 10 mg  10 mg Oral BID Jackie Palma MD   10 mg at 12/26/22 3553    Followed by    Denver Tenorio ON 12/29/2022] apixaban (ELIQUIS) tablet 5 mg  5 mg Oral BID Jackie Palma MD        sodium chloride flush 0.9 % injection 5-40 mL  5-40 mL IntraVENous 2 times per day Jackie Palma MD   10 mL at 12/26/22 0858    sodium chloride flush 0.9 % injection 5-40 mL  5-40 mL IntraVENous PRN Jackie Palma MD        0.9 % sodium chloride infusion   IntraVENous PRN Jcakie Palma MD        sodium chloride flush 0.9 % injection 5-40 mL  5-40 mL IntraVENous 2 times per day Jackie Palma MD   10 mL at 12/26/22 0858    sodium chloride flush 0.9 % injection 5-40 mL  5-40 mL IntraVENous PRN Jackie Palma MD        0.9 % sodium chloride infusion   IntraVENous PRN Jackie Palma MD        ondansetron [facility]) injection 4 mg  4 mg IntraVENous Q6H PRN Glenn Arceoiday, DO   4 mg at 12/25/22 1715    sodium chloride flush 0.9 % injection 5-40 mL  5-40 mL IntraVENous 2 times per day Nelson Adali PA-C   10 mL at 12/26/22 0859    sodium chloride flush 0.9 % injection 5-40 mL  5-40 mL IntraVENous PRN Nelson Adali, PA-C        0.9 % sodium chloride infusion   IntraVENous PRN Nelson Adali PA-C sodium chloride flush 0.9 % injection 5-40 mL  5-40 mL IntraVENous 2 times per day Mikayla Bosch MD   10 mL at 12/26/22 0859    sodium chloride flush 0.9 % injection 5-40 mL  5-40 mL IntraVENous PRN Mikayla Bosch MD        0.9 % sodium chloride infusion   IntraVENous PRN Mikayla Bosch MD        ondansetron (ZOFRAN-ODT) disintegrating tablet 4 mg  4 mg Oral Q8H PRN Mikayla Bosch MD   4 mg at 12/24/22 1100    Or    ondansetron [facility]) injection 4 mg  4 mg IntraVENous Q6H PRN Mikayla Bosch MD   4 mg at 12/22/22 2127    polyethylene glycol (GLYCOLAX) packet 17 g  17 g Oral Daily PRN Mikayla Bosch MD        acetaminophen (TYLENOL) tablet 650 mg  650 mg Oral Q6H PRN Mikayla Bosch MD   650 mg at 12/25/22 1714    Or    acetaminophen (TYLENOL) suppository 650 mg  650 mg Rectal Q6H PRN Mikayla Bosch MD           PHYSICAL EXAM:    BP (!) 101/47   Pulse (!) 108   Temp 99.2 °F (37.3 °C) (Oral)   Resp 29   Ht 4' 11\" (1.499 m)   Wt 172 lb (78 kg)   SpO2 98%   BMI 34.74 kg/m²      Review of Systems   Unable to perform ROS: Other   Constitutional:  Negative for appetite change, chills and fever. HENT:  Negative for hearing loss and trouble swallowing. Respiratory:  Positive for shortness of breath. Negative for cough, chest tightness and wheezing. Cardiovascular:  Negative for chest pain, palpitations and leg swelling. Gastrointestinal:  Negative for nausea and vomiting. Musculoskeletal:  Negative for gait problem. Skin:  Negative for color change and rash. Neurological:  Negative for dizziness, tremors, seizures, syncope, facial asymmetry, speech difficulty, weakness, light-headedness, numbness and headaches. Psychiatric/Behavioral:  Negative for agitation, confusion and hallucinations. The patient is not nervous/anxious.        Physical Exam  Deferred due to COVID isolation and according to the nurses remain nonfocal    Patient examined and has poor recall of events  XR PELVIS (1-2 VIEWS)    Result Date: 12/18/2022  EXAMINATION: ONE XRAY VIEW OF THE PELVIS 12/18/2022 10:04 am COMPARISON: None. HISTORY: ORDERING SYSTEM PROVIDED HISTORY: fall TECHNOLOGIST PROVIDED HISTORY: Reason for exam:->fall What reading provider will be dictating this exam?->CRC FINDINGS: No acute fracture or malalignment. Mild degenerative changes of the hips. Degenerative changes of the visualized spine. Surgical clips project over the left pelvis. The bladder is opacified with excreted IV contrast.     No acute fracture or malalignment. CT HEAD WO CONTRAST    Result Date: 12/18/2022  EXAMINATION: CT OF THE HEAD WITHOUT CONTRAST  12/18/2022 8:30 am TECHNIQUE: CT of the head was performed without the administration of intravenous contrast. Automated exposure control, iterative reconstruction, and/or weight based adjustment of the mA/kV was utilized to reduce the radiation dose to as low as reasonably achievable. COMPARISON: None. HISTORY: ORDERING SYSTEM PROVIDED HISTORY: syncope, fall TECHNOLOGIST PROVIDED HISTORY: Reason for exam:->syncope, fall Has a \"code stroke\" or \"stroke alert\" been called? ->No Decision Support Exception - unselect if not a suspected or confirmed emergency medical condition->Emergency Medical Condition ([place]) What reading provider will be dictating this exam?->CRC FINDINGS: BRAIN/VENTRICLES: There is no acute intracranial hemorrhage, mass effect or midline shift. No abnormal extra-axial fluid collection. The gray-white differentiation is maintained without evidence of an acute infarct. There is no evidence of hydrocephalus. The ventricles, cisterns and sulci are prominent consistent with atrophy. There is decreased attenuation within the periventricular white matter consistent with periventricular leukomalacia. ORBITS: The visualized portion of the orbits demonstrate no acute abnormality.  SINUSES: The visualized paranasal sinuses and mastoid air cells demonstrate no acute abnormality. There is mild mucosal thickening seen within the right ethmoid air cells. SOFT TISSUES/SKULL:  No acute abnormality of the visualized skull or soft tissues. 1. There is no acute intracranial abnormality. Specifically, there is no intracranial hemorrhage. 2. Atrophy and periventricular leukomalacia,     CTA CHEST W WO CONTRAST PE Eval    Result Date: 12/18/2022  EXAMINATION: CTA OF THE CHEST WITH AND WITHOUT CONTRAST 12/18/2022 8:30 am TECHNIQUE: CTA of the chest was performed before and after the administration of intravenous contrast.  Multiplanar reformatted images are provided for review. MIP images are provided for review. Automated exposure control, iterative reconstruction, and/or weight based adjustment of the mA/kV was utilized to reduce the radiation dose to as low as reasonably achievable. COMPARISON: None. HISTORY: ORDERING SYSTEM PROVIDED HISTORY: PE, syncope, hx of cancer, rule out PE; cough evaluate for infiltrate TECHNOLOGIST PROVIDED HISTORY: Reason for exam:->PE Reason for exam:->syncope, hx of cancer, rule out PE; cough evaluate for infiltrate Decision Support Exception - unselect if not a suspected or confirmed emergency medical condition->Emergency Medical Condition ([place]) What reading provider will be dictating this exam?->CRC FINDINGS: Pulmonary Arteries: Pulmonary arteries are adequately opacified for evaluation. There is abnormal filling defect seen within the main pulmonary arteries bilaterally with slight saddle embolism identified. There is opacification identified within all the segmental and subsegmental branches most pronounced within the right middle and lower lobe pulmonary arteries. Emboli as well seen within the lingular branch. There is evidence of right heart strain within RV LV ratio of 1.6. There is prominence identified of the main pulmonary artery. Mediastinum: No evidence of mediastinal lymphadenopathy.   The heart and pericardium demonstrate no acute abnormality. There is no acute abnormality of the thoracic aorta. Lungs/pleura: Mild ground-glass opacity identified in the right lower lobe suggesting possibly an area of infarction given the extensive thrombus within the pulmonary arteries on the right. Atelectatic change cannot be excluded. There is minimal atelectatic changes seen within the lingula. No definite pleural effusion or pneumothorax. Upper Abdomen: Limited images of the upper abdomen are unremarkable. Soft Tissues/Bones: Abnormal increased density identified within the subcutaneous tissues surrounding the luke catheter on the left anterior chest wall to suggest extravasation of intravenous contrast.     Extensive pulmonary emboli with saddle embolism identified. There is evidence of right heart strain within RV LV ratio of 1.6. Mild ground-glass seen at the right lung base in which early infarction cannot be excluded. There is increased density seen surrounding the left luke catheter in the subcutaneous tissues to suggest extravasation of intravenous contrast. Findings were discussed with Dr. Vicki Marquez at time of interpretation of the examination. CT CERVICAL SPINE WO CONTRAST    Result Date: 12/18/2022  EXAMINATION: CT OF THE CERVICAL SPINE WITHOUT CONTRAST 12/18/2022 8:30 am TECHNIQUE: CT of the cervical spine was performed without the administration of intravenous contrast. Multiplanar reformatted images are provided for review. Automated exposure control, iterative reconstruction, and/or weight based adjustment of the mA/kV was utilized to reduce the radiation dose to as low as reasonably achievable. COMPARISON: None.  HISTORY: ORDERING SYSTEM PROVIDED HISTORY: syncope, fall TECHNOLOGIST PROVIDED HISTORY: Reason for exam:->syncope, fall Decision Support Exception - unselect if not a suspected or confirmed emergency medical condition->Emergency Medical Condition ([place]) What reading provider will be dictating this exam?->CRC FINDINGS: The ring of C1 is intact as is the dense. There is no compression fracture of the cervical spine. No jumped or perched facet is noted. Multilevel degenerative disc and degenerative joint disease is noted. There are posterior degenerative endplate spurs seen at the C3-4 and C6-7 levels. There is mild central canal stenosis at the C6-7 level and mild bilateral neural foraminal narrowing. The prevertebral soft tissues are unremarkable. The airway is widely patent. Images through the lung apices are negative for a pneumothorax. 1. There is no acute compression fracture or subluxation of the cervical spine. 2. Multilevel degenerative disc and degenerative joint disease. .     CT THORACIC SPINE WO CONTRAST    Result Date: 12/18/2022  EXAMINATION: CT OF THE THORACIC SPINE WITHOUT CONTRAST  12/18/2022 8:30 am: TECHNIQUE: CT of the thoracic spine was performed without the administration of intravenous contrast. Multiplanar reformatted images are provided for review. Automated exposure control, iterative reconstruction, and/or weight based adjustment of the mA/kV was utilized to reduce the radiation dose to as low as reasonably achievable. COMPARISON: None. HISTORY: ORDERING SYSTEM PROVIDED HISTORY: syncope, fall TECHNOLOGIST PROVIDED HISTORY: Reason for exam:->syncope, fall What reading provider will be dictating this exam?->CRC FINDINGS: BONES/ALIGNMENT: There is no acute compression fracture of the thoracic spine. There is dextroscoliosis of the thoracic spine. Mild multilevel degenerative disc and degenerative joint disease is noted. There are large anterior and lateral degenerative endplate spurs seen throughout the course of the thoracic spine. .  There is no osseous lesion. SOFT TISSUES: No paraspinal mass is seen. 1. There is no acute compression fracture of the thoracic spine. 2. Dextroscoliosis 3. Multilevel degenerative changes.      CT LUMBAR SPINE WO CONTRAST    Result Date: 12/18/2022  EXAMINATION: CT OF THE LUMBAR SPINE WITHOUT CONTRAST  12/18/2022 TECHNIQUE: CT of the lumbar spine was performed without the administration of intravenous contrast. Multiplanar reformatted images are provided for review. Adjustment of mA and/or kV according to patient size was utilized. Automated exposure control, iterative reconstruction, and/or weight based adjustment of the mA/kV was utilized to reduce the radiation dose to as low as reasonably achievable. COMPARISON: None. HISTORY: ORDERING SYSTEM PROVIDED HISTORY: syncope, fall TECHNOLOGIST PROVIDED HISTORY: Reason for exam:->syncope, fall Decision Support Exception - unselect if not a suspected or confirmed emergency medical condition->Emergency Medical Condition ([place]) What reading provider will be dictating this exam?->CRC FINDINGS: Vertebral alignment is normal.  Mild disc space narrowing and discovertebral degenerative changes are identified. Facet arthritis is also seen. No acute vertebral fracture is visualized. No spondylolysis is noted on the right or left. The sacroiliac joints are intact. The paraspinal soft tissues appear unremarkable. The urinary bladder is dilated. There are mild right hydronephrosis and hydroureter. There is colonic diverticulosis. There are postsurgical changes at the colorectal junction. Disc space narrowing and degenerative changes. No acute vertebral fracture or subluxation. XR CHEST PORTABLE    Result Date: 12/18/2022  EXAMINATION: ONE XRAY VIEW OF THE CHEST 12/18/2022 9:59 am COMPARISON: The previous study performed 11/03/2022. HISTORY: ORDERING SYSTEM PROVIDED HISTORY: fall, syncope TECHNOLOGIST PROVIDED HISTORY: Reason for exam:->fall, syncope What reading provider will be dictating this exam?->CRC FINDINGS: There is a large, ill-defined density overlying the left shoulder and mid to upper lateral left hemithorax. This may be external to the patient.   It limits full evaluation of the surrounding soft tissue and osseous structures. There is no evidence of acute consolidation or infiltrate. No active pleural disease is seen. The cardiac silhouette and mediastinal structures are unremarkable. The tracheobronchial tree is midline and patent. A left-sided MediPort catheter is again noted, with the tip in the proximal SVC. There is again noted to be a thoracic scoliosis, with convexity to the right. Osteo-degenerative changes are again noted involving the thoracic spine. Large, ill-defined density overlying the left shoulder and mid to upper lateral left hemithorax, which limits full evaluation of the immediate surrounding soft tissue and osseous structures. No acute consolidation or infiltrate. Repeat chest radiograph can be performed further evaluation. US DUP LOWER EXTREMITIES BILATERAL VENOUS    Result Date: 12/19/2022  EXAMINATION:  BILATERAL LOWER EXTREMITY VENOUS DUPLEX CLINICAL HISTORY:  SAGITTAL PULMONARY EMBOLISM COMPARISONS:  NONE AVAILABLE TECHNIQUE:  B-mode, color flow and spectral Doppler FINDINGS:  Bilateral lower extremity venous systems were interrogated extending from the common femoral vein to its adjacent segments of the proximal popliteal vein. On the right side the common femoral vein, femoral vein as well as the popliteal vein and into the peroneal vein there is an adequate compression augmentation as well as phasic flow consistent with the thrombus. Left lower extremity there is abnormal compression augmentation as well as phasic flow posterior tibial and into the peroneal veins. RIGHT LOWER EXTREMITY EXTENSIVE THE THIGH DEEP VEIN THROMBOSIS EXTENDING INTO THE CALF. LEFT LOWER EXTREMITY EDEMA DISTAL DEEP VEIN THROMBOSIS INVOLVING THE CALF REGION.        Recent Labs     12/24/22  0600 12/25/22  0600 12/26/22  0545   WBC 5.2 5.3 5.5   HGB 8.3* 8.1* 8.0*    267 267       Recent Labs     12/24/22  0600 12/25/22  0600 12/26/22  0556   * 130* 130*   K 3.5 3.7 3.7   CL 93* 94* 93*   CO2 24 25 25   BUN 18 16 20   CREATININE 0.77 0.93* 1.19*   GLUCOSE 106* 102* 97       No results for input(s): BILITOT, ALKPHOS, AST, ALT in the last 72 hours. Lab Results   Component Value Date/Time    PROTIME 15.4 12/19/2022 07:00 AM    INR 1.2 12/19/2022 07:00 AM     No results found for: LITHIUM, DILFRTOT, VALPROATE    ASSESSMENT AND PLAN  Questionable seizure though I truly feel that the patient reported that she was scared and that she was shaking in the CT room. EEG is entirely normal and therefore we have discontinued her anticonvulsants and does not require any intervention unless something new at develops. Patient is remained nonfocal and she is COVID-positive and in isolation and therefore evaluations done through the nurse. Patient may be transferred to regular medical floor    12/22/22  Questionable seizure however feel that this was related to some anxiety with having c scan. EEG is normal; anticonvulsants discontinued   Remains nonfocal  Continues on COVID isolation   Patient may discharge from neurology standpoint   Will follow from a distance     12/24  Questionable seizures though I truly feel that this was a convulsive syncope. Patient remains nonfocal and COVID with negative exam.  We will follow at a distance if needed. 12/26/22:  Seizure-like episode which was likely convulsive syncope. EEG normal.  No need for anticonvulsants. Patient currently hospitalized for acute provoked submassive PE status post thrombectomy and COVID-19 pneumonia. Patient to be discharged today. Neurology to sign off. Call with questions or concerns. I have personally performed a face to face diagnostic evaluation on this patient, reviewed all data and investigations, and am the sole provider of all clinical decisions on the neurological status of this patient. Exam as noted above patient remains nonfocal no further seizures occurred. We will sign off unless there are any other issues.   60% time spent on date of this patient ourselves. Gabino Murray MD, Hildegarde Kawasaki, American Board of Psychiatry & Neurology  Board Certified in Vascular Neurology  Board Certified in Neuromuscular Medicine  Certified in [place]

## 2022-12-26 NOTE — PROGRESS NOTES
INPATIENT PROGRESS NOTES    PATIENT NAME: Edith Underwood  MRN: [id number]  SERVICE DATE:  December 26, 2022   SERVICE TIME:  11:14 AM      PRIMARY SERVICE: Pulmonary Disease    CHIEF COMPLAIN: Pulmonary embolism      INTERVAL HPI: Patient seen and examined at bedside, Interval Notes, orders reviewed. Nursing notes noted  Patient is comfortable in bed. Denies having short of breath. No chest pain. No cough or sputum production. She is on room air and O2 saturation is 98%. No nausea vomiting or diarrhea. OBJECTIVE    Body mass index is 34.74 kg/m². PHYSICAL EXAM:  Vitals:  BP (!) 101/47   Pulse (!) 108   Temp 99.2 °F (37.3 °C) (Oral)   Resp 29   Ht 4' 11\" (1.499 m)   Wt 172 lb (78 kg)   SpO2 98%   BMI 34.74 kg/m²   General: Alert, awake . comfortable in bed, No distress. Head: Atraumatic , Normocephalic   Eyes: PERRL. No sclera icterus. No conjunctival injection. No discharge   ENT: No nasal  discharge. Pharynx clear. Neck:  Trachea midline. No thyromegaly, no JVD, No cervical adenopathy. Chest : Bilaterally symmetrical ,Normal effort,  No accessory muscle use  Lung : . Fair BS bilateral, decreased BS at bases. No Rales. No wheezing. No rhonchi. Heart[de-identified] Normal  rate. Regular rhythm. No mumur ,  Rub or gallop  ABD: Non-tender. Non-distended. No masses. No organmegaly. Normal bowel sounds. No hernia. Ext : No Pitting both leg , No Cyanosis No clubbing  Neuro: no focal weakness          DATA:   Recent Labs     12/25/22  0600 12/26/22  0545   WBC 5.3 5.5   HGB 8.1* 8.0*   HCT 23.1* 23.3*   MCV 87.8 88.6    267     Recent Labs     12/25/22  0600 12/26/22  0556   * 130*   K 3.7 3.7   CL 94* 93*   CO2 25 25   BUN 16 20   CREATININE 0.93* 1.19*   GLUCOSE 102* 97   CALCIUM 8.6 8.4*   LABALBU 3.3* 3.2*   LABGLOM >60.0 49.1*       MV Settings:          No results for input(s): PHART, JJF2PVV, PO2ART, GAS2LSR, BEART, Z1WVWESG in the last 72 hours.     O2 Device: None (Room air)  O2 Flow Rate (L/min): 2 L/min    ADULT DIET; Regular; Low Fat/Low Chol/High Fiber/PADMA     MEDICATIONS during current hospitalization:    Continuous Infusions:   sodium chloride      sodium chloride      sodium chloride      sodium chloride         Scheduled Meds:   metoprolol succinate  25 mg Oral Nightly    apixaban  10 mg Oral BID    Followed by    Marychuy Jones ON 12/29/2022] apixaban  5 mg Oral BID    sodium chloride flush  5-40 mL IntraVENous 2 times per day    sodium chloride flush  5-40 mL IntraVENous 2 times per day    sodium chloride flush  5-40 mL IntraVENous 2 times per day    sodium chloride flush  5-40 mL IntraVENous 2 times per day       PRN Meds:trimethobenzamide, sodium chloride flush, sodium chloride, sodium chloride flush, sodium chloride, ondansetron, sodium chloride flush, sodium chloride, sodium chloride flush, sodium chloride, ondansetron **OR** ondansetron, polyethylene glycol, acetaminophen **OR** acetaminophen    Radiology  XR PELVIS (1-2 VIEWS)    Result Date: 12/18/2022  EXAMINATION: ONE XRAY VIEW OF THE PELVIS 12/18/2022 10:04 am COMPARISON: None. HISTORY: ORDERING SYSTEM PROVIDED HISTORY: fall TECHNOLOGIST PROVIDED HISTORY: Reason for exam:->fall What reading provider will be dictating this exam?->CRC FINDINGS: No acute fracture or malalignment. Mild degenerative changes of the hips. Degenerative changes of the visualized spine. Surgical clips project over the left pelvis. The bladder is opacified with excreted IV contrast.     No acute fracture or malalignment. CT HEAD WO CONTRAST    Result Date: 12/18/2022  EXAMINATION: CT OF THE HEAD WITHOUT CONTRAST  12/18/2022 8:30 am TECHNIQUE: CT of the head was performed without the administration of intravenous contrast. Automated exposure control, iterative reconstruction, and/or weight based adjustment of the mA/kV was utilized to reduce the radiation dose to as low as reasonably achievable. COMPARISON: None.  HISTORY: ORDERING SYSTEM PROVIDED HISTORY: syncope, fall TECHNOLOGIST PROVIDED HISTORY: Reason for exam:->syncope, fall Has a \"code stroke\" or \"stroke alert\" been called? ->No Decision Support Exception - unselect if not a suspected or confirmed emergency medical condition->Emergency Medical Condition ([place]) What reading provider will be dictating this exam?->CRC FINDINGS: BRAIN/VENTRICLES: There is no acute intracranial hemorrhage, mass effect or midline shift. No abnormal extra-axial fluid collection. The gray-white differentiation is maintained without evidence of an acute infarct. There is no evidence of hydrocephalus. The ventricles, cisterns and sulci are prominent consistent with atrophy. There is decreased attenuation within the periventricular white matter consistent with periventricular leukomalacia. ORBITS: The visualized portion of the orbits demonstrate no acute abnormality. SINUSES: The visualized paranasal sinuses and mastoid air cells demonstrate no acute abnormality. There is mild mucosal thickening seen within the right ethmoid air cells. SOFT TISSUES/SKULL:  No acute abnormality of the visualized skull or soft tissues. 1. There is no acute intracranial abnormality. Specifically, there is no intracranial hemorrhage. 2. Atrophy and periventricular leukomalacia,     CTA CHEST W WO CONTRAST PE Eval    Result Date: 12/18/2022  EXAMINATION: CTA OF THE CHEST WITH AND WITHOUT CONTRAST 12/18/2022 8:30 am TECHNIQUE: CTA of the chest was performed before and after the administration of intravenous contrast.  Multiplanar reformatted images are provided for review. MIP images are provided for review. Automated exposure control, iterative reconstruction, and/or weight based adjustment of the mA/kV was utilized to reduce the radiation dose to as low as reasonably achievable. COMPARISON: None.  HISTORY: ORDERING SYSTEM PROVIDED HISTORY: PE, syncope, hx of cancer, rule out PE; cough evaluate for infiltrate TECHNOLOGIST PROVIDED HISTORY: Reason for exam:->PE Reason for exam:->syncope, hx of cancer, rule out PE; cough evaluate for infiltrate Decision Support Exception - unselect if not a suspected or confirmed emergency medical condition->Emergency Medical Condition (MA) What reading provider will be dictating this exam?->CRC FINDINGS: Pulmonary Arteries: Pulmonary arteries are adequately opacified for evaluation. There is abnormal filling defect seen within the main pulmonary arteries bilaterally with slight saddle embolism identified. There is opacification identified within all the segmental and subsegmental branches most pronounced within the right middle and lower lobe pulmonary arteries. Emboli as well seen within the lingular branch. There is evidence of right heart strain within RV LV ratio of 1.6. There is prominence identified of the main pulmonary artery. Mediastinum: No evidence of mediastinal lymphadenopathy. The heart and pericardium demonstrate no acute abnormality. There is no acute abnormality of the thoracic aorta. Lungs/pleura: Mild ground-glass opacity identified in the right lower lobe suggesting possibly an area of infarction given the extensive thrombus within the pulmonary arteries on the right. Atelectatic change cannot be excluded. There is minimal atelectatic changes seen within the lingula. No definite pleural effusion or pneumothorax. Upper Abdomen: Limited images of the upper abdomen are unremarkable. Soft Tissues/Bones: Abnormal increased density identified within the subcutaneous tissues surrounding the luke catheter on the left anterior chest wall to suggest extravasation of intravenous contrast.     Extensive pulmonary emboli with saddle embolism identified. There is evidence of right heart strain within RV LV ratio of 1.6. Mild ground-glass seen at the right lung base in which early infarction cannot be excluded.  There is increased density seen surrounding the left luke catheter in the subcutaneous tissues to suggest extravasation of intravenous contrast. Findings were discussed with Dr. Srinivas Chery at time of interpretation of the examination. CT CERVICAL SPINE WO CONTRAST    Result Date: 12/18/2022  EXAMINATION: CT OF THE CERVICAL SPINE WITHOUT CONTRAST 12/18/2022 8:30 am TECHNIQUE: CT of the cervical spine was performed without the administration of intravenous contrast. Multiplanar reformatted images are provided for review. Automated exposure control, iterative reconstruction, and/or weight based adjustment of the mA/kV was utilized to reduce the radiation dose to as low as reasonably achievable. COMPARISON: None. HISTORY: ORDERING SYSTEM PROVIDED HISTORY: syncope, fall TECHNOLOGIST PROVIDED HISTORY: Reason for exam:->syncope, fall Decision Support Exception - unselect if not a suspected or confirmed emergency medical condition->Emergency Medical Condition ([place]) What reading provider will be dictating this exam?->CRC FINDINGS: The ring of C1 is intact as is the dense. There is no compression fracture of the cervical spine. No jumped or perched facet is noted. Multilevel degenerative disc and degenerative joint disease is noted. There are posterior degenerative endplate spurs seen at the C3-4 and C6-7 levels. There is mild central canal stenosis at the C6-7 level and mild bilateral neural foraminal narrowing. The prevertebral soft tissues are unremarkable. The airway is widely patent. Images through the lung apices are negative for a pneumothorax. 1. There is no acute compression fracture or subluxation of the cervical spine. 2. Multilevel degenerative disc and degenerative joint disease. .     CT THORACIC SPINE WO CONTRAST    Result Date: 12/18/2022  EXAMINATION: CT OF THE THORACIC SPINE WITHOUT CONTRAST  12/18/2022 8:30 am: TECHNIQUE: CT of the thoracic spine was performed without the administration of intravenous contrast. Multiplanar reformatted images are provided for review.  Automated exposure control, iterative reconstruction, and/or weight based adjustment of the mA/kV was utilized to reduce the radiation dose to as low as reasonably achievable. COMPARISON: None. HISTORY: ORDERING SYSTEM PROVIDED HISTORY: syncope, fall TECHNOLOGIST PROVIDED HISTORY: Reason for exam:->syncope, fall What reading provider will be dictating this exam?->CRC FINDINGS: BONES/ALIGNMENT: There is no acute compression fracture of the thoracic spine. There is dextroscoliosis of the thoracic spine. Mild multilevel degenerative disc and degenerative joint disease is noted. There are large anterior and lateral degenerative endplate spurs seen throughout the course of the thoracic spine. .  There is no osseous lesion. SOFT TISSUES: No paraspinal mass is seen. 1. There is no acute compression fracture of the thoracic spine. 2. Dextroscoliosis 3. Multilevel degenerative changes. CT LUMBAR SPINE WO CONTRAST    Result Date: 12/18/2022  EXAMINATION: CT OF THE LUMBAR SPINE WITHOUT CONTRAST  12/18/2022 TECHNIQUE: CT of the lumbar spine was performed without the administration of intravenous contrast. Multiplanar reformatted images are provided for review. Adjustment of mA and/or kV according to patient size was utilized. Automated exposure control, iterative reconstruction, and/or weight based adjustment of the mA/kV was utilized to reduce the radiation dose to as low as reasonably achievable. COMPARISON: None. HISTORY: ORDERING SYSTEM PROVIDED HISTORY: syncope, fall TECHNOLOGIST PROVIDED HISTORY: Reason for exam:->syncope, fall Decision Support Exception - unselect if not a suspected or confirmed emergency medical condition->Emergency Medical Condition ([place]) What reading provider will be dictating this exam?->CRC FINDINGS: Vertebral alignment is normal.  Mild disc space narrowing and discovertebral degenerative changes are identified. Facet arthritis is also seen. No acute vertebral fracture is visualized.   No spondylolysis is noted on the right or left. The sacroiliac joints are intact. The paraspinal soft tissues appear unremarkable. The urinary bladder is dilated. There are mild right hydronephrosis and hydroureter. There is colonic diverticulosis. There are postsurgical changes at the colorectal junction. Disc space narrowing and degenerative changes. No acute vertebral fracture or subluxation. XR CHEST PORTABLE    Result Date: 12/18/2022  EXAMINATION: ONE XRAY VIEW OF THE CHEST 12/18/2022 9:59 am COMPARISON: The previous study performed 11/03/2022. HISTORY: ORDERING SYSTEM PROVIDED HISTORY: fall, syncope TECHNOLOGIST PROVIDED HISTORY: Reason for exam:->fall, syncope What reading provider will be dictating this exam?->CRC FINDINGS: There is a large, ill-defined density overlying the left shoulder and mid to upper lateral left hemithorax. This may be external to the patient. It limits full evaluation of the surrounding soft tissue and osseous structures. There is no evidence of acute consolidation or infiltrate. No active pleural disease is seen. The cardiac silhouette and mediastinal structures are unremarkable. The tracheobronchial tree is midline and patent. A left-sided MediPort catheter is again noted, with the tip in the proximal SVC. There is again noted to be a thoracic scoliosis, with convexity to the right. Osteo-degenerative changes are again noted involving the thoracic spine. Large, ill-defined density overlying the left shoulder and mid to upper lateral left hemithorax, which limits full evaluation of the immediate surrounding soft tissue and osseous structures. No acute consolidation or infiltrate. Repeat chest radiograph can be performed further evaluation. IR PICC WO SQ PORT/PUMP > 5 YEARS    Result Date: 12/21/2022  PROCEDURE: ULTRASOUND GUIDED VASCULAR ACCESS. FLUOROSCOPY GUIDED PICC PLACEMENT 12/18/2022.  HISTORY: ORDERING SYSTEM PROVIDED HISTORY: limited acess TECHNOLOGIST PROVIDED HISTORY: Reason for exam:->limited acess How many lumens are being requested?->2 What site is the preferred site? ->No preference What side should this line be placed?->Right What reading provider will be dictating this exam?->CRC SEDATION: No sedation was administered. FLUOROSCOPY DOSE AND TYPE OR TIME AND EXPOSURES: 53 seconds of fluoroscopy were provided. A single fluoroscopic spot image was obtained. TECHNIQUE: Informed consent was obtained after a detailed explanation of the procedure including risks, benefits, and alternatives. Universal protocol was observed. The right arm was prepped and draped in sterile fashion using maximum sterile barrier technique. Local anesthesia was achieved with lidocaine. A micropuncture needle was used to access the right basilic vein using ultrasound guidance. An ultrasound image demonstrating patency of the vein with needle tip located within it. An image was obtained and stored in PACs. A 0.018 guidewire was used to place a peel-a-way sheath and a 5 [employer] Danni dual lumen PICC was advanced with fluoroscopic guidance with the tip at the cavo-atrial junction. The catheter flushed easily and there was a good blood return. The catheter was secured to the skin. The patient tolerated the procedure well and there were no immediate complications. FINDINGS: Fluoroscopic image demonstrates the tip of the catheter at the cavo-atrial junction. Successful ultrasound and fluoroscopy guided PICC placement. IR CONTRAST INJECTION  EXISTING CV ACCESS DEVICE EVALUATION W FLUORO    Result Date: 12/20/2022  PROCEDURE: IR CONTRAST INJECTION EXISTING CV ACCESS DEVICE EVALUATION W FLUORO 12/20/2022 HISTORY: ORDERING SYSTEM PROVIDED HISTORY: check  port TECHNOLOGIST PROVIDED HISTORY: Reason for exam:->check  port What reading provider will be dictating this exam?->CRC TECHNIQUE: The port was accessed using sterile technique. Contrast was then administered during fluoroscopic observation.   The port was subsequently flushed with saline then locked with heparin. CONTRAST: 18 mL Isovue 200. SEDATION: No sedation was administered. FLUOROSCOPY DOSE AND TYPE OR TIME AND EXPOSURES: Fluoroscopy time was 27 seconds. A fluoroscopic cine loop was acquired. FINDINGS: There is an [id number]-W-[id number] on the left with an internal jugular vein catheter terminating at the midline with its tip directed toward the right near the confluence of the innominate veins. The catheter is redundant in the neck. Aspiration of the port yields good blood return. Significant resistance was met when flushing the port. The catheter is patent and intact. No extravasation of contrast is identified. There is the suggestion of thrombus and or fibrin sheath at the distal end of the catheter. Left Iwfagw-[id number]-[id number] with an internal jugular vein catheter terminating at the midline with its tip directed toward the right near the confluence of the innominate veins. Redundancy of the catheter in the neck. Good blood return when aspirating the port but significant resistance when flushing the port. Patent and intact catheter with no contrast extravasation. Suggestion of thrombus and or fibrin sheath at the distal end of the catheter. US CAROTID ARTERY BILATERAL    Result Date: 12/22/2022  EXAMINATION - Carotid Duplex Ultrasound COMPARISON: May 9, 2017 DIAGNOSIS: Syncope  TECHNIQUE: Duplex carotid imaging was performed of both carotid systems using real time, peak velocity and color flow Doppler analysis. FINDINGS: Doppler findings: Right ICA PSV: 90 cm/sec. Left ICA PSV: 124 cm/sec Right CCA PSV: 82 cm/sec. Left CCA PSV:  103 cm/sec. Right ICA /CCA ratio: 1.02. Left ICA/CCA ratio: 1.5. Right ECA PSV: 49 cm/sec. Left: ECA PSV: 62 cm/sec. Antegrade flow is demonstrated in both vertebral arteries. Mild scattered atherosclerosis without obstructive lesion. Areas of intimal thickening. Bilateral ICAs less than 50% stenosis.      US DUP LOWER EXTREMITIES BILATERAL VENOUS    Result Date: 12/19/2022  EXAMINATION:  BILATERAL LOWER EXTREMITY VENOUS DUPLEX CLINICAL HISTORY:  SAGITTAL PULMONARY EMBOLISM COMPARISONS:  NONE AVAILABLE TECHNIQUE:  B-mode, color flow and spectral Doppler FINDINGS:  Bilateral lower extremity venous systems were interrogated extending from the common femoral vein to its adjacent segments of the proximal popliteal vein. On the right side the common femoral vein, femoral vein as well as the popliteal vein and into the peroneal vein there is an adequate compression augmentation as well as phasic flow consistent with the thrombus. Left lower extremity there is abnormal compression augmentation as well as phasic flow posterior tibial and into the peroneal veins. RIGHT LOWER EXTREMITY EXTENSIVE THE THIGH DEEP VEIN THROMBOSIS EXTENDING INTO THE CALF. LEFT LOWER EXTREMITY EDEMA DISTAL DEEP VEIN THROMBOSIS INVOLVING THE CALF REGION. IMPRESSION AND SUGGESTION:  Acute provoked submassive PE status post thrombectomy  COVID-19 pneumonia  Acute kidney injury improving    Continue anticoagulation therapy. She is in isolation for COVID-19. She does not have any complaint at this time. Continue present treatment plan. NOTE: This report was transcribed using voice recognition software. Every effort was made to ensure accuracy; however, inadvertent computerized transcription errors may be present.       Electronically signed by Alicja Matute MD, [facility] on 12/26/2022 at 11:14 AM

## 2022-12-26 NOTE — DISCHARGE INSTR - COC
Continuity of Care Form    Patient Name: Diana Brenner   :  1950  MRN:  [id number]    Admit date:  2022  Discharge date:  ***    Code Status Order: Full Code   Advance Directives:     Admitting Physician:  Harris Valle MD  PCP: Tony Rivera DO    Discharging Nurse: [facility] Unit/Room#: A243/X396-79  Discharging Unit Phone Number: ***    Emergency Contact:   Extended Emergency Contact Information  Primary Emergency Contact: [address] Phone: [phone]  Mobile Phone: [phone]  Relation: Child  Secondary Emergency Contact: GregorioJuan F   [address] Phone: [phone]  Relation: Child    Past Surgical History:  Past Surgical History:   Procedure Laterality Date    CATARACT REMOVAL      CHOLECYSTECTOMY      COLECTOMY N/A 10/3/2022    LOW ANTERIOR RESECTION WITH LOOP ILEOSTOMY performed by Mello Keating MD at [address] [place] [address] 2022    COLORECTAL CANCER SCREENING, NOT HIGH RISK performed by Lamine Eubanks MD at [facility]    COLONOSCOPY N/A 2022    FLEXIBLE SIGMOIDOSCOPY WITH [address] POLYPECTOMY performed by Mello Keating MD at [address] [place] [address] N/A 11/3/2022    INFUSAPORT performed by Mello Keating MD at [facility] 2 LEG    TUBAL LIGATION         Immunization History:   Immunization History   Administered Date(s) Administered    COVID-19, J&J, (age 18y+), IM, 0.5 mL 2021       Active Problems:  Patient Active Problem List   Diagnosis Code    Cancer ([facility] [address] C80.1    Hyperlipidemia E78.5    Elevated blood pressure reading R03.0    Colonic mass K63.89    Rectal polyp K62.1    Rectal cancer ([facility] [address] C20    Malignant neoplasm of rectum ([facility] [address] C20    Bilateral pulmonary embolism (HCC) I26.99    Convulsive syncope R55    COVID-19 U07.1       Isolation/Infection:   Isolation            Droplet Plus          Patient Infection Status       Infection Onset Added Last Indicated Last Indicated By Review Planned Expiration Resolved Resolved By    COVID-19 22 COVID-19, Rapid 22      Resolved    COVID-19 (Rule Out) 22 COVID-19, Rapid (Ordered)   22 Rule-Out Test Resulted    COVID-19 20 COVID-19 Ambulatory   20     COVID-19 (Rule Out) 20 COVID-19 Ambulatory (Ordered)   20 Rule-Out Test Resulted            Nurse Assessment:  Last Vital Signs: BP (!) 101/47   Pulse (!) 108   Temp 99.2 °F (37.3 °C) (Oral)   Resp 29   Ht 4' 11\" (1.499 m)   Wt 172 lb (78 kg)   SpO2 98%   BMI 34.74 kg/m²     Last documented pain score (0-10 scale): Pain Level: 3  Last Weight:   Wt Readings from Last 1 Encounters:   22 172 lb (78 kg)     Mental Status:  {IP PT MENTAL STATUS:}    IV Access:  { ABRAHAM IV ACCESS:309705322}    Nursing Mobility/ADLs:  Walking   {CHP DME COZM:478511969}  Transfer  {CHP DME IWVY:180403071}  Bathing  {CHP DME ZOPV:504891063}  Dressing  {CHP DME EHRW:893764434}  Toileting  {CHP DME MBJX:299915901}  Feeding  {CHP DME GEEX:777103706}  Med Admin  {CHP DME FXGU:819724989}  Med Delivery   { ABRAHAM MED Delivery:674375469}    Wound Care Documentation and Therapy:        Elimination:  Continence:    Bowel: {YES / H}  Bladder: {YES / HW:19219}  Urinary Catheter: {Urinary Catheter:937890058}   Colostomy/Ileostomy/Ileal Conduit: {YES / RY:40157}  Ileostomy/Jejunostomy RLQ Loop ileostomy-Stomal Appliance: 1 piece, Clean, dry & intact  Ileostomy/Jejunostomy RLQ Loop ileostomy-Flange Size (inches): 1.25 Inches  Ileostomy/Jejunostomy RLQ Loop ileostomy-Stoma  Assessment: Red, Moist  Ileostomy/Jejunostomy RLQ Loop ileostomy-Peristomal Assessment: Unable to assess  Ileostomy/Jejunostomy RLQ Loop ileostomy-Treatment: Bag change, Stoma powder, Liquid skin barrier  Ileostomy/Jejunostomy RLQ Loop ileostomy-Stool Appearance: Watery  Ileostomy/Jejunostomy RLQ Loop ileostomy-Stool Color: Brown  Ileostomy/Jejunostomy RLQ Loop ileostomy-Stool Amount: Medium  Ileostomy/Jejunostomy RLQ Loop ileostomy-Output (mL): 100 ml    Date of Last BM: ***    Intake/Output Summary (Last 24 hours) at 2022 09  Last data filed at 2022 1149  Gross per 24 hour   Intake --   Output 1100 ml   Net -1100 ml     I/O last 3 completed shifts:  In: -   Out: 1700 [Urine:250; Emesis/NG output:100; Stool:1350]    Safety Concerns:     [address] Safety Concerns:396971396}    Impairments/Disabilities:      [address] Impairments/Disabilities:062764536}    Nutrition Therapy:  Current Nutrition Therapy:   [address] Diet List:745222577}    Routes of Feeding: {CHP DME Other Feedings:207600780}  Liquids: {Slp liquid thickness:39851}  Daily Fluid Restriction: {CHP DME Yes amt example:026784548}  Last Modified Barium Swallow with Video (Video Swallowing Test): {Done Not Done TERP:013874089}    Treatments at the Time of Hospital Discharge:   Respiratory Treatments: ***  Oxygen Therapy:  {Therapy; copd oxygen:79890}  Ventilator:    { CC Vent VRAX:460024029}    Rehab Therapies: {THERAPEUTIC INTERVENTION:2775918043}  Weight Bearing Status/Restrictions: [employer] Weight Bearin}  Other Medical Equipment (for information only, NOT a DME order):  {EQUIPMENT:542234700}  Other Treatments: ***    Patient's personal belongings (please select all that are sent with patient):  {CHP DME Belongings:212768738}    RN SIGNATURE:  {Esignature:835505754}    CASE MANAGEMENT/SOCIAL WORK SECTION    Inpatient Status Date: 22    Readmission Risk Assessment Score:  Readmission Risk              Risk of Unplanned Readmission:  25           Discharging to Facility/ Agency   Name: [facility]  Address:  Phone:  Fax:    Dialysis Facility (if applicable)   Name:  Address:  Dialysis Schedule:  Phone:  Fax:    / signature: Electronically signed by Solomon Sheets LSW on 12/26/22 at 12:10 PM EST    PHYSICIAN SECTION    Prognosis: {Prognosis:9810772892}    Condition at Discharge: [address] Patient Condition:856891531}    Rehab Potential (if transferring to Rehab): {Prognosis:4115400049}    Recommended Labs or Other Treatments After Discharge: CBC and BMP in 2 to 3 days    Physician Certification: I certify the above information and transfer of Gary Castro  is necessary for the continuing treatment of the diagnosis listed and that she requires {Admit to Appropriate Level of Care:43023} for {GREATER/LESS:302256670} 30 days.      Update Admission H&P: {CHP DME Changes in EIXXC:932466705}    PHYSICIAN SIGNATURE:  Electronically signed by Osmani Tobias MD on 12/26/22 at 9:19 AM EST

## 2022-12-26 NOTE — CARE COORDINATION
SPOKE WITH AMANDA. HAVE AUTH FOR PT TO GO TO [facility]. FACILITY CAN ACCEPT PT THIS EVENING. DR. Yovany Palma. CALL PLAC[facility], TRANSPORT ARRANGED- ETA 1700  PATIENT AND NURSING UPDATED. RECEIVED CALL FROM AMANDA. REQUESTING INFORMATION RE: PT CHEMO. WHEN IT CAN RESUME AND WHAT CHEMO MED PT IS ON. HEM/ONC HAS NOT SEEN PT SINCE 12/19. ATTEMPT TO CALL CANCER CENTER FOR CLARIFICATION- NO ANSWER. LEFT MESSAGE. NURSING TO ATTEMPT TO REACH HEM/ONC FOR PLAN. WILL CONTINUE TO FOLLOW.     8555- HEM/ONC AT BEDSIDE. Libby Slade. NO CHEMO UNTIL FOLLOW UP APPOINTMENT IN ABOUT 2 WEEKS.

## 2022-12-26 NOTE — PROGRESS NOTES
Comprehensive Nutrition Assessment    Type and Reason for Visit:  Reassess    Nutrition Recommendations/Plan:   Discontinue Cardiac restrictions    Add high calorie ONS BID     Malnutrition Assessment:  Malnutrition Status:  Insufficient data (12/19/22 1054)    Context:  Chronic Illness     Findings of the 6 clinical characteristics of malnutrition:  Energy Intake:  Unable to assess  Weight Loss:  Greater than 7.5% over 3 months     Body Fat Loss:  Unable to assess     Muscle Mass Loss:  Unable to assess    Fluid Accumulation:  No significant fluid accumulation     Strength:  Not Performed    Nutrition Assessment:    Pt admitted with suspected malnutrition, hx of significant weight loss ~10% x 2-3 months, with suspected inadeqauate po intake PTA, + droplet precautions for COVID and continues to be N/A for interview. Noted appetite to be fair, will discontinue caRDIAC RESTRICTIONS AND ADD A HIGH CALORIE ons bid    Nutrition Related Findings:    presents with a hx of colon/rectal cancer undergoing active chemotherapy, ( colectomy 10/3/22),hld, oa, who presents after a fall while walking from the bathroom after changing her colostomy bag. She lost consciousness for an unknown amount of time. She was then brought to the ER and had a second syncopal episode. \"labs/meds reviewed, stool via colostomy, + COVID isolation precautions Wound Type: None       Current Nutrition Intake & Therapies:    Average Meal Intake: Unable to assess  Average Supplements Intake: Unable to assess  ADULT DIET; Regular; Low Fat/Low Chol/High Fiber/PADMA    Anthropometric Measures:  Height: 4' 11\" (149.9 cm)  Ideal Body Weight (IBW): 95 lbs (43 kg)    Admission Body Weight: 173 lb (78.5 kg) (stated)  Current Body Weight: 172 lb (78 kg),   IBW.  Weight Source: Bed Scale  Current BMI (kg/m2): 34.7  Usual Body Weight: 192 lb (87.1 kg) (( 11/2021), 185# ( 10/22))  % Weight Change (Calculated): -13  Weight Adjustment For: No Adjustment BMI Categories: Obese Class 1 (BMI 30.0-34. 9)    Estimated Daily Nutrient Needs:  Energy Requirements Based On: Kcal/kg  Weight Used for Energy Requirements: Current  Energy (kcal/day): 3040-4853 kcals @ 16-18 kcal/kg  Weight Used for Protein Requirements: Ideal  Protein (g/day): 52-56 g protein @ 1.2-1.3 g/kg IBW  Method Used for Fluid Requirements: ml/Kg  Fluid (ml/day): ~1900 ml @25 ml/kg    Nutrition Diagnosis:   Unintended weight loss related to inadequate protein-energy intake, increase demand for energy/nutrients as evidenced by weight loss 7.5% in 3 months    Nutrition Interventions:   Food and/or Nutrient Delivery: Continue Current Diet, Continue Oral Nutrition Supplement (Discontinue Cardiac restrictions  Add high calorie ONS BID)  Nutrition Education/Counseling: Education not indicated  Coordination of Nutrition Care: Continue to monitor while inpatient       Goals:  Previous Goal Met: Goal(s) Achieved  Goals: PO intake 75% or greater (New goals)       Nutrition Monitoring and Evaluation:   Behavioral-Environmental Outcomes: None Identified  Food/Nutrient Intake Outcomes: Diet Advancement/Tolerance, Food and Nutrient Intake  Physical Signs/Symptoms Outcomes: GI Status, Weight, Meal Time Behavior, Nutrition Focused Physical Findings    Discharge Planning:     Too soon to determine     Mark Arreola RD, LD

## 2022-12-26 NOTE — DISCHARGE SUMMARY
Discharge Summary    Date: 12/26/2022  Patient Name: Lilliana Boone    YOB: 1950     Age: 81 y.o. Admit Date: 12/18/2022  Discharge Date: 12/26/2022  Discharge Condition:    Admission Diagnosis  Syncope and collapse [R55]; Hypokalemia [E87.6]; Thrombocytopenia ([facility] [address] [D69.6]; Elevated troponin [R77.8]; Bilateral pulmonary embolism ([address] [I26.99]; Seizure-like activity ([facility] [address] [R56.9]; History of rectal cancer [Z85.048]; Acute saddle pulmonary embolism with acute cor pulmonale (HCC) [I26.02]; Neutropenia, unspecified type ([facility] [address] [D70.9]; COVID-19 [U07.1]      Discharge Diagnosis  Principal Problem:    Bilateral pulmonary embolism (HCC)  Active Problems:    Convulsive syncope    COVID-19  Resolved Problems:    * No resolved hospital problems. [facility] Stay  Narrative of Hospital Course:  Patient comes in fall/syncope  for history of colon cancer found to have PE and COVID. On room air. followed by cardiology and  Status post PE thrombectomy for saddle PE   B/L DVT  Right side extensive Thrombus -s/p RLE DVT Thrombectomy  Pt now going to [facility] on  [address]    Consultants:  IP CONSULT TO PHARMACY  IP CONSULT TO HEM/ONC  IP CONSULT TO CRITICAL CARE  IP CONSULT TO CARDIOLOGY  IP CONSULT TO [place]  IP CONSULT TO CARDIOLOGY  IP CONSULT TO NEUROLOGY  IP CONSULT TO SPIRITUAL SERVICES    Surgeries/procedures Performed:      Treatments:            Discharge Plan/Disposition:  Home    Hospital/Incidental Findings Requiring Follow Up:    Patient Instructions:    Diet:    Activity:  For number of days (if applicable): Other Instructions:    Provider Follow-Up:   No follow-ups on file. Significant Diagnostic Studies:    Recent Labs:  Admission on 12/18/2022  No results displayed because visit has over 200 results.     ------------    Radiology last 7 days:  IR CONTRAST INJECTION  EXISTING CV ACCESS DEVICE EVALUATION W FLUORO    Result Date: 12/20/2022  Left Ykdbaa-K-Hjbz with an internal jugular vein catheter terminating at the midline with its tip directed toward the right near the confluence of the innominate veins. Redundancy of the catheter in the neck. Good blood return when aspirating the port but significant resistance when flushing the port. Patent and intact catheter with no contrast extravasation. Suggestion of thrombus and or fibrin sheath at the distal end of the catheter. US CAROTID ARTERY BILATERAL    Result Date: 12/22/2022  Mild scattered atherosclerosis without obstructive lesion. Areas of intimal thickening. Bilateral ICAs less than 50% stenosis. US DUP LOWER EXTREMITIES BILATERAL VENOUS    Result Date: 12/19/2022  RIGHT LOWER EXTREMITY EXTENSIVE THE THIGH DEEP VEIN THROMBOSIS EXTENDING INTO THE CALF. LEFT LOWER EXTREMITY EDEMA DISTAL DEEP VEIN THROMBOSIS INVOLVING THE CALF REGION. Pending Labs     Order Current Status    APTT Collected (12/19/22 0530)    CBC Collected (12/18/22 1934)    Hemoglobin and Hematocrit Collected (12/21/22 1047)    Protime-INR Collected (12/19/22 0530)    CBC with Auto Differential Preliminary result        Discharge Medications    Current Discharge Medication List    START taking these medications    !! apixaban (ELIQUIS) 5 MG TABS tablet  Take 2 tablets by mouth 2 times daily for 14 doses  Qty: 14 tablet Refills: 0    !! apixaban (ELIQUIS) 5 MG TABS tablet  Take 1 tablet by mouth 2 times daily  Qty: 60 tablet Refills: 5    metoprolol succinate (TOPROL XL) 25 MG extended release tablet  Take 1 tablet by mouth daily  Qty: 30 tablet Refills: 3    !! - Potential duplicate medications found. Please discuss with provider.           Current Discharge Medication List        Current Discharge Medication List    CONTINUE these medications which have NOT CHANGED    lidocaine-prilocaine (EMLA) 2.5-2.5 % cream      simvastatin (ZOCOR) 40 MG tablet  TAKE ONE TABLET BY MOUTH EVERY DAY IN THE EVENING  Qty: 90 tablet Refills: 3  Associated Diagnoses:Mixed hyperlipidemia    Handicap Placard MISC  by Does not apply route Good for 5 years  Qty: 1 each Refills: 0  Associated Diagnoses:Cancer [facility])          Current Discharge Medication List    STOP taking these medications    prochlorperazine (COMPAZINE) 10 MG tablet  Comments:  Reason for Stopping:    SUTAB 5019-497-421 MG TABS  Comments:  Reason for Stopping:    aspirin 325 MG tablet  Comments:  Reason for Stopping:    meloxicam (MOBIC) 15 MG tablet  Comments:  Reason for Stopping:          Time Spent on Discharge:  minutes were spent in patient examination, evaluation, counseling as well as medication reconciliation, prescriptions for required medications, discharge plan, and follow up.     Electronically signed by Lory Fink MD on 12/26/22 at 10:02 AM EST   Overtime on dc summary was 45 min

## 2022-12-26 NOTE — PLAN OF CARE
Problem: Discharge Planning  Goal: Discharge to home or other facility with appropriate resources  Outcome: Adequate for Discharge     Problem: Pain  Goal: Verbalizes/displays adequate comfort level or baseline comfort level  Outcome: Adequate for Discharge     Problem: Safety - Adult  Goal: Free from fall injury  Outcome: Adequate for Discharge     Problem: Nutrition Deficit:  Goal: Optimize nutritional status  12/26/2022 1616 by Taj Ferro RN  Outcome: Adequate for Discharge  12/26/2022 1608 by Damaris Mahmood RD, LD  Flowsheets (Taken 12/19/2022 1057 by Mahnaz Hughes RD, XENIA)  Nutrient intake appropriate for improving, restoring, or maintaining nutritional needs:   Assess nutritional status and recommend course of action   Recommend appropriate diets, oral nutritional supplements, and vitamin/mineral supplements   Monitor oral intake, labs, and treatment plans     Problem: Skin/Tissue Integrity  Goal: Absence of new skin breakdown  Description: 1. Monitor for areas of redness and/or skin breakdown  2. Assess vascular access sites hourly  3. Every 4-6 hours minimum:  Change oxygen saturation probe site  4. Every 4-6 hours:  If on nasal continuous positive airway pressure, respiratory therapy assess nares and determine need for appliance change or resting period.   Outcome: Adequate for Discharge

## 2022-12-26 NOTE — PROGRESS NOTES
Hematology/Oncology   Progress Note        CHIEF COMPLAINT/HPI:  Patient is dizzy on change of posture . She is able to sit by the side of the bed. She has not walked much during this hospital stay . Arrangements are being made for the patient to go a SNF to rehab. Oral intake is fair . Colostomy is functioning well . She has persistent cough . REVIEW OF SYSTEMS:    Unremarkable except for symptoms mentioned in HPI.     Current Inpatient Medications:    Current Facility-Administered Medications   Medication Dose Route Frequency Provider Last Rate Last Admin    trimethobenzamide (TIGAN) injection 200 mg  200 mg IntraMUSCular Q6H PRN Adry Chen, DO   200 mg at 12/23/22 0353    metoprolol succinate (TOPROL XL) extended release tablet 25 mg  25 mg Oral Nightly Ajith Chambers MD   25 mg at 12/25/22 2005    apixaban (ELIQUIS) tablet 10 mg  10 mg Oral BID Ajith Chambers MD   10 mg at 12/26/22 8539    Followed by    Chris Backbone ON 12/29/2022] apixaban (ELIQUIS) tablet 5 mg  5 mg Oral BID Ajith Chambers MD        sodium chloride flush 0.9 % injection 5-40 mL  5-40 mL IntraVENous 2 times per day Ajith Chambers MD   10 mL at 12/26/22 0858    sodium chloride flush 0.9 % injection 5-40 mL  5-40 mL IntraVENous PRN Ajith Chambers MD        0.9 % sodium chloride infusion   IntraVENous PRN Ajith Chambers MD        sodium chloride flush 0.9 % injection 5-40 mL  5-40 mL IntraVENous 2 times per day Ajith Chambers MD   10 mL at 12/26/22 0858    sodium chloride flush 0.9 % injection 5-40 mL  5-40 mL IntraVENous PRN Ajith Chambers MD        0.9 % sodium chloride infusion   IntraVENous PRN Ajith Chambers MD        ondansetron [facility]) injection 4 mg  4 mg IntraVENous Q6H PRN Glenn Heard DO   4 mg at 12/25/22 1715    sodium chloride flush 0.9 % injection 5-40 mL  5-40 mL IntraVENous 2 times per day [facility]   10 mL at 12/26/22 0859    sodium chloride flush 0.9 % injection 5-40 mL  5-40 mL IntraVENous PRN [facility]        0.9 % sodium chloride infusion   IntraVENous PRN [employer], PA-C        sodium chloride flush 0.9 % injection 5-40 mL  5-40 mL IntraVENous 2 times per day Fawad Velasco MD   10 mL at 12/26/22 0859    sodium chloride flush 0.9 % injection 5-40 mL  5-40 mL IntraVENous PRN Fawad Velasco MD        0.9 % sodium chloride infusion   IntraVENous PRN Fawad Velasco MD        ondansetron (ZOFRAN-ODT) disintegrating tablet 4 mg  4 mg Oral Q8H PRN Fawad Velasco MD   4 mg at 12/24/22 1100    Or    [facility]ron [facility]) injection 4 mg  4 mg IntraVENous Q6H PRN Fawad Velasco MD   4 mg at 12/22/22 2127    polyethylene glycol (GLYCOLAX) packet 17 g  17 g Oral Daily PRN Fawad Velasco MD        acetaminophen (TYLENOL) tablet 650 mg  650 mg Oral Q6H PRN Fawad Velasco MD   650 mg at 12/25/22 1714    Or    acetaminophen (TYLENOL) suppository 650 mg  650 mg Rectal Q6H PRN Fawad Velasco MD           PHYSICAL EXAM:    EYES:  Lids and lashes normal, pupils equal, round and reactive to light, extra ocular muscles intact, sclera clear, conjunctiva pale    ENT:  Normocephalic, without obvious abnormality, atraumatic,     NECK:  Supple, symmetrical, trachea midline, no adenopathy, thyroid symmetric, not enlarged and no tenderness, skin normal    CHEST:    LUNGS:  No increased work of breathing, good air exchange, clear to auscultation bilaterally, no crackles or wheezing    CARDIOVASCULAR:  Normal apical impulse, regular rate and rhythm, normal S1 and S2, no S3 or S4, and no murmur noted    ABDOMEN:  No scars, normal bowel sounds, soft, non-distended, non-tender, no masses palpated, no hepatosplenomegally. Colostomy is functioning well . MUSCULOSKELETAL:  There is no redness, warmth, or swelling of the joints. Full range of motion noted. Motor strength is 5 out of 5 all extremities bilaterally. Tone is normal.  EXTREMITIES:No edema or calf tenderness . NEURO:No focal neuro deficit .      DATA: PT/INR:  No results found for: PTINR  PTT:    Lab Results   Component Value Date/Time    APTT 96.7 12/19/2022 07:00 AM     CMP:    Lab Results   Component Value Date/Time     12/26/2022 05:56 AM    K 3.7 12/26/2022 05:56 AM    K 3.3 12/18/2022 08:30 PM    CL 93 12/26/2022 05:56 AM    CO2 25 12/26/2022 05:56 AM    BUN 20 12/26/2022 05:56 AM    PROT 5.8 12/19/2022 07:00 AM     Magnesium:    Lab Results   Component Value Date/Time    MG 2.2 12/18/2022 08:30 PM     Phosphorus:  No components found for: PO4  Calcium:  No results found for: CA  CBC:    Lab Results   Component Value Date/Time    WBC 5.5 12/26/2022 05:45 AM    RBC 2.63 12/26/2022 05:45 AM    HGB 8.0 12/26/2022 05:45 AM    HCT 23.3 12/26/2022 05:45 AM    MCV 88.6 12/26/2022 05:45 AM    RDW 17.6 12/26/2022 05:45 AM     12/26/2022 05:45 AM     DIFF:    Lab Results   Component Value Date/Time    MCV 88.6 12/26/2022 05:45 AM    RDW 17.6 12/26/2022 05:45 AM      LDH:  No results found for: LDH  Uric Acid:  No components found for: URIC    EKG Reviewed  Appropriate Radiology Reviewed      Pathology: Reviewed where indicated      ASSESSMENT:  Principal Problem:    Bilateral pulmonary embolism ([facility] [address]  Active Problems:    Acute saddle pulmonary embolism with acute cor pulmonale (HCC)    Convulsive syncope    COVID-19    Seizure-like activity ([facility] [address]  Resolved Problems:    * No resolved hospital problems. *    Patient Active Problem List   Diagnosis    Cancer ([address]    Hyperlipidemia    Elevated blood pressure reading    Colonic mass    Rectal polyp    Rectal cancer ([facility] [address]    Malignant neoplasm of rectum (HCC)    Bilateral pulmonary embolism (HCC)    Convulsive syncope    COVID-19    Seizure-like activity ([facility] [address]    Acute saddle pulmonary embolism with acute cor pulmonale (HCC)        Anemia -Multifactorial        Vertigo         Generalized weakness    PLAN:  I shall obtain iron panel , Folate and B12 level . Patient may be transferred to a SNF for rehab.  I shall see her in the office in a few weeks and plan further adjuvant chemotherapy . Patient understands the plans for rehab and rationale of postponing her chemotherapy .            Electronically signed by Larisa Peters MD on 12/26/22 at 3:05 PM EST

## 2022-12-27 PROCEDURE — 95816 EEG AWAKE AND DROWSY: CPT | Performed by: PSYCHIATRY & NEUROLOGY

## 2022-12-27 NOTE — PROCEDURES
[address]                      [address], [address] [facility]                                 PROCEDURE NOTE    PATIENT NAME: Israel Jackson                     :        1950  MED REC NO:   [id number]                            ROOM:       H485  ACCOUNT NO:   [de-identified]                           ADMIT DATE: 2022  PROVIDER:     Corrinne Loma Holiday, DO    DATE OF PROCEDURE:  2022    PROCEDURES PERFORMED:  Right lower extremity ascending venography, IVC  venogram, intravascular ultrasound of the inferior vena cava as well as  the right lower extremity venous system, mechanical thrombectomy with a  right common femoral vein as well as right proximal femoral vein as well  as ultrasound-guided vascular access. PROCEDURE PERFORMED BY:  Glenn Heard DO    INDICATIONS:  Acute right lower extremity deep vein thrombosis    COMPLICATIONS:  None. ACCESS SITE:  Right popliteal vein. DESCRIPTION OF PROCEDURE:  The patient was brought to the cardiac cath  lab suite where she was sterilely prepped and draped in the usual  fashion. 1% lidocaine was used to anesthetize the right popliteal fossa  and a 7-Chinese venous sheath was obtained under ultrasound guidance. Images were stored permanently in the records and the vein and  needle/wire were visualized entering the vein under ultrasound guidance. There is significant thrombus burden in the right popliteal vein. Moderate IV conscious sedation was given with IV fentanyl as well as  Versed for the duration of the procedure/greater than 50 minutes under  my direct supervision and independent trained observer. An ascending venography was performed through the right popliteal venous  sheath. Next, a 0.035 wire _____ was placed in the superior vena cava  and intravascular ultrasound was performed from the inferior vena cava  only backed down through the iliofemoral popliteal venous system.   Next,  mechanical thrombectomy was performed with the Inari ClotTriever device  from the right common femoral vein as well as femoral popliteal venous  system with multiple passes with extrusion of significant amount of  thrombus. Complete ascending venography was obtained showing excellent  results as well as post-thrombectomy intravascular ultrasound. FINDINGS:  Large thrombus burden noted in the right common femoral vein  as well as proximal femoral vein. Intravascular ultrasound of the  inferior vena cava showed right iliac as well as femoral venous system  showed significant compression and venous fibrosis in the right common  iliac and external iliac venous segments. The inferior vena cava was  free of any thrombus or duplication of the inferior vena cava. ASSESSMENT:  1.  Status post successful mechanical thrombectomy with a right common  femoral vein as well as proximal right femoral vein. 2.  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 There is significant fibrosis of the  right common iliac and external venous segments. PLAN:  1. Continue with Lovenox and continue with oral anticoagulation. 2.  The patient will require bilateral common iliac vein/external iliac  vein, intravascular ultrasound in the near future with possible  stenting. 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         Rafael Gold DO    D: 12/26/2022 18:55:47       T: 12/26/2022 21:49:04     [email or web address]  Job#: [id number]     Doc#: [id number]    CC:

## 2022-12-27 NOTE — PROGRESS NOTES
Physical Therapy  Facility/Department: [facility] MED SURG Y338/S377-41  Physical Therapy Discharge      NAME: Branden De La Torre    : 1950 (81 y.o.)  MRN: [id number]    Account: [de-identified]  Gender: female      Patient has been discharged from acute care hospital. DC patient from current PT program.      Electronically signed by Yani Lopez PT on 22 at 3:22 PM EST

## 2022-12-27 NOTE — PROCEDURES
[address]                      [address], [address] [facility]                                 PROCEDURE NOTE    PATIENT NAME: Layla Waddell                     :        1950  MED REC NO:   [id number]                            ROOM:       [id number]  ACCOUNT NO:   [de-identified]                           ADMIT DATE: 2022  PROVIDER:     Rosalia Heard DO    DATE OF PROCEDURE:  2022    PROCEDURE PERFORMED:  Selective bilateral pulmonary angiogram, right  heart catheterization, successful bilateral main as well as segmental  pulmonary artery mechanical thrombectomy with Inari device. The patient  _____ pulmonary embolism with RV strain. PROCEDURE PERFORMED BY:  Glenn Heard DO    COMPLICATIONS:  None. ACCESS SITE:  Right common femoral vein. DESCRIPTION OF PROCEDURE:  The patient was brought to the cardiac cath  lab suite where she was sterilely prepped and draped in usual fashion. 1% lidocaine was used to anesthetized the right inguinal area and a  7-Trinidadian venous sheath was placed without any difficulty. Moderate IV conscious sedation was given with IV fentanyl as well as  Versed for the duration of the procedure/greater than 50 minutes under  my direct supervision as well as independent trained observer. Next, a balloon-tipped Norton-Rigo catheter was placed in the pulmonary  artery. Main pulmonary artery and PA pressure was obtained and O2  saturation was obtained as well. Next, a pigtail catheter was placed in the left main pulmonary artery  and angiogram of the left pulmonary system was obtained. Next, the  catheter was then placed in the right main pulmonary artery and  angiogram of the right pulmonary system was obtained.     Next, eventually an 0.035 wire was placed in right main and distal  pulmonary artery and the sheath was upsized in 24-Trinidadian sheath and T24  Inari mechanical thrombectomy device was placed in the right main  pulmonary artery as well segmental pulmonary artery. Large amount of  thrombus was extruded per usual technique. Next, catheter was then  directed into the left main pulmonary artery as well as left segmental  pulmonary vein. Next, significant amount of thrombus was also extruded  from dissection. Complete bilateral pulmonary angiogram was obtained  and showing excellent results. Repeat PA pressure was obtained in the  pulmonary artery and the catheter was removed from the patient and a  figure-of-eight stitch was placed in the right groin and the patient was  transferred to the Intensive Care Unit in stable and satisfactory  condition. FINDINGS:  There is large thrombus burden in the main as well as  bilateral main pulmonary arteries as well as segmental branches. Pre  thrombectomy pulmonary artery pressure was 40/15 with mean of 28 mmHg. The post thrombectomy pulmonary artery pressure was 30/10 with a mean of  17. There was normal cardiac output as well as cardiac index. ASSESSMENT:  Status post successful bilateral main as well as segmental  pulmonary artery mechanical thrombectomy with Inari device with  excellent angiographic results as well as significant drop in PA  pressure post procedure. PLAN:  1.  DC heparin drip and initiate Lovenox full dose. 2.  Planned for right lower extremity DVT thrombectomy in the near  future.   3.  Monitor H and H.        YESICA VICK DO    D: 12/26/2022 18:51:37       T: 12/26/2022 21:23:50     [email or web address]  Job#: [id number]     Doc#: [id number]    CC:

## 2022-12-29 ENCOUNTER — OFFICE VISIT (OUTPATIENT)
Dept: GERIATRIC MEDICINE | Age: 72
End: 2022-12-29

## 2022-12-29 DIAGNOSIS — C20 MALIGNANT NEOPLASM OF RECTUM (HCC): Primary | ICD-10-CM

## 2022-12-29 DIAGNOSIS — R56.9 SEIZURE-LIKE ACTIVITY (HCC): ICD-10-CM

## 2022-12-29 DIAGNOSIS — I26.99 BILATERAL PULMONARY EMBOLISM (HCC): ICD-10-CM

## 2023-01-05 ENCOUNTER — OFFICE VISIT (OUTPATIENT)
Dept: GERIATRIC MEDICINE | Age: 73
End: 2023-01-05
Payer: COMMERCIAL

## 2023-01-05 DIAGNOSIS — I10 PRIMARY HYPERTENSION: ICD-10-CM

## 2023-01-05 DIAGNOSIS — C20 MALIGNANT NEOPLASM OF RECTUM (HCC): ICD-10-CM

## 2023-01-05 DIAGNOSIS — Z78.9 IMPAIRED MOBILITY AND ADLS: ICD-10-CM

## 2023-01-05 DIAGNOSIS — Z74.09 IMPAIRED MOBILITY AND ADLS: ICD-10-CM

## 2023-01-05 DIAGNOSIS — R53.83 FATIGUE AFTER COVID-19 VACCINATION: ICD-10-CM

## 2023-01-05 DIAGNOSIS — I26.99 BILATERAL PULMONARY EMBOLISM (HCC): ICD-10-CM

## 2023-01-05 DIAGNOSIS — E78.2 MIXED HYPERLIPIDEMIA: ICD-10-CM

## 2023-01-05 DIAGNOSIS — Z79.01 ON CONTINUOUS ORAL ANTICOAGULATION: Primary | ICD-10-CM

## 2023-01-05 DIAGNOSIS — T50.B95A FATIGUE AFTER COVID-19 VACCINATION: ICD-10-CM

## 2023-01-05 PROCEDURE — 1123F ACP DISCUSS/DSCN MKR DOCD: CPT | Performed by: FAMILY MEDICINE

## 2023-01-05 PROCEDURE — 99497 ADVNCD CARE PLAN 30 MIN: CPT | Performed by: FAMILY MEDICINE

## 2023-01-05 PROCEDURE — G8484 FLU IMMUNIZE NO ADMIN: HCPCS | Performed by: FAMILY MEDICINE

## 2023-01-05 PROCEDURE — 99310 SBSQ NF CARE HIGH MDM 45: CPT | Performed by: FAMILY MEDICINE

## 2023-01-12 VITALS — BODY MASS INDEX: 31.1 KG/M2 | WEIGHT: 154 LBS

## 2023-01-12 ASSESSMENT — ENCOUNTER SYMPTOMS
NAUSEA: 0
SORE THROAT: 0
WHEEZING: 0
VOICE CHANGE: 0
SHORTNESS OF BREATH: 1
GASTROINTESTINAL NEGATIVE: 1
ALLERGIC/IMMUNOLOGIC NEGATIVE: 1
COUGH: 1
COLOR CHANGE: 0
EYES NEGATIVE: 1
CONSTIPATION: 0

## 2023-01-13 NOTE — PROGRESS NOTES
Subjective:      Patient was examined at [facility]  Address: [address], [place], [address] [facility]  Phone: [phone]      Chief Complaint   Patient presents with    Pulmonary Embolism    Post-COVID Symptoms    Shortness of Breath    Discuss Labs    Other    Follow-up     Patient ID: Gary Castro is a 81 y.o. female being seen today. Patient was admitted to skilled nursing facility related to weakness and elevated for for ADL secondary to admission to [facility] from the dates December 18 December 26 post fall and syncopal episode. Has a known history of colon cancer. Was found to have saddle pulmonary embolism and COVID 19. Also noted intermittent hypertension. Was started on Eliquis and metoprolol. patient was also noted to have hyponatremia and started on sodium chloride tablets post admission for sodium 124. GFR 37 white blood cell 6.7 hemoglobin 9.5. Noted sodium 130 prior to hospital discharge discharge. Patient does have ostomy bag related to history of colon cancer with no acute complaints or concerns for such today. Wound/ostomy nurse following. Shortness of breath at baseline.        Past Medical History:   Diagnosis Date    Arthritis     Cancer ([address]     SKIN    Hyperlipidemia      Past Surgical History:   Procedure Laterality Date    CATARACT REMOVAL      CHOLECYSTECTOMY      COLECTOMY N/A 10/3/2022    LOW ANTERIOR RESECTION WITH LOOP ILEOSTOMY performed by Chiara Mercado MD at [address] [place] [address] N/A 9/16/2022    COLORECTAL CANCER SCREENING, NOT HIGH RISK performed by Stef Sanders MD at [facility]    COLONOSCOPY N/A 9/26/2022    FLEXIBLE SIGMOIDOSCOPY WITH SNARE POLYPECTOMY performed by Chiara Mercado MD at [place] [address] N/A 11/3/2022    INFUSAPORT performed by Chiara Mercado MD at [address] [place] [address] LOWER LEG    TUBAL LIGATION       Family History   Problem Relation Age of Onset Diabetes Mother     Breast Cancer Maternal Aunt     Colon Cancer Neg Hx      Social History     Socioeconomic History    Marital status:      Spouse name: Not on file    Number of children: Not on file    Years of education: Not on file    Highest education level: Not on file   Occupational History    Not on file   Tobacco Use    Smoking status: Never    Smokeless tobacco: Never   Vaping Use    Vaping Use: Never used   Substance and Sexual Activity    Alcohol use: No    Drug use: No    Sexual activity: Not on file   Other Topics Concern    Not on file   Social History Narrative    Not on file     Social Determinants of Health     Financial Resource Strain: Low Risk     Difficulty of Paying Living Expenses: Not hard at all   Food Insecurity: No Food Insecurity    Worried About Running Out of Food in the Last Year: Never true    [address] in the Last Year: Never true   Transportation Needs: No Transportation Needs    Lack of Transportation (Medical): No    Lack of Transportation (Non-Medical): No   Physical Activity: Not on file   Stress: Not on file   Social Connections: Not on file   Intimate Partner Violence: Not on file   Housing Stability: Not on file       Allergies: Morphine  NF MEDICATIONS REVIEWED    Review of Systems   Constitutional: Negative. Negative for appetite change, fatigue, fever and unexpected weight change. HENT: Negative. Negative for sore throat and voice change. Eyes: Negative. Respiratory:  Positive for cough and shortness of breath. Negative for wheezing. Cardiovascular: Negative. Negative for chest pain. Gastrointestinal: Negative. Negative for constipation and nausea. Endocrine: Negative. Genitourinary: Negative. Musculoskeletal:  Positive for arthralgias and gait problem. Negative for joint swelling and myalgias. Skin: Negative. Negative for color change and rash. Allergic/Immunologic: Negative. Neurological:  Positive for weakness.  Negative for dizziness, tremors and syncope. Hematological: Negative. Psychiatric/Behavioral: Negative. Negative for agitation, confusion, decreased concentration and suicidal ideas. The patient is not nervous/anxious. Objective:   BP: 107 / 69 mmHg  Temp:97.2 °F  Pulse:81 bpm  Weight:154 Lbs  Resp:16 Breaths/min  O2:94 %    Physical Exam  Vitals reviewed. Constitutional:       Appearance: She is well-developed. She is ill-appearing. HENT:      Head: Normocephalic. Eyes:      Pupils: Pupils are equal, round, and reactive to light. Cardiovascular:      Rate and Rhythm: Normal rate. Pulmonary:      Effort: Pulmonary effort is normal. No respiratory distress. Breath sounds: Normal breath sounds. No wheezing or rales. Abdominal:      General: Bowel sounds are normal. There is no distension. Palpations: Abdomen is soft. Tenderness: There is no abdominal tenderness. There is no guarding. Musculoskeletal:         General: Normal range of motion. Cervical back: Normal range of motion. Skin:     General: Skin is warm and dry. Neurological:      Mental Status: She is alert and oriented to person, place, and time. Mental status is at baseline. Motor: Weakness present. Gait: Gait abnormal.   Psychiatric:         Behavior: Behavior normal.       Weight Trends  Wt Readings from Last 10 Encounters:   01/12/23 154 lb (69.9 kg)   12/24/22 172 lb (78 kg)   11/11/22 178 lb (80.7 kg)   11/03/22 178 lb (80.7 kg)   10/28/22 178 lb (80.7 kg)   10/20/22 178 lb 11.2 oz (81.1 kg)   10/03/22 194 lb (88 kg)   09/26/22 186 lb (84.4 kg)   09/20/22 186 lb (84.4 kg)   09/16/22 186 lb (84.4 kg)        Assessment and Plan:      Diagnosis Orders   1. On continuous oral anticoagulation        2. Bilateral pulmonary embolism ([address]        3. Malignant neoplasm of rectum ([address]        4. Primary hypertension        5. Mixed hyperlipidemia        6. Fatigue after COVID-19 vaccination        7.  Impaired mobility and ADLs             Lung sounds clear to auscultation. Shortness of breath at baseline. Does have intermittent chronic cough post COVID-19/PE. Denies SOB, sputum production, excessive fatigue, fever, chills, or myalgias. Ostomy bag with brown semi-solid stool at this time. Continue PT OT as tolerated. Educated on need for routine Eliquis therapy in the presence of saddle PE per patient questioning. Remain on apixaban 5 mg p.o. twice daily. Remain on sodium tab 1 g p.o. twice daily. Repeat CBC BMP in 1 week. - Advance Care Planning Discussion (greater than 16 minutes):    Code status discussed. Explained in extensive detail nuances between Full Code, DNR CCA and DNR CC. Discussed maximizing length of life vs maximizing quality of life with patient/POA/primary decision maker opting for Full code. All related questions were answered completely. Much active listening, presence, and emotional support were given. adhere to the JNC VIII guidelines for HTN managementand the NCEP ATP III guidelines for LDL-C management. No orders of the defined types were placed in this encounter. No orders of the defined types were placed in this encounter. No follow-ups on file. Encourage fluids and good nutrition. Stress fall prevention strategies. Electronically signed by SHARAD Baptiste CNP    Total time includes reviewing Plan of Care, labs, reviewing history, medications, and allergies, counseling patient, performing medically appropriate evaluation and examination, ordering medications, and documenting in the medical record. Please note this report is partially produced by using speech recognition hardware. It may contain errors related to the system, including grammar, punctuation and spelling as well as words and phrases that may seem inaccurate.   For any questions or concerns feel free to contact me for clarification

## 2023-01-19 ENCOUNTER — OFFICE VISIT (OUTPATIENT)
Dept: GERIATRIC MEDICINE | Age: 73
End: 2023-01-19
Payer: COMMERCIAL

## 2023-01-19 DIAGNOSIS — I26.99 BILATERAL PULMONARY EMBOLISM (HCC): Primary | ICD-10-CM

## 2023-01-19 DIAGNOSIS — I50.22 CHRONIC SYSTOLIC (CONGESTIVE) HEART FAILURE (HCC): ICD-10-CM

## 2023-01-19 DIAGNOSIS — T50.B95A FATIGUE AFTER COVID-19 VACCINATION: ICD-10-CM

## 2023-01-19 DIAGNOSIS — R53.83 FATIGUE AFTER COVID-19 VACCINATION: ICD-10-CM

## 2023-01-19 DIAGNOSIS — Z79.01 ON CONTINUOUS ORAL ANTICOAGULATION: ICD-10-CM

## 2023-01-19 DIAGNOSIS — R55 ATYPICAL SYNCOPE: ICD-10-CM

## 2023-01-19 DIAGNOSIS — I10 PRIMARY HYPERTENSION: ICD-10-CM

## 2023-01-19 PROCEDURE — G8484 FLU IMMUNIZE NO ADMIN: HCPCS | Performed by: FAMILY MEDICINE

## 2023-01-19 PROCEDURE — 99316 NF DSCHRG MGMT 30 MIN+: CPT | Performed by: FAMILY MEDICINE

## 2023-01-23 ENCOUNTER — OFFICE VISIT (OUTPATIENT)
Dept: GERIATRIC MEDICINE | Age: 73
End: 2023-01-23
Payer: COMMERCIAL

## 2023-01-23 DIAGNOSIS — R03.0 ELEVATED BLOOD PRESSURE READING: ICD-10-CM

## 2023-01-23 DIAGNOSIS — Z79.01 ON CONTINUOUS ORAL ANTICOAGULATION: ICD-10-CM

## 2023-01-23 DIAGNOSIS — I50.22 CHRONIC SYSTOLIC (CONGESTIVE) HEART FAILURE (HCC): ICD-10-CM

## 2023-01-23 DIAGNOSIS — I10 PRIMARY HYPERTENSION: ICD-10-CM

## 2023-01-23 DIAGNOSIS — I26.99 BILATERAL PULMONARY EMBOLISM (HCC): Primary | ICD-10-CM

## 2023-01-23 PROCEDURE — 99309 SBSQ NF CARE MODERATE MDM 30: CPT | Performed by: FAMILY MEDICINE

## 2023-01-23 PROCEDURE — 1123F ACP DISCUSS/DSCN MKR DOCD: CPT | Performed by: FAMILY MEDICINE

## 2023-01-23 PROCEDURE — G8484 FLU IMMUNIZE NO ADMIN: HCPCS | Performed by: FAMILY MEDICINE

## 2023-01-25 DIAGNOSIS — Z43.3 COLOSTOMY CARE (HCC): Primary | ICD-10-CM

## 2023-01-29 NOTE — PROGRESS NOTES
Patient Name: Romi Boles  Date: 1/28/2023  YOB: 1950  Medical Record Number: [id number]              History of Present Illness:      Review of Systems    Review of Systems: All 14 review of systems negative other than as stated above    Social History     Tobacco Use    Smoking status: Never    Smokeless tobacco: Never   Vaping Use    Vaping Use: Never used   Substance Use Topics    Alcohol use: No    Drug use: No         Past Medical History:   Diagnosis Date    Arthritis     Cancer ([address]     SKIN    Hyperlipidemia            Past Surgical History:   Procedure Laterality Date    CATARACT REMOVAL      CHOLECYSTECTOMY      COLECTOMY N/A 10/3/2022    LOW ANTERIOR RESECTION WITH LOOP ILEOSTOMY performed by Ludmila Araya MD at [address] [facility] N/A 9/16/2022    COLORECTAL CANCER SCREENING, NOT HIGH RISK performed by Sunitha Olsen MD at [facility] 9/26/2022    FLEXIBLE SIGMOIDOSCOPY WITH [address] POLYPECTOMY performed by Ludmila Araya MD at [address] [place] [address] N/A 11/3/2022    INFUSAPORT performed by Ludmila Araya MD at [place] [address]           Current Outpatient Medications on File Prior to Visit   Medication Sig Dispense Refill    apixaban (ELIQUIS) 5 MG TABS tablet Take 2 tablets by mouth 2 times daily for 14 doses (Patient taking differently: Take 10 mg by mouth 2 times daily Indications: Treatment to Prevent Deep Vein Thrombosis) 14 tablet 0    apixaban (ELIQUIS) 5 MG TABS tablet Take 1 tablet by mouth 2 times daily 60 tablet 5    metoprolol succinate (TOPROL XL) 25 MG extended release tablet Take 1 tablet by mouth daily (Patient taking differently: Take 25 mg by mouth daily Indications: High Blood Pressure Disorder, Rapid Heartbeat) 30 tablet 3    simvastatin (ZOCOR) 40 MG tablet TAKE ONE TABLET BY MOUTH EVERY DAY IN THE EVENING (Patient taking differently: 40 mg nightly Indications: High Amount of Fats in the Blood) 90 tablet 3    Handicap Placard MISC by Does not apply route Good for 5 years 1 each 0     No current facility-administered medications on file prior to visit. Allergies   Allergen Reactions    Morphine Itching         Family History   Problem Relation Age of Onset    Diabetes Mother     Breast Cancer Maternal Aunt     Colon Cancer Neg Hx          Physical Exam:      Physical Exam    not currently breastfeeding. .   Lab Results   Component Value Date    WBC 5.5 12/26/2022    HGB 8.0 (L) 12/26/2022    HCT 23.3 (L) 12/26/2022    MCV 88.6 12/26/2022     12/26/2022     Lab Results   Component Value Date/Time     12/26/2022 05:56 AM    K 3.7 12/26/2022 05:56 AM    K 3.3 12/18/2022 08:30 PM    CL 93 12/26/2022 05:56 AM    CO2 25 12/26/2022 05:56 AM    BUN 20 12/26/2022 05:56 AM    CREATININE 1.19 12/26/2022 05:56 AM    GLUCOSE 97 12/26/2022 05:56 AM    CALCIUM 8.4 12/26/2022 05:56 AM                ASSESSMENT:  Patient Active Problem List   Diagnosis    Cancer ([facility]    Hyperlipidemia    Elevated blood pressure reading    Colonic mass    Rectal polyp    Rectal cancer (HCC)    Malignant neoplasm of rectum (HCC)    Bilateral pulmonary embolism (HCC)    Convulsive syncope    COVID-19    Seizure-like activity (HCC)    Acute saddle pulmonary embolism with acute cor pulmonale (HCC)         PLAN:   Diagnosis Orders   1. Malignant neoplasm of rectum (HCC)        2. Seizure-like activity ([address]        3. Bilateral pulmonary embolism ([address]              Please note orders entered on site at facility after discussion with appropriate facility nursing/therapy/ / nutritional staff. Current longstanding medical problems and acute medical issues addressed with staff. Available data and data elements in on site paper chart reviewed and analyzed. Current external consultant notes reviewed in on site chart. Ordered laboratory testing and imaging will be reviewed when available.    This patient's need for psychiatric medication has been reviewed. Will consider further adjustment and possible further evaluations by mental health services.

## 2023-01-30 ENCOUNTER — HOSPITAL ENCOUNTER (OUTPATIENT)
Dept: INTERVENTIONAL RADIOLOGY/VASCULAR | Age: 73
Discharge: HOME | End: 2023-02-01
Payer: COMMERCIAL

## 2023-01-30 DIAGNOSIS — C18.9 MALIGNANT NEOPLASM OF COLON, UNSPECIFIED PART OF COLON (HCC): ICD-10-CM

## 2023-01-30 PROCEDURE — 36573 INSJ PICC RS&I 5 YR+: CPT | Performed by: RADIOLOGY

## 2023-01-30 PROCEDURE — 2709999900 IR PICC WO SQ PORT/PUMP > 5 YEARS

## 2023-01-30 PROCEDURE — 36573 INSJ PICC RS&I 5 YR+: CPT

## 2023-01-30 RX ORDER — LIDOCAINE HYDROCHLORIDE 20 MG/ML
5 INJECTION, SOLUTION INFILTRATION; PERINEURAL ONCE
Status: DISCONTINUED | OUTPATIENT
Start: 2023-01-30 | End: 2023-02-02 | Stop reason: HOSPADM

## 2023-01-30 ASSESSMENT — ENCOUNTER SYMPTOMS
COUGH: 1
CONSTIPATION: 1
BACK PAIN: 1

## 2023-01-30 NOTE — DISCHARGE INSTRUCTIONS
Peripherally Inserted Central Catheter (PICC): Care Instructions  Your Care Instructions    Remember to carry your card for PICC. It is proof that the PICC line can be safely used for radiology procedures. A peripherally inserted central catheter (PICC) is a soft, flexible tube that runs under your skin from a vein in your arm to a large vein near your heart. One end of the catheter stays outside your body. It is a type of central venous catheter, or central venous line. You may have it for weeks or months. A PICC is used to give you medicine, blood products, nutrients, or fluids. A PICC makes doing these things more comfortable for you because they are put directly into the catheter. So you will not be stuck with a needle every time. A PICC also can be used to draw blood for tests. The end of the PICC sometimes has two or three openings so that you can get more than one type of fluid or medicine at a time. Your doctor may give you medicine to make you feel relaxed. You may feel a little pain when your doctor numbs your arm. Your doctor will then thread the catheter up a vein in your arm to a larger vein. You will not feel any pain. The doctor may use stitches or other devices to hold the catheter in place where it exits your arm. After the procedure, the site may be sore for a day or two. Follow-up care is a key part of your treatment and safety. Be sure to make and go to all appointments, and call your doctor if you are having problems. It's also a good idea to know your test results and keep a list of the medicines you take. How can you care for yourself at home? Do not lift anything heavier than 10-15lbs with affected arm. Do not perform any vigorous activity that involves affected arm; ie jumping jacks, push-ups, burpees etc.  Do not wear jewelry, such as necklaces, that can catch on the catheter. If the catheter breaks, follow the instructions your doctor gave you.  If you have no instructions, clamp or tie off the catheter. Then see a doctor as soon as possible. To help prevent infection, take a shower instead of a bath. Do not go swimming with the catheter. Try to keep the area dry. When you shower, cover the area with waterproof material, such as plastic wrap. Never touch the open end of the catheter if the cap is off. Never use scissors, knives, pins, or other sharp objects near the catheter or other tubing. If your catheter has a clamp, keep it clamped when you are not using it. Fasten or tape the catheter to your body to prevent pulling or dangling. Avoid clothing that rubs or pulls on your catheter. Avoid bending or crimping your catheter. Always wash your hands before you touch your catheter. Wear loose clothing over the catheter for the first 10 to 14 days. When getting dressed, be careful not to pull on the catheter. Dressing Changes  Your PICC dressing should be changed at least once a week. If the dressing becomes loose, wet, or dirty, it must be changed more often to prevent infection. Since the PICC is in one of your arms, you will not be able to change the dressing on your own. Your healthcare provider will have the required supplies needed to change your PICC dressing. These include medical tape, a surgical mask, sterile gloves, and your dressing kit. When should you call for help? Call 911 anytime you think you may need emergency care. For example, call if:    You passed out (lost consciousness). You have severe trouble breathing. You have sudden chest pain and shortness of breath, or you cough up blood. You have a fast or uneven pulse. Call your doctor now or seek immediate medical care if:    You have signs of infection, such as: Increased pain, swelling, warmth, or redness. Red streaks leading from the area. Pus or blood draining from the area. A fever. You have swelling in your face, chest, neck, or arm on the side where the catheter is. You have signs of a blood clot, such as bulging veins near the catheter. Your catheter is leaking, cracked, or clogged. You feel resistance when you inject medicine or fluids into your catheter. Your catheter is out of place. This may happen after severe coughing or vomiting, or if you pull on the catheter. You have chest pain or shortness of breath. Watch closely for changes in your health, and be sure to contact your doctor if:    You have any concerns about your catheter. Where can you learn more? Go to https://ArchitexapepicSensegoneb.foc.us. org and sign in to your [employer] account. Enter K579 in the [employer] box to learn more about \"Peripherally Inserted Central Catheter (PICC): Care Instructions. \"     Current as of: September 23, 2018  Content Version: 12.0  © 2897-0403 Healthwise, Incorporated. Care instructions adapted under license by [facility] ([facility]). If you have questions about a medical condition or this instruction, always ask your healthcare professional. [place] [address] any warranty or liability for your use of this information.

## 2023-01-31 ENCOUNTER — OFFICE VISIT (OUTPATIENT)
Dept: FAMILY MEDICINE CLINIC | Age: 73
End: 2023-01-31
Payer: COMMERCIAL

## 2023-01-31 VITALS
OXYGEN SATURATION: 98 % | BODY MASS INDEX: 33.26 KG/M2 | TEMPERATURE: 97.3 F | HEIGHT: 59 IN | SYSTOLIC BLOOD PRESSURE: 122 MMHG | WEIGHT: 165 LBS | HEART RATE: 74 BPM | DIASTOLIC BLOOD PRESSURE: 64 MMHG

## 2023-01-31 DIAGNOSIS — I26.02 ACUTE SADDLE PULMONARY EMBOLISM WITH ACUTE COR PULMONALE (HCC): Primary | ICD-10-CM

## 2023-01-31 DIAGNOSIS — U07.1 COVID-19: ICD-10-CM

## 2023-01-31 DIAGNOSIS — Z09 HOSPITAL DISCHARGE FOLLOW-UP: ICD-10-CM

## 2023-01-31 PROCEDURE — 1090F PRES/ABSN URINE INCON ASSESS: CPT | Performed by: NURSE PRACTITIONER

## 2023-01-31 PROCEDURE — G8484 FLU IMMUNIZE NO ADMIN: HCPCS | Performed by: NURSE PRACTITIONER

## 2023-01-31 PROCEDURE — G8427 DOCREV CUR MEDS BY ELIG CLIN: HCPCS | Performed by: NURSE PRACTITIONER

## 2023-01-31 PROCEDURE — G8399 PT W/DXA RESULTS DOCUMENT: HCPCS | Performed by: NURSE PRACTITIONER

## 2023-01-31 PROCEDURE — 3017F COLORECTAL CA SCREEN DOC REV: CPT | Performed by: NURSE PRACTITIONER

## 2023-01-31 PROCEDURE — 1036F TOBACCO NON-USER: CPT | Performed by: NURSE PRACTITIONER

## 2023-01-31 PROCEDURE — 1111F DSCHRG MED/CURRENT MED MERGE: CPT | Performed by: NURSE PRACTITIONER

## 2023-01-31 PROCEDURE — 99214 OFFICE O/P EST MOD 30 MIN: CPT | Performed by: NURSE PRACTITIONER

## 2023-01-31 PROCEDURE — G8417 CALC BMI ABV UP PARAM F/U: HCPCS | Performed by: NURSE PRACTITIONER

## 2023-01-31 PROCEDURE — 1123F ACP DISCUSS/DSCN MKR DOCD: CPT | Performed by: NURSE PRACTITIONER

## 2023-01-31 RX ORDER — ONDANSETRON 4 MG/1
4 TABLET, FILM COATED ORAL
COMMUNITY
Start: 2023-01-24

## 2023-01-31 RX ORDER — RIVAROXABAN 20 MG/1
20 TABLET, FILM COATED ORAL
COMMUNITY
Start: 2023-01-27

## 2023-01-31 RX ORDER — SODIUM CHLORIDE 1000 MG
TABLET, SOLUBLE MISCELLANEOUS
COMMUNITY
Start: 2023-01-24

## 2023-01-31 ASSESSMENT — ENCOUNTER SYMPTOMS
COUGH: 0
BLOOD IN STOOL: 0
SHORTNESS OF BREATH: 0
WHEEZING: 0
RESPIRATORY NEGATIVE: 1

## 2023-01-31 ASSESSMENT — PATIENT HEALTH QUESTIONNAIRE - PHQ9
SUM OF ALL RESPONSES TO PHQ QUESTIONS 1-9: 0
1. LITTLE INTEREST OR PLEASURE IN DOING THINGS: 0
SUM OF ALL RESPONSES TO PHQ9 QUESTIONS 1 & 2: 0
2. FEELING DOWN, DEPRESSED OR HOPELESS: 0
SUM OF ALL RESPONSES TO PHQ QUESTIONS 1-9: 0

## 2023-01-31 NOTE — PROGRESS NOTES
[facility] PRIMARY AND SPECIALTY CARE  [address] [facility]  [facility] [id number]  Dept: [phone]  Dept Fax: [phone]: [address] Follow Up:       Parveen Beauchamp   YOB: 1950    Date of Office Visit:  1/31/2023  Date of Hospital Admission: 12/18/22  Date of Hospital Discharge: 12/26/22        Patient Active Problem List   Diagnosis    Cancer ([facility]    Hyperlipidemia    Elevated blood pressure reading    Colonic mass    Rectal polyp    Rectal cancer ([facility]    Malignant neoplasm of rectum (HCC)    Bilateral pulmonary embolism (HCC)    Convulsive syncope    COVID-19    Seizure-like activity ([facility]    Acute saddle pulmonary embolism with acute cor pulmonale (HCC)       Allergies   Allergen Reactions    Morphine Itching       Medications marked \"taking\" at this time  Outpatient Medications Marked as Taking for the 1/31/23 encounter (Office Visit) with SHARAD Huizar CNP   Medication Sig Dispense Refill    ondansetron (ZOFRAN) 4 MG tablet 4 mg      apixaban (ELIQUIS) 5 MG TABS tablet Take 1 tablet by mouth 2 times daily 60 tablet 5    metoprolol succinate (TOPROL XL) 25 MG extended release tablet Take 1 tablet by mouth daily (Patient taking differently: Take 25 mg by mouth daily Indications: High Blood Pressure Disorder, Rapid Heartbeat) 30 tablet 3    simvastatin (ZOCOR) 40 MG tablet TAKE ONE TABLET BY MOUTH EVERY DAY IN THE EVENING (Patient taking differently: 40 mg nightly Indications: High Amount of Fats in the Blood) 90 tablet 3    Handicap Placard MISC by Does not apply route Good for 5 years 1 each 0          Chief Complaint   Patient presents with    Follow-Up from Hospital     Discharged 1/24/23       HPI    Inpatient course: Discharge summary reviewed- Copied below:    Went to the nursing home after, and 1/25 was discharged to her home. Patient states she will be having home health ([facility]), however it hasn't started. Feeling well overall. Patient denies SOB or chest pain. Currently has plenty of medication. Sleeping okay at home. Lives with her son and grand daughter. Patient comes in fall/syncope had then been admitted for history of colon cancer found to have PE and COVID. followed by cardiology and Status post PE thrombectomy for saddle PE   B/L DVT  Right side extensive Thrombus -s/p RLE DVT Thrombectomy. Latest Reference Range & Units 12/25/22 06:00   Sodium 135 - 144 mEq/L 130 (L)   Potassium 3.4 - 4.9 mEq/L 3.7   Chloride 95 - 107 mEq/L 94 (L)   CO2 20 - 31 mEq/L 25   BUN,BUNPL 8 - 23 mg/dL 16   Creatinine 0.50 - 0.90 mg/dL 0.93 (H)   Anion Gap 9 - 15 mEq/L 11   Est, Glom Filt Rate >60  >60.0   Glucose, Random 70 - 99 mg/dL 102 (H)   CALCIUM, SERUM, 309325 8.5 - 9.9 mg/dL 8.6   Phosphorus 2.3 - 4.8 mg/dL 2.9   Albumin 3.5 - 4.6 g/dL 3.3 (L)   WBC 4.8 - 10.8 K/uL 5.3   RBC 4.20 - 5.40 M/uL 2.63 (L)   Hemoglobin Quant 12.0 - 16.0 g/dL 8.1 (L)   Hematocrit 37.0 - 47.0 % 23.1 (L)   MCV 79.4 - 94.8 fL 87.8   MCH 27.0 - 31.3 pg 30.8   MCHC 33.0 - 37.0 % 35.1   RDW 11.5 - 14.5 % 16.4 (H)   Platelet Count 187 - 400 K/uL 267   Neutrophils % % 43.2   Lymphocyte % % 37.7   Monocytes % % 17.8   Eosinophils % % 0.6   Basophils % % 0.7   Neutrophils Absolute 1.4 - 6.5 K/uL 2.3   Lymphocytes Absolute 1.0 - 4.8 K/uL 2.0   Monocytes Absolute 0.2 - 0.8 K/uL 0.9 (H)   Eosinophils Absolute 0.0 - 0.7 K/uL 0.0   Basophils Absolute 0.0 - 0.2 K/uL 0.0   (L): Data is abnormally low  (H): Data is abnormally high  Review of Systems   Constitutional: Negative. Negative for fatigue and fever. Respiratory: Negative. Negative for cough, shortness of breath and wheezing. Cardiovascular: Negative. Negative for chest pain, palpitations and leg swelling.    Gastrointestinal:  Negative for blood in stool.   Psychiatric/Behavioral: Negative. Negative for behavioral problems. The patient is not nervous/anxious. Vitals:    01/31/23 1440   BP: 122/64   Pulse: 74   Temp: 97.3 °F (36.3 °C)   TempSrc: Temporal   SpO2: 98%   Weight: 165 lb (74.8 kg)   Height: 4' 11\" (1.499 m)     Body mass index is 33.33 kg/m². Wt Readings from Last 3 Encounters:   01/31/23 165 lb (74.8 kg)   01/12/23 154 lb (69.9 kg)   12/24/22 172 lb (78 kg)     BP Readings from Last 3 Encounters:   01/31/23 122/64   12/26/22 111/61   11/03/22 (!) 140/69       Physical Exam  Constitutional:       Appearance: Normal appearance. HENT:      Head: Normocephalic. Cardiovascular:      Rate and Rhythm: Normal rate and regular rhythm. Heart sounds: No murmur heard. Pulmonary:      Effort: Pulmonary effort is normal. No respiratory distress. Breath sounds: Normal breath sounds. No wheezing. Skin:     General: Skin is warm and dry. Neurological:      General: No focal deficit present. Mental Status: She is alert and oriented to person, place, and time. Assessment/Plan:  1. Acute saddle pulmonary embolism with acute cor pulmonale (HCC)  Stable on Xarelto  2. COVID-19   Resolved  3. Colon cancer  Stable on chemo, seeing oncology.            .Electronically signed by:  SHARAD Graf CNP  2:59 PM 1/31/23

## 2023-02-01 VITALS — WEIGHT: 157.8 LBS | BODY MASS INDEX: 31.87 KG/M2

## 2023-02-01 PROBLEM — I50.22 CHRONIC SYSTOLIC (CONGESTIVE) HEART FAILURE (HCC): Status: ACTIVE | Noted: 2023-02-01

## 2023-02-01 NOTE — PROGRESS NOTES
[facility]  Address: [address] [place] [address], [place], [address] [facility]  Phone: [phone]      Gilda Alegre  is a 81 y.o. in the NF being seen for a f/u of   Chief Complaint   Patient presents with    Other    Follow-up    Extremity Weakness       1/19/2023    HPI Patient was admitted to skilled nursing facility related to weakness and elevated for for ADL secondary to admission to [facility] fpost fall and syncopal episode. Was found to have saddle pulmonary embolism and COVID 19. Also noted intermittent hypertension. Was started on Eliquis and metoprolol. During stay at skilled nursing facility did have noted rash around ostomy site. Much pain to rash site. Was treated by wound care nurse and started on Norco for pain during ostomy changes. Did participate in PT OT and [facility] per PT OT.           Past Medical History:   Diagnosis Date    Arthritis     Cancer ([address]     SKIN    Hyperlipidemia      Past Surgical History:   Procedure Laterality Date    CATARACT REMOVAL      CHOLECYSTECTOMY      COLECTOMY N/A 10/3/2022    LOW ANTERIOR RESECTION WITH LOOP ILEOSTOMY performed by Christina Young MD at [facility]  N/A 9/16/2022    COLORECTAL CANCER SCREENING, NOT HIGH RISK performed by Lizzeth Mcgovern MD at [facility]    COLONOSCOPY N/A 9/26/2022    FLEXIBLE SIGMOIDOSCOPY WITH SNARE POLYPECTOMY performed by Christina Young MD at [address] N/A 11/3/2022    INFUSAPORT performed by Christina Young MD at [facility]      R LOWER LEG    TUBAL LIGATION       Family History   Problem Relation Age of Onset    Diabetes Mother     Breast Cancer Maternal Aunt     Colon Cancer Neg Hx      Social History     Socioeconomic History    Marital status:      Spouse name: Not on file    Number of children: Not on file    Years of education: Not on file    Highest education level: Not on file Occupational History    Not on file   Tobacco Use    Smoking status: Never    Smokeless tobacco: Never   Vaping Use    Vaping Use: Never used   Substance and Sexual Activity    Alcohol use: No    Drug use: No    Sexual activity: Not on file   Other Topics Concern    Not on file   Social History Narrative    Not on file     Social Determinants of Health     Financial Resource Strain: Low Risk     Difficulty of Paying Living Expenses: Not hard at all   Food Insecurity: No Food Insecurity    Worried About [address] in the Last Year: Never true    [address] in the Last Year: Never true   Transportation Needs: No Transportation Needs    Lack of Transportation (Medical): No    Lack of Transportation (Non-Medical): No   Physical Activity: Not on file   Stress: Not on file   Social Connections: Not on file   Intimate Partner Violence: Not on file   Housing Stability: Not on file       Allergies: Morphine  NF MEDICATIONS REVIEWED AND ALLERGIES REVIEWED IN NF CHART    ROS:   Constitutional: There are no reports of behavioral issues, change in appetite, fever, or weakness. Respiratory: No SOB   Cardiovascular: No CP  GI: No N/V/D. : no reports of dysuria  Extremities: No reports of pain issues, noedema  Rehabilitation: Participated in rehabilitation and is able to perform ADLs, toilet self, obtain nutrition and remove self from home in an emergency. Requests home health therapy at discharge. Physical exam:   BP: 121/65 mmHg  Temp:97.7 °F  Pulse:60 bpm  Weight:157.8 Lbs  Resp:18 Breaths/min  O2:96 %    Constitutional: Awake and alert sitting up in room. Cardiovascular: Regular rate  -c/r  Respiratory: LCTA  GI: abdomen NT ND  : no suprapubic tenderness  Extremities: no edema, DP pulses 2+/4  Mobility: is  able to transfer in out of bed to chair    ASSESSMENT:     Diagnosis Orders   1. Bilateral pulmonary embolism ([address]        2. Chronic systolic (congestive) heart failure        3.  Fatigue after COVID-19 vaccination        4. On continuous oral anticoagulation        5. Atypical syncope        6. Primary hypertension            PLAN:     Patient remains at baseline mental status. Shortness of breath edema at baseline. Vital signs stable. Able to self ambulate with walker. No noted syncopal episodes during skilled nursing facility stay. Rash to ostomy site almost completely dissipated at this time. Was educated as recently started on Eliquis therapy to follow-up with PCP within 3 to 5 days post discharge. Plan of care discharge with home health care. Patient has no other acute complaints or concerns prior to discharge. No orders of the defined types were placed in this encounter. No orders of the defined types were placed in this encounter. No follow-ups on file. Pt/POA agrees to POC  Total time taken for discharging this patient: 35 minutes. Time was taken to review chart, discuss plans with discharge planner, nursing, reconciling medications, discussing plan answering questions with patient. Pertinent POC, labs, have been reviewed  Discharge patient from facility when arrangements are made. Home Health Agency with PT/OT and Nursing if needed. Equipment needed none. Same medications and Send medications with patient. F/U with PCP in 3-5 days. Melvina Morataya, APRN - CNP    Please note this report is partially produced by using speech recognition hardware. It may contain errors related to the system, including grammar, punctuation and spelling as well as words and phrases that may seem inaccurate.   For any questions or concerns feel free to contact me for clarification    Referral to Home Health:   Documentation of Face to Face Encounter   Certification statement    Name: [facility]: Sergio Christensen  Address: [address] [place] [address], [address] [place] [address]  Phone: [phone]    Date: 2023                   : 1950    I completed a face-to-face encounter on 1/19/2023 with the above named patient. In this encounter a medical condition was addressed, which is the primary reason for home health care. Based on my findings, the following services are medically necessary (Check all the apply):    [x] Skilled Nursing [] Speech Language Pathology [x] Physical Therapy    [] MSW  [x] [address] [facility])  [x] Occupational Therapy    The following specific clinical findings (not the diagnosis) support the need for skilled services as selected above:    RN for medication management  Therapy for strengthening and increase in endurance to reduce general weakness and improve balance for ataxic gait. The follow findings support that this patient is homebound including the need for any assistance of others and he use of Assistive Devices - what makes leaving home a considerable and taxing effort:    Using walker wheel chair that causes leaving home taxing and needs assistance of one to leave home. Certification for [address]:  Based on the above findings, I certify that this patient meets the criteria for being homebound and has the skilled need for intermittent care as indicated above. The patient is under my care and I have intitated the request for the Plan of Care. A physician and/or non-physician practitioner and collaborating physician will follow this patient and periodically review the POC.     Signature:   SHARAD Guzman - FRANSICO   Date: 1/19/2023    Dr. Niru Benson collaborator

## 2023-02-03 ENCOUNTER — TELEPHONE (OUTPATIENT)
Dept: FAMILY MEDICINE CLINIC | Age: 73
End: 2023-02-03

## 2023-02-03 DIAGNOSIS — E78.2 MIXED HYPERLIPIDEMIA: ICD-10-CM

## 2023-02-04 NOTE — TELEPHONE ENCOUNTER
Rx requested:  Requested Prescriptions     Pending Prescriptions Disp Refills    simvastatin (ZOCOR) 40 MG tablet 90 tablet 3     Sig: TAKE ONE TABLET BY MOUTH EVERY DAY IN THE EVENING    metoprolol succinate (TOPROL XL) 25 MG extended release tablet 30 tablet 3     Sig: Take 1 tablet by mouth daily    ondansetron (ZOFRAN) 4 MG tablet       Si tablet    sodium chloride 1 g tablet 90 tablet      Sig: Take 1 tablet by mouth         Last Office Visit:   2022 with Lacey Lin      Next Visit Date:  Future Appointments   Date Time Provider Caleb Gonzalez   2023  8:00 AM Kenneth Rubalcava  [place], [place]

## 2023-02-06 RX ORDER — ONDANSETRON 4 MG/1
4 TABLET, FILM COATED ORAL EVERY 8 HOURS PRN
Qty: 20 TABLET | Refills: 2 | Status: SHIPPED | OUTPATIENT
Start: 2023-02-06

## 2023-02-06 RX ORDER — SIMVASTATIN 40 MG
TABLET ORAL
Qty: 90 TABLET | Refills: 3 | Status: SHIPPED | OUTPATIENT
Start: 2023-02-06

## 2023-02-06 RX ORDER — SODIUM CHLORIDE 1000 MG
1 TABLET, SOLUBLE MISCELLANEOUS DAILY
Qty: 90 TABLET | Refills: 2 | Status: SHIPPED | OUTPATIENT
Start: 2023-02-06

## 2023-02-06 RX ORDER — METOPROLOL SUCCINATE 25 MG/1
25 TABLET, EXTENDED RELEASE ORAL DAILY
Qty: 30 TABLET | Refills: 3 | Status: SHIPPED | OUTPATIENT
Start: 2023-02-06

## 2023-02-08 ENCOUNTER — TELEPHONE (OUTPATIENT)
Dept: FAMILY MEDICINE CLINIC | Age: 73
End: 2023-02-08

## 2023-02-08 VITALS — BODY MASS INDEX: 31.99 KG/M2 | WEIGHT: 158.4 LBS

## 2023-02-08 ASSESSMENT — ENCOUNTER SYMPTOMS
SHORTNESS OF BREATH: 1
VOICE CHANGE: 0
CONSTIPATION: 0
NAUSEA: 0
COLOR CHANGE: 0
ALLERGIC/IMMUNOLOGIC NEGATIVE: 1
EYES NEGATIVE: 1
GASTROINTESTINAL NEGATIVE: 1
SORE THROAT: 0
COUGH: 1
WHEEZING: 0

## 2023-02-08 NOTE — TELEPHONE ENCOUNTER
[address] from [facility]. They are having some troubles with her colostomy bag not adhering. They are requesting a new order be written for daily skilled nursing, Due to the changes that have occurred.  Please advise

## 2023-02-08 NOTE — PROGRESS NOTES
Subjective:      Patient was examined at [facility]  Address: [address] [place] [address] [facility] [address] [place] [address]  Phone: [phone]      Chief Complaint   Patient presents with    Rash    Extremity Weakness    Other    Follow-up     Patient ID: Butch Chopra is a 81 y.o. female being seen today. Patient remains alert and orient x3. Patient plan of care to DC in the next 24 to 48 hours. Recent labs show GFR 55 albumin 3.3 hemoglobin 11.4. Rash around ostomy site has fully resolved. Edema at baseline.             Past Medical History:   Diagnosis Date    Arthritis     Cancer ([facility] [address]     SKIN    Hyperlipidemia      Past Surgical History:   Procedure Laterality Date    CATARACT REMOVAL      CHOLECYSTECTOMY      COLECTOMY N/A 10/3/2022    LOW ANTERIOR RESECTION WITH LOOP ILEOSTOMY performed by Felipe Resendiz MD at [address] [place] [address] N/A 9/16/2022    COLORECTAL CANCER SCREENING, NOT HIGH RISK performed by Jonda Gilford, MD at [facility]    COLONOSCOPY N/A 9/26/2022    FLEXIBLE SIGMOIDOSCOPY WITH SNARE POLYPECTOMY performed by Felipe Resendiz MD at [address] N/A 11/3/2022    INFUSAPORT performed by Felipe Resendiz MD at [address] [place] [address] LOWER LEG    TUBAL LIGATION       Family History   Problem Relation Age of Onset    Diabetes Mother     Breast Cancer Maternal Aunt     Colon Cancer Neg Hx      Social History     Socioeconomic History    Marital status:      Spouse name: Not on file    Number of children: Not on file    Years of education: Not on file    Highest education level: Not on file   Occupational History    Not on file   Tobacco Use    Smoking status: Never    Smokeless tobacco: Never   Vaping Use    Vaping Use: Never used   Substance and Sexual Activity    Alcohol use: No    Drug use: No    Sexual activity: Not on file   Other Topics Concern    Not on file   Social History Narrative    Not on file     Social Determinants of Health     Financial Resource Strain: Low Risk     Difficulty of Paying Living Expenses: Not hard at all   Food Insecurity: No Food Insecurity    Worried About [address] [employer] in the Last Year: Never true    Ran Out of Food in the Last Year: Never true   Transportation Needs: No Transportation Needs    Lack of Transportation (Medical): No    Lack of Transportation (Non-Medical): No   Physical Activity: Not on file   Stress: Not on file   Social Connections: Not on file   Intimate Partner Violence: Not on file   Housing Stability: Not on file       Allergies: Morphine  NF MEDICATIONS REVIEWED    Review of Systems   Constitutional: Negative. Negative for appetite change, fatigue, fever and unexpected weight change. HENT: Negative. Negative for sore throat and voice change. Eyes: Negative. Respiratory:  Positive for cough and shortness of breath. Negative for wheezing. Cardiovascular: Negative. Negative for chest pain. Gastrointestinal: Negative. Negative for constipation and nausea. Endocrine: Negative. Genitourinary: Negative. Musculoskeletal:  Positive for arthralgias and gait problem. Negative for joint swelling and myalgias. Skin: Negative. Negative for color change and rash. Allergic/Immunologic: Negative. Neurological:  Positive for weakness. Negative for dizziness, tremors and syncope. Hematological: Negative. Psychiatric/Behavioral: Negative. Negative for agitation, confusion, decreased concentration and suicidal ideas. The patient is not nervous/anxious. Objective:   BP: 115/70 mmHg  Temp:97.9 °F  Pulse:89 bpm  Weight:158.4 Lbs  Resp:16 Breaths/min  O2:98 %    Physical Exam  Vitals reviewed. Constitutional:       Appearance: She is well-developed. She is ill-appearing. HENT:      Head: Normocephalic. Eyes:      Pupils: Pupils are equal, round, and reactive to light. Cardiovascular:      Rate and Rhythm: Normal rate.    Pulmonary: Effort: Pulmonary effort is normal. No respiratory distress. Breath sounds: Normal breath sounds. No wheezing or rales. Abdominal:      General: Bowel sounds are normal. There is no distension. Palpations: Abdomen is soft. Tenderness: There is no abdominal tenderness. There is no guarding. Musculoskeletal:         General: Normal range of motion. Cervical back: Normal range of motion. Skin:     General: Skin is warm and dry. Neurological:      Mental Status: She is alert and oriented to person, place, and time. Mental status is at baseline. Motor: Weakness present. Gait: Gait abnormal.   Psychiatric:         Behavior: Behavior normal.       Weight Trends  Wt Readings from Last 10 Encounters:   01/31/23 165 lb (74.8 kg)   02/08/23 158 lb 6.4 oz (71.8 kg)   02/01/23 157 lb 12.8 oz (71.6 kg)   01/12/23 154 lb (69.9 kg)   12/24/22 172 lb (78 kg)   11/11/22 178 lb (80.7 kg)   11/03/22 178 lb (80.7 kg)   10/28/22 178 lb (80.7 kg)   10/20/22 178 lb 11.2 oz (81.1 kg)   10/03/22 194 lb (88 kg)        Assessment and Plan:      Diagnosis Orders   1. Bilateral pulmonary embolism ([address]        2. Chronic systolic (congestive) heart failure        3. On continuous oral anticoagulation        4. Primary hypertension        5. Elevated blood pressure reading           He has finished with antibiotic therapy okay to discontinue PICC line. Educated patient to remain on Eliquis therapy and sodium chloride tablets and follow-up with PCP regarding such postdischarge. Rash around ostomy site is fully resolved at this time. Patient has no other acute complaints or concerns prior to discharge. adhere to the JNC VIII guidelines for HTN managementand the NCEP ATP III guidelines for LDL-C management. No orders of the defined types were placed in this encounter. No orders of the defined types were placed in this encounter. No follow-ups on file.     Encourage fluids and good nutrition. Stress fall prevention strategies. Electronically signed by SHARAD Aguilar CNP    Total time includes reviewing Plan of Care, labs, reviewing history, medications, and allergies, counseling patient, performing medically appropriate evaluation and examination, ordering medications, and documenting in the medical record. Please note this report is partially produced by using speech recognition hardware. It may contain errors related to the system, including grammar, punctuation and spelling as well as words and phrases that may seem inaccurate.   For any questions or concerns feel free to contact me for clarification

## 2023-02-08 NOTE — TELEPHONE ENCOUNTER
I am just now reading this. I called Keshia Caban and left a vm to call back concerning order.
Is this something you can do, or does patient need an appointment?
Melisa Rodriguez from [facility] called, requesting written order for Therapy at home   1x a week for 2 weeks & 2x a week for 2 weeks.
Shawna Montiel from [facility] called, requesting written order for Therapy at home   1x a week for 2 weeks & 2x a week for 2 weeks.
Spoke with other MA's. Sounds like you just need to write it on a script pad & sign & then it will be faxed.
This requires a visit and note. She recently saw Elon Runner maybe she can order it. ?
Family

## 2023-02-08 NOTE — TELEPHONE ENCOUNTER
[address] from [facility]. They are having some troubles with her colostomy bag not adhering. They are requesting a new order be written for daily skilled nursing, Due to the changes that have occurred.  Please advise

## 2023-02-09 ENCOUNTER — TELEPHONE (OUTPATIENT)
Dept: FAMILY MEDICINE CLINIC | Age: 73
End: 2023-02-09

## 2023-02-09 NOTE — TELEPHONE ENCOUNTER
Destiny Tran from [facility] called and stated she needs a verbal order to up ththe patient's frequency, she a colostomy bag. Please contact Destiny Tran at [phone] with any questions.

## 2023-03-03 ENCOUNTER — FOLLOWUP TELEPHONE ENCOUNTER (OUTPATIENT)
Dept: RADIATION ONCOLOGY | Age: 73
End: 2023-03-03

## 2023-03-03 NOTE — PROGRESS NOTES
FREDDIE telephoned pt following referral by Dr. Julissa Monique Oncology office staff. Pt had requested opportunity to speak to a  to learn of financial assistance at [address] [facility] and if she might speak to someone about Medicare resources. She had been given information for Opal Pruett in the [address], and SW reinforced that she may contact him and request financial assistance application. Also provided her with contact for the [facility] and [address] [place] [address] so that she may speak with the Medicare specialist as she has additional questions about her coverage. Pt expressed appreciation for the information. Pt states she is to have radiation once she finishes chemotherapy in the next few weeks, at which time she may meet cancer center FR[facility]DIE. Until then SW contact information was provided, and pt was invited to seek assistance as she feels need.

## 2023-03-07 ENCOUNTER — TELEPHONE (OUTPATIENT)
Dept: FAMILY MEDICINE CLINIC | Age: 73
End: 2023-03-07

## 2023-03-07 NOTE — TELEPHONE ENCOUNTER
Caryn Sanabria from [facility] calling just started her on care Nursing but no therapy patient refused, also asking for verbal to follow for care patient is getting chemo now then going for radiation.  ph. [phone]

## 2023-03-13 RX ORDER — FLUOROURACIL 50 MG/ML
600 INJECTION, SOLUTION INTRAVENOUS ONCE
Status: CANCELLED
Start: 2023-03-15

## 2023-03-15 ENCOUNTER — HOSPITAL ENCOUNTER (OUTPATIENT)
Dept: INFUSION THERAPY | Age: 73
Setting detail: INFUSION SERIES
Discharge: HOME | End: 2023-03-15
Payer: MEDICARE

## 2023-03-15 VITALS
HEART RATE: 85 BPM | SYSTOLIC BLOOD PRESSURE: 134 MMHG | DIASTOLIC BLOOD PRESSURE: 65 MMHG | TEMPERATURE: 98.3 F | OXYGEN SATURATION: 99 % | RESPIRATION RATE: 18 BRPM

## 2023-03-15 DIAGNOSIS — C20 MALIGNANT NEOPLASM OF RECTUM (HCC): Primary | ICD-10-CM

## 2023-03-15 PROCEDURE — 96368 THER/DIAG CONCURRENT INF: CPT

## 2023-03-15 PROCEDURE — 96409 CHEMO IV PUSH SNGL DRUG: CPT

## 2023-03-15 PROCEDURE — 96415 CHEMO IV INFUSION ADDL HR: CPT

## 2023-03-15 PROCEDURE — 96365 THER/PROPH/DIAG IV INF INIT: CPT

## 2023-03-15 PROCEDURE — 6360000002 HC RX W HCPCS: Performed by: INTERNAL MEDICINE

## 2023-03-15 PROCEDURE — 96375 TX/PRO/DX INJ NEW DRUG ADDON: CPT

## 2023-03-15 PROCEDURE — 2580000003 HC RX 258

## 2023-03-15 PROCEDURE — 2580000003 HC RX 258: Performed by: INTERNAL MEDICINE

## 2023-03-15 PROCEDURE — 96413 CHEMO IV INFUSION 1 HR: CPT

## 2023-03-15 RX ORDER — FLUOROURACIL 50 MG/ML
600 INJECTION, SOLUTION INTRAVENOUS ONCE
Status: COMPLETED | OUTPATIENT
Start: 2023-03-15 | End: 2023-03-15

## 2023-03-15 RX ORDER — SODIUM CHLORIDE 9 MG/ML
INJECTION, SOLUTION INTRAVENOUS
Status: COMPLETED
Start: 2023-03-15 | End: 2023-03-15

## 2023-03-15 RX ADMIN — DEXTROSE MONOHYDRATE 250 ML: 50 INJECTION, SOLUTION INTRAVENOUS at 10:46

## 2023-03-15 RX ADMIN — LEUCOVORIN CALCIUM 600 MG: 200 INJECTION, POWDER, LYOPHILIZED, FOR SOLUTION INTRAMUSCULAR; INTRAVENOUS at 10:56

## 2023-03-15 RX ADMIN — Medication 150 MG: at 09:44

## 2023-03-15 RX ADMIN — PALONOSETRON: 0.05 INJECTION, SOLUTION INTRAVENOUS at 09:06

## 2023-03-15 RX ADMIN — FLUOROURACIL 600 MG: 50 INJECTION, SOLUTION INTRAVENOUS at 13:04

## 2023-03-15 RX ADMIN — OXALIPLATIN 100 MG: 5 INJECTION, SOLUTION INTRAVENOUS at 10:55

## 2023-03-15 RX ADMIN — SODIUM CHLORIDE 250 ML: 9 INJECTION, SOLUTION INTRAVENOUS at 08:35

## 2023-03-15 RX ADMIN — DEXAMETHASONE SODIUM PHOSPHATE: 10 INJECTION INTRAMUSCULAR; INTRAVENOUS at 09:25

## 2023-03-15 NOTE — FLOWSHEET NOTE
Chemo treatment is complete. Tolerated well. AVS printed and given to patient. Left the unit via wheelchair. All equipment used in the care for this patient has been cleaned.

## 2023-03-15 NOTE — FLOWSHEET NOTE
Patient to the floor via wheelchair for her Cycle 6 Day 1 chemo treatment. Re eduction given verbally regarding this treatment. Verbalized understanding. Call light within reach.

## 2023-03-22 RX ORDER — FLUOROURACIL 50 MG/ML
600 INJECTION, SOLUTION INTRAVENOUS ONCE
OUTPATIENT
Start: 2023-03-29 | End: 2023-03-29

## 2023-03-28 ENCOUNTER — HOSPITAL ENCOUNTER (OUTPATIENT)
Dept: INFUSION THERAPY | Age: 73
Setting detail: INFUSION SERIES
Discharge: HOME | End: 2023-03-28
Payer: MEDICARE

## 2023-03-28 VITALS
DIASTOLIC BLOOD PRESSURE: 73 MMHG | TEMPERATURE: 97.9 F | RESPIRATION RATE: 18 BRPM | HEART RATE: 72 BPM | SYSTOLIC BLOOD PRESSURE: 140 MMHG

## 2023-03-28 DIAGNOSIS — C20 MALIGNANT NEOPLASM OF RECTUM (HCC): Primary | ICD-10-CM

## 2023-03-28 LAB
BASOPHILS # BLD: 0.1 K/UL (ref 0–0.2)
BASOPHILS NFR BLD: 1.1 %
EOSINOPHIL # BLD: 0.1 K/UL (ref 0–0.7)
EOSINOPHIL NFR BLD: 1.1 %
ERYTHROCYTE [DISTWIDTH] IN BLOOD BY AUTOMATED COUNT: 15.2 % (ref 11.5–14.5)
HCT VFR BLD AUTO: 36.5 % (ref 37–47)
HGB BLD-MCNC: 12 G/DL (ref 12–16)
LYMPHOCYTES # BLD: 1.8 K/UL (ref 1–4.8)
LYMPHOCYTES NFR BLD: 38 %
MCH RBC QN AUTO: 30.1 PG (ref 27–31.3)
MCHC RBC AUTO-ENTMCNC: 32.8 % (ref 33–37)
MCV RBC AUTO: 91.7 FL (ref 79.4–94.8)
MONOCYTES # BLD: 0.6 K/UL (ref 0.2–0.8)
MONOCYTES NFR BLD: 12.8 %
NEUTROPHILS # BLD: 2.2 K/UL (ref 1.4–6.5)
NEUTS SEG NFR BLD: 47 %
PLATELET # BLD AUTO: 139 K/UL (ref 130–400)
RBC # BLD AUTO: 3.98 M/UL (ref 4.2–5.4)
WBC # BLD AUTO: 4.7 K/UL (ref 4.8–10.8)

## 2023-03-28 PROCEDURE — 6360000002 HC RX W HCPCS: Performed by: INTERNAL MEDICINE

## 2023-03-28 PROCEDURE — 96365 THER/PROPH/DIAG IV INF INIT: CPT

## 2023-03-28 PROCEDURE — 96368 THER/DIAG CONCURRENT INF: CPT

## 2023-03-28 PROCEDURE — 96409 CHEMO IV PUSH SNGL DRUG: CPT

## 2023-03-28 PROCEDURE — 96413 CHEMO IV INFUSION 1 HR: CPT

## 2023-03-28 PROCEDURE — 85025 COMPLETE CBC W/AUTO DIFF WBC: CPT

## 2023-03-28 PROCEDURE — 2580000003 HC RX 258

## 2023-03-28 PROCEDURE — 36592 COLLECT BLOOD FROM PICC: CPT

## 2023-03-28 PROCEDURE — 2580000003 HC RX 258: Performed by: INTERNAL MEDICINE

## 2023-03-28 PROCEDURE — 96375 TX/PRO/DX INJ NEW DRUG ADDON: CPT

## 2023-03-28 PROCEDURE — 96415 CHEMO IV INFUSION ADDL HR: CPT

## 2023-03-28 RX ORDER — DEXTROSE MONOHYDRATE 50 MG/ML
INJECTION, SOLUTION INTRAVENOUS
Status: COMPLETED
Start: 2023-03-28 | End: 2023-03-28

## 2023-03-28 RX ORDER — FLUOROURACIL 50 MG/ML
600 INJECTION, SOLUTION INTRAVENOUS ONCE
Status: COMPLETED | OUTPATIENT
Start: 2023-03-28 | End: 2023-03-28

## 2023-03-28 RX ORDER — SODIUM CHLORIDE 9 MG/ML
INJECTION, SOLUTION INTRAVENOUS
Status: COMPLETED
Start: 2023-03-28 | End: 2023-03-28

## 2023-03-28 RX ADMIN — LEUCOVORIN CALCIUM 600 MG: 200 INJECTION, POWDER, LYOPHILIZED, FOR SUSPENSION INTRAMUSCULAR; INTRAVENOUS at 11:49

## 2023-03-28 RX ADMIN — SODIUM CHLORIDE 250 ML: 9 INJECTION, SOLUTION INTRAVENOUS at 08:58

## 2023-03-28 RX ADMIN — DEXAMETHASONE SODIUM PHOSPHATE: 10 INJECTION INTRAMUSCULAR; INTRAVENOUS at 10:43

## 2023-03-28 RX ADMIN — FLUOROURACIL 600 MG: 50 INJECTION, SOLUTION INTRAVENOUS at 13:59

## 2023-03-28 RX ADMIN — DEXTROSE MONOHYDRATE 250 ML: 50 INJECTION, SOLUTION INTRAVENOUS at 11:34

## 2023-03-28 RX ADMIN — PALONOSETRON: 0.05 INJECTION, SOLUTION INTRAVENOUS at 10:24

## 2023-03-28 RX ADMIN — OXALIPLATIN 100 MG: 5 INJECTION, SOLUTION INTRAVENOUS at 11:51

## 2023-03-28 RX ADMIN — FOSAPREPITANT DIMEGLUMINE: 150 INJECTION, POWDER, LYOPHILIZED, FOR SOLUTION INTRAVENOUS at 09:48

## 2023-03-28 NOTE — PROGRESS NOTES
Patient left unit by wheelchair with transporter assistance. All equipment used in the care for this patient has been cleaned.

## 2023-03-28 NOTE — PROGRESS NOTES
Patient arrived on unit by wheelchair with family assistance. Alert, oriented, resting in chair. Vital signs obtained.

## 2023-03-28 NOTE — PROGRESS NOTES
Treatment complete, patient tolerated well. Faint pink rash noted to skin wear patients scarf is lying. No other symptoms noted. Rash is fading. Liliane Milian at [facility] updated.

## 2023-03-31 ENCOUNTER — TELEPHONE (OUTPATIENT)
Dept: FAMILY MEDICINE CLINIC | Age: 73
End: 2023-03-31

## 2023-03-31 NOTE — TELEPHONE ENCOUNTER
Hamzah Hinkle from [facility] called ([phone])   Today was PT Eval  They are going to continue care 2x a week for 5 more weeks

## 2023-04-05 RX ORDER — FLUOROURACIL 50 MG/ML
600 INJECTION, SOLUTION INTRAVENOUS ONCE
OUTPATIENT
Start: 2023-04-12 | End: 2023-04-12

## 2023-04-05 RX ORDER — DEXTROSE MONOHYDRATE 50 MG/ML
INJECTION, SOLUTION INTRAVENOUS ONCE
Start: 2023-04-12 | End: 2023-04-12

## 2023-04-05 NOTE — PROGRESS NOTES
Dr Jason Vasquez made aware of pts low BP @ 68/46. Ordered to infuse another 200 ml LR. BP cuff switched to other arm. /71.   1605 - Bp 138/95 IV slowed down to 100ml / hr per pump.  RH Anemia

## 2023-04-12 ENCOUNTER — HOSPITAL ENCOUNTER (OUTPATIENT)
Dept: INFUSION THERAPY | Age: 73
Setting detail: INFUSION SERIES
Discharge: HOME | End: 2023-04-12
Payer: COMMERCIAL

## 2023-04-12 VITALS
DIASTOLIC BLOOD PRESSURE: 82 MMHG | HEART RATE: 77 BPM | RESPIRATION RATE: 18 BRPM | TEMPERATURE: 98 F | SYSTOLIC BLOOD PRESSURE: 145 MMHG | OXYGEN SATURATION: 99 %

## 2023-04-12 DIAGNOSIS — C20 MALIGNANT NEOPLASM OF RECTUM (HCC): Primary | ICD-10-CM

## 2023-04-12 PROCEDURE — 96375 TX/PRO/DX INJ NEW DRUG ADDON: CPT

## 2023-04-12 PROCEDURE — 96365 THER/PROPH/DIAG IV INF INIT: CPT

## 2023-04-12 PROCEDURE — 96368 THER/DIAG CONCURRENT INF: CPT

## 2023-04-12 PROCEDURE — 6360000002 HC RX W HCPCS: Performed by: INTERNAL MEDICINE

## 2023-04-12 PROCEDURE — 2580000003 HC RX 258

## 2023-04-12 PROCEDURE — 96413 CHEMO IV INFUSION 1 HR: CPT

## 2023-04-12 PROCEDURE — 96415 CHEMO IV INFUSION ADDL HR: CPT

## 2023-04-12 PROCEDURE — 2580000003 HC RX 258: Performed by: INTERNAL MEDICINE

## 2023-04-12 PROCEDURE — 96409 CHEMO IV PUSH SNGL DRUG: CPT

## 2023-04-12 RX ORDER — FLUOROURACIL 50 MG/ML
600 INJECTION, SOLUTION INTRAVENOUS ONCE
Status: COMPLETED | OUTPATIENT
Start: 2023-04-12 | End: 2023-04-12

## 2023-04-12 RX ORDER — DEXTROSE MONOHYDRATE 50 MG/ML
INJECTION, SOLUTION INTRAVENOUS ONCE
Status: COMPLETED | OUTPATIENT
Start: 2023-04-12 | End: 2023-04-12

## 2023-04-12 RX ORDER — SODIUM CHLORIDE 9 MG/ML
INJECTION, SOLUTION INTRAVENOUS
Status: COMPLETED
Start: 2023-04-12 | End: 2023-04-12

## 2023-04-12 RX ORDER — DEXTROSE MONOHYDRATE 50 MG/ML
INJECTION, SOLUTION INTRAVENOUS
Status: COMPLETED
Start: 2023-04-12 | End: 2023-04-12

## 2023-04-12 RX ADMIN — OXALIPLATIN 100 MG: 5 INJECTION, SOLUTION INTRAVENOUS at 10:46

## 2023-04-12 RX ADMIN — FOSAPREPITANT: 150 INJECTION, POWDER, LYOPHILIZED, FOR SOLUTION INTRAVENOUS at 09:26

## 2023-04-12 RX ADMIN — DEXTROSE MONOHYDRATE: 50 INJECTION, SOLUTION INTRAVENOUS at 10:40

## 2023-04-12 RX ADMIN — PALONOSETRON: 0.05 INJECTION, SOLUTION INTRAVENOUS at 09:05

## 2023-04-12 RX ADMIN — FLUOROURACIL 600 MG: 50 INJECTION, SOLUTION INTRAVENOUS at 12:58

## 2023-04-12 RX ADMIN — DEXAMETHASONE SODIUM PHOSPHATE: 4 INJECTION, SOLUTION INTRAMUSCULAR; INTRAVENOUS at 08:47

## 2023-04-12 RX ADMIN — SODIUM CHLORIDE 250 ML: 9 INJECTION, SOLUTION INTRAVENOUS at 08:45

## 2023-04-12 RX ADMIN — LEUCOVORIN CALCIUM 600 MG: 200 INJECTION, POWDER, LYOPHILIZED, FOR SUSPENSION INTRAMUSCULAR; INTRAVENOUS at 10:43

## 2023-04-12 NOTE — FLOWSHEET NOTE
Chemo treatment is complete. Tolerated well. Left the unit via wheelchair. All equipment used in the care for this patient has been cleaned. HOC aware of completion.

## 2023-04-12 NOTE — FLOWSHEET NOTE
Patient to the floor via wheelchair for her cycle 8 day 1 chemo treatment. Vital signs taken. Denies any discomfort. Call light within reach.

## 2023-04-21 RX ORDER — FLUOROURACIL 50 MG/ML
600 INJECTION, SOLUTION INTRAVENOUS ONCE
Start: 2023-04-26

## 2023-04-26 ENCOUNTER — HOSPITAL ENCOUNTER (OUTPATIENT)
Dept: INFUSION THERAPY | Age: 73
Setting detail: INFUSION SERIES
Discharge: HOME | End: 2023-04-26
Payer: COMMERCIAL

## 2023-04-26 VITALS
RESPIRATION RATE: 18 BRPM | SYSTOLIC BLOOD PRESSURE: 70 MMHG | HEART RATE: 70 BPM | DIASTOLIC BLOOD PRESSURE: 38 MMHG | TEMPERATURE: 97.8 F

## 2023-04-26 DIAGNOSIS — C20 MALIGNANT NEOPLASM OF RECTUM (HCC): Primary | ICD-10-CM

## 2023-04-26 PROCEDURE — 96415 CHEMO IV INFUSION ADDL HR: CPT

## 2023-04-26 PROCEDURE — 96367 TX/PROPH/DG ADDL SEQ IV INF: CPT

## 2023-04-26 PROCEDURE — 2580000003 HC RX 258

## 2023-04-26 PROCEDURE — 96365 THER/PROPH/DIAG IV INF INIT: CPT

## 2023-04-26 PROCEDURE — 6360000002 HC RX W HCPCS: Performed by: INTERNAL MEDICINE

## 2023-04-26 PROCEDURE — 96366 THER/PROPH/DIAG IV INF ADDON: CPT

## 2023-04-26 PROCEDURE — 2580000003 HC RX 258: Performed by: INTERNAL MEDICINE

## 2023-04-26 PROCEDURE — 96413 CHEMO IV INFUSION 1 HR: CPT

## 2023-04-26 PROCEDURE — 96409 CHEMO IV PUSH SNGL DRUG: CPT

## 2023-04-26 PROCEDURE — 96375 TX/PRO/DX INJ NEW DRUG ADDON: CPT

## 2023-04-26 RX ORDER — SODIUM CHLORIDE 9 MG/ML
INJECTION, SOLUTION INTRAVENOUS
Status: COMPLETED
Start: 2023-04-26 | End: 2023-04-26

## 2023-04-26 RX ORDER — DEXTROSE MONOHYDRATE 50 MG/ML
INJECTION, SOLUTION INTRAVENOUS CONTINUOUS
Status: DISCONTINUED | OUTPATIENT
Start: 2023-04-26 | End: 2023-04-27 | Stop reason: HOSPADM

## 2023-04-26 RX ORDER — FLUOROURACIL 50 MG/ML
600 INJECTION, SOLUTION INTRAVENOUS ONCE
Status: COMPLETED | OUTPATIENT
Start: 2023-04-26 | End: 2023-04-26

## 2023-04-26 RX ADMIN — FLUOROURACIL 600 MG: 50 INJECTION, SOLUTION INTRAVENOUS at 13:37

## 2023-04-26 RX ADMIN — DEXTROSE MONOHYDRATE: 50 INJECTION, SOLUTION INTRAVENOUS at 11:14

## 2023-04-26 RX ADMIN — OXALIPLATIN 100 MG: 5 INJECTION, SOLUTION INTRAVENOUS at 11:17

## 2023-04-26 RX ADMIN — DEXAMETHASONE SODIUM PHOSPHATE 10 MG: 4 INJECTION, SOLUTION INTRAMUSCULAR; INTRAVENOUS at 09:29

## 2023-04-26 RX ADMIN — SODIUM CHLORIDE 250 ML: 9 INJECTION, SOLUTION INTRAVENOUS at 08:47

## 2023-04-26 RX ADMIN — LEUCOVORIN CALCIUM 600 MG: 200 INJECTION, POWDER, LYOPHILIZED, FOR SOLUTION INTRAMUSCULAR; INTRAVENOUS at 11:09

## 2023-04-26 RX ADMIN — PALONOSETRON 0.25 MG: 0.05 INJECTION, SOLUTION INTRAVENOUS at 08:57

## 2023-04-26 RX ADMIN — FOSAPREPITANT DIMEGLUMINE 150 MG: 150 INJECTION, POWDER, LYOPHILIZED, FOR SOLUTION INTRAVENOUS at 09:58

## 2023-04-26 NOTE — PROGRESS NOTES
Chemo initiated. Pt states no c/o. No sxs reactions after premeds and observation. Pt up to [facility] with one assist x 3 times. Also assisted to restroom for colostomy bag hygiene.

## 2023-04-26 NOTE — PROGRESS NOTES
Treatment completed. Pt tolerated well. Pt stated always feels tired. Discussion about side effects. Called transport. Vega Bose at [facility] to notify of patient leaving to go to [facility].

## 2023-04-26 NOTE — PROGRESS NOTES
Pt to [facility] via w/c with daughter Noreen Bello, for chemo treatment. Pt states no c/o, and no cange in information.

## 2023-04-28 ENCOUNTER — TELEPHONE (OUTPATIENT)
Dept: FAMILY MEDICINE CLINIC | Age: 73
End: 2023-04-28

## 2023-04-28 RX ORDER — FLUOROURACIL 50 MG/ML
600 INJECTION, SOLUTION INTRAVENOUS ONCE
Start: 2023-05-10

## 2023-05-09 ENCOUNTER — TELEMEDICINE (OUTPATIENT)
Dept: FAMILY MEDICINE CLINIC | Age: 73
End: 2023-05-09
Payer: COMMERCIAL

## 2023-05-09 DIAGNOSIS — R56.9 SEIZURE-LIKE ACTIVITY (HCC): ICD-10-CM

## 2023-05-09 DIAGNOSIS — C20 RECTAL CANCER (HCC): Primary | ICD-10-CM

## 2023-05-09 DIAGNOSIS — I26.02 ACUTE SADDLE PULMONARY EMBOLISM WITH ACUTE COR PULMONALE (HCC): ICD-10-CM

## 2023-05-09 PROCEDURE — G8399 PT W/DXA RESULTS DOCUMENT: HCPCS | Performed by: PHYSICIAN ASSISTANT

## 2023-05-09 PROCEDURE — G8417 CALC BMI ABV UP PARAM F/U: HCPCS | Performed by: PHYSICIAN ASSISTANT

## 2023-05-09 PROCEDURE — 99212 OFFICE O/P EST SF 10 MIN: CPT | Performed by: PHYSICIAN ASSISTANT

## 2023-05-09 PROCEDURE — 1090F PRES/ABSN URINE INCON ASSESS: CPT | Performed by: PHYSICIAN ASSISTANT

## 2023-05-09 PROCEDURE — 1123F ACP DISCUSS/DSCN MKR DOCD: CPT | Performed by: PHYSICIAN ASSISTANT

## 2023-05-09 PROCEDURE — 1036F TOBACCO NON-USER: CPT | Performed by: PHYSICIAN ASSISTANT

## 2023-05-09 PROCEDURE — G8427 DOCREV CUR MEDS BY ELIG CLIN: HCPCS | Performed by: PHYSICIAN ASSISTANT

## 2023-05-09 PROCEDURE — 3017F COLORECTAL CA SCREEN DOC REV: CPT | Performed by: PHYSICIAN ASSISTANT

## 2023-05-09 RX ORDER — RIVAROXABAN 20 MG/1
20 TABLET, FILM COATED ORAL
Qty: 30 TABLET | Refills: 0 | Status: SHIPPED | OUTPATIENT
Start: 2023-05-09

## 2023-05-09 SDOH — ECONOMIC STABILITY: HOUSING INSECURITY
IN THE LAST 12 MONTHS, WAS THERE A TIME WHEN YOU DID NOT HAVE A STEADY PLACE TO SLEEP OR SLEPT IN A SHELTER (INCLUDING NOW)?: NO

## 2023-05-09 SDOH — ECONOMIC STABILITY: FOOD INSECURITY: WITHIN THE PAST 12 MONTHS, YOU WORRIED THAT YOUR FOOD WOULD RUN OUT BEFORE YOU GOT MONEY TO BUY MORE.: NEVER TRUE

## 2023-05-09 SDOH — ECONOMIC STABILITY: FOOD INSECURITY: WITHIN THE PAST 12 MONTHS, THE FOOD YOU BOUGHT JUST DIDN'T LAST AND YOU DIDN'T HAVE MONEY TO GET MORE.: NEVER TRUE

## 2023-05-09 SDOH — ECONOMIC STABILITY: INCOME INSECURITY: HOW HARD IS IT FOR YOU TO PAY FOR THE VERY BASICS LIKE FOOD, HOUSING, MEDICAL CARE, AND HEATING?: NOT HARD AT ALL

## 2023-05-09 NOTE — PROGRESS NOTES
Nolan Aldrich (:  1950) is a Established patient, presenting virtually for evaluation of the following:  Referral for continuation of home health  Assessment & Plan   Below is the assessment and plan developed based on review of pertinent history, physical exam, labs, studies, and medications. Return in about 3 months (around 2023) for follow up with your PCP as directed. Subjective   HPI  Telemedicine Doxy video visit due to concern for exposure to COVID-19 (). Patient has history of rectal CA- receiving  chemoinfusion - followed by Ming Owen md ( heme onc)  Scheduled for  colostomy bag changes on  and  through [facility]- treatment  was interrupted as she needs a  new referral  Patient reports history of repeated syncopal episodes prior to diagnosis of pulmonary embolism  2022 patient is on Xarelto - denies seizure  Review of Systems   All other systems reviewed and are negative. Objective   Patient-Reported Vitals  No data recorded     Physical Exam  Pulmonary:      Effort: Pulmonary effort is normal.   Neurological:      Mental Status: She is oriented to person, place, and time. Psychiatric:         Mood and Affect: Mood normal.          Assessment & Plan    Diagnosis Orders   1. Rectal cancer ([address]  Amb External Referral To Home Health      2. Acute saddle pulmonary embolism with acute cor pulmonale (HCC)  XARELTO 20 MG TABS tablet      3.  Seizure-like activity ([address]      resolved with dx and treatment of PE            Orders Placed This Encounter   Procedures    Amb External Referral To Home Health     Referral Priority:   Routine     Referral Type:   Consult for Advice and Opinion     Referral Reason:   Specialty Services Required     Requested Specialty:   [address]     Number of Visits Requested:   1     Orders Placed This Encounter   Medications    XARELTO 20 MG TABS tablet     Sig: Take 1 tablet by mouth daily (with breakfast)     Dispense:

## 2023-05-10 ENCOUNTER — HOSPITAL ENCOUNTER (OUTPATIENT)
Dept: INFUSION THERAPY | Age: 73
Setting detail: INFUSION SERIES
Discharge: HOME | End: 2023-05-10
Payer: COMMERCIAL

## 2023-05-10 VITALS
OXYGEN SATURATION: 99 % | DIASTOLIC BLOOD PRESSURE: 82 MMHG | SYSTOLIC BLOOD PRESSURE: 142 MMHG | HEART RATE: 73 BPM | RESPIRATION RATE: 18 BRPM | TEMPERATURE: 98 F

## 2023-05-10 DIAGNOSIS — C20 MALIGNANT NEOPLASM OF RECTUM (HCC): Primary | ICD-10-CM

## 2023-05-10 PROCEDURE — 96360 HYDRATION IV INFUSION INIT: CPT

## 2023-05-10 PROCEDURE — 6360000002 HC RX W HCPCS: Performed by: INTERNAL MEDICINE

## 2023-05-10 PROCEDURE — 2580000003 HC RX 258

## 2023-05-10 PROCEDURE — 96415 CHEMO IV INFUSION ADDL HR: CPT

## 2023-05-10 PROCEDURE — 96368 THER/DIAG CONCURRENT INF: CPT

## 2023-05-10 PROCEDURE — 2580000003 HC RX 258: Performed by: INTERNAL MEDICINE

## 2023-05-10 PROCEDURE — 96409 CHEMO IV PUSH SNGL DRUG: CPT

## 2023-05-10 PROCEDURE — 96365 THER/PROPH/DIAG IV INF INIT: CPT

## 2023-05-10 PROCEDURE — 96375 TX/PRO/DX INJ NEW DRUG ADDON: CPT

## 2023-05-10 PROCEDURE — 96413 CHEMO IV INFUSION 1 HR: CPT

## 2023-05-10 RX ORDER — SODIUM CHLORIDE 9 MG/ML
INJECTION, SOLUTION INTRAVENOUS
Status: COMPLETED
Start: 2023-05-10 | End: 2023-05-10

## 2023-05-10 RX ORDER — FLUOROURACIL 50 MG/ML
600 INJECTION, SOLUTION INTRAVENOUS ONCE
Status: COMPLETED | OUTPATIENT
Start: 2023-05-10 | End: 2023-05-10

## 2023-05-10 RX ORDER — LANOLIN ALCOHOL/MO/W.PET/CERES
400 CREAM (GRAM) TOPICAL DAILY
COMMUNITY

## 2023-05-10 RX ORDER — DEXTROSE MONOHYDRATE 50 MG/ML
INJECTION, SOLUTION INTRAVENOUS
Status: COMPLETED
Start: 2023-05-10 | End: 2023-05-10

## 2023-05-10 RX ADMIN — DEXTROSE MONOHYDRATE 250 ML: 50 INJECTION, SOLUTION INTRAVENOUS at 10:23

## 2023-05-10 RX ADMIN — SODIUM CHLORIDE 250 ML: 9 INJECTION, SOLUTION INTRAVENOUS at 08:49

## 2023-05-10 RX ADMIN — FLUOROURACIL 600 MG: 50 INJECTION, SOLUTION INTRAVENOUS at 13:10

## 2023-05-10 RX ADMIN — PALONOSETRON 0.25 MG: 0.05 INJECTION, SOLUTION INTRAVENOUS at 08:52

## 2023-05-10 RX ADMIN — DEXAMETHASONE SODIUM PHOSPHATE 10 MG: 10 INJECTION INTRAMUSCULAR; INTRAVENOUS at 09:11

## 2023-05-10 RX ADMIN — FOSAPREPITANT DIMEGLUMINE: 150 INJECTION, POWDER, LYOPHILIZED, FOR SOLUTION INTRAVENOUS at 09:28

## 2023-05-10 RX ADMIN — LEUCOVORIN CALCIUM 600 MG: 200 INJECTION, POWDER, LYOPHILIZED, FOR SOLUTION INTRAMUSCULAR; INTRAVENOUS at 10:31

## 2023-05-10 RX ADMIN — OXALIPLATIN 100 MG: 5 INJECTION, SOLUTION INTRAVENOUS at 10:33

## 2023-05-10 NOTE — FLOWSHEET NOTE
Patient to the floor via wheelchair for her Cycle 10 chemo treatment. Vital signs taken. Denies any discomfort. Call light within reach. Colostomy is leaking and she need this changed due to [facility] not being able to come to do this. She has brought all of her supplies. Will change today.

## 2023-05-10 NOTE — FLOWSHEET NOTE
Chemo treatment is complete. Tolerated well. Left the unit via wheelchair with caregiver. All equipment used in the care for this patient has been cleaned.

## 2023-05-10 NOTE — FLOWSHEET NOTE
Severa Fret Rn wound care nurse assessed and changed her colostomy and will follow up with Dr. Vernell Leary.

## 2023-05-17 ENCOUNTER — TELEPHONE (OUTPATIENT)
Dept: FAMILY MEDICINE CLINIC | Age: 73
End: 2023-05-17

## 2023-05-17 NOTE — TELEPHONE ENCOUNTER
Lilliana with [facility] [place] [address] calling into the office to get verbal ok to start [place] [address] for 2 times a week for 9 Weeks and 5 PRN for ostomy care. Please call her at [phone].

## 2023-05-18 NOTE — TELEPHONE ENCOUNTER
Called and spoke to Danilo giving the verbal ok for [facility].

## 2023-05-24 ENCOUNTER — HOSPITAL ENCOUNTER (OUTPATIENT)
Dept: INFUSION THERAPY | Age: 73
Setting detail: INFUSION SERIES
Discharge: HOME | End: 2023-05-24
Payer: COMMERCIAL

## 2023-05-24 ENCOUNTER — HOSPITAL ENCOUNTER (OUTPATIENT)
Dept: INTERVENTIONAL RADIOLOGY/VASCULAR | Age: 73
Discharge: HOME | End: 2023-05-26
Payer: COMMERCIAL

## 2023-05-24 VITALS
HEART RATE: 76 BPM | TEMPERATURE: 97.5 F | RESPIRATION RATE: 18 BRPM | SYSTOLIC BLOOD PRESSURE: 142 MMHG | OXYGEN SATURATION: 97 % | DIASTOLIC BLOOD PRESSURE: 76 MMHG

## 2023-05-24 DIAGNOSIS — C20 MALIGNANT NEOPLASM OF RECTUM (HCC): Primary | ICD-10-CM

## 2023-05-24 PROCEDURE — 2500000003 HC RX 250 WO HCPCS: Performed by: INTERNAL MEDICINE

## 2023-05-24 PROCEDURE — 96409 CHEMO IV PUSH SNGL DRUG: CPT

## 2023-05-24 PROCEDURE — 2580000003 HC RX 258: Performed by: INTERNAL MEDICINE

## 2023-05-24 PROCEDURE — 96368 THER/DIAG CONCURRENT INF: CPT

## 2023-05-24 PROCEDURE — 96413 CHEMO IV INFUSION 1 HR: CPT

## 2023-05-24 PROCEDURE — C1769 GUIDE WIRE: HCPCS

## 2023-05-24 PROCEDURE — 36573 INSJ PICC RS&I 5 YR+: CPT

## 2023-05-24 PROCEDURE — 96367 TX/PROPH/DG ADDL SEQ IV INF: CPT

## 2023-05-24 PROCEDURE — 6360000002 HC RX W HCPCS: Performed by: INTERNAL MEDICINE

## 2023-05-24 PROCEDURE — 2580000003 HC RX 258

## 2023-05-24 PROCEDURE — 96375 TX/PRO/DX INJ NEW DRUG ADDON: CPT

## 2023-05-24 PROCEDURE — 96415 CHEMO IV INFUSION ADDL HR: CPT

## 2023-05-24 RX ORDER — SODIUM CHLORIDE 0.9 % (FLUSH) 0.9 %
5-40 SYRINGE (ML) INJECTION PRN
Status: DISCONTINUED | OUTPATIENT
Start: 2023-05-24 | End: 2023-05-27 | Stop reason: HOSPADM

## 2023-05-24 RX ORDER — LIDOCAINE HYDROCHLORIDE 20 MG/ML
5 INJECTION, SOLUTION INFILTRATION; PERINEURAL ONCE
Status: COMPLETED | OUTPATIENT
Start: 2023-05-24 | End: 2023-05-24

## 2023-05-24 RX ORDER — SODIUM CHLORIDE 9 MG/ML
INJECTION, SOLUTION INTRAVENOUS PRN
Status: DISCONTINUED | OUTPATIENT
Start: 2023-05-24 | End: 2023-05-27 | Stop reason: HOSPADM

## 2023-05-24 RX ORDER — SODIUM CHLORIDE 9 MG/ML
INJECTION, SOLUTION INTRAVENOUS
Status: COMPLETED
Start: 2023-05-24 | End: 2023-05-24

## 2023-05-24 RX ORDER — SODIUM CHLORIDE 0.9 % (FLUSH) 0.9 %
5-40 SYRINGE (ML) INJECTION EVERY 12 HOURS SCHEDULED
Status: DISCONTINUED | OUTPATIENT
Start: 2023-05-24 | End: 2023-05-27 | Stop reason: HOSPADM

## 2023-05-24 RX ORDER — FLUOROURACIL 50 MG/ML
600 INJECTION, SOLUTION INTRAVENOUS ONCE
Status: COMPLETED | OUTPATIENT
Start: 2023-05-24 | End: 2023-05-24

## 2023-05-24 RX ORDER — SODIUM CHLORIDE 9 MG/ML
250 INJECTION, SOLUTION INTRAVENOUS ONCE
Status: DISCONTINUED | OUTPATIENT
Start: 2023-05-24 | End: 2023-05-27 | Stop reason: HOSPADM

## 2023-05-24 RX ORDER — DEXTROSE MONOHYDRATE 50 MG/ML
INJECTION, SOLUTION INTRAVENOUS
Status: COMPLETED
Start: 2023-05-24 | End: 2023-05-24

## 2023-05-24 RX ADMIN — PALONOSETRON 0.25 MG: 0.05 INJECTION, SOLUTION INTRAVENOUS at 10:56

## 2023-05-24 RX ADMIN — OXALIPLATIN 100 MG: 5 INJECTION, SOLUTION INTRAVENOUS at 12:46

## 2023-05-24 RX ADMIN — LEUCOVORIN CALCIUM 600 MG: 200 INJECTION, POWDER, LYOPHILIZED, FOR SOLUTION INTRAMUSCULAR; INTRAVENOUS at 12:44

## 2023-05-24 RX ADMIN — SODIUM CHLORIDE 250 ML: 9 INJECTION, SOLUTION INTRAVENOUS at 10:38

## 2023-05-24 RX ADMIN — DEXTROSE MONOHYDRATE 250 ML: 50 INJECTION, SOLUTION INTRAVENOUS at 12:41

## 2023-05-24 RX ADMIN — DEXAMETHASONE SODIUM PHOSPHATE 10 MG: 10 INJECTION INTRAMUSCULAR; INTRAVENOUS at 11:17

## 2023-05-24 RX ADMIN — LIDOCAINE HYDROCHLORIDE 5 ML: 20 INJECTION, SOLUTION INFILTRATION; PERINEURAL at 08:34

## 2023-05-24 RX ADMIN — FLUOROURACIL 600 MG: 50 INJECTION, SOLUTION INTRAVENOUS at 14:55

## 2023-05-24 RX ADMIN — FOSAPREPITANT DIMEGLUMINE 150 MG: 150 INJECTION, POWDER, LYOPHILIZED, FOR SOLUTION INTRAVENOUS at 11:36

## 2023-05-24 ASSESSMENT — PAIN SCALES - GENERAL: PAINLEVEL_OUTOF10: 0

## 2023-05-24 NOTE — DISCHARGE INSTRUCTIONS
Peripherally Inserted Central Catheter (PICC): Care Instructions  Your Care Instructions    Remember to carry your card for PICC. It is proof that the PICC line can be safely used for radiology procedures. A peripherally inserted central catheter (PICC) is a soft, flexible tube that runs under your skin from a vein in your arm to a large vein near your heart. One end of the catheter stays outside your body. It is a type of central venous catheter, or central venous line. You may have it for weeks or months. A PICC is used to give you medicine, blood products, nutrients, or fluids. A PICC makes doing these things more comfortable for you because they are put directly into the catheter. So you will not be stuck with a needle every time. A PICC also can be used to draw blood for tests. The end of the PICC sometimes has two or three openings so that you can get more than one type of fluid or medicine at a time. Your doctor may give you medicine to make you feel relaxed. You may feel a little pain when your doctor numbs your arm. Your doctor will then thread the catheter up a vein in your arm to a larger vein. You will not feel any pain. The doctor may use stitches or other devices to hold the catheter in place where it exits your arm. After the procedure, the site may be sore for a day or two. Follow-up care is a key part of your treatment and safety. Be sure to make and go to all appointments, and call your doctor if you are having problems. It's also a good idea to know your test results and keep a list of the medicines you take. How can you care for yourself at home? Do not lift anything heavier than 10-15lbs with affected arm. Do not perform any vigorous activity that involves affected arm; ie jumping jacks, push-ups, burpees etc.  Do not wear jewelry, such as necklaces, that can catch on the catheter. If the catheter breaks, follow the instructions your doctor gave you.  If you have no instructions,

## 2023-05-24 NOTE — FLOWSHEET NOTE
Chemo treatment is complete. Tolerated well. Left the unit via wheelchair. All equipment used in the care for this patient has been cleaned.

## 2023-05-24 NOTE — FLOWSHEET NOTE
Patient to the floor via wheelchair for her cycle 11 chemo treatment. Vitals signs taken. Denies any discomfort. Call light within reach.

## 2023-06-26 ENCOUNTER — HOSPITAL ENCOUNTER (OUTPATIENT)
Dept: RADIATION ONCOLOGY | Age: 73
Discharge: HOME | End: 2023-06-26
Payer: COMMERCIAL

## 2023-06-26 VITALS
DIASTOLIC BLOOD PRESSURE: 80 MMHG | TEMPERATURE: 97.5 F | SYSTOLIC BLOOD PRESSURE: 133 MMHG | BODY MASS INDEX: 34.43 KG/M2 | HEIGHT: 59 IN | HEART RATE: 73 BPM | OXYGEN SATURATION: 99 % | RESPIRATION RATE: 18 BRPM | WEIGHT: 170.8 LBS

## 2023-06-26 DIAGNOSIS — C20 RECTAL CANCER (HCC): Primary | ICD-10-CM

## 2023-06-26 DIAGNOSIS — C20 MALIGNANT NEOPLASM OF RECTUM (HCC): ICD-10-CM

## 2023-06-26 PROCEDURE — 99212 OFFICE O/P EST SF 10 MIN: CPT | Performed by: RADIOLOGY

## 2023-06-26 PROCEDURE — 99497 ADVNCD CARE PLAN 30 MIN: CPT | Performed by: RADIOLOGY

## 2023-06-26 PROCEDURE — 99205 OFFICE O/P NEW HI 60 MIN: CPT | Performed by: RADIOLOGY

## 2023-07-07 ENCOUNTER — OFFICE VISIT (OUTPATIENT)
Dept: SURGERY | Age: 73
End: 2023-07-07
Payer: COMMERCIAL

## 2023-07-07 VITALS
WEIGHT: 170 LBS | HEIGHT: 59 IN | TEMPERATURE: 97.8 F | BODY MASS INDEX: 34.27 KG/M2 | OXYGEN SATURATION: 98 % | HEART RATE: 67 BPM

## 2023-07-07 DIAGNOSIS — K94.13 ILEOSTOMY DYSFUNCTION (HCC): Primary | ICD-10-CM

## 2023-07-07 PROCEDURE — 1123F ACP DISCUSS/DSCN MKR DOCD: CPT | Performed by: COLON & RECTAL SURGERY

## 2023-07-07 PROCEDURE — 99213 OFFICE O/P EST LOW 20 MIN: CPT | Performed by: COLON & RECTAL SURGERY

## 2023-07-07 RX ORDER — SODIUM CHLORIDE 9 MG/ML
INJECTION, SOLUTION INTRAVENOUS PRN
OUTPATIENT
Start: 2023-07-07

## 2023-07-07 RX ORDER — SODIUM CHLORIDE 0.9 % (FLUSH) 0.9 %
5-40 SYRINGE (ML) INJECTION EVERY 12 HOURS SCHEDULED
OUTPATIENT
Start: 2023-07-07

## 2023-07-07 RX ORDER — SODIUM CHLORIDE 0.9 % (FLUSH) 0.9 %
5-40 SYRINGE (ML) INJECTION PRN
OUTPATIENT
Start: 2023-07-07

## 2023-07-07 ASSESSMENT — ENCOUNTER SYMPTOMS
ABDOMINAL PAIN: 0
SHORTNESS OF BREATH: 0
CHEST TIGHTNESS: 0
CONSTIPATION: 0
COLOR CHANGE: 0
DIARRHEA: 0

## 2023-07-12 ENCOUNTER — HOSPITAL ENCOUNTER (OUTPATIENT)
Age: 73
Setting detail: OUTPATIENT SURGERY
Discharge: HOME | End: 2023-07-12
Attending: COLON & RECTAL SURGERY | Admitting: COLON & RECTAL SURGERY
Payer: COMMERCIAL

## 2023-07-12 ENCOUNTER — ANESTHESIA (OUTPATIENT)
Dept: OPERATING ROOM | Age: 73
End: 2023-07-12
Payer: COMMERCIAL

## 2023-07-12 ENCOUNTER — ANESTHESIA EVENT (OUTPATIENT)
Dept: OPERATING ROOM | Age: 73
End: 2023-07-12
Payer: COMMERCIAL

## 2023-07-12 VITALS
OXYGEN SATURATION: 97 % | SYSTOLIC BLOOD PRESSURE: 167 MMHG | BODY MASS INDEX: 32.86 KG/M2 | WEIGHT: 163 LBS | RESPIRATION RATE: 14 BRPM | DIASTOLIC BLOOD PRESSURE: 74 MMHG | TEMPERATURE: 97.2 F | HEART RATE: 70 BPM | HEIGHT: 59 IN

## 2023-07-12 DIAGNOSIS — K94.13 ILEOSTOMY DYSFUNCTION (HCC): ICD-10-CM

## 2023-07-12 LAB
ERYTHROCYTE [DISTWIDTH] IN BLOOD BY AUTOMATED COUNT: 16.4 % (ref 11.5–14.5)
HCT VFR BLD AUTO: 41.5 % (ref 37–47)
HGB BLD-MCNC: 13.3 G/DL (ref 12–16)
MCH RBC QN AUTO: 31.7 PG (ref 27–31.3)
MCHC RBC AUTO-ENTMCNC: 32.1 % (ref 33–37)
MCV RBC AUTO: 98.7 FL (ref 79.4–94.8)
PLATELET # BLD AUTO: 229 K/UL (ref 130–400)
RBC # BLD AUTO: 4.21 M/UL (ref 4.2–5.4)
WBC # BLD AUTO: 7.2 K/UL (ref 4.8–10.8)

## 2023-07-12 PROCEDURE — 7100000001 HC PACU RECOVERY - ADDTL 15 MIN: Performed by: COLON & RECTAL SURGERY

## 2023-07-12 PROCEDURE — 3609027000 HC COLONOSCOPY: Performed by: COLON & RECTAL SURGERY

## 2023-07-12 PROCEDURE — 3700000000 HC ANESTHESIA ATTENDED CARE: Performed by: COLON & RECTAL SURGERY

## 2023-07-12 PROCEDURE — 85610 PROTHROMBIN TIME: CPT

## 2023-07-12 PROCEDURE — 2500000003 HC RX 250 WO HCPCS: Performed by: ANESTHESIOLOGY

## 2023-07-12 PROCEDURE — 2580000003 HC RX 258: Performed by: ANESTHESIOLOGY

## 2023-07-12 PROCEDURE — 3700000001 HC ADD 15 MINUTES (ANESTHESIA): Performed by: COLON & RECTAL SURGERY

## 2023-07-12 PROCEDURE — 85730 THROMBOPLASTIN TIME PARTIAL: CPT

## 2023-07-12 PROCEDURE — 2500000003 HC RX 250 WO HCPCS: Performed by: COLON & RECTAL SURGERY

## 2023-07-12 PROCEDURE — 2709999900 HC NON-CHARGEABLE SUPPLY: Performed by: COLON & RECTAL SURGERY

## 2023-07-12 PROCEDURE — 7100000010 HC PHASE II RECOVERY - FIRST 15 MIN: Performed by: COLON & RECTAL SURGERY

## 2023-07-12 PROCEDURE — 6370000000 HC RX 637 (ALT 250 FOR IP): Performed by: COLON & RECTAL SURGERY

## 2023-07-12 PROCEDURE — 7100000000 HC PACU RECOVERY - FIRST 15 MIN: Performed by: COLON & RECTAL SURGERY

## 2023-07-12 PROCEDURE — A4217 STERILE WATER/SALINE, 500 ML: HCPCS | Performed by: COLON & RECTAL SURGERY

## 2023-07-12 PROCEDURE — 85027 COMPLETE CBC AUTOMATED: CPT

## 2023-07-12 PROCEDURE — 7100000011 HC PHASE II RECOVERY - ADDTL 15 MIN: Performed by: COLON & RECTAL SURGERY

## 2023-07-12 PROCEDURE — 2580000003 HC RX 258: Performed by: COLON & RECTAL SURGERY

## 2023-07-12 RX ORDER — WOUND DRESSING ADHESIVE - LIQUID
LIQUID MISCELLANEOUS PRN
Status: DISCONTINUED | OUTPATIENT
Start: 2023-07-12 | End: 2023-07-12 | Stop reason: HOSPADM

## 2023-07-12 RX ORDER — OXYCODONE HYDROCHLORIDE 5 MG/1
5 TABLET ORAL
Status: DISCONTINUED | OUTPATIENT
Start: 2023-07-12 | End: 2023-07-12 | Stop reason: HOSPADM

## 2023-07-12 RX ORDER — MAGNESIUM HYDROXIDE 1200 MG/15ML
LIQUID ORAL PRN
Status: DISCONTINUED | OUTPATIENT
Start: 2023-07-12 | End: 2023-07-12 | Stop reason: HOSPADM

## 2023-07-12 RX ORDER — SODIUM CHLORIDE 0.9 % (FLUSH) 0.9 %
5-40 SYRINGE (ML) INJECTION PRN
Status: DISCONTINUED | OUTPATIENT
Start: 2023-07-12 | End: 2023-07-12 | Stop reason: HOSPADM

## 2023-07-12 RX ORDER — SODIUM CHLORIDE 0.9 % (FLUSH) 0.9 %
5-40 SYRINGE (ML) INJECTION EVERY 12 HOURS SCHEDULED
Status: DISCONTINUED | OUTPATIENT
Start: 2023-07-12 | End: 2023-07-12 | Stop reason: HOSPADM

## 2023-07-12 RX ORDER — METOCLOPRAMIDE HYDROCHLORIDE 5 MG/ML
10 INJECTION INTRAMUSCULAR; INTRAVENOUS
Status: DISCONTINUED | OUTPATIENT
Start: 2023-07-12 | End: 2023-07-12 | Stop reason: HOSPADM

## 2023-07-12 RX ORDER — ONDANSETRON 2 MG/ML
4 INJECTION INTRAMUSCULAR; INTRAVENOUS
Status: DISCONTINUED | OUTPATIENT
Start: 2023-07-12 | End: 2023-07-12 | Stop reason: HOSPADM

## 2023-07-12 RX ORDER — MAGNESIUM HYDROXIDE 1200 MG/15ML
LIQUID ORAL CONTINUOUS PRN
Status: DISCONTINUED | OUTPATIENT
Start: 2023-07-12 | End: 2023-07-12 | Stop reason: HOSPADM

## 2023-07-12 RX ORDER — SODIUM CHLORIDE, SODIUM LACTATE, POTASSIUM CHLORIDE, CALCIUM CHLORIDE 600; 310; 30; 20 MG/100ML; MG/100ML; MG/100ML; MG/100ML
INJECTION, SOLUTION INTRAVENOUS CONTINUOUS
Status: DISCONTINUED | OUTPATIENT
Start: 2023-07-12 | End: 2023-07-12 | Stop reason: HOSPADM

## 2023-07-12 RX ORDER — SODIUM CHLORIDE 9 MG/ML
INJECTION, SOLUTION INTRAVENOUS PRN
Status: DISCONTINUED | OUTPATIENT
Start: 2023-07-12 | End: 2023-07-12 | Stop reason: HOSPADM

## 2023-07-12 RX ORDER — DIPHENHYDRAMINE HYDROCHLORIDE 50 MG/ML
12.5 INJECTION INTRAMUSCULAR; INTRAVENOUS
Status: DISCONTINUED | OUTPATIENT
Start: 2023-07-12 | End: 2023-07-12 | Stop reason: HOSPADM

## 2023-07-12 RX ORDER — HYDROMORPHONE HYDROCHLORIDE 1 MG/ML
0.5 INJECTION, SOLUTION INTRAMUSCULAR; INTRAVENOUS; SUBCUTANEOUS EVERY 10 MIN PRN
Status: DISCONTINUED | OUTPATIENT
Start: 2023-07-12 | End: 2023-07-12 | Stop reason: HOSPADM

## 2023-07-12 RX ORDER — MEPERIDINE HYDROCHLORIDE 25 MG/ML
12.5 INJECTION INTRAMUSCULAR; INTRAVENOUS; SUBCUTANEOUS
Status: DISCONTINUED | OUTPATIENT
Start: 2023-07-12 | End: 2023-07-12 | Stop reason: HOSPADM

## 2023-07-12 RX ORDER — OXYCODONE HYDROCHLORIDE AND ACETAMINOPHEN 5; 325 MG/1; MG/1
1 TABLET ORAL EVERY 6 HOURS PRN
Qty: 10 TABLET | Refills: 0 | Status: SHIPPED | OUTPATIENT
Start: 2023-07-12 | End: 2023-07-15

## 2023-07-12 RX ORDER — LIDOCAINE HYDROCHLORIDE 10 MG/ML
2 INJECTION, SOLUTION EPIDURAL; INFILTRATION; INTRACAUDAL; PERINEURAL ONCE
Status: COMPLETED | OUTPATIENT
Start: 2023-07-12 | End: 2023-07-12

## 2023-07-12 RX ORDER — FENTANYL CITRATE 0.05 MG/ML
50 INJECTION, SOLUTION INTRAMUSCULAR; INTRAVENOUS EVERY 10 MIN PRN
Status: DISCONTINUED | OUTPATIENT
Start: 2023-07-12 | End: 2023-07-12 | Stop reason: HOSPADM

## 2023-07-12 RX ORDER — PROPOFOL 10 MG/ML
INJECTION, EMULSION INTRAVENOUS CONTINUOUS PRN
Status: DISCONTINUED | OUTPATIENT
Start: 2023-07-12 | End: 2023-07-12 | Stop reason: SDUPTHER

## 2023-07-12 RX ORDER — BUPIVACAINE HYDROCHLORIDE AND EPINEPHRINE 2.5; 5 MG/ML; UG/ML
INJECTION, SOLUTION EPIDURAL; INFILTRATION; INTRACAUDAL; PERINEURAL PRN
Status: DISCONTINUED | OUTPATIENT
Start: 2023-07-12 | End: 2023-07-12 | Stop reason: HOSPADM

## 2023-07-12 RX ADMIN — PROPOFOL 100 MCG/KG/MIN: 10 INJECTION, EMULSION INTRAVENOUS at 09:27

## 2023-07-12 RX ADMIN — LIDOCAINE HYDROCHLORIDE 0.1 ML: 10 INJECTION, SOLUTION EPIDURAL; INFILTRATION; INTRACAUDAL; PERINEURAL at 08:15

## 2023-07-12 RX ADMIN — SODIUM CHLORIDE, POTASSIUM CHLORIDE, SODIUM LACTATE AND CALCIUM CHLORIDE 1000 ML: 600; 310; 30; 20 INJECTION, SOLUTION INTRAVENOUS at 08:15

## 2023-07-12 ASSESSMENT — PAIN - FUNCTIONAL ASSESSMENT: PAIN_FUNCTIONAL_ASSESSMENT: NONE - DENIES PAIN

## 2023-07-12 NOTE — BRIEF OP NOTE
Brief Postoperative Note      Patient: Juan Carlos Epps  YOB: 1950  MRN: [id number]    Date of Procedure: 7/12/2023    Pre-Op diagnosis dysfunctional Hercmv-t-Rala    Post-Op Diagnosis: Same       Procedure: Vlgkow-p-Ocpo removal    Surgeon: Marcos Loyd    Assistant:   Assistant: Low Gonzalez    Anesthesia: Monitor Anesthesia Care, local    Estimated Blood Loss (mL): 20    Complications: None    Specimens:   * No specimens in log *    Implants:  * No implants in log *      Drains:   Ileostomy/Jejunostomy RLQ Loop ileostomy (Active)       Findings: Left internal jugular Snmnob-x-Dguh removed under local/MAC.   Colonoscopy done prior dictated in separate report      Electronically signed by Helene Gloria MD on 7/12/2023 at 9:56 AM

## 2023-07-12 NOTE — ANESTHESIA POSTPROCEDURE EVALUATION
Department of Anesthesiology  Postprocedure Note    Patient: Julissa Noel  MRN: [id number]  YOB: 1950  Date of evaluation: 7/12/2023      Procedure Summary     Date: 07/12/23 Room / Location: [address]    Anesthesia Start: 1271 Anesthesia Stop:     Procedures:       Colonoscopy (Anus)      Gail Garry removal (Left: Chest) Diagnosis:       Ileostomy dysfunction ([address])      (Ileostomy dysfunction ([address]) [K94.13])    Surgeons: Darnell Hua MD Responsible Provider: Lisha Handley MD    Anesthesia Type: MAC ASA Status: 3          Anesthesia Type: No value filed.     Jessi Phase I: Jessi Score: 10    Jessi Phase II:        Anesthesia Post Evaluation    Patient location during evaluation: PACU  Patient participation: complete - patient participated  Level of consciousness: awake  Pain score: 0  Airway patency: patent  Nausea & Vomiting: no nausea  Complications: no  Cardiovascular status: hemodynamically stable  Respiratory status: face mask  Hydration status: euvolemic

## 2023-07-12 NOTE — FLOWSHEET NOTE
Discharge instruction given to patient and family. Verbalizes understanding with no further questions. Bandaid cdi. IV out, patient being discharged.

## 2023-07-12 NOTE — OP NOTE
[address] [place] [facility]                                OPERATIVE REPORT    PATIENT NAME: Casey Quiroga                     :        1950  MED REC NO:   [id number]                            ROOM:  ACCOUNT NO:   [de-identified]                           ADMIT DATE: 2023  PROVIDER:     Marchelle Jeans, MD    DATE OF PROCEDURE:  2023    PREOPERATIVE DIAGNOSIS:  Dysfunctional Lmfbtx-C-Ffam. PREOPERATIVE DIAGNOSIS:  Dysfunctional Unkact-K-Fbso. PROCEDURE PERFORMED:  Ejepeg-I-Ufps removal.    SURGEON:  Marchelle Jeans, MD    ASSISTANT:  Ms. Jose Herrmann. SEDATION:  1. MAC.  2.  Local.    ESTIMATED BLOOD LOSS:  20 mL. SPECIMENS:  None. COMPLICATIONS:  None. INDICATIONS:  A 63-year-old female with an Mvlhzu-N-Hkuj, which she no  longer requires. She was having a colonoscopy and I elected to proceed  with Cilbfu-S-Gjlk removal.  Risks of infection, bleeding, and pain  outlined. She wished to proceed. Consent obtained. OPERATIVE PROCEDURE:  After the endoscopic procedure, she was placed  back in supine position. Her Yfejoi-Z-Zjzf on the left chest wall was  prepped and draped with a ChloraPrep-containing solution. A time-out  was taken for appropriate verification. A 0.25% Marcaine was injected liberally for local analgesia. The  previous incision was opened down to the port pocket, which was opened. The port was grasped and freed of surrounding investing tissue. The  entire port and catheter were removed completely. Pressure was held on the left neck for hemostasis. After 5 minutes,  there was no bleeding seen. The deep layer was closed using two interrupted 3-0 Vicryl sutures. The  skin was closed with 4-0 Monocryl in a subcuticular fashion. Band-Aid was applied. At the end of the procedure, all lap, needle, and instrument counts were  all correct.     The patient was taken to

## 2023-07-12 NOTE — ANESTHESIA PRE PROCEDURE
obese,           Vascular: negative vascular ROS. Other Findings:           Anesthesia Plan      MAC     ASA 3             Anesthetic plan and risks discussed with patient.                         Lisha Handley MD   7/12/2023

## 2023-07-13 ENCOUNTER — PREP FOR PROCEDURE (OUTPATIENT)
Dept: SURGERY | Age: 73
End: 2023-07-13

## 2023-07-13 DIAGNOSIS — E78.2 MIXED HYPERLIPIDEMIA: ICD-10-CM

## 2023-07-13 RX ORDER — SODIUM CHLORIDE 0.9 % (FLUSH) 0.9 %
5-40 SYRINGE (ML) INJECTION PRN
Status: CANCELLED | OUTPATIENT
Start: 2023-07-26

## 2023-07-13 RX ORDER — SODIUM CHLORIDE 9 MG/ML
INJECTION, SOLUTION INTRAVENOUS PRN
Status: CANCELLED | OUTPATIENT
Start: 2023-07-26

## 2023-07-13 RX ORDER — SODIUM CHLORIDE 0.9 % (FLUSH) 0.9 %
5-40 SYRINGE (ML) INJECTION EVERY 12 HOURS SCHEDULED
Status: CANCELLED | OUTPATIENT
Start: 2023-07-26

## 2023-07-25 ENCOUNTER — ANESTHESIA EVENT (OUTPATIENT)
Dept: OPERATING ROOM | Age: 73
End: 2023-07-25
Payer: COMMERCIAL

## 2023-07-26 ENCOUNTER — HOSPITAL ENCOUNTER (INPATIENT)
Age: 73
LOS: 6 days | Discharge: HOME | DRG: 330 | End: 2023-08-01
Attending: COLON & RECTAL SURGERY | Admitting: COLON & RECTAL SURGERY
Payer: COMMERCIAL

## 2023-07-26 ENCOUNTER — ANESTHESIA (OUTPATIENT)
Dept: OPERATING ROOM | Age: 73
End: 2023-07-26
Payer: COMMERCIAL

## 2023-07-26 DIAGNOSIS — K94.13 ILEOSTOMY DYSFUNCTION (HCC): Primary | ICD-10-CM

## 2023-07-26 LAB
ANION GAP SERPL CALCULATED.3IONS-SCNC: 11 MEQ/L (ref 9–15)
BUN SERPL-MCNC: 13 MG/DL (ref 8–23)
CALCIUM SERPL-MCNC: 8.9 MG/DL (ref 8.5–9.9)
CHLORIDE SERPL-SCNC: 110 MEQ/L (ref 95–107)
CO2 SERPL-SCNC: 22 MEQ/L (ref 20–31)
CREAT SERPL-MCNC: 0.8 MG/DL (ref 0.5–0.9)
GLUCOSE SERPL-MCNC: 149 MG/DL (ref 70–99)
POTASSIUM SERPL-SCNC: 4.5 MEQ/L (ref 3.4–4.9)
SODIUM SERPL-SCNC: 143 MEQ/L (ref 135–144)

## 2023-07-26 PROCEDURE — 3600000004 HC SURGERY LEVEL 4 BASE: Performed by: COLON & RECTAL SURGERY

## 2023-07-26 PROCEDURE — 2709999900 HC NON-CHARGEABLE SUPPLY: Performed by: COLON & RECTAL SURGERY

## 2023-07-26 PROCEDURE — 3600000014 HC SURGERY LEVEL 4 ADDTL 15MIN: Performed by: COLON & RECTAL SURGERY

## 2023-07-26 PROCEDURE — 0DBB0ZZ EXCISION OF ILEUM, OPEN APPROACH: ICD-10-PCS | Performed by: COLON & RECTAL SURGERY

## 2023-07-26 PROCEDURE — 7100000001 HC PACU RECOVERY - ADDTL 15 MIN: Performed by: COLON & RECTAL SURGERY

## 2023-07-26 PROCEDURE — 2720000010 HC SURG SUPPLY STERILE: Performed by: COLON & RECTAL SURGERY

## 2023-07-26 PROCEDURE — 6360000002 HC RX W HCPCS: Performed by: COLON & RECTAL SURGERY

## 2023-07-26 PROCEDURE — C9399 UNCLASSIFIED DRUGS OR BIOLOG: HCPCS | Performed by: ANESTHESIOLOGY

## 2023-07-26 PROCEDURE — 2580000003 HC RX 258: Performed by: COLON & RECTAL SURGERY

## 2023-07-26 PROCEDURE — 2580000003 HC RX 258: Performed by: ANESTHESIOLOGY

## 2023-07-26 PROCEDURE — 6360000002 HC RX W HCPCS: Performed by: ANESTHESIOLOGY

## 2023-07-26 PROCEDURE — 2500000003 HC RX 250 WO HCPCS: Performed by: ANESTHESIOLOGY

## 2023-07-26 PROCEDURE — 80048 BASIC METABOLIC PNL TOTAL CA: CPT

## 2023-07-26 PROCEDURE — 7100000000 HC PACU RECOVERY - FIRST 15 MIN: Performed by: COLON & RECTAL SURGERY

## 2023-07-26 PROCEDURE — 3700000000 HC ANESTHESIA ATTENDED CARE: Performed by: COLON & RECTAL SURGERY

## 2023-07-26 PROCEDURE — A4217 STERILE WATER/SALINE, 500 ML: HCPCS | Performed by: COLON & RECTAL SURGERY

## 2023-07-26 PROCEDURE — 64486 TAP BLOCK UNIL BY INJECTION: CPT | Performed by: ANESTHESIOLOGY

## 2023-07-26 PROCEDURE — 2500000003 HC RX 250 WO HCPCS: Performed by: NURSE ANESTHETIST, CERTIFIED REGISTERED

## 2023-07-26 PROCEDURE — 44620 REPAIR BOWEL OPENING: CPT | Performed by: COLON & RECTAL SURGERY

## 2023-07-26 PROCEDURE — 36415 COLL VENOUS BLD VENIPUNCTURE: CPT

## 2023-07-26 PROCEDURE — 3700000001 HC ADD 15 MINUTES (ANESTHESIA): Performed by: COLON & RECTAL SURGERY

## 2023-07-26 PROCEDURE — 6360000002 HC RX W HCPCS

## 2023-07-26 PROCEDURE — 1210000000 HC MED SURG R&B

## 2023-07-26 RX ORDER — ONDANSETRON 2 MG/ML
4 INJECTION INTRAMUSCULAR; INTRAVENOUS EVERY 6 HOURS PRN
Status: DISCONTINUED | OUTPATIENT
Start: 2023-07-26 | End: 2023-07-31 | Stop reason: SDUPTHER

## 2023-07-26 RX ORDER — MIDAZOLAM HYDROCHLORIDE 1 MG/ML
INJECTION INTRAMUSCULAR; INTRAVENOUS PRN
Status: DISCONTINUED | OUTPATIENT
Start: 2023-07-26 | End: 2023-07-26 | Stop reason: SDUPTHER

## 2023-07-26 RX ORDER — ROPIVACAINE HYDROCHLORIDE 5 MG/ML
INJECTION, SOLUTION EPIDURAL; INFILTRATION; PERINEURAL PRN
Status: DISCONTINUED | OUTPATIENT
Start: 2023-07-26 | End: 2023-07-26 | Stop reason: SDUPTHER

## 2023-07-26 RX ORDER — SODIUM CHLORIDE 0.9 % (FLUSH) 0.9 %
5-40 SYRINGE (ML) INJECTION PRN
Status: DISCONTINUED | OUTPATIENT
Start: 2023-07-26 | End: 2023-07-26 | Stop reason: HOSPADM

## 2023-07-26 RX ORDER — SODIUM CHLORIDE 9 MG/ML
INJECTION, SOLUTION INTRAVENOUS CONTINUOUS PRN
Status: DISCONTINUED | OUTPATIENT
Start: 2023-07-26 | End: 2023-07-26 | Stop reason: SDUPTHER

## 2023-07-26 RX ORDER — SODIUM CHLORIDE 9 MG/ML
INJECTION, SOLUTION INTRAVENOUS CONTINUOUS
Status: DISCONTINUED | OUTPATIENT
Start: 2023-07-26 | End: 2023-07-31

## 2023-07-26 RX ORDER — SODIUM CHLORIDE 0.9 % (FLUSH) 0.9 %
5-40 SYRINGE (ML) INJECTION EVERY 12 HOURS SCHEDULED
Status: DISCONTINUED | OUTPATIENT
Start: 2023-07-26 | End: 2023-08-01 | Stop reason: HOSPADM

## 2023-07-26 RX ORDER — SODIUM CHLORIDE 9 MG/ML
INJECTION, SOLUTION INTRAVENOUS PRN
Status: DISCONTINUED | OUTPATIENT
Start: 2023-07-26 | End: 2023-08-01 | Stop reason: HOSPADM

## 2023-07-26 RX ORDER — SODIUM CHLORIDE 0.9 % (FLUSH) 0.9 %
5-40 SYRINGE (ML) INJECTION EVERY 12 HOURS SCHEDULED
Status: DISCONTINUED | OUTPATIENT
Start: 2023-07-26 | End: 2023-07-26 | Stop reason: HOSPADM

## 2023-07-26 RX ORDER — SODIUM CHLORIDE 9 MG/ML
INJECTION, SOLUTION INTRAVENOUS PRN
Status: DISCONTINUED | OUTPATIENT
Start: 2023-07-26 | End: 2023-07-26 | Stop reason: HOSPADM

## 2023-07-26 RX ORDER — SODIUM CHLORIDE 0.9 % (FLUSH) 0.9 %
5-40 SYRINGE (ML) INJECTION PRN
Status: DISCONTINUED | OUTPATIENT
Start: 2023-07-26 | End: 2023-08-01 | Stop reason: HOSPADM

## 2023-07-26 RX ORDER — ROCURONIUM BROMIDE 10 MG/ML
INJECTION, SOLUTION INTRAVENOUS PRN
Status: DISCONTINUED | OUTPATIENT
Start: 2023-07-26 | End: 2023-07-26 | Stop reason: SDUPTHER

## 2023-07-26 RX ORDER — ONDANSETRON 4 MG/1
4 TABLET, ORALLY DISINTEGRATING ORAL EVERY 8 HOURS PRN
Status: DISCONTINUED | OUTPATIENT
Start: 2023-07-26 | End: 2023-07-31 | Stop reason: SDUPTHER

## 2023-07-26 RX ORDER — FENTANYL CITRATE 50 UG/ML
INJECTION, SOLUTION INTRAMUSCULAR; INTRAVENOUS
Status: COMPLETED
Start: 2023-07-26 | End: 2023-07-26

## 2023-07-26 RX ORDER — FENTANYL CITRATE 0.05 MG/ML
50 INJECTION, SOLUTION INTRAMUSCULAR; INTRAVENOUS EVERY 10 MIN PRN
Status: DISCONTINUED | OUTPATIENT
Start: 2023-07-26 | End: 2023-07-26 | Stop reason: HOSPADM

## 2023-07-26 RX ORDER — KETAMINE HYDROCHLORIDE 100 MG/ML
INJECTION INTRAMUSCULAR; INTRAVENOUS PRN
Status: DISCONTINUED | OUTPATIENT
Start: 2023-07-26 | End: 2023-07-26 | Stop reason: SDUPTHER

## 2023-07-26 RX ORDER — ONDANSETRON 2 MG/ML
INJECTION INTRAMUSCULAR; INTRAVENOUS PRN
Status: DISCONTINUED | OUTPATIENT
Start: 2023-07-26 | End: 2023-07-26 | Stop reason: SDUPTHER

## 2023-07-26 RX ORDER — METOPROLOL SUCCINATE 25 MG/1
25 TABLET, EXTENDED RELEASE ORAL DAILY
Status: DISCONTINUED | OUTPATIENT
Start: 2023-07-27 | End: 2023-08-01 | Stop reason: HOSPADM

## 2023-07-26 RX ORDER — LIDOCAINE HYDROCHLORIDE 20 MG/ML
INJECTION, SOLUTION INTRAVENOUS PRN
Status: DISCONTINUED | OUTPATIENT
Start: 2023-07-26 | End: 2023-07-26 | Stop reason: SDUPTHER

## 2023-07-26 RX ORDER — OXYCODONE HYDROCHLORIDE 5 MG/1
5 TABLET ORAL
Status: DISCONTINUED | OUTPATIENT
Start: 2023-07-26 | End: 2023-07-26 | Stop reason: HOSPADM

## 2023-07-26 RX ORDER — CEFAZOLIN SODIUM 1 G/3ML
INJECTION, POWDER, FOR SOLUTION INTRAMUSCULAR; INTRAVENOUS PRN
Status: DISCONTINUED | OUTPATIENT
Start: 2023-07-26 | End: 2023-07-26 | Stop reason: SDUPTHER

## 2023-07-26 RX ORDER — HYDROMORPHONE HYDROCHLORIDE 1 MG/ML
1 INJECTION, SOLUTION INTRAMUSCULAR; INTRAVENOUS; SUBCUTANEOUS
Status: DISCONTINUED | OUTPATIENT
Start: 2023-07-26 | End: 2023-08-01 | Stop reason: HOSPADM

## 2023-07-26 RX ORDER — ENOXAPARIN SODIUM 100 MG/ML
40 INJECTION SUBCUTANEOUS DAILY
Status: DISCONTINUED | OUTPATIENT
Start: 2023-07-26 | End: 2023-08-01 | Stop reason: HOSPADM

## 2023-07-26 RX ORDER — MAGNESIUM HYDROXIDE 1200 MG/15ML
LIQUID ORAL CONTINUOUS PRN
Status: COMPLETED | OUTPATIENT
Start: 2023-07-26 | End: 2023-07-26

## 2023-07-26 RX ORDER — LABETALOL HYDROCHLORIDE 5 MG/ML
10 INJECTION, SOLUTION INTRAVENOUS
Status: DISCONTINUED | OUTPATIENT
Start: 2023-07-26 | End: 2023-08-01 | Stop reason: HOSPADM

## 2023-07-26 RX ORDER — METOPROLOL TARTRATE 5 MG/5ML
INJECTION INTRAVENOUS PRN
Status: DISCONTINUED | OUTPATIENT
Start: 2023-07-26 | End: 2023-07-26 | Stop reason: SDUPTHER

## 2023-07-26 RX ORDER — PROPOFOL 10 MG/ML
INJECTION, EMULSION INTRAVENOUS PRN
Status: DISCONTINUED | OUTPATIENT
Start: 2023-07-26 | End: 2023-07-26 | Stop reason: SDUPTHER

## 2023-07-26 RX ORDER — MEPERIDINE HYDROCHLORIDE 25 MG/ML
12.5 INJECTION INTRAMUSCULAR; INTRAVENOUS; SUBCUTANEOUS
Status: DISCONTINUED | OUTPATIENT
Start: 2023-07-26 | End: 2023-07-26 | Stop reason: HOSPADM

## 2023-07-26 RX ORDER — DEXAMETHASONE SODIUM PHOSPHATE 10 MG/ML
INJECTION INTRAMUSCULAR; INTRAVENOUS PRN
Status: DISCONTINUED | OUTPATIENT
Start: 2023-07-26 | End: 2023-07-26 | Stop reason: SDUPTHER

## 2023-07-26 RX ORDER — KETAMINE HYDROCHLORIDE 10 MG/ML
50 INJECTION INTRAMUSCULAR; INTRAVENOUS ONCE
Status: DISCONTINUED | OUTPATIENT
Start: 2023-07-26 | End: 2023-07-26 | Stop reason: HOSPADM

## 2023-07-26 RX ORDER — ONDANSETRON 2 MG/ML
4 INJECTION INTRAMUSCULAR; INTRAVENOUS
Status: DISCONTINUED | OUTPATIENT
Start: 2023-07-26 | End: 2023-07-26 | Stop reason: HOSPADM

## 2023-07-26 RX ORDER — METRONIDAZOLE 500 MG/100ML
500 INJECTION, SOLUTION INTRAVENOUS ONCE
Status: COMPLETED | OUTPATIENT
Start: 2023-07-26 | End: 2023-07-26

## 2023-07-26 RX ORDER — DIPHENHYDRAMINE HYDROCHLORIDE 50 MG/ML
12.5 INJECTION INTRAMUSCULAR; INTRAVENOUS
Status: DISCONTINUED | OUTPATIENT
Start: 2023-07-26 | End: 2023-07-26 | Stop reason: HOSPADM

## 2023-07-26 RX ORDER — METOCLOPRAMIDE HYDROCHLORIDE 5 MG/ML
10 INJECTION INTRAMUSCULAR; INTRAVENOUS
Status: DISCONTINUED | OUTPATIENT
Start: 2023-07-26 | End: 2023-07-26 | Stop reason: HOSPADM

## 2023-07-26 RX ADMIN — METRONIDAZOLE 500 MG: 500 INJECTION, SOLUTION INTRAVENOUS at 09:00

## 2023-07-26 RX ADMIN — LIDOCAINE HYDROCHLORIDE 60 MG: 20 INJECTION, SOLUTION INTRAVENOUS at 09:00

## 2023-07-26 RX ADMIN — HYDROMORPHONE HYDROCHLORIDE 1 MG: 1 INJECTION, SOLUTION INTRAMUSCULAR; INTRAVENOUS; SUBCUTANEOUS at 20:36

## 2023-07-26 RX ADMIN — FENTANYL CITRATE 50 MCG: 50 INJECTION INTRAMUSCULAR; INTRAVENOUS at 10:58

## 2023-07-26 RX ADMIN — ROPIVACAINE HYDROCHLORIDE 20 ML: 5 INJECTION, SOLUTION EPIDURAL; INFILTRATION; PERINEURAL at 08:41

## 2023-07-26 RX ADMIN — CEFAZOLIN 2 G: 1 INJECTION, POWDER, FOR SOLUTION INTRAMUSCULAR; INTRAVENOUS at 09:10

## 2023-07-26 RX ADMIN — KETAMINE HYDROCHLORIDE 25 MG: 100 INJECTION INTRAMUSCULAR; INTRAVENOUS at 09:52

## 2023-07-26 RX ADMIN — PROPOFOL 150 MG: 10 INJECTION, EMULSION INTRAVENOUS at 09:00

## 2023-07-26 RX ADMIN — FENTANYL CITRATE 50 MCG/ML: 50 INJECTION, SOLUTION INTRAMUSCULAR; INTRAVENOUS at 10:40

## 2023-07-26 RX ADMIN — Medication 10 ML: at 20:36

## 2023-07-26 RX ADMIN — SODIUM CHLORIDE: 9 INJECTION, SOLUTION INTRAVENOUS at 12:57

## 2023-07-26 RX ADMIN — HYDROMORPHONE HYDROCHLORIDE 1 MG: 1 INJECTION, SOLUTION INTRAMUSCULAR; INTRAVENOUS; SUBCUTANEOUS at 12:57

## 2023-07-26 RX ADMIN — HYDROMORPHONE HYDROCHLORIDE 1 MG: 1 INJECTION, SOLUTION INTRAMUSCULAR; INTRAVENOUS; SUBCUTANEOUS at 16:21

## 2023-07-26 RX ADMIN — ENOXAPARIN SODIUM 40 MG: 100 INJECTION SUBCUTANEOUS at 18:00

## 2023-07-26 RX ADMIN — METOPROLOL TARTRATE 3 MG: 5 INJECTION, SOLUTION INTRAVENOUS at 09:33

## 2023-07-26 RX ADMIN — ROCURONIUM BROMIDE 50 MG: 10 INJECTION INTRAVENOUS at 09:00

## 2023-07-26 RX ADMIN — SODIUM CHLORIDE: 9 INJECTION, SOLUTION INTRAVENOUS at 08:55

## 2023-07-26 RX ADMIN — SODIUM CHLORIDE: 9 INJECTION, SOLUTION INTRAVENOUS at 09:31

## 2023-07-26 RX ADMIN — DEXAMETHASONE SODIUM PHOSPHATE 8 MG: 10 INJECTION INTRAMUSCULAR; INTRAVENOUS at 09:26

## 2023-07-26 RX ADMIN — SUGAMMADEX 200 MG: 100 INJECTION, SOLUTION INTRAVENOUS at 10:02

## 2023-07-26 RX ADMIN — ONDANSETRON 4 MG: 2 INJECTION INTRAMUSCULAR; INTRAVENOUS at 10:00

## 2023-07-26 RX ADMIN — SODIUM CHLORIDE 1000 ML: 9 INJECTION, SOLUTION INTRAVENOUS at 07:46

## 2023-07-26 RX ADMIN — MIDAZOLAM HYDROCHLORIDE 2 MG: 1 INJECTION, SOLUTION INTRAMUSCULAR; INTRAVENOUS at 08:37

## 2023-07-26 ASSESSMENT — LIFESTYLE VARIABLES
HOW MANY STANDARD DRINKS CONTAINING ALCOHOL DO YOU HAVE ON A TYPICAL DAY: PATIENT DOES NOT DRINK
HOW OFTEN DO YOU HAVE A DRINK CONTAINING ALCOHOL: NEVER

## 2023-07-26 ASSESSMENT — PAIN DESCRIPTION - LOCATION
LOCATION: ABDOMEN

## 2023-07-26 ASSESSMENT — PAIN DESCRIPTION - DESCRIPTORS
DESCRIPTORS: BURNING
DESCRIPTORS: ACHING
DESCRIPTORS: ACHING;BURNING
DESCRIPTORS: ACHING;BURNING
DESCRIPTORS: ACHING

## 2023-07-26 ASSESSMENT — PAIN SCALES - GENERAL
PAINLEVEL_OUTOF10: 8
PAINLEVEL_OUTOF10: 7
PAINLEVEL_OUTOF10: 4
PAINLEVEL_OUTOF10: 5
PAINLEVEL_OUTOF10: 3
PAINLEVEL_OUTOF10: 8

## 2023-07-26 ASSESSMENT — PAIN DESCRIPTION - PAIN TYPE
TYPE: SURGICAL PAIN
TYPE: SURGICAL PAIN
TYPE: ACUTE PAIN

## 2023-07-26 ASSESSMENT — PAIN - FUNCTIONAL ASSESSMENT
PAIN_FUNCTIONAL_ASSESSMENT: 0-10
PAIN_FUNCTIONAL_ASSESSMENT: PREVENTS OR INTERFERES SOME ACTIVE ACTIVITIES AND ADLS
PAIN_FUNCTIONAL_ASSESSMENT: PREVENTS OR INTERFERES SOME ACTIVE ACTIVITIES AND ADLS

## 2023-07-26 ASSESSMENT — PAIN DESCRIPTION - ONSET
ONSET: ON-GOING

## 2023-07-26 ASSESSMENT — PAIN DESCRIPTION - ORIENTATION: ORIENTATION: RIGHT

## 2023-07-26 ASSESSMENT — PAIN DESCRIPTION - FREQUENCY
FREQUENCY: CONTINUOUS

## 2023-07-26 NOTE — OP NOTE
[place] [address] [address] [facility]                                OPERATIVE REPORT    PATIENT NAME: Merline Ala                     :        1950  MED REC NO:   [id number]                            ROOM:  ACCOUNT NO:   [de-identified]                           ADMIT DATE: 2023  PROVIDER:     Sudheer Seo MD    DATE OF PROCEDURE:  2023    PREOPERATIVE DIAGNOSIS:  Dysfunctional ileostomy. POSTOPERATIVE DIAGNOSIS:  Dysfunctional ileostomy. PROCEDURE PERFORMED:  Ileostomy closure. SURGEON:  Sudheer Seo MD    ASSISTANT:  Ms. [facility] Domingo. ANESTHESIA:  1. General endotracheal anesthesia. 2.  Right TAP block. ESTIMATED BLOOD LOSS:  50 mL. SPECIMENS:  None. COMPLICATIONS:  None. INDICATIONS:  A 82-year-old female with a previous resection for rectal  cancer. She has a diverting ileostomy. Preoperative colonoscopy shows  a patent anastomosis. Risks and benefits of ileostomy reversal  described in the office. She understands the risks of the procedure  including infection, bleeding, anastomotic leak, reoperation, and  postoperative pain. Despite the risks, she wishes to proceed. Consent obtained. OPERATIVE PROCEDURE:  She was taken to the operating room and placed in  the supine position. General endotracheal anesthesia was administered. Right TAP block placed preoperatively. The abdomen was prepped and  draped with Betadine-containing solution. Ancef and Flagyl given  preoperatively. A time-out was taken for appropriate verification. The ileostomy was freed up at the mucocutaneous junction. Subcutaneous  tissues were incised down to the fascia. The abdomen was entered and  there were no significant adhesions. The afferent and efferent limbs  were freed up.   A 75-mm KRISTI stapler was transected across the afferent  and efferent limbs and the mesentery was transected using a

## 2023-07-26 NOTE — BRIEF OP NOTE
Brief Postoperative Note      Patient: Tacos Jones  YOB: 1950  MRN: [id number]    Date of Procedure: 7/26/2023    Pre-Op Diagnosis Codes:     * Ileostomy dysfunction ([address]) [K94.13]    Post-Op Diagnosis: Same       Procedure(s):  ileostomy reversal    Surgeon(s):  Guerda Chen MD    Assistant:  First Assistant: [place] Gonzalez    Anesthesia: General, right tap block    Estimated Blood Loss (mL): 50    Complications: None    Specimens:   * No specimens in log *    Implants:  * No implants in log *      Drains:   Open Drain 07/26/23 RUQ (Active)       [REMOVED] Ileostomy/Jejunostomy RLQ Loop ileostomy (Removed)       Findings: None additional      Electronically signed by Guerda Chen MD on 7/26/2023 at 10:03 AM

## 2023-07-26 NOTE — ANESTHESIA PROCEDURE NOTES
Peripheral Block    Patient location during procedure: pre-op  Reason for block: post-op pain management and at surgeon's request  Start time: 7/26/2023 8:38 AM  End time: 7/26/2023 8:43 AM  Staffing  Performed: anesthesiologist   Anesthesiologist: Micheline Perez MD  Preanesthetic Checklist  Completed: patient identified, IV checked, site marked, risks and benefits discussed, surgical/procedural consents, equipment checked, pre-op evaluation, timeout performed, anesthesia consent given, oxygen available and monitors applied/VS acknowledged  Peripheral Block   Patient position: supine  Prep: ChloraPrep  Provider prep: mask and sterile gloves (Sterile probe cover)  Patient monitoring: cardiac monitor, continuous pulse ox, frequent blood pressure checks and IV access  Block type: TAP  Laterality: right  Injection technique: single-shot  Guidance: ultrasound guided  Local infiltration: ropivacaine (10 ml of NS added)  Infiltration strength: 0.5 %  Local infiltration: ropivacaine (10 ml of NS added)  Dose: 20 mL    Needle   Needle type: combined needle/nerve stimulator   Needle gauge: 21 G  Needle localization: anatomical landmarks and ultrasound guidance  Needle length: 10 cm  Assessment   Injection assessment: negative aspiration for heme, no paresthesia on injection and local visualized surrounding nerve on ultrasound  Paresthesia pain: immediately resolved  Slow fractionated injection: yes  Hemodynamics: stable  Real-time US image taken/store: yes    Additional Notes  Ultrasound image printed and saved in patient chart.     Sterile probe cover used

## 2023-07-26 NOTE — H&P
Patient ID: Dudley Pulido is a 81 y.o. female who presents for:      Chief Complaint   Patient presents with    Follow-up         This is a 65-year-old female who completed her adjuvant treatment for rectal cancer. She is having issues with her ileostomy from a pouching standpoint. It is functioning properly. She denies abdominal pain or any unintentional weight loss. I reviewed her previous operative report. I reviewed her past medical history.   I reviewed her recent colonoscopy report    No change in her past medical or surgical history        Past Medical History        Past Medical History:   Diagnosis Date    Arthritis      Cancer ([address])       SKIN    DVT (deep venous thrombosis) ([address]) 12/2022    Hyperlipidemia      Pulmonary embolism ([address]) 12/2022         Past Surgical History         Past Surgical History:   Procedure Laterality Date    CATARACT REMOVAL        CHOLECYSTECTOMY        COLECTOMY N/A 10/3/2022     LOW ANTERIOR RESECTION WITH LOOP ILEOSTOMY performed by Warren Steven MD at [address] N/A 9/16/2022     COLORECTAL CANCER SCREENING, NOT HIGH RISK performed by Olvin Benito MD at [facility]    COLONOSCOPY N/A 9/26/2022     FLEXIBLE SIGMOIDOSCOPY WITH SNARE POLYPECTOMY performed by Warren Steven MD at [address] N/A 11/3/2022     INFUSAPORT performed by Warren Steven MD at [address]         R LOWER LEG    TUBAL LIGATION             Social History               Socioeconomic History    Marital status:        Spouse name: Not on file    Number of children: Not on file    Years of education: Not on file    Highest education level: Not on file   Occupational History    Not on file   Tobacco Use    Smoking status: Never    Smokeless tobacco: Never   Vaping Use    Vaping Use: Never used   Substance and Sexual Activity    Alcohol use: No       Comment: rare glass of wine    Drug use: No    Sexual activity: Not on

## 2023-07-26 NOTE — ANESTHESIA POSTPROCEDURE EVALUATION
Department of Anesthesiology  Postprocedure Note    Patient: Harden Cowden  MRN: [id number]  YOB: 1950  Date of evaluation: 7/26/2023      Procedure Summary     Date: 07/26/23 Room / Location: [address]    Anesthesia Start: 5740 Anesthesia Stop: 3902    Procedure: ileostomy reversal (Abdomen) Diagnosis:       Ileostomy dysfunction ([address])      (Ileostomy dysfunction ([address]) [K94.13])    Surgeons: Carola Medeiros MD Responsible Provider: Lars Shaikh MD    Anesthesia Type: general ASA Status: 2          Anesthesia Type: No value filed.     Jessi Phase I: Jessi Score: 10    Jessi Phase II:        Anesthesia Post Evaluation    Patient location during evaluation: PACU  Patient participation: complete - patient participated  Level of consciousness: awake and alert  Pain score: 0  Airway patency: patent  Nausea & Vomiting: no vomiting and no nausea  Complications: no  Cardiovascular status: hemodynamically stable  Respiratory status: face mask  Hydration status: stable  Multimodal analgesia pain management approach

## 2023-07-27 LAB
ANION GAP SERPL CALCULATED.3IONS-SCNC: 11 MEQ/L (ref 9–15)
BUN SERPL-MCNC: 23 MG/DL (ref 8–23)
CALCIUM SERPL-MCNC: 8.8 MG/DL (ref 8.5–9.9)
CHLORIDE SERPL-SCNC: 109 MEQ/L (ref 95–107)
CO2 SERPL-SCNC: 21 MEQ/L (ref 20–31)
CREAT SERPL-MCNC: 1 MG/DL (ref 0.5–0.9)
ERYTHROCYTE [DISTWIDTH] IN BLOOD BY AUTOMATED COUNT: 14.7 % (ref 11.5–14.5)
GLUCOSE SERPL-MCNC: 123 MG/DL (ref 70–99)
HCT VFR BLD AUTO: 33.9 % (ref 37–47)
HGB BLD-MCNC: 11.5 G/DL (ref 12–16)
MCH RBC QN AUTO: 32.4 PG (ref 27–31.3)
MCHC RBC AUTO-ENTMCNC: 34.1 % (ref 33–37)
MCV RBC AUTO: 95 FL (ref 79.4–94.8)
PLATELET # BLD AUTO: 233 K/UL (ref 130–400)
POTASSIUM SERPL-SCNC: 4.7 MEQ/L (ref 3.4–4.9)
RBC # BLD AUTO: 3.56 M/UL (ref 4.2–5.4)
SODIUM SERPL-SCNC: 141 MEQ/L (ref 135–144)
WBC # BLD AUTO: 12.7 K/UL (ref 4.8–10.8)

## 2023-07-27 PROCEDURE — 1210000000 HC MED SURG R&B

## 2023-07-27 PROCEDURE — 6370000000 HC RX 637 (ALT 250 FOR IP): Performed by: COLON & RECTAL SURGERY

## 2023-07-27 PROCEDURE — 80048 BASIC METABOLIC PNL TOTAL CA: CPT

## 2023-07-27 PROCEDURE — 2700000000 HC OXYGEN THERAPY PER DAY

## 2023-07-27 PROCEDURE — 99024 POSTOP FOLLOW-UP VISIT: CPT | Performed by: COLON & RECTAL SURGERY

## 2023-07-27 PROCEDURE — 85027 COMPLETE CBC AUTOMATED: CPT

## 2023-07-27 PROCEDURE — 2580000003 HC RX 258: Performed by: COLON & RECTAL SURGERY

## 2023-07-27 PROCEDURE — 6360000002 HC RX W HCPCS: Performed by: COLON & RECTAL SURGERY

## 2023-07-27 PROCEDURE — 2580000003 HC RX 258: Performed by: ANESTHESIOLOGY

## 2023-07-27 PROCEDURE — 36415 COLL VENOUS BLD VENIPUNCTURE: CPT

## 2023-07-27 RX ADMIN — HYDROMORPHONE HYDROCHLORIDE 0.5 MG: 1 INJECTION, SOLUTION INTRAMUSCULAR; INTRAVENOUS; SUBCUTANEOUS at 09:37

## 2023-07-27 RX ADMIN — ENOXAPARIN SODIUM 40 MG: 100 INJECTION SUBCUTANEOUS at 09:28

## 2023-07-27 RX ADMIN — HYDROMORPHONE HYDROCHLORIDE 0.5 MG: 1 INJECTION, SOLUTION INTRAMUSCULAR; INTRAVENOUS; SUBCUTANEOUS at 12:41

## 2023-07-27 RX ADMIN — Medication 10 ML: at 09:29

## 2023-07-27 RX ADMIN — HYDROMORPHONE HYDROCHLORIDE 1 MG: 1 INJECTION, SOLUTION INTRAMUSCULAR; INTRAVENOUS; SUBCUTANEOUS at 22:24

## 2023-07-27 RX ADMIN — METOPROLOL SUCCINATE 25 MG: 25 TABLET, EXTENDED RELEASE ORAL at 09:29

## 2023-07-27 RX ADMIN — HYDROMORPHONE HYDROCHLORIDE 0.5 MG: 1 INJECTION, SOLUTION INTRAMUSCULAR; INTRAVENOUS; SUBCUTANEOUS at 01:20

## 2023-07-27 RX ADMIN — HYDROMORPHONE HYDROCHLORIDE 0.5 MG: 1 INJECTION, SOLUTION INTRAMUSCULAR; INTRAVENOUS; SUBCUTANEOUS at 18:43

## 2023-07-27 RX ADMIN — SODIUM CHLORIDE: 9 INJECTION, SOLUTION INTRAVENOUS at 07:25

## 2023-07-27 ASSESSMENT — PAIN DESCRIPTION - DESCRIPTORS
DESCRIPTORS: ACHING;CRAMPING
DESCRIPTORS: CRAMPING;SHARP

## 2023-07-27 ASSESSMENT — PAIN SCALES - GENERAL
PAINLEVEL_OUTOF10: 5
PAINLEVEL_OUTOF10: 6
PAINLEVEL_OUTOF10: 0
PAINLEVEL_OUTOF10: 3
PAINLEVEL_OUTOF10: 6
PAINLEVEL_OUTOF10: 4
PAINLEVEL_OUTOF10: 5
PAINLEVEL_OUTOF10: 2

## 2023-07-27 ASSESSMENT — PAIN DESCRIPTION - LOCATION
LOCATION: ABDOMEN

## 2023-07-27 ASSESSMENT — PAIN - FUNCTIONAL ASSESSMENT: PAIN_FUNCTIONAL_ASSESSMENT: PREVENTS OR INTERFERES SOME ACTIVE ACTIVITIES AND ADLS

## 2023-07-27 ASSESSMENT — PAIN DESCRIPTION - PAIN TYPE: TYPE: SURGICAL PAIN

## 2023-07-27 ASSESSMENT — PAIN DESCRIPTION - ONSET: ONSET: GRADUAL

## 2023-07-27 ASSESSMENT — PAIN DESCRIPTION - ORIENTATION
ORIENTATION: MID
ORIENTATION: RIGHT;MID

## 2023-07-27 ASSESSMENT — PAIN DESCRIPTION - FREQUENCY: FREQUENCY: INTERMITTENT

## 2023-07-27 NOTE — CARE COORDINATION
Case Management Assessment  Initial Evaluation    Date/Time of Evaluation: 7/27/2023 11:26 AM  Assessment Completed by: MIYA Morrison, MONICAW    If patient is discharged prior to next notation, then this note serves as note for discharge by case management. Patient Name: Juan Carlos Epps                   YOB: 1950  Diagnosis: Ileostomy dysfunction [facility]) [K94.13]                   Date / Time: 7/26/2023  7:15 AM    Patient Admission Status: Inpatient   Readmission Risk (Low < 19, Mod (19-27), High > 27): Readmission Risk Score: 15    Current PCP: Severo Marshall, DO  PCP verified by CM? Chart Reviewed: Yes      History Provided by:    Patient Orientation:      Patient Cognition:      Hospitalization in the last 30 days (Readmission):  No    If yes, Readmission Assessment in CM Navigator will be completed. Advance Directives:      Code Status: Full Code   Patient's Primary Decision Maker is:      Primary Decision Maker: Griselda Guadarrama - Child - [phone]    Secondary Decision Maker: Juan F Perkins - Child - [phone]    Secondary Decision Maker: Rebeca Marroquin - Child - [phone]    Discharge Planning:    Patient lives with: Family Members Type of Home: House  Primary Care Giver:    Patient Support Systems include: Family Members   Current Financial resources:    Current community resources:    Current services prior to admission: None            Current DME:              Type of Home Care services:  None    ADLS  Prior functional level:    Current functional level:      PT AM-PAC:   /24  OT AM-PAC:   /24    Family can provide assistance at DC: Would you like Case Management to discuss the discharge plan with any other family members/significant others, and if so, who?     Plans to Return to Present Housing:    Other Identified Issues/Barriers to RETURNING to current housing: medical complications  Potential Assistance needed at discharge: N/A            Potential DME:    Patient

## 2023-07-27 NOTE — ACP (ADVANCE CARE PLANNING)
Advance Care Planning   Healthcare Decision Maker:    Primary Decision Maker: Nik Pace - Child - [phone]    Secondary Decision Maker: Ever Dubose - Child - [phone]    Secondary Decision Maker: Chilo Desai - Child - [phone]    Click here to complete Healthcare Decision Makers including selection of the Healthcare Decision Maker Relationship (ie \"Primary\").

## 2023-07-28 LAB
ANION GAP SERPL CALCULATED.3IONS-SCNC: 11 MEQ/L (ref 9–15)
BASOPHILS # BLD: 0.1 K/UL (ref 0–0.2)
BASOPHILS NFR BLD: 0.5 %
BUN SERPL-MCNC: 22 MG/DL (ref 8–23)
CALCIUM SERPL-MCNC: 8.7 MG/DL (ref 8.5–9.9)
CHLORIDE SERPL-SCNC: 107 MEQ/L (ref 95–107)
CO2 SERPL-SCNC: 22 MEQ/L (ref 20–31)
CREAT SERPL-MCNC: 0.7 MG/DL (ref 0.5–0.9)
EOSINOPHIL # BLD: 0 K/UL (ref 0–0.7)
EOSINOPHIL NFR BLD: 0.1 %
ERYTHROCYTE [DISTWIDTH] IN BLOOD BY AUTOMATED COUNT: 14.7 % (ref 11.5–14.5)
GLUCOSE SERPL-MCNC: 116 MG/DL (ref 70–99)
HCT VFR BLD AUTO: 36.4 % (ref 37–47)
HGB BLD-MCNC: 12 G/DL (ref 12–16)
LYMPHOCYTES # BLD: 1.7 K/UL (ref 1–4.8)
LYMPHOCYTES NFR BLD: 13.1 %
MCH RBC QN AUTO: 31.5 PG (ref 27–31.3)
MCHC RBC AUTO-ENTMCNC: 33 % (ref 33–37)
MCV RBC AUTO: 95.4 FL (ref 79.4–94.8)
MONOCYTES # BLD: 0.8 K/UL (ref 0.2–0.8)
MONOCYTES NFR BLD: 6.3 %
NEUTROPHILS # BLD: 10.2 K/UL (ref 1.4–6.5)
NEUTS SEG NFR BLD: 80 %
PLATELET # BLD AUTO: 227 K/UL (ref 130–400)
POTASSIUM SERPL-SCNC: 4.2 MEQ/L (ref 3.4–4.9)
RBC # BLD AUTO: 3.81 M/UL (ref 4.2–5.4)
SODIUM SERPL-SCNC: 140 MEQ/L (ref 135–144)
WBC # BLD AUTO: 12.7 K/UL (ref 4.8–10.8)

## 2023-07-28 PROCEDURE — 6360000002 HC RX W HCPCS: Performed by: COLON & RECTAL SURGERY

## 2023-07-28 PROCEDURE — 6370000000 HC RX 637 (ALT 250 FOR IP): Performed by: COLON & RECTAL SURGERY

## 2023-07-28 PROCEDURE — 2580000003 HC RX 258: Performed by: COLON & RECTAL SURGERY

## 2023-07-28 PROCEDURE — 2700000000 HC OXYGEN THERAPY PER DAY

## 2023-07-28 PROCEDURE — 1210000000 HC MED SURG R&B

## 2023-07-28 PROCEDURE — 36415 COLL VENOUS BLD VENIPUNCTURE: CPT

## 2023-07-28 PROCEDURE — 2580000003 HC RX 258: Performed by: ANESTHESIOLOGY

## 2023-07-28 PROCEDURE — 80048 BASIC METABOLIC PNL TOTAL CA: CPT

## 2023-07-28 PROCEDURE — 85025 COMPLETE CBC W/AUTO DIFF WBC: CPT

## 2023-07-28 PROCEDURE — 99024 POSTOP FOLLOW-UP VISIT: CPT | Performed by: COLON & RECTAL SURGERY

## 2023-07-28 RX ADMIN — SODIUM CHLORIDE: 9 INJECTION, SOLUTION INTRAVENOUS at 02:24

## 2023-07-28 RX ADMIN — HYDROMORPHONE HYDROCHLORIDE 0.5 MG: 1 INJECTION, SOLUTION INTRAMUSCULAR; INTRAVENOUS; SUBCUTANEOUS at 13:54

## 2023-07-28 RX ADMIN — METOPROLOL SUCCINATE 25 MG: 25 TABLET, EXTENDED RELEASE ORAL at 09:12

## 2023-07-28 RX ADMIN — ENOXAPARIN SODIUM 40 MG: 100 INJECTION SUBCUTANEOUS at 09:12

## 2023-07-28 RX ADMIN — SODIUM CHLORIDE: 9 INJECTION, SOLUTION INTRAVENOUS at 18:56

## 2023-07-28 RX ADMIN — Medication 10 ML: at 21:59

## 2023-07-28 RX ADMIN — ONDANSETRON 4 MG: 2 INJECTION INTRAMUSCULAR; INTRAVENOUS at 03:45

## 2023-07-28 ASSESSMENT — PAIN DESCRIPTION - DESCRIPTORS: DESCRIPTORS: CRAMPING;SHARP

## 2023-07-28 ASSESSMENT — PAIN SCALES - GENERAL
PAINLEVEL_OUTOF10: 2
PAINLEVEL_OUTOF10: 2
PAINLEVEL_OUTOF10: 5

## 2023-07-28 ASSESSMENT — PAIN DESCRIPTION - LOCATION
LOCATION: ABDOMEN
LOCATION: ABDOMEN

## 2023-07-28 NOTE — CARE COORDINATION
This LSW met with patient at bedside this afternoon. Patient and I discussed discharge plans. Patient plans to return home with her son and daughter in law. Patient denies home going needs at this time. MONICAW /MARIEL TO FOLLOW.   Electronically signed by MIYA Villafana, PRESTON on 7/28/23 at 1:55 PM EDT

## 2023-07-29 LAB
ALBUMIN SERPL-MCNC: 3.4 G/DL (ref 3.5–4.6)
ALP SERPL-CCNC: 87 U/L (ref 40–130)
ALT SERPL-CCNC: 11 U/L (ref 0–33)
ANION GAP SERPL CALCULATED.3IONS-SCNC: 10 MEQ/L (ref 9–15)
AST SERPL-CCNC: 25 U/L (ref 0–35)
BASOPHILS # BLD: 0 K/UL (ref 0–0.2)
BASOPHILS NFR BLD: 0.2 %
BILIRUB SERPL-MCNC: 0.5 MG/DL (ref 0.2–0.7)
BUN SERPL-MCNC: 20 MG/DL (ref 8–23)
CALCIUM SERPL-MCNC: 8.6 MG/DL (ref 8.5–9.9)
CHLORIDE SERPL-SCNC: 105 MEQ/L (ref 95–107)
CO2 SERPL-SCNC: 22 MEQ/L (ref 20–31)
CREAT SERPL-MCNC: 0.62 MG/DL (ref 0.5–0.9)
EOSINOPHIL # BLD: 0 K/UL (ref 0–0.7)
EOSINOPHIL NFR BLD: 0.4 %
ERYTHROCYTE [DISTWIDTH] IN BLOOD BY AUTOMATED COUNT: 14.7 % (ref 11.5–14.5)
GLOBULIN SER CALC-MCNC: 2.6 G/DL (ref 2.3–3.5)
GLUCOSE SERPL-MCNC: 113 MG/DL (ref 70–99)
HCT VFR BLD AUTO: 35.4 % (ref 37–47)
HGB BLD-MCNC: 11.8 G/DL (ref 12–16)
LYMPHOCYTES # BLD: 1.3 K/UL (ref 1–4.8)
LYMPHOCYTES NFR BLD: 11.8 %
MCH RBC QN AUTO: 31.5 PG (ref 27–31.3)
MCHC RBC AUTO-ENTMCNC: 33.3 % (ref 33–37)
MCV RBC AUTO: 94.6 FL (ref 79.4–94.8)
MONOCYTES # BLD: 1.1 K/UL (ref 0.2–0.8)
MONOCYTES NFR BLD: 10.5 %
NEUTROPHILS # BLD: 8.2 K/UL (ref 1.4–6.5)
NEUTS SEG NFR BLD: 77.1 %
PLATELET # BLD AUTO: 231 K/UL (ref 130–400)
POTASSIUM SERPL-SCNC: 3.7 MEQ/L (ref 3.4–4.9)
PROT SERPL-MCNC: 6 G/DL (ref 6.3–8)
RBC # BLD AUTO: 3.74 M/UL (ref 4.2–5.4)
SODIUM SERPL-SCNC: 137 MEQ/L (ref 135–144)
WBC # BLD AUTO: 10.6 K/UL (ref 4.8–10.8)

## 2023-07-29 PROCEDURE — 99024 POSTOP FOLLOW-UP VISIT: CPT | Performed by: SURGERY

## 2023-07-29 PROCEDURE — 97162 PT EVAL MOD COMPLEX 30 MIN: CPT

## 2023-07-29 PROCEDURE — 85025 COMPLETE CBC W/AUTO DIFF WBC: CPT

## 2023-07-29 PROCEDURE — 80053 COMPREHEN METABOLIC PANEL: CPT

## 2023-07-29 PROCEDURE — 6370000000 HC RX 637 (ALT 250 FOR IP): Performed by: COLON & RECTAL SURGERY

## 2023-07-29 PROCEDURE — 2580000003 HC RX 258: Performed by: ANESTHESIOLOGY

## 2023-07-29 PROCEDURE — 2580000003 HC RX 258: Performed by: COLON & RECTAL SURGERY

## 2023-07-29 PROCEDURE — 36415 COLL VENOUS BLD VENIPUNCTURE: CPT

## 2023-07-29 PROCEDURE — 1210000000 HC MED SURG R&B

## 2023-07-29 PROCEDURE — 6360000002 HC RX W HCPCS: Performed by: COLON & RECTAL SURGERY

## 2023-07-29 RX ADMIN — ONDANSETRON 4 MG: 2 INJECTION INTRAMUSCULAR; INTRAVENOUS at 05:38

## 2023-07-29 RX ADMIN — Medication 10 ML: at 11:35

## 2023-07-29 RX ADMIN — ENOXAPARIN SODIUM 40 MG: 100 INJECTION SUBCUTANEOUS at 10:52

## 2023-07-29 RX ADMIN — HYDROMORPHONE HYDROCHLORIDE 1 MG: 1 INJECTION, SOLUTION INTRAMUSCULAR; INTRAVENOUS; SUBCUTANEOUS at 05:38

## 2023-07-29 RX ADMIN — SODIUM CHLORIDE: 9 INJECTION, SOLUTION INTRAVENOUS at 14:59

## 2023-07-29 RX ADMIN — METOPROLOL SUCCINATE 25 MG: 25 TABLET, EXTENDED RELEASE ORAL at 10:51

## 2023-07-29 RX ADMIN — ONDANSETRON 4 MG: 2 INJECTION INTRAMUSCULAR; INTRAVENOUS at 10:54

## 2023-07-29 RX ADMIN — HYDROMORPHONE HYDROCHLORIDE 0.5 MG: 1 INJECTION, SOLUTION INTRAMUSCULAR; INTRAVENOUS; SUBCUTANEOUS at 18:59

## 2023-07-29 RX ADMIN — HYDROMORPHONE HYDROCHLORIDE 1 MG: 1 INJECTION, SOLUTION INTRAMUSCULAR; INTRAVENOUS; SUBCUTANEOUS at 11:33

## 2023-07-29 ASSESSMENT — PAIN SCALES - GENERAL
PAINLEVEL_OUTOF10: 2
PAINLEVEL_OUTOF10: 7
PAINLEVEL_OUTOF10: 7
PAINLEVEL_OUTOF10: 2

## 2023-07-29 ASSESSMENT — PAIN DESCRIPTION - ORIENTATION: ORIENTATION: MID

## 2023-07-29 ASSESSMENT — PAIN DESCRIPTION - LOCATION
LOCATION: ABDOMEN

## 2023-07-29 ASSESSMENT — PAIN DESCRIPTION - DESCRIPTORS: DESCRIPTORS: CRAMPING

## 2023-07-29 NOTE — PLAN OF CARE
Problem: Discharge Planning  Goal: Discharge to home or other facility with appropriate resources  7/29/2023 0055 by Kirsten Christianson RN  Outcome: Progressing  Flowsheets (Taken 7/28/2023 2200)  Discharge to home or other facility with appropriate resources:   Identify barriers to discharge with patient and caregiver   Arrange for needed discharge resources and transportation as appropriate   Identify discharge learning needs (meds, wound care, etc)   Refer to discharge planning if patient needs post-hospital services based on physician order or complex needs related to functional status, cognitive ability or social support system  7/28/2023 1107 by Courtney Stevenson RN  Outcome: Progressing  Flowsheets (Taken 7/28/2023 0902)  Discharge to home or other facility with appropriate resources: Identify barriers to discharge with patient and caregiver     Problem: Chronic Conditions and Co-morbidities  Goal: Patient's chronic conditions and co-morbidity symptoms are monitored and maintained or improved  7/29/2023 0055 by Kirsten Christianson RN  Outcome: Progressing  Flowsheets (Taken 7/28/2023 2200)  Care Plan - Patient's Chronic Conditions and Co-Morbidity Symptoms are Monitored and Maintained or Improved:   Monitor and assess patient's chronic conditions and comorbid symptoms for stability, deterioration, or improvement   Collaborate with multidisciplinary team to address chronic and comorbid conditions and prevent exacerbation or deterioration   Update acute care plan with appropriate goals if chronic or comorbid symptoms are exacerbated and prevent overall improvement and discharge  7/28/2023 1107 by Courtney Stevenson RN  Outcome: Progressing     Problem: Pain  Goal: Verbalizes/displays adequate comfort level or baseline comfort level  7/29/2023 0055 by Kirsten Christianson RN  Outcome: Progressing  7/28/2023 1107 by Courtney Stevenson RN  Outcome: Progressing     Problem: Safety - Adult  Goal: Free from fall no known precautions/limitations

## 2023-07-30 LAB
ALBUMIN SERPL-MCNC: 3.3 G/DL (ref 3.5–4.6)
ALP SERPL-CCNC: 90 U/L (ref 40–130)
ALT SERPL-CCNC: 13 U/L (ref 0–33)
ANION GAP SERPL CALCULATED.3IONS-SCNC: 12 MEQ/L (ref 9–15)
AST SERPL-CCNC: 24 U/L (ref 0–35)
BASOPHILS # BLD: 0 K/UL (ref 0–0.2)
BASOPHILS NFR BLD: 0.2 %
BILIRUB SERPL-MCNC: 0.8 MG/DL (ref 0.2–0.7)
BUN SERPL-MCNC: 17 MG/DL (ref 8–23)
CALCIUM SERPL-MCNC: 8.6 MG/DL (ref 8.5–9.9)
CHLORIDE SERPL-SCNC: 105 MEQ/L (ref 95–107)
CO2 SERPL-SCNC: 24 MEQ/L (ref 20–31)
CREAT SERPL-MCNC: 0.63 MG/DL (ref 0.5–0.9)
EOSINOPHIL # BLD: 0 K/UL (ref 0–0.7)
EOSINOPHIL NFR BLD: 0.3 %
ERYTHROCYTE [DISTWIDTH] IN BLOOD BY AUTOMATED COUNT: 14.5 % (ref 11.5–14.5)
GLOBULIN SER CALC-MCNC: 2.5 G/DL (ref 2.3–3.5)
GLUCOSE SERPL-MCNC: 110 MG/DL (ref 70–99)
HCT VFR BLD AUTO: 33.4 % (ref 37–47)
HGB BLD-MCNC: 11.3 G/DL (ref 12–16)
LYMPHOCYTES # BLD: 1.3 K/UL (ref 1–4.8)
LYMPHOCYTES NFR BLD: 17.6 %
MCH RBC QN AUTO: 31.9 PG (ref 27–31.3)
MCHC RBC AUTO-ENTMCNC: 33.9 % (ref 33–37)
MCV RBC AUTO: 94.1 FL (ref 79.4–94.8)
MONOCYTES # BLD: 0.8 K/UL (ref 0.2–0.8)
MONOCYTES NFR BLD: 11 %
NEUTROPHILS # BLD: 5.1 K/UL (ref 1.4–6.5)
NEUTS SEG NFR BLD: 70.9 %
PLATELET # BLD AUTO: 237 K/UL (ref 130–400)
POTASSIUM SERPL-SCNC: 3.5 MEQ/L (ref 3.4–4.9)
PROT SERPL-MCNC: 5.8 G/DL (ref 6.3–8)
RBC # BLD AUTO: 3.55 M/UL (ref 4.2–5.4)
SODIUM SERPL-SCNC: 141 MEQ/L (ref 135–144)
WBC # BLD AUTO: 7.2 K/UL (ref 4.8–10.8)

## 2023-07-30 PROCEDURE — 85025 COMPLETE CBC W/AUTO DIFF WBC: CPT

## 2023-07-30 PROCEDURE — 97166 OT EVAL MOD COMPLEX 45 MIN: CPT

## 2023-07-30 PROCEDURE — 80053 COMPREHEN METABOLIC PANEL: CPT

## 2023-07-30 PROCEDURE — 99024 POSTOP FOLLOW-UP VISIT: CPT | Performed by: SURGERY

## 2023-07-30 PROCEDURE — 1210000000 HC MED SURG R&B

## 2023-07-30 PROCEDURE — 2580000003 HC RX 258: Performed by: ANESTHESIOLOGY

## 2023-07-30 PROCEDURE — 6360000002 HC RX W HCPCS: Performed by: COLON & RECTAL SURGERY

## 2023-07-30 PROCEDURE — 36415 COLL VENOUS BLD VENIPUNCTURE: CPT

## 2023-07-30 PROCEDURE — 6370000000 HC RX 637 (ALT 250 FOR IP): Performed by: COLON & RECTAL SURGERY

## 2023-07-30 PROCEDURE — 2580000003 HC RX 258: Performed by: COLON & RECTAL SURGERY

## 2023-07-30 PROCEDURE — 97162 PT EVAL MOD COMPLEX 30 MIN: CPT

## 2023-07-30 RX ORDER — ACETAMINOPHEN 325 MG/1
650 TABLET ORAL EVERY 4 HOURS PRN
Status: DISCONTINUED | OUTPATIENT
Start: 2023-07-30 | End: 2023-08-01 | Stop reason: HOSPADM

## 2023-07-30 RX ADMIN — HYDROMORPHONE HYDROCHLORIDE 0.5 MG: 1 INJECTION, SOLUTION INTRAMUSCULAR; INTRAVENOUS; SUBCUTANEOUS at 12:07

## 2023-07-30 RX ADMIN — Medication 10 ML: at 21:48

## 2023-07-30 RX ADMIN — Medication 10 ML: at 21:49

## 2023-07-30 RX ADMIN — SODIUM CHLORIDE: 9 INJECTION, SOLUTION INTRAVENOUS at 12:54

## 2023-07-30 RX ADMIN — ENOXAPARIN SODIUM 40 MG: 100 INJECTION SUBCUTANEOUS at 12:47

## 2023-07-30 RX ADMIN — METOPROLOL SUCCINATE 25 MG: 25 TABLET, EXTENDED RELEASE ORAL at 12:45

## 2023-07-30 RX ADMIN — HYDROMORPHONE HYDROCHLORIDE 0.5 MG: 1 INJECTION, SOLUTION INTRAMUSCULAR; INTRAVENOUS; SUBCUTANEOUS at 04:37

## 2023-07-30 RX ADMIN — HYDROMORPHONE HYDROCHLORIDE 0.5 MG: 1 INJECTION, SOLUTION INTRAMUSCULAR; INTRAVENOUS; SUBCUTANEOUS at 15:51

## 2023-07-30 RX ADMIN — HYDROMORPHONE HYDROCHLORIDE 0.5 MG: 1 INJECTION, SOLUTION INTRAMUSCULAR; INTRAVENOUS; SUBCUTANEOUS at 08:36

## 2023-07-30 ASSESSMENT — PAIN DESCRIPTION - DESCRIPTORS: DESCRIPTORS: ACHING;THROBBING

## 2023-07-30 ASSESSMENT — PAIN SCALES - GENERAL
PAINLEVEL_OUTOF10: 4
PAINLEVEL_OUTOF10: 4
PAINLEVEL_OUTOF10: 6
PAINLEVEL_OUTOF10: 5

## 2023-07-30 ASSESSMENT — PAIN DESCRIPTION - LOCATION: LOCATION: ABDOMEN

## 2023-07-30 ASSESSMENT — PAIN SCALES - WONG BAKER: WONGBAKER_NUMERICALRESPONSE: 0

## 2023-07-30 NOTE — FLOWSHEET NOTE
Patient is resting in bed,her breathing is unlabored,iv site intact,with iv fluid infusing via pump.    4:20 patient had a large watery brown stool,a complete bed linens changed and partial bath was provided. she displayed facial grimacing when turned and repositioned in bed,the followed with hiccups. 04:37 medicated her with dilaudid 0.5 mg Iv push for abdominal pain rated 4/10  whereby ten is the worst pain there is.    05;00 patient is resting in bed without respiratory distress. she expressed that her discomfort was relieved. Her call light is within her easy reach.

## 2023-07-31 LAB
ALBUMIN SERPL-MCNC: 2.8 G/DL (ref 3.5–4.6)
ALP SERPL-CCNC: 80 U/L (ref 40–130)
ALT SERPL-CCNC: 9 U/L (ref 0–33)
ANION GAP SERPL CALCULATED.3IONS-SCNC: 10 MEQ/L (ref 9–15)
AST SERPL-CCNC: 16 U/L (ref 0–35)
BASOPHILS # BLD: 0 K/UL (ref 0–0.2)
BASOPHILS NFR BLD: 0.3 %
BILIRUB SERPL-MCNC: 0.7 MG/DL (ref 0.2–0.7)
BUN SERPL-MCNC: 15 MG/DL (ref 8–23)
CALCIUM SERPL-MCNC: 8.1 MG/DL (ref 8.5–9.9)
CHLORIDE SERPL-SCNC: 105 MEQ/L (ref 95–107)
CO2 SERPL-SCNC: 22 MEQ/L (ref 20–31)
CREAT SERPL-MCNC: 0.54 MG/DL (ref 0.5–0.9)
EOSINOPHIL # BLD: 0 K/UL (ref 0–0.7)
EOSINOPHIL NFR BLD: 0.8 %
ERYTHROCYTE [DISTWIDTH] IN BLOOD BY AUTOMATED COUNT: 14.2 % (ref 11.5–14.5)
GLOBULIN SER CALC-MCNC: 2.3 G/DL (ref 2.3–3.5)
GLUCOSE SERPL-MCNC: 97 MG/DL (ref 70–99)
HCT VFR BLD AUTO: 30.8 % (ref 37–47)
HGB BLD-MCNC: 10.5 G/DL (ref 12–16)
LYMPHOCYTES # BLD: 1 K/UL (ref 1–4.8)
LYMPHOCYTES NFR BLD: 16.4 %
MCH RBC QN AUTO: 32.2 PG (ref 27–31.3)
MCHC RBC AUTO-ENTMCNC: 34 % (ref 33–37)
MCV RBC AUTO: 94.7 FL (ref 79.4–94.8)
MONOCYTES # BLD: 0.7 K/UL (ref 0.2–0.8)
MONOCYTES NFR BLD: 11.1 %
NEUTROPHILS # BLD: 4.5 K/UL (ref 1.4–6.5)
NEUTS SEG NFR BLD: 71.4 %
PLATELET # BLD AUTO: 220 K/UL (ref 130–400)
POTASSIUM SERPL-SCNC: 2.6 MEQ/L (ref 3.4–4.9)
PROT SERPL-MCNC: 5.1 G/DL (ref 6.3–8)
RBC # BLD AUTO: 3.25 M/UL (ref 4.2–5.4)
SODIUM SERPL-SCNC: 137 MEQ/L (ref 135–144)
WBC # BLD AUTO: 6.3 K/UL (ref 4.8–10.8)

## 2023-07-31 PROCEDURE — 6360000002 HC RX W HCPCS: Performed by: COLON & RECTAL SURGERY

## 2023-07-31 PROCEDURE — 6370000000 HC RX 637 (ALT 250 FOR IP): Performed by: COLON & RECTAL SURGERY

## 2023-07-31 PROCEDURE — 6370000000 HC RX 637 (ALT 250 FOR IP): Performed by: SURGERY

## 2023-07-31 PROCEDURE — 80053 COMPREHEN METABOLIC PANEL: CPT

## 2023-07-31 PROCEDURE — 1210000000 HC MED SURG R&B

## 2023-07-31 PROCEDURE — 99024 POSTOP FOLLOW-UP VISIT: CPT | Performed by: COLON & RECTAL SURGERY

## 2023-07-31 PROCEDURE — 85025 COMPLETE CBC W/AUTO DIFF WBC: CPT

## 2023-07-31 PROCEDURE — 97535 SELF CARE MNGMENT TRAINING: CPT

## 2023-07-31 PROCEDURE — 2580000003 HC RX 258: Performed by: COLON & RECTAL SURGERY

## 2023-07-31 PROCEDURE — 36415 COLL VENOUS BLD VENIPUNCTURE: CPT

## 2023-07-31 PROCEDURE — 2580000003 HC RX 258: Performed by: ANESTHESIOLOGY

## 2023-07-31 RX ORDER — ONDANSETRON 4 MG/1
4 TABLET, ORALLY DISINTEGRATING ORAL EVERY 8 HOURS PRN
Status: DISCONTINUED | OUTPATIENT
Start: 2023-07-31 | End: 2023-08-01 | Stop reason: HOSPADM

## 2023-07-31 RX ORDER — POTASSIUM CHLORIDE 7.45 MG/ML
10 INJECTION INTRAVENOUS PRN
Status: DISCONTINUED | OUTPATIENT
Start: 2023-07-31 | End: 2023-08-01 | Stop reason: HOSPADM

## 2023-07-31 RX ORDER — ONDANSETRON 2 MG/ML
4 INJECTION INTRAMUSCULAR; INTRAVENOUS EVERY 6 HOURS PRN
Status: DISCONTINUED | OUTPATIENT
Start: 2023-07-31 | End: 2023-08-01 | Stop reason: HOSPADM

## 2023-07-31 RX ORDER — POTASSIUM CHLORIDE 7.45 MG/ML
10 INJECTION INTRAVENOUS PRN
Status: DISCONTINUED | OUTPATIENT
Start: 2023-07-31 | End: 2023-07-31 | Stop reason: SDUPTHER

## 2023-07-31 RX ADMIN — POTASSIUM CHLORIDE 10 MEQ: 7.46 INJECTION, SOLUTION INTRAVENOUS at 12:17

## 2023-07-31 RX ADMIN — Medication 10 ML: at 21:52

## 2023-07-31 RX ADMIN — POTASSIUM CHLORIDE 10 MEQ: 7.46 INJECTION, SOLUTION INTRAVENOUS at 09:50

## 2023-07-31 RX ADMIN — ACETAMINOPHEN 650 MG: 325 TABLET ORAL at 09:15

## 2023-07-31 RX ADMIN — POTASSIUM CHLORIDE 10 MEQ: 7.46 INJECTION, SOLUTION INTRAVENOUS at 11:06

## 2023-07-31 RX ADMIN — POTASSIUM CHLORIDE 10 MEQ: 7.46 INJECTION, SOLUTION INTRAVENOUS at 13:22

## 2023-07-31 RX ADMIN — Medication 10 ML: at 10:13

## 2023-07-31 RX ADMIN — POTASSIUM CHLORIDE 10 MEQ: 7.46 INJECTION, SOLUTION INTRAVENOUS at 14:39

## 2023-07-31 RX ADMIN — METOPROLOL SUCCINATE 25 MG: 25 TABLET, EXTENDED RELEASE ORAL at 09:01

## 2023-07-31 RX ADMIN — ENOXAPARIN SODIUM 40 MG: 100 INJECTION SUBCUTANEOUS at 09:01

## 2023-07-31 RX ADMIN — POTASSIUM CHLORIDE 10 MEQ: 7.46 INJECTION, SOLUTION INTRAVENOUS at 16:17

## 2023-07-31 RX ADMIN — ONDANSETRON 4 MG: 4 TABLET, ORALLY DISINTEGRATING ORAL at 21:46

## 2023-07-31 RX ADMIN — ACETAMINOPHEN 650 MG: 325 TABLET ORAL at 14:41

## 2023-07-31 RX ADMIN — SODIUM CHLORIDE: 9 INJECTION, SOLUTION INTRAVENOUS at 08:59

## 2023-07-31 RX ADMIN — ACETAMINOPHEN 650 MG: 325 TABLET ORAL at 21:47

## 2023-07-31 RX ADMIN — ACETAMINOPHEN 650 MG: 325 TABLET ORAL at 02:34

## 2023-07-31 RX ADMIN — Medication 10 ML: at 21:47

## 2023-07-31 ASSESSMENT — PAIN DESCRIPTION - DESCRIPTORS
DESCRIPTORS: ACHING
DESCRIPTORS: SHARP
DESCRIPTORS: ACHING

## 2023-07-31 ASSESSMENT — PAIN DESCRIPTION - ORIENTATION
ORIENTATION: INNER
ORIENTATION: MID
ORIENTATION: INNER
ORIENTATION: INNER
ORIENTATION: MID

## 2023-07-31 ASSESSMENT — PAIN SCALES - GENERAL
PAINLEVEL_OUTOF10: 5
PAINLEVEL_OUTOF10: 4
PAINLEVEL_OUTOF10: 5

## 2023-07-31 ASSESSMENT — PAIN DESCRIPTION - PAIN TYPE: TYPE: SURGICAL PAIN

## 2023-07-31 ASSESSMENT — PAIN DESCRIPTION - LOCATION
LOCATION: ABDOMEN

## 2023-07-31 NOTE — CARE COORDINATION
Met with patient to discuss dc needs. Patient requesting home with [facility] when medically stable. Will need order.  Spoke with Vu Reinoso to inform of request.

## 2023-08-01 VITALS
WEIGHT: 207 LBS | SYSTOLIC BLOOD PRESSURE: 132 MMHG | HEIGHT: 59 IN | OXYGEN SATURATION: 97 % | TEMPERATURE: 98.2 F | HEART RATE: 96 BPM | DIASTOLIC BLOOD PRESSURE: 51 MMHG | RESPIRATION RATE: 18 BRPM | BODY MASS INDEX: 41.73 KG/M2

## 2023-08-01 LAB
ANION GAP SERPL CALCULATED.3IONS-SCNC: 14 MEQ/L (ref 9–15)
BUN SERPL-MCNC: 9 MG/DL (ref 8–23)
CALCIUM SERPL-MCNC: 8.6 MG/DL (ref 8.5–9.9)
CHLORIDE SERPL-SCNC: 98 MEQ/L (ref 95–107)
CO2 SERPL-SCNC: 23 MEQ/L (ref 20–31)
CREAT SERPL-MCNC: 0.41 MG/DL (ref 0.5–0.9)
GLUCOSE SERPL-MCNC: 88 MG/DL (ref 70–99)
MAGNESIUM SERPL-MCNC: 2 MG/DL (ref 1.7–2.4)
POTASSIUM SERPL-SCNC: 2.5 MEQ/L (ref 3.4–4.9)
POTASSIUM SERPL-SCNC: 3.2 MEQ/L (ref 3.4–4.9)
POTASSIUM SERPL-SCNC: 3.5 MEQ/L (ref 3.4–4.9)
REASON FOR REJECTION: NORMAL
REJECTED TEST: NORMAL
SODIUM SERPL-SCNC: 135 MEQ/L (ref 135–144)

## 2023-08-01 PROCEDURE — 83735 ASSAY OF MAGNESIUM: CPT

## 2023-08-01 PROCEDURE — 80048 BASIC METABOLIC PNL TOTAL CA: CPT

## 2023-08-01 PROCEDURE — 6370000000 HC RX 637 (ALT 250 FOR IP): Performed by: COLON & RECTAL SURGERY

## 2023-08-01 PROCEDURE — 6370000000 HC RX 637 (ALT 250 FOR IP): Performed by: SURGERY

## 2023-08-01 PROCEDURE — 99024 POSTOP FOLLOW-UP VISIT: CPT | Performed by: COLON & RECTAL SURGERY

## 2023-08-01 PROCEDURE — 36415 COLL VENOUS BLD VENIPUNCTURE: CPT

## 2023-08-01 PROCEDURE — 6360000002 HC RX W HCPCS: Performed by: COLON & RECTAL SURGERY

## 2023-08-01 PROCEDURE — 2580000003 HC RX 258: Performed by: ANESTHESIOLOGY

## 2023-08-01 PROCEDURE — 84132 ASSAY OF SERUM POTASSIUM: CPT

## 2023-08-01 RX ORDER — POTASSIUM CHLORIDE 750 MG/1
40 TABLET, FILM COATED, EXTENDED RELEASE ORAL ONCE
Status: COMPLETED | OUTPATIENT
Start: 2023-08-01 | End: 2023-08-01

## 2023-08-01 RX ORDER — POTASSIUM CHLORIDE 20 MEQ/1
20 TABLET, EXTENDED RELEASE ORAL DAILY
Qty: 10 TABLET | Refills: 0 | Status: SHIPPED | OUTPATIENT
Start: 2023-08-01 | End: 2023-08-11

## 2023-08-01 RX ORDER — POTASSIUM CHLORIDE 7.45 MG/ML
10 INJECTION INTRAVENOUS
Status: DISCONTINUED | OUTPATIENT
Start: 2023-08-01 | End: 2023-08-01

## 2023-08-01 RX ORDER — POTASSIUM CHLORIDE 20 MEQ/1
20 TABLET, EXTENDED RELEASE ORAL ONCE
Status: COMPLETED | OUTPATIENT
Start: 2023-08-01 | End: 2023-08-01

## 2023-08-01 RX ORDER — OXYCODONE HYDROCHLORIDE AND ACETAMINOPHEN 5; 325 MG/1; MG/1
1 TABLET ORAL EVERY 6 HOURS PRN
Qty: 10 TABLET | Refills: 0 | Status: SHIPPED | OUTPATIENT
Start: 2023-08-01 | End: 2023-08-04

## 2023-08-01 RX ADMIN — METOPROLOL SUCCINATE 25 MG: 25 TABLET, EXTENDED RELEASE ORAL at 08:12

## 2023-08-01 RX ADMIN — POTASSIUM CHLORIDE 10 MEQ: 7.46 INJECTION, SOLUTION INTRAVENOUS at 03:44

## 2023-08-01 RX ADMIN — ENOXAPARIN SODIUM 40 MG: 100 INJECTION SUBCUTANEOUS at 08:12

## 2023-08-01 RX ADMIN — SODIUM CHLORIDE: 9 INJECTION, SOLUTION INTRAVENOUS at 03:39

## 2023-08-01 RX ADMIN — POTASSIUM CHLORIDE 10 MEQ: 7.46 INJECTION, SOLUTION INTRAVENOUS at 09:45

## 2023-08-01 RX ADMIN — POTASSIUM CHLORIDE 40 MEQ: 750 TABLET, FILM COATED, EXTENDED RELEASE ORAL at 03:45

## 2023-08-01 RX ADMIN — POTASSIUM CHLORIDE 20 MEQ: 1500 TABLET, EXTENDED RELEASE ORAL at 06:25

## 2023-08-01 RX ADMIN — POTASSIUM CHLORIDE 10 MEQ: 7.46 INJECTION, SOLUTION INTRAVENOUS at 08:11

## 2023-08-01 RX ADMIN — POTASSIUM CHLORIDE 10 MEQ: 7.46 INJECTION, SOLUTION INTRAVENOUS at 06:25

## 2023-08-01 RX ADMIN — POTASSIUM CHLORIDE 10 MEQ: 7.46 INJECTION, SOLUTION INTRAVENOUS at 10:50

## 2023-08-01 RX ADMIN — POTASSIUM CHLORIDE 10 MEQ: 7.46 INJECTION, SOLUTION INTRAVENOUS at 05:18

## 2023-08-01 RX ADMIN — ACETAMINOPHEN 650 MG: 325 TABLET ORAL at 08:12

## 2023-08-01 ASSESSMENT — PAIN SCALES - GENERAL
PAINLEVEL_OUTOF10: 4
PAINLEVEL_OUTOF10: 4

## 2023-08-01 ASSESSMENT — PAIN DESCRIPTION - DESCRIPTORS
DESCRIPTORS: BURNING
DESCRIPTORS: BURNING

## 2023-08-01 ASSESSMENT — PAIN DESCRIPTION - ORIENTATION: ORIENTATION: LEFT;MID

## 2023-08-01 ASSESSMENT — PAIN DESCRIPTION - PAIN TYPE: TYPE: SURGICAL PAIN

## 2023-08-01 ASSESSMENT — PAIN DESCRIPTION - FREQUENCY: FREQUENCY: INTERMITTENT

## 2023-08-01 ASSESSMENT — PAIN DESCRIPTION - LOCATION
LOCATION: ABDOMEN
LOCATION: ABDOMEN

## 2023-08-01 NOTE — DISCHARGE SUMMARY
Physician Discharge Summary     Patient ID:  Alvarez Link  [id number]  53 y.o.  1950    Admit date: 7/26/2023    Discharge date and time: 8/1/2023    Admitting Physician: Brandie Reid MD     Discharge Physician: Same    Admission Diagnoses: Ileostomy dysfunction [facility]) [K94.13]    Discharge Diagnoses: Same    Admission Condition: good    Discharged Condition: good    Indication for Admission: Dysfunctional ileostomy    Hospital Course: Patient was taken to the operating room on 7/26/2023 for an ileostomy reversal, the details of which can be found in the operative report. Patient was taken to the floor postoperatively. Adequate pain control was given. For the first 48 hours patient had postoperative nausea consistent with ileus. Following this her bowel function returned. She was started on clear liquids and advance to a low fiber diet prior to discharge. Her medications were restarted. She was given DVT prophylaxis. Physical therapy was offered for assistance with deconditioning. On postoperative day #4 and 5 her potassium was supplemented. On postop day #6, she was tolerating a diet and her pain was well controlled. Her bowels were working. Her Penrose drain was removed. Discharge instructions were given regarding activity, medication, follow-up and wound care. Her potassium normalized prior to discharge. Follow-up appointments made. All questions answered. Patient pleased with her postoperative course. Consults: none    Significant Diagnostic Studies: labs: CBC, BMP    Treatments: IV hydration and surgery: Ileostomy reversal    Discharge Exam:  See exam dated 8/1/2023    Disposition: home    In process/preliminary results:  Outstanding Order Results       No orders found from 6/27/2023 to 7/27/2023.             Patient Instructions:   Current Discharge Medication List        START taking these medications    Details   oxyCODONE-acetaminophen (PERCOCET) 5-325 MG

## 2023-08-01 NOTE — CARE COORDINATION
Met with patient to discuss dc planning. Patient states she is going to return home at dc. Discussed recommendations for snf. Patient declines and states she will return home with family.  Encouraged patient to get oob and also work with PT/OT

## 2023-08-01 NOTE — DISCHARGE INSTRUCTIONS
May shower    Potassium pill for the next 10 days electronically sent to pharmacy. Pain medication electronically sent to pharmacy    No lifting greater than 10 pounds for the next 2 weeks.   Abdominal binder as desired    No driving while on pain medication

## 2023-08-02 ENCOUNTER — TELEPHONE (OUTPATIENT)
Dept: FAMILY MEDICINE CLINIC | Age: 73
End: 2023-08-02

## 2023-08-02 NOTE — DISCHARGE INSTR - COC
{P DME JKOI:536759135}  Feeding  {[facility] DME HJTW:222686490}  Med Admin  {[facility] DME ENN}  Med Delivery   {[facility] MED Delivery:682762543}    Wound Care Documentation and Therapy:  Incision 23 Abdomen Medial;Upper (Active)   Dressing Status Clean;Dry; Intact 23 08   Dressing/Treatment ABD pad 23 08   Closure Staples 23 08   Margins Approximated 23 08   Incision Assessment Dry 23   Drainage Amount Other (Comment) 23   Drainage Description Other (Comment) 23   Odor None 23   Number of days: 7        Elimination:  Continence: Bowel: {YES / GD:96334}  Bladder: {YES / BZ:20312}  Urinary Catheter: {Urinary Catheter:321495095}   Colostomy/Ileostomy/Ileal Conduit: {YES / GT:85150}       Date of Last BM: ***  No intake or output data in the 24 hours ending 23 1618  No intake/output data recorded.     Safety Concerns:     [address] [place] [address] Safety Concerns:429738489}    Impairments/Disabilities:      [address] [place] [address] Impairments/Disabilities:612749650}    Nutrition Therapy:  Current Nutrition Therapy:   [address] [place] [address] Diet List:351255306}    Routes of Feeding: {[facility] DME Other Feedings:231989911}  Liquids: {Slp liquid thickness:33880}  Daily Fluid Restriction: {[facility] DME Yes amt example:606827386}  Last Modified Barium Swallow with Video (Video Swallowing Test): {Done Not Done OUI}    Treatments at the Time of Hospital Discharge:   Respiratory Treatments: ***  Oxygen Therapy:  {Therapy; copd oxygen:80646}  Ventilator:    {[facility] Vent FVDL:856020209}    Rehab Therapies: {THERAPEUTIC INTERVENTION:4773832274}  Weight Bearing Status/Restrictions: [address] [place] [address] Weight Bearin}  Other Medical Equipment (for information only, NOT a DME order):  {EQUIPMENT:513751867}  Other Treatments: ***    Patient's personal belongings (please select all that are sent with patient):  {[facility] DME Belongings:601644639}    RN SIGNATURE:  {Esignature:601070162}    CASE

## 2023-08-02 NOTE — TELEPHONE ENCOUNTER
Care Transitions Initial Follow Up Call    Outreach made within 2 business days of discharge: Yes    Patient: Rosa Maria Wilson Patient : 1950   MRN: [id number]  Reason for Admission: There are no discharge diagnoses documented for the most recent discharge.   Discharge Date: 23       Spoke with: Pt was called, left message to call back    Discharge department/facility: [facility]      Scheduled appointment with PCP within 7-14 days    Follow Up  Future Appointments   Date Time Provider [address] [place] [address]   2023  9:30 AM DO JACQUES Gilmore [facility]   2023 11:30 AM [address] [place] [address]

## 2023-08-08 ENCOUNTER — OFFICE VISIT (OUTPATIENT)
Dept: SURGERY | Age: 73
End: 2023-08-08

## 2023-08-08 VITALS
WEIGHT: 207 LBS | HEIGHT: 59 IN | TEMPERATURE: 97.8 F | OXYGEN SATURATION: 99 % | HEART RATE: 104 BPM | BODY MASS INDEX: 41.73 KG/M2

## 2023-08-08 DIAGNOSIS — K94.13 ILEOSTOMY DYSFUNCTION (HCC): Primary | ICD-10-CM

## 2023-08-08 PROCEDURE — 99024 POSTOP FOLLOW-UP VISIT: CPT | Performed by: COLON & RECTAL SURGERY

## 2023-08-08 NOTE — PROGRESS NOTES
Subjective:      Patient ID: Tuyet Medina is a 81 y.o. female who presents for:  Chief Complaint   Patient presents with    Post-Op Check       She returns to the office almost 2 weeks from an ileostomy reversal.    She has issues with occasional loose stool and minimal pain and bleeding on defecation. She denies nausea or vomiting. She denies fever.       Past Medical History:   Diagnosis Date    Arthritis     Cancer ([address])     SKIN    DVT (deep venous thrombosis) ([address]) 12/2022    Hyperlipidemia     Pulmonary embolism ([address]) 12/2022     Past Surgical History:   Procedure Laterality Date    CATARACT REMOVAL      CHOLECYSTECTOMY      COLECTOMY N/A 10/3/2022    LOW ANTERIOR RESECTION WITH LOOP ILEOSTOMY performed by Leticia Irvin MD at [facility] N/A 9/16/2022    COLORECTAL CANCER SCREENING, NOT HIGH RISK performed by Andrei Louis MD at [facility] N/A 9/26/2022    FLEXIBLE SIGMOIDOSCOPY WITH SNARE POLYPECTOMY performed by Leticia Irvin MD at [facility] N/A 7/12/2023    Colonoscopy performed by Leticia Irvin MD at [address] N/A 11/3/2022    INFUSAPORT performed by Leticia Irvin MD at [address] Left 7/12/2023    Canales Bharathi removal performed by Leticia Irvin MD at [address] [facility] [address]      R LOWER LEG    SMALL INTESTINE SURGERY N/A 7/26/2023    ileostomy reversal performed by Leticia Irvin MD at [address] [facility] [address] History     Socioeconomic History    Marital status:      Spouse name: Not on file    Number of children: Not on file    Years of education: Not on file    Highest education level: Not on file   Occupational History    Not on file   Tobacco Use    Smoking status: Never    Smokeless tobacco: Never   Vaping Use    Vaping Use: Never used   Substance and Sexual Activity    Alcohol use: No     Comment: rare glass of wine    Drug use: No    Sexual activity: Not

## 2023-08-09 ENCOUNTER — OFFICE VISIT (OUTPATIENT)
Dept: FAMILY MEDICINE CLINIC | Age: 73
End: 2023-08-09
Payer: COMMERCIAL

## 2023-08-09 ENCOUNTER — OFFICE VISIT (OUTPATIENT)
Dept: FAMILY MEDICINE CLINIC | Age: 73
End: 2023-08-09

## 2023-08-09 VITALS
DIASTOLIC BLOOD PRESSURE: 72 MMHG | WEIGHT: 175 LBS | HEART RATE: 77 BPM | OXYGEN SATURATION: 97 % | HEIGHT: 59 IN | TEMPERATURE: 97.6 F | BODY MASS INDEX: 35.28 KG/M2 | SYSTOLIC BLOOD PRESSURE: 122 MMHG

## 2023-08-09 VITALS
OXYGEN SATURATION: 97 % | BODY MASS INDEX: 35.28 KG/M2 | TEMPERATURE: 97.6 F | HEIGHT: 59 IN | SYSTOLIC BLOOD PRESSURE: 122 MMHG | DIASTOLIC BLOOD PRESSURE: 72 MMHG | HEART RATE: 77 BPM | WEIGHT: 175 LBS

## 2023-08-09 DIAGNOSIS — Z98.890 POST-OPERATIVE STATE: ICD-10-CM

## 2023-08-09 DIAGNOSIS — I26.99 BILATERAL PULMONARY EMBOLISM (HCC): ICD-10-CM

## 2023-08-09 DIAGNOSIS — K64.9 HEMORRHOIDS, UNSPECIFIED HEMORRHOID TYPE: Primary | ICD-10-CM

## 2023-08-09 DIAGNOSIS — I50.22 CHRONIC SYSTOLIC (CONGESTIVE) HEART FAILURE (HCC): ICD-10-CM

## 2023-08-09 DIAGNOSIS — Z00.00 INITIAL MEDICARE ANNUAL WELLNESS VISIT: Primary | ICD-10-CM

## 2023-08-09 DIAGNOSIS — R53.1 WEAKNESS: ICD-10-CM

## 2023-08-09 DIAGNOSIS — C20 RECTAL CANCER (HCC): ICD-10-CM

## 2023-08-09 DIAGNOSIS — E78.2 MIXED HYPERLIPIDEMIA: ICD-10-CM

## 2023-08-09 PROCEDURE — 1123F ACP DISCUSS/DSCN MKR DOCD: CPT | Performed by: STUDENT IN AN ORGANIZED HEALTH CARE EDUCATION/TRAINING PROGRAM

## 2023-08-09 PROCEDURE — G0438 PPPS, INITIAL VISIT: HCPCS | Performed by: STUDENT IN AN ORGANIZED HEALTH CARE EDUCATION/TRAINING PROGRAM

## 2023-08-09 RX ORDER — HYDROCORTISONE ACETATE 25 MG/1
25 SUPPOSITORY RECTAL 2 TIMES DAILY
Qty: 24 SUPPOSITORY | Refills: 1 | Status: SHIPPED | OUTPATIENT
Start: 2023-08-09

## 2023-08-09 RX ORDER — ACETAMINOPHEN 500 MG
500 TABLET ORAL EVERY 6 HOURS PRN
COMMUNITY

## 2023-08-09 RX ORDER — OXYCODONE HYDROCHLORIDE AND ACETAMINOPHEN 5; 325 MG/1; MG/1
1 TABLET ORAL EVERY 4 HOURS PRN
COMMUNITY

## 2023-08-09 RX ORDER — METOPROLOL SUCCINATE 25 MG/1
25 TABLET, EXTENDED RELEASE ORAL DAILY
Qty: 90 TABLET | Refills: 1 | Status: SHIPPED | OUTPATIENT
Start: 2023-08-09

## 2023-08-09 ASSESSMENT — PATIENT HEALTH QUESTIONNAIRE - PHQ9
SUM OF ALL RESPONSES TO PHQ QUESTIONS 1-9: 0
SUM OF ALL RESPONSES TO PHQ QUESTIONS 1-9: 0
1. LITTLE INTEREST OR PLEASURE IN DOING THINGS: 0
SUM OF ALL RESPONSES TO PHQ QUESTIONS 1-9: 0
SUM OF ALL RESPONSES TO PHQ QUESTIONS 1-9: 0
SUM OF ALL RESPONSES TO PHQ9 QUESTIONS 1 & 2: 0
2. FEELING DOWN, DEPRESSED OR HOPELESS: 0

## 2023-08-09 ASSESSMENT — LIFESTYLE VARIABLES
HOW MANY STANDARD DRINKS CONTAINING ALCOHOL DO YOU HAVE ON A TYPICAL DAY: 1 OR 2
HOW OFTEN DO YOU HAVE A DRINK CONTAINING ALCOHOL: MONTHLY OR LESS

## 2023-08-09 NOTE — PROGRESS NOTES
Medicare Annual Wellness Visit    Alvarez Link is here for No chief complaint on file. Assessment & Plan   Initial Medicare annual wellness visit  Recommendations for Preventive Services Due: see orders and patient instructions/AVS.  Recommended screening schedule for the next 5-10 years is provided to the patient in written form: see Patient Instructions/AVS.     No follow-ups on file. Subjective       Patient's complete Health Risk Assessment and screening values have been reviewed and are found in Flowsheets. The following problems were reviewed today and where indicated follow up appointments were made and/or referrals ordered. Positive Risk Factor Screenings with Interventions:    Fall Risk:  Do you feel unsteady or are you worried about falling? : (!) yes  2 or more falls in past year?: no  Fall with injury in past year?: no     Interventions:    Patient declines any further evaluation or treatment             Opioid Risk: (Low risk score <55) Opioid risk score: 3    Patient is low risk for opioid use disorder or overdose. Last PDMP [place] as Reviewed:  Review User Review Instant Review Result                    Weight and Activity:  Physical Activity: Inactive    Days of Exercise per Week: 0 days    Minutes of Exercise per Session: 0 min     On average, how many days per week do you engage in moderate to strenuous exercise (like a brisk walk)?: 0 days  Have you lost any weight without trying in the past 3 months?: No  There is no height or weight on file to calculate BMI.  (!) Abnormal    Inactivity Interventions:  Patient declined any further interventions or treatment  Obesity Interventions:  Patient declines any further evaluation or treatment          Dentist Screen:  Have you seen the dentist within the past year?: (!) No    Intervention:  Not applicable - dentures    Hearing Screen:  Do you or your family notice any trouble with your hearing that hasn't been managed with hearing aids?: (!)

## 2023-08-09 NOTE — PATIENT INSTRUCTIONS
and/or copay. Some of these benefits include a comprehensive review of your medical history including lifestyle, illnesses that may run in your family, and various assessments and screenings as appropriate. After reviewing your medical record and screening and assessments performed today your provider may have ordered immunizations, labs, imaging, and/or referrals for you. A list of these orders (if applicable) as well as your Preventive Care list are included within your After Visit Summary for your review. Other Preventive Recommendations:    A preventive eye exam performed by an eye specialist is recommended every 1-2 years to screen for glaucoma; cataracts, macular degeneration, and other eye disorders. A preventive dental visit is recommended every 6 months. Try to get at least 150 minutes of exercise per week or 10,000 steps per day on a pedometer . Order or download the FREE \"Exercise & Physical Activity: Your Everyday Guide\" from [employer]. Call 1-381.570.8459 or search [employer] online. You need 8691-4511 mg of calcium and 0806-9460 IU of vitamin D per day. It is possible to meet your calcium requirement with diet alone, but a vitamin D supplement is usually necessary to meet this goal.  When exposed to the sun, use a sunscreen that protects against both UVA and UVB radiation with an SPF of 30 or greater. Reapply every 2 to 3 hours or after sweating, drying off with a towel, or swimming. Always wear a seat belt when traveling in a car. Always wear a helmet when riding a bicycle or motorcycle.

## 2023-08-09 NOTE — PROGRESS NOTES
Subjective  [facility], 81 y.o. female presents today with:  Chief Complaint   Patient presents with    Follow-up     Granddaughter, Albania Carlton is here today with patient. Hyperlipidemia     Lipid panel last done 9/28/21    Hemorrhoids     States she thinks she has hemorrhoids. States she is having 1-2 episodes of diarrhea daily & is having a burning/pain sensation in rectum area. States she does have blood on toilet paper after BM, but does not have blood in toilet or underwear. Other     Would like to see if she can get a shower chair & if possible one with a transfer bench. States she has been having to take sponge baths since her resection due to not being able to get into the tub. Patient with follow-up appointment. Patient with concern today for hemorrhoids. She did see her surgeon yesterday and he was not as concerned about this. She is having 1-2 episodes of diarrhea every day. She has a burning and pain sensation in the rectum area. She does have blood on the toilet paper, not on the bowel movement. She has not used anything over-the-counter. She did have surgery to reverse her ileostomy after having resection for rectal cancer. .  She is recovering from that. She continues to struggle with taking showers by herself. She is not able to stand for that long to take a shower. She has been doing sponge baths for the last couple months. She continues to have some weakness from this. She is no longer enrolled in therapy due to insurance. Patient also with mixed hyperlipidemia. She is on simvastatin 40 mg daily. Tolerating well. No muscle aches or pains. Patient also with chronic systolic heart failure. She is on metoprolol succinate 25 mg daily. Tolerating well. No side effects of medication. She denies any chest pain, shortness of breath, palpitations, headaches or dizziness. Patient also with history of bilateral pulmonary embolism. She is on Xarelto 20 mg daily.

## 2023-08-10 ENCOUNTER — TELEPHONE (OUTPATIENT)
Dept: FAMILY MEDICINE CLINIC | Age: 73
End: 2023-08-10

## 2023-08-10 NOTE — TELEPHONE ENCOUNTER
PT called the pharmacy want her to check to make sure RX was correct.  Is the metoprolol succinate (TOPROL XL) 25 MG extended release tablet   To replace the metorolol tarttrat

## 2023-08-11 ASSESSMENT — ENCOUNTER SYMPTOMS
WHEEZING: 0
VOMITING: 0
COUGH: 0
RHINORRHEA: 0
DIARRHEA: 0
BLOOD IN STOOL: 1
NAUSEA: 0
SHORTNESS OF BREATH: 0
ABDOMINAL PAIN: 0
EYE DISCHARGE: 0
SORE THROAT: 0

## 2023-08-11 NOTE — TELEPHONE ENCOUNTER
Yes metoprolol succinate is to replace the metoprolol tartrate.   Looks like it was changed in the hospital.  Thanks

## 2023-08-22 ENCOUNTER — OFFICE VISIT (OUTPATIENT)
Dept: SURGERY | Age: 73
End: 2023-08-22

## 2023-08-22 VITALS
HEART RATE: 100 BPM | TEMPERATURE: 97.3 F | HEIGHT: 59 IN | BODY MASS INDEX: 35.28 KG/M2 | WEIGHT: 175 LBS | OXYGEN SATURATION: 99 %

## 2023-08-22 DIAGNOSIS — K94.13 ILEOSTOMY DYSFUNCTION (HCC): Primary | ICD-10-CM

## 2023-08-22 PROCEDURE — 99024 POSTOP FOLLOW-UP VISIT: CPT | Performed by: COLON & RECTAL SURGERY

## 2023-08-22 NOTE — PROGRESS NOTES
Subjective:      Patient ID: Rosa Maria Wilson is a 81 y.o. female who presents for:  Chief Complaint   Patient presents with    Post-Op Check       She returns to the office about 1 month out from an ileostomy reversal.    She is doing well. Her bowels are starting to work more normal.    She denies fever. Still complaining of some incisional pain at the previous ileostomy site. Past Medical History:   Diagnosis Date    Arthritis     Cancer ([address])     SKIN    DVT (deep venous thrombosis) ([address]) 12/2022    Hyperlipidemia     Pulmonary embolism ([address]) 12/2022     Past Surgical History:   Procedure Laterality Date    CATARACT REMOVAL      CHOLECYSTECTOMY      COLECTOMY N/A 10/3/2022    LOW ANTERIOR RESECTION WITH LOOP ILEOSTOMY performed by Krishan Parrish MD at [address] [place] [address] N/A 9/16/2022    COLORECTAL CANCER SCREENING, NOT HIGH RISK performed by Harvey Morrell MD at [facility] N/A 9/26/2022    FLEXIBLE SIGMOIDOSCOPY WITH SNARE POLYPECTOMY performed by Krishan Parrish MD at [address] [place] [address] N/A 7/12/2023    Colonoscopy performed by Krishan Parrish MD at [address] N/A 11/3/2022    INFUSAPORT performed by Krishan Parrish MD at [address] Left 7/12/2023    [place] removal performed by Krishan Parrish MD at [address] [facility]    SMALL INTESTINE SURGERY N/A 7/26/2023    ileostomy reversal performed by Krishan Parrish MD at [address] History     Socioeconomic History    Marital status:      Spouse name: Not on file    Number of children: Not on file    Years of education: Not on file    Highest education level: Not on file   Occupational History    Not on file   Tobacco Use    Smoking status: Never    Smokeless tobacco: Never   Vaping Use    Vaping Use: Never used   Substance and Sexual Activity    Alcohol use: No     Comment: rare glass of wine    Drug use:  No

## 2023-08-28 DIAGNOSIS — E78.2 MIXED HYPERLIPIDEMIA: ICD-10-CM

## 2023-08-28 RX ORDER — SIMVASTATIN 40 MG
TABLET ORAL
Qty: 90 TABLET | Refills: 3 | Status: SHIPPED | OUTPATIENT
Start: 2023-08-28

## 2023-08-28 NOTE — TELEPHONE ENCOUNTER
[facility] is requesting medication refill. Rx requested:  Requested Prescriptions     Pending Prescriptions Disp Refills    simvastatin (ZOCOR) 40 MG tablet 90 tablet 3     Sig: TAKE ONE TABLET BY MOUTH EVERY DAY IN THE EVENING       Last Office Visit:   8/9/2023    Last Tox screen:    ?     Last Medication contract:    ?    Next Visit Date:  Future Appointments   Date Time Provider [address]   10/9/2023  8:45 AM Monroe Woodward DO [facility]

## 2023-09-05 ENCOUNTER — TELEPHONE (OUTPATIENT)
Dept: FAMILY MEDICINE CLINIC | Age: 73
End: 2023-09-05

## 2023-09-05 NOTE — TELEPHONE ENCOUNTER
Patient calling in says that Transfer tub chair was denied.     Patient says that insurance will cover-  Standard Tub Seat-   Please send through [facility]

## 2023-09-23 DIAGNOSIS — E78.2 MIXED HYPERLIPIDEMIA: ICD-10-CM

## 2023-09-23 LAB
CHOLEST SERPL-MCNC: 182 MG/DL (ref 0–199)
HDLC SERPL-MCNC: 52 MG/DL (ref 40–59)
LDL CHOLESTEROL CALCULATED: 98 MG/DL (ref 0–129)
TRIGLYCERIDE, FASTING: 161 MG/DL (ref 0–150)

## 2023-10-09 ENCOUNTER — OFFICE VISIT (OUTPATIENT)
Dept: FAMILY MEDICINE CLINIC | Age: 73
End: 2023-10-09
Payer: COMMERCIAL

## 2023-10-09 VITALS
TEMPERATURE: 97.7 F | HEIGHT: 59 IN | BODY MASS INDEX: 33.38 KG/M2 | DIASTOLIC BLOOD PRESSURE: 74 MMHG | SYSTOLIC BLOOD PRESSURE: 120 MMHG | HEART RATE: 77 BPM | OXYGEN SATURATION: 97 % | WEIGHT: 165.6 LBS

## 2023-10-09 DIAGNOSIS — I26.02 ACUTE SADDLE PULMONARY EMBOLISM WITH ACUTE COR PULMONALE (HCC): ICD-10-CM

## 2023-10-09 DIAGNOSIS — I50.22 CHRONIC SYSTOLIC (CONGESTIVE) HEART FAILURE (HCC): ICD-10-CM

## 2023-10-09 DIAGNOSIS — K64.9 HEMORRHOIDS, UNSPECIFIED HEMORRHOID TYPE: Primary | ICD-10-CM

## 2023-10-09 DIAGNOSIS — R53.1 WEAKNESS: ICD-10-CM

## 2023-10-09 PROCEDURE — 99214 OFFICE O/P EST MOD 30 MIN: CPT | Performed by: STUDENT IN AN ORGANIZED HEALTH CARE EDUCATION/TRAINING PROGRAM

## 2023-10-09 PROCEDURE — 1123F ACP DISCUSS/DSCN MKR DOCD: CPT | Performed by: STUDENT IN AN ORGANIZED HEALTH CARE EDUCATION/TRAINING PROGRAM

## 2023-10-09 NOTE — PROGRESS NOTES
Subjective  Timothy Vuong, 72 y.o. female presents today with:  Chief Complaint   Patient presents with    Follow-up     Has no concerns at this time. Fatigue     States this has improved significantly. States she has even contacted her HR dept at work. States she is ready to go back to work part-time she feels. Hemorrhoids     States this has improved as well. Is not having any issues with this at the current time. Patient with 2-month follow-up appointment. Hemorrhoids. She states that symptoms have improved. She is not having issues with this at this time. She never ended up taking the suppositories but has them available if she needs them again. She is increasing fiber. No blood in the stool. Patient also following up on fatigue. She states this is much better. She has more energy. She is overall doing much better. It has vastly improved. She states she contacted her human resources department and is actually going to be going back to work. Patient also with chronic systolic heart failure. She is on metoprolol succinate 25 mg daily. Tolerating well. No side effects medication. Patient also with history of pulmonary embolism. She is on Xarelto daily. Doing well. No side effects from medication. She does not need a refill. No abnormal bleeding. No other concerns. Review of Systems   Constitutional:  Negative for chills, fatigue, fever and unexpected weight change. HENT:  Negative for congestion, rhinorrhea and sore throat. Eyes:  Negative for discharge. Respiratory:  Negative for cough, shortness of breath and wheezing. Cardiovascular:  Negative for chest pain, palpitations and leg swelling. Gastrointestinal:  Negative for abdominal pain, diarrhea, nausea and vomiting. Genitourinary:  Negative for difficulty urinating. Musculoskeletal:  Negative for arthralgias and joint swelling. Skin:  Negative for rash.    Neurological:  Negative for weakness,

## 2023-10-10 ASSESSMENT — ENCOUNTER SYMPTOMS
WHEEZING: 0
RHINORRHEA: 0
NAUSEA: 0
VOMITING: 0
SHORTNESS OF BREATH: 0
ABDOMINAL PAIN: 0
DIARRHEA: 0
SORE THROAT: 0
COUGH: 0
EYE DISCHARGE: 0

## 2023-11-22 ENCOUNTER — TRANSCRIBE ORDERS (OUTPATIENT)
Dept: ADMINISTRATIVE | Age: 73
End: 2023-11-22

## 2023-11-22 DIAGNOSIS — C19 MALIGNANT NEOPLASM OF RECTOSIGMOID JUNCTION (HCC): Primary | ICD-10-CM

## 2023-11-29 ENCOUNTER — HOSPITAL ENCOUNTER (OUTPATIENT)
Dept: CT IMAGING | Age: 73
Discharge: HOME | End: 2023-12-01
Attending: INTERNAL MEDICINE
Payer: COMMERCIAL

## 2023-11-29 DIAGNOSIS — C19 MALIGNANT NEOPLASM OF RECTOSIGMOID JUNCTION (HCC): ICD-10-CM

## 2023-11-29 PROCEDURE — 74177 CT ABD & PELVIS W/CONTRAST: CPT

## 2023-11-29 PROCEDURE — 6360000004 HC RX CONTRAST MEDICATION: Performed by: INTERNAL MEDICINE

## 2023-11-29 RX ADMIN — IOPAMIDOL 20 ML: 612 INJECTION, SOLUTION INTRAVENOUS at 13:58

## 2023-11-29 RX ADMIN — IOPAMIDOL 75 ML: 612 INJECTION, SOLUTION INTRAVENOUS at 14:01

## 2024-02-07 ENCOUNTER — TELEPHONE (OUTPATIENT)
Dept: FAMILY MEDICINE CLINIC | Age: 74
End: 2024-02-07

## 2024-02-07 NOTE — TELEPHONE ENCOUNTER
[facility] RN nurse Guillermo Sarabia called & wanted her PCP know that they are scheduling her mammogram. Pt blood pressure 160/90 Heart rate is 81. RN is requesting  blood pressure medication. RN also would like a referral for weight loss management for pt. RN said, that if you have questions or want to go over pt information to please give him a call.  [phone] Office Number  [phone] Cell phone

## 2024-02-12 ENCOUNTER — OFFICE VISIT (OUTPATIENT)
Dept: FAMILY MEDICINE CLINIC | Age: 74
End: 2024-02-12
Payer: COMMERCIAL

## 2024-02-12 VITALS
SYSTOLIC BLOOD PRESSURE: 130 MMHG | HEART RATE: 76 BPM | TEMPERATURE: 97.6 F | DIASTOLIC BLOOD PRESSURE: 84 MMHG | OXYGEN SATURATION: 97 %

## 2024-02-12 VITALS
HEART RATE: 76 BPM | HEIGHT: 59 IN | OXYGEN SATURATION: 97 % | TEMPERATURE: 97.6 F | SYSTOLIC BLOOD PRESSURE: 130 MMHG | BODY MASS INDEX: 38.18 KG/M2 | WEIGHT: 189.4 LBS | DIASTOLIC BLOOD PRESSURE: 84 MMHG

## 2024-02-12 DIAGNOSIS — I50.22 CHRONIC SYSTOLIC (CONGESTIVE) HEART FAILURE (HCC): Primary | ICD-10-CM

## 2024-02-12 DIAGNOSIS — I26.02 ACUTE SADDLE PULMONARY EMBOLISM WITH ACUTE COR PULMONALE (HCC): ICD-10-CM

## 2024-02-12 DIAGNOSIS — Z00.00 MEDICARE ANNUAL WELLNESS VISIT, SUBSEQUENT: Primary | ICD-10-CM

## 2024-02-12 DIAGNOSIS — Z12.31 SCREENING MAMMOGRAM FOR BREAST CANCER: ICD-10-CM

## 2024-02-12 DIAGNOSIS — E78.2 MIXED HYPERLIPIDEMIA: ICD-10-CM

## 2024-02-12 PROCEDURE — 99214 OFFICE O/P EST MOD 30 MIN: CPT | Performed by: STUDENT IN AN ORGANIZED HEALTH CARE EDUCATION/TRAINING PROGRAM

## 2024-02-12 PROCEDURE — G0439 PPPS, SUBSEQ VISIT: HCPCS | Performed by: STUDENT IN AN ORGANIZED HEALTH CARE EDUCATION/TRAINING PROGRAM

## 2024-02-12 PROCEDURE — 1123F ACP DISCUSS/DSCN MKR DOCD: CPT | Performed by: STUDENT IN AN ORGANIZED HEALTH CARE EDUCATION/TRAINING PROGRAM

## 2024-02-12 RX ORDER — RIVAROXABAN 20 MG/1
20 TABLET, FILM COATED ORAL
Qty: 90 TABLET | Refills: 3 | Status: SHIPPED | OUTPATIENT
Start: 2024-02-12

## 2024-02-12 RX ORDER — METOPROLOL SUCCINATE 25 MG/1
25 TABLET, EXTENDED RELEASE ORAL DAILY
Qty: 90 TABLET | Refills: 3 | Status: SHIPPED | OUTPATIENT
Start: 2024-02-12

## 2024-02-12 NOTE — PROGRESS NOTES
Social Hx reviewed and updated.  Health Maintenance reviewed.    Objective    Vitals:    02/12/24 1033   BP: 130/84   Site: Right Upper Arm   Position: Sitting   Cuff Size: Large Adult   Pulse: 76   Temp: 97.6 °F (36.4 °C)   TempSrc: Temporal   SpO2: 97%   Weight: 85.9 kg (189 lb 6.4 oz)   Height: 1.499 m (4' 11\")       Physical Exam  Vitals reviewed.   Constitutional:       General: She is not in acute distress.  HENT:      Head: Normocephalic and atraumatic.   Eyes:      Conjunctiva/sclera: Conjunctivae normal.   Neck:      Thyroid: No thyromegaly or thyroid tenderness.   Cardiovascular:      Rate and Rhythm: Normal rate and regular rhythm.      Heart sounds:      No friction rub. No gallop.   Pulmonary:      Effort: Pulmonary effort is normal.      Breath sounds: Normal breath sounds. No wheezing, rhonchi or rales.   Musculoskeletal:         General: No swelling or deformity.      Cervical back: Normal range of motion and neck supple.      Right lower leg: No edema.      Left lower leg: No edema.   Lymphadenopathy:      Cervical: No cervical adenopathy.   Skin:     General: Skin is warm and dry.      Findings: No rash.   Neurological:      General: No focal deficit present.      Mental Status: She is alert.      Gait: Gait is intact.   Psychiatric:         Mood and Affect: Mood normal.         Behavior: Behavior normal.            Assessment & Plan       1. Chronic systolic (congestive) heart failure  Stable, chronic.  Continue metoprolol succinate 25 mg daily.  Refill sent.  Normal blood pressure in the office today.  Continue to monitor.  Follow-up as scheduled.  If symptoms worsen or change, return to care sooner.  - metoprolol succinate (TOPROL XL) 25 MG extended release tablet; Take 1 tablet by mouth daily  Dispense: 90 tablet; Refill: 3    2. Acute saddle pulmonary embolism with acute cor pulmonale (HCC)  Stable, chronic.  Continue Xarelto 20 mg daily.  Refill sent to the pharmacy.  Continue monitor.

## 2024-02-12 NOTE — PROGRESS NOTES
Medicare Annual Wellness Visit    Kay Douglas is here for Medicare AWV    Assessment & Plan   Medicare annual wellness visit, subsequent  Recommendations for Preventive Services Due: see orders and patient instructions/AVS.  Recommended screening schedule for the next 5-10 years is provided to the patient in written form: see Patient Instructions/AVS.     No follow-ups on file.     Subjective       Patient's complete Health Risk Assessment and screening values have been reviewed and are found in Flowsheets. The following problems were reviewed today and where indicated follow up appointments were made and/or referrals ordered.    Positive Risk Factor Screenings with Interventions:                Activity, Diet, and Weight:  On average, how many days per week do you engage in moderate to strenuous exercise (like a brisk walk)?: 0 days  On average, how many minutes do you engage in exercise at this level?: 0 min    Do you eat balanced/healthy meals regularly?: Yes    There is no height or weight on file to calculate BMI. (!) Abnormal      Inactivity Interventions:  Patient declined any further interventions or treatment  Obesity Interventions:  Patient declines any further evaluation or treatment            Dentist Screen:  Have you seen the dentist within the past year?: (!) No    Intervention:  Advised to schedule with their dentist     Vision Screen:  Do you have difficulty driving, watching TV, or doing any of your daily activities because of your eyesight?: No  Have you had an eye exam within the past year?: (!) No  No results found.    Interventions:   Patient encouraged to make appointment with their eye specialist      Advanced Directives:  Do you have a Living Will?: (!) No    Intervention:  has NO advanced directive - information provided                     Objective   Vitals:    02/12/24 1047   BP: 130/84   Pulse: 76   Temp: 97.6 °F (36.4 °C)   SpO2: 97%      There is no height or weight on file to

## 2024-02-12 NOTE — PATIENT INSTRUCTIONS
Learning About Being Active as an Older Adult  Why is being active important as you get older?     Being active is one of the best things you can do for your health. And it's never too late to start. Being active--or getting active, if you aren't already--has definite benefits. It can:  Give you more energy,  Keep your mind sharp.  Improve balance to reduce your risk of falls.  Help you manage chronic illness with fewer medicines.  No matter how old you are, how fit you are, or what health problems you have, there is a form of activity that will work for you. And the more physical activity you can do, the better your overall health will be.  What kinds of activity can help you stay healthy?  Being more active will make your daily activities easier. Physical activity includes planned exercise and things you do in daily life. There are four types of activity:  Aerobic.  Doing aerobic activity makes your heart and lungs strong.  Includes walking, dancing, and gardening.  Aim for at least 2½ hours spread throughout the week.  It improves your energy and can help you sleep better.  Muscle-strengthening.  This type of activity can help maintain muscle and strengthen bones.  Includes climbing stairs, using resistance bands, and lifting or carrying heavy loads.  Aim for at least twice a week.  It can help protect the knees and other joints.  Stretching.  Stretching gives you better range of motion in joints and muscles.  Includes upper arm stretches, calf stretches, and gentle yoga.  Aim for at least twice a week, preferably after your muscles are warmed up from other activities.  It can help you function better in daily life.  Balancing.  This helps you stay coordinated and have good posture.  Includes heel-to-toe walking, mckayla chi, and certain types of yoga.  Aim for at least 3 days a week.  It can reduce your risk of falling.  Even if you have a hard time meeting the recommendations, it's better to be more active

## 2024-02-22 ENCOUNTER — TELEPHONE (OUTPATIENT)
Dept: FAMILY MEDICINE CLINIC | Age: 74
End: 2024-02-22

## 2024-02-22 ENCOUNTER — HOSPITAL ENCOUNTER (OUTPATIENT)
Dept: WOMENS IMAGING | Age: 74
Discharge: HOME | End: 2024-02-24
Payer: COMMERCIAL

## 2024-02-22 DIAGNOSIS — Z12.31 SCREENING MAMMOGRAM FOR BREAST CANCER: ICD-10-CM

## 2024-02-22 PROCEDURE — 77067 SCR MAMMO BI INCL CAD: CPT

## 2024-02-22 NOTE — TELEPHONE ENCOUNTER
Continued Stay Note  Psychiatric     Patient Name: Elvia Montero  MRN: [id number]  Today's Date: 3/11/2021    Admit Date: 2/21/2021    Discharge Plan     Row Name 03/11/21 0854       Plan    Plan  Social work was able to get an ambulance for 4:45 pm today to the [facility] as long as the [facility] will accept the patient for inpatient treatment. [facility] has the medical records that were sent to them this morning.    Row Name 03/11/21 0814       Plan    Plan  Social work called [facility] at [phone] option 2  and faxed medical records to [facility] to review today.        Discharge Codes    No documentation.       Expected Discharge Date and Time     Expected Discharge Date Expected Discharge Time    Mar 6, 2021             ZARIA Morales     Pt called back & I notified her Mammogram came back normal.

## 2024-04-10 ENCOUNTER — OFFICE VISIT (OUTPATIENT)
Dept: FAMILY MEDICINE CLINIC | Age: 74
End: 2024-04-10
Payer: COMMERCIAL

## 2024-04-10 VITALS
BODY MASS INDEX: 39.72 KG/M2 | TEMPERATURE: 97.8 F | HEART RATE: 84 BPM | WEIGHT: 197 LBS | SYSTOLIC BLOOD PRESSURE: 138 MMHG | DIASTOLIC BLOOD PRESSURE: 80 MMHG | HEIGHT: 59 IN | OXYGEN SATURATION: 95 %

## 2024-04-10 DIAGNOSIS — E78.2 MIXED HYPERLIPIDEMIA: ICD-10-CM

## 2024-04-10 DIAGNOSIS — I50.22 CHRONIC SYSTOLIC (CONGESTIVE) HEART FAILURE (HCC): Primary | ICD-10-CM

## 2024-04-10 DIAGNOSIS — I26.02 ACUTE SADDLE PULMONARY EMBOLISM WITH ACUTE COR PULMONALE (HCC): ICD-10-CM

## 2024-04-10 DIAGNOSIS — C20 RECTAL CANCER (HCC): ICD-10-CM

## 2024-04-10 PROBLEM — K94.13 ILEOSTOMY DYSFUNCTION (HCC): Status: RESOLVED | Noted: 2023-07-07 | Resolved: 2024-04-10

## 2024-04-10 PROCEDURE — 99214 OFFICE O/P EST MOD 30 MIN: CPT | Performed by: STUDENT IN AN ORGANIZED HEALTH CARE EDUCATION/TRAINING PROGRAM

## 2024-04-10 PROCEDURE — 1123F ACP DISCUSS/DSCN MKR DOCD: CPT | Performed by: STUDENT IN AN ORGANIZED HEALTH CARE EDUCATION/TRAINING PROGRAM

## 2024-04-10 ASSESSMENT — ENCOUNTER SYMPTOMS
SHORTNESS OF BREATH: 0
RHINORRHEA: 0
SORE THROAT: 0
DIARRHEA: 0
COUGH: 0
NAUSEA: 0
VOMITING: 0
WHEEZING: 0
ABDOMINAL PAIN: 0

## 2024-04-10 NOTE — PROGRESS NOTES
Negative for confusion and suicidal ideas. The patient is not nervous/anxious.        Past Medical History:   Diagnosis Date    Arthritis     Cancer (HCC)     SKIN    Colon cancer (HCC)     DVT (deep venous thrombosis) (HCC) 12/2022    Hx antineoplastic chemo     Hyperlipidemia     Pulmonary embolism (HCC) 12/2022     Past Surgical History:   Procedure Laterality Date    CATARACT REMOVAL      CHOLECYSTECTOMY      COLECTOMY N/A 10/03/2022    LOW ANTERIOR RESECTION WITH LOOP ILEOSTOMY performed by Ajith Goldberg MD at [facility] OR    COLONOSCOPY N/A 09/16/2022    COLORECTAL CANCER SCREENING, NOT HIGH RISK performed by Darius Barrow MD at [facility] CENTER    COLONOSCOPY N/A 09/26/2022    FLEXIBLE SIGMOIDOSCOPY WITH SNARE POLYPECTOMY performed by Ajith Goldberg MD at [facility] OR    COLONOSCOPY N/A 07/12/2023    Colonoscopy performed by Ajith Goldberg MD at [facility] OR    PORT SURGERY N/A 11/03/2022    INFUSAPORT performed by Ajith Goldberg MD at [facility] OR    PORT SURGERY Left 07/12/2023    /Lobwdt-q-Ldnb removal performed by Ajith Goldberg MD at [facility] OR    SKIN BIOPSY      R LOWER LEG    SMALL INTESTINE SURGERY N/A 07/26/2023    ileostomy reversal performed by Ajith Goldberg MD at [facility] OR    TUBAL LIGATION       Current Outpatient Medications   Medication Sig Dispense Refill    metoprolol succinate (TOPROL XL) 25 MG extended release tablet Take 1 tablet by mouth daily 90 tablet 3    XARELTO 20 MG TABS tablet Take 1 tablet by mouth daily (with breakfast) 90 tablet 3    simvastatin (ZOCOR) 40 MG tablet TAKE ONE TABLET BY MOUTH EVERY DAY IN THE EVENING 90 tablet 3    acetaminophen (TYLENOL) 500 MG tablet Take 1 tablet by mouth every 6 hours as needed for Pain      magnesium oxide (MAG-OX) 400 (240 Mg) MG tablet Take 1 tablet by mouth daily      Handicap Placard MISC by Does not apply route Good for 5 years 1 each 0     No current facility-administered medications for this visit.     Allergies,

## 2024-09-25 DIAGNOSIS — E78.2 MIXED HYPERLIPIDEMIA: ICD-10-CM

## 2024-09-25 RX ORDER — SIMVASTATIN 40 MG
TABLET ORAL
Qty: 90 TABLET | Refills: 3 | Status: SHIPPED | OUTPATIENT
Start: 2024-09-25

## 2024-10-09 ENCOUNTER — OFFICE VISIT (OUTPATIENT)
Dept: FAMILY MEDICINE CLINIC | Age: 74
End: 2024-10-09
Payer: COMMERCIAL

## 2024-10-09 VITALS
HEIGHT: 59 IN | OXYGEN SATURATION: 95 % | HEART RATE: 75 BPM | SYSTOLIC BLOOD PRESSURE: 138 MMHG | WEIGHT: 207 LBS | BODY MASS INDEX: 41.73 KG/M2 | TEMPERATURE: 97.6 F | DIASTOLIC BLOOD PRESSURE: 88 MMHG

## 2024-10-09 DIAGNOSIS — E78.2 MIXED HYPERLIPIDEMIA: ICD-10-CM

## 2024-10-09 DIAGNOSIS — I50.22 CHRONIC SYSTOLIC (CONGESTIVE) HEART FAILURE (HCC): Primary | ICD-10-CM

## 2024-10-09 DIAGNOSIS — I26.02 ACUTE SADDLE PULMONARY EMBOLISM WITH ACUTE COR PULMONALE (HCC): ICD-10-CM

## 2024-10-09 DIAGNOSIS — C20 RECTAL CANCER (HCC): ICD-10-CM

## 2024-10-09 DIAGNOSIS — C80.1 CANCER (HCC): ICD-10-CM

## 2024-10-09 PROCEDURE — 1123F ACP DISCUSS/DSCN MKR DOCD: CPT | Performed by: STUDENT IN AN ORGANIZED HEALTH CARE EDUCATION/TRAINING PROGRAM

## 2024-10-09 PROCEDURE — G2211 COMPLEX E/M VISIT ADD ON: HCPCS | Performed by: STUDENT IN AN ORGANIZED HEALTH CARE EDUCATION/TRAINING PROGRAM

## 2024-10-09 PROCEDURE — 99214 OFFICE O/P EST MOD 30 MIN: CPT | Performed by: STUDENT IN AN ORGANIZED HEALTH CARE EDUCATION/TRAINING PROGRAM

## 2024-10-09 SDOH — ECONOMIC STABILITY: FOOD INSECURITY: WITHIN THE PAST 12 MONTHS, THE FOOD YOU BOUGHT JUST DIDN'T LAST AND YOU DIDN'T HAVE MONEY TO GET MORE.: NEVER TRUE

## 2024-10-09 SDOH — ECONOMIC STABILITY: FOOD INSECURITY: WITHIN THE PAST 12 MONTHS, YOU WORRIED THAT YOUR FOOD WOULD RUN OUT BEFORE YOU GOT MONEY TO BUY MORE.: NEVER TRUE

## 2024-10-09 SDOH — ECONOMIC STABILITY: INCOME INSECURITY: HOW HARD IS IT FOR YOU TO PAY FOR THE VERY BASICS LIKE FOOD, HOUSING, MEDICAL CARE, AND HEATING?: NOT HARD AT ALL

## 2024-10-09 ASSESSMENT — ENCOUNTER SYMPTOMS
SORE THROAT: 0
COUGH: 0
ABDOMINAL PAIN: 0
EYE DISCHARGE: 0
VOMITING: 0
WHEEZING: 0
SHORTNESS OF BREATH: 0
RHINORRHEA: 0
NAUSEA: 0
DIARRHEA: 0

## 2024-10-09 NOTE — PROGRESS NOTES
Subjective  Kayalfred Douglas, 74 y.o. female presents today with:  Chief Complaint   Patient presents with    6 Month Follow-Up     Did not have lab work done that was ordered in April. Forgot about it, will have done soon.     Hypertension     Occasionally checks BP at home. Denies chest pains, heart palpitations, headaches, dizziness, SOB or edema.     History of Pulmonary Embolism      Taking Xarelto as directed, no side effects.     Hyperlipidemia     Lipid panel done 9/23/23. Taking medication as directed, no side effects.     Rectal Cancer     Continues to follow with oncology.        Patient with 6-month follow-up appointment.  Did not have lab work done that was ordered at last appointment as she forgot.    Patient with history of heart failure.  She is a metoprolol succinate 25 mg daily.  Tolerating well.  No side effects of medication.  She denies any chest pain, shortness breath, palpitations, headaches or dizziness.    Patient also with history of pulmonary embolism.  She is on Xarelto 20 mg daily.  Tolerating well.  No side effects or medication.  No abnormal bleeding.    Patient also with mixed hyperlipidemia.  She is on simvastatin 40 mg daily.  Tolerating well.  No side effects from medication.    Patient also with history of rectal cancer.  Following with oncology every 6 months.  She does need a renewal of her handicap placard.  She has no other concerns at this time.      Review of Systems   Constitutional:  Negative for chills, fatigue, fever and unexpected weight change.   HENT:  Negative for congestion, rhinorrhea and sore throat.    Eyes:  Negative for discharge.   Respiratory:  Negative for cough, shortness of breath and wheezing.    Cardiovascular:  Negative for chest pain, palpitations and leg swelling.   Gastrointestinal:  Negative for abdominal pain, diarrhea, nausea and vomiting.   Genitourinary:  Negative for difficulty urinating.   Musculoskeletal:  Negative for arthralgias and joint

## 2024-10-22 DIAGNOSIS — E78.2 MIXED HYPERLIPIDEMIA: ICD-10-CM

## 2024-10-22 DIAGNOSIS — I50.22 CHRONIC SYSTOLIC (CONGESTIVE) HEART FAILURE (HCC): ICD-10-CM

## 2024-10-22 DIAGNOSIS — I26.02 ACUTE SADDLE PULMONARY EMBOLISM WITH ACUTE COR PULMONALE (HCC): ICD-10-CM

## 2024-10-22 LAB
ALBUMIN SERPL-MCNC: 4.1 G/DL (ref 3.5–4.6)
ALP SERPL-CCNC: 144 U/L (ref 40–130)
ALT SERPL-CCNC: 21 U/L (ref 0–33)
ANION GAP SERPL CALCULATED.3IONS-SCNC: 9 MEQ/L (ref 9–15)
AST SERPL-CCNC: 31 U/L (ref 0–35)
BASOPHILS # BLD: 0 K/UL (ref 0–0.2)
BASOPHILS NFR BLD: 0.5 %
BILIRUB SERPL-MCNC: 0.4 MG/DL (ref 0.2–0.7)
BUN SERPL-MCNC: 12 MG/DL (ref 8–23)
CALCIUM SERPL-MCNC: 9.3 MG/DL (ref 8.5–9.9)
CHLORIDE SERPL-SCNC: 104 MEQ/L (ref 95–107)
CHOLEST SERPL-MCNC: 153 MG/DL (ref 0–199)
CO2 SERPL-SCNC: 28 MEQ/L (ref 20–31)
CREAT SERPL-MCNC: 0.86 MG/DL (ref 0.5–0.9)
EOSINOPHIL # BLD: 0.1 K/UL (ref 0–0.7)
EOSINOPHIL NFR BLD: 1.4 %
ERYTHROCYTE [DISTWIDTH] IN BLOOD BY AUTOMATED COUNT: 13.9 % (ref 11.5–14.5)
GLOBULIN SER CALC-MCNC: 3.3 G/DL (ref 2.3–3.5)
GLUCOSE FASTING: 100 MG/DL (ref 70–99)
HCT VFR BLD AUTO: 46.7 % (ref 37–47)
HDLC SERPL-MCNC: 41 MG/DL (ref 40–59)
HGB BLD-MCNC: 15 G/DL (ref 12–16)
LDL CHOLESTEROL: 76 MG/DL (ref 0–129)
LYMPHOCYTES # BLD: 3.8 K/UL (ref 1–4.8)
LYMPHOCYTES NFR BLD: 43 %
MCH RBC QN AUTO: 28.9 PG (ref 27–31.3)
MCHC RBC AUTO-ENTMCNC: 32.1 % (ref 33–37)
MCV RBC AUTO: 90 FL (ref 79.4–94.8)
MONOCYTES # BLD: 0.7 K/UL (ref 0.2–0.8)
MONOCYTES NFR BLD: 7.5 %
NEUTROPHILS # BLD: 4.2 K/UL (ref 1.4–6.5)
NEUTS SEG NFR BLD: 47.4 %
PLATELET # BLD AUTO: 300 K/UL (ref 130–400)
POTASSIUM SERPL-SCNC: 4.5 MEQ/L (ref 3.4–4.9)
PROT SERPL-MCNC: 7.4 G/DL (ref 6.3–8)
RBC # BLD AUTO: 5.19 M/UL (ref 4.2–5.4)
SODIUM SERPL-SCNC: 141 MEQ/L (ref 135–144)
TRIGLYCERIDE, FASTING: 182 MG/DL (ref 0–150)
WBC # BLD AUTO: 8.8 K/UL (ref 4.8–10.8)

## 2025-01-22 ENCOUNTER — TELEPHONE (OUTPATIENT)
Age: 75
End: 2025-01-22

## 2025-01-22 NOTE — TELEPHONE ENCOUNTER
Patient called with concerns about blood thinner medication. Insurance says it is now tier 3 and patient is responsible for one hundred percent of cost of the medication. Patient was given information for a pharmacy in [place] that will send them to her at a lower cost.   [facility]   [address] [address]   [facility] [id number]   [phone]  If patient can not get prescription from this pharmacy patient would like to change prescription to one insurance will cover or not cost so much.

## 2025-01-25 ENCOUNTER — HOSPITAL ENCOUNTER (EMERGENCY)
Age: 75
Discharge: HOME | End: 2025-01-25
Payer: COMMERCIAL

## 2025-01-25 ENCOUNTER — APPOINTMENT (OUTPATIENT)
Dept: CT IMAGING | Age: 75
End: 2025-01-25
Payer: COMMERCIAL

## 2025-01-25 ENCOUNTER — APPOINTMENT (OUTPATIENT)
Dept: GENERAL RADIOLOGY | Age: 75
End: 2025-01-25
Payer: COMMERCIAL

## 2025-01-25 VITALS
OXYGEN SATURATION: 95 % | TEMPERATURE: 97.4 F | DIASTOLIC BLOOD PRESSURE: 81 MMHG | BODY MASS INDEX: 40.32 KG/M2 | WEIGHT: 200 LBS | SYSTOLIC BLOOD PRESSURE: 158 MMHG | HEIGHT: 59 IN | RESPIRATION RATE: 16 BRPM | HEART RATE: 90 BPM

## 2025-01-25 DIAGNOSIS — M25.532 LEFT WRIST PAIN: ICD-10-CM

## 2025-01-25 DIAGNOSIS — S62.115A CLOSED NONDISPLACED FRACTURE OF TRIQUETRUM OF LEFT WRIST, INITIAL ENCOUNTER: Primary | ICD-10-CM

## 2025-01-25 DIAGNOSIS — K43.9 SPIGELIAN HERNIA: ICD-10-CM

## 2025-01-25 PROCEDURE — 96372 THER/PROPH/DIAG INJ SC/IM: CPT

## 2025-01-25 PROCEDURE — 73030 X-RAY EXAM OF SHOULDER: CPT

## 2025-01-25 PROCEDURE — 6360000002 HC RX W HCPCS

## 2025-01-25 PROCEDURE — 73130 X-RAY EXAM OF HAND: CPT

## 2025-01-25 PROCEDURE — 6370000000 HC RX 637 (ALT 250 FOR IP)

## 2025-01-25 PROCEDURE — 74176 CT ABD & PELVIS W/O CONTRAST: CPT

## 2025-01-25 PROCEDURE — 72125 CT NECK SPINE W/O DYE: CPT

## 2025-01-25 PROCEDURE — 99284 EMERGENCY DEPT VISIT MOD MDM: CPT

## 2025-01-25 PROCEDURE — 71250 CT THORAX DX C-: CPT

## 2025-01-25 PROCEDURE — 70450 CT HEAD/BRAIN W/O DYE: CPT

## 2025-01-25 RX ORDER — HYDROCODONE BITARTRATE AND ACETAMINOPHEN 5; 325 MG/1; MG/1
1 TABLET ORAL EVERY 6 HOURS PRN
Qty: 10 TABLET | Refills: 0 | Status: SHIPPED | OUTPATIENT
Start: 2025-01-25 | End: 2025-01-28

## 2025-01-25 RX ORDER — KETOROLAC TROMETHAMINE 15 MG/ML
15 INJECTION, SOLUTION INTRAMUSCULAR; INTRAVENOUS ONCE
Status: COMPLETED | OUTPATIENT
Start: 2025-01-25 | End: 2025-01-25

## 2025-01-25 RX ORDER — RIVAROXABAN 15 MG-20MG
KIT ORAL
Qty: 1 EACH | Refills: 0 | Status: SHIPPED | OUTPATIENT
Start: 2025-01-25 | End: 2025-01-25

## 2025-01-25 RX ORDER — HYDROCODONE BITARTRATE AND ACETAMINOPHEN 5; 325 MG/1; MG/1
1 TABLET ORAL ONCE
Status: COMPLETED | OUTPATIENT
Start: 2025-01-25 | End: 2025-01-25

## 2025-01-25 RX ADMIN — KETOROLAC TROMETHAMINE 15 MG: 15 INJECTION, SOLUTION INTRAMUSCULAR; INTRAVENOUS at 12:50

## 2025-01-25 RX ADMIN — HYDROCODONE BITARTRATE AND ACETAMINOPHEN 1 TABLET: 5; 325 TABLET ORAL at 12:50

## 2025-01-25 ASSESSMENT — PAIN DESCRIPTION - DESCRIPTORS: DESCRIPTORS: ACHING

## 2025-01-25 ASSESSMENT — PAIN SCALES - GENERAL
PAINLEVEL_OUTOF10: 5
PAINLEVEL_OUTOF10: 4

## 2025-01-25 ASSESSMENT — LIFESTYLE VARIABLES
HOW OFTEN DO YOU HAVE A DRINK CONTAINING ALCOHOL: NEVER
HOW MANY STANDARD DRINKS CONTAINING ALCOHOL DO YOU HAVE ON A TYPICAL DAY: PATIENT DOES NOT DRINK

## 2025-01-25 ASSESSMENT — PAIN - FUNCTIONAL ASSESSMENT: PAIN_FUNCTIONAL_ASSESSMENT: 0-10

## 2025-01-25 ASSESSMENT — PAIN DESCRIPTION - ORIENTATION: ORIENTATION: LEFT

## 2025-01-25 ASSESSMENT — PAIN DESCRIPTION - ONSET: ONSET: SUDDEN

## 2025-01-25 ASSESSMENT — PAIN DESCRIPTION - PAIN TYPE: TYPE: ACUTE PAIN

## 2025-01-25 ASSESSMENT — PAIN DESCRIPTION - LOCATION: LOCATION: HAND

## 2025-01-25 ASSESSMENT — PAIN DESCRIPTION - FREQUENCY: FREQUENCY: CONTINUOUS

## 2025-01-25 NOTE — ED PROVIDER NOTES
11:46 AM [facility] EMERGENCY DEPARTMENT  EMERGENCY DEPARTMENT ENCOUNTER      Pt Name: Kay Douglas  MRN: [id number]  Birthdate 1950  Date of evaluation: 1/25/2025  Provider: Rody Mccord MD    CHIEF COMPLAINT       Chief Complaint   Patient presents with    Fall     Left hand pain         HISTORY OF PRESENT ILLNESS   (Location/Symptom, Timing/Onset, Context/Setting, Quality, Duration, Modifying Factors, Severity)  Note limiting factors.       Kay Douglas is a 74 y.o. female with a past medical history of PE on anticoagulation, hypertension, who presents to the emergency department for chief complaint of a fall that she sustained yesterday.  Patient states that she was at the bank yesterday, and excellently tripped on the sidewalk, falling on her left side.  Patient states that she landed on her left wrist, and hit her head.  Patient does endorse being on anticoagulation.  Patient denies any loss of consciousness, and denies any bleeding at the time.  Patient states that she was told to come to the emergency department at the time, but declined EMS.  Patient states she woke up this morning with a mild headache, and generalized bodyaches, so she came in.  Patient denies any other symptoms such as fevers, chills, chest pain, shortness of breath, rigors, confusion, altered mental status, abdominal pain, nausea, vomiting, diarrhea, focal neurological defects, headache lightheadedness or palpitations.    The history is provided by the patient and a relative.       Nursing Notes were reviewed.    REVIEW OF SYSTEMS    (2-9 systems for level 4, 10 or more for level 5)     Review of Systems    Except as noted above the remainder of the review of systems was reviewed and negative.       PAST MEDICAL HISTORY     Past Medical History:   Diagnosis Date    Arthritis     Cancer (HCC)     SKIN    Colon cancer (HCC)     DVT (deep venous thrombosis) (HCC) 12/2022    Hx antineoplastic chemo     Hyperlipidemia      demonstrates no acute traumatic process.  Patient has been informed of her hernias, and has been advised to follow-up outpatient with surgery.  Patient's x-ray of the hand demonstrates a possible triquetrum fracture, with other chronic changes.  Patient's hand have been placed in a volar splint, and patient has been asked to follow-up with orthopedic doctor on Monday.  Patient has also been provided pain control in the emergency department, and provided with a prescription as well.  Patient has been advised to take her anticoagulation as prescribed, we attempted to find a cheaper prescription option.  Most economical prescription right now is currently a 2-week prescription for Xarelto which has been provided to her.  Patient to follow-up with her PCP Dr. Prince, for further management.           Procedures        CRITICAL CARE TIME   Total Critical Care time was 0 minutes, excluding separately reportable procedures.  There was a high probability of clinically significant/life threatening deterioration in the patient's condition which required my urgent intervention.       FINAL IMPRESSION      1. Closed nondisplaced fracture of triquetrum of left wrist, initial encounter    2. Left wrist pain    3. Spigelian hernia          DISPOSITION/PLAN   DISPOSITION Decision To Discharge 01/25/2025 12:43:11 PM      PATIENT REFERRED TO:  Samanta Prince DO  [address] [facility] [address]  [phone]      Xarelto prescription    [facility] Emergency Department  [address] [facility] [address]  [phone]  Today  As needed, If symptoms worsen    [facility]  [address] [facility] [address]  [phone]  Schedule an appointment as soon as possible for a visit in 1 day      Ajith Goldberg MD  [address] [address]  [phone]    Schedule an appointment as soon as possible for a visit in 1 day  Follow up with surgery for hernia      DISCHARGE

## 2025-01-25 NOTE — ED TRIAGE NOTES
Pt states she fell yesterday around 1030 when she tripped on the sidewalk.  Pt states she fell onto her left side and states that only her left wrist hurts at this time.  Pt stated she struck the left side of her face on the ground but there was no LOC, no bleeding, and no sign of injury on her face.  Pt was helped up and walked to the car.  Pt denies dizziness and headache.

## 2025-01-27 ENCOUNTER — OFFICE VISIT (OUTPATIENT)
Dept: ORTHOPEDIC SURGERY | Age: 75
End: 2025-01-27
Payer: COMMERCIAL

## 2025-01-27 VITALS
WEIGHT: 200 LBS | HEIGHT: 59 IN | TEMPERATURE: 97.7 F | HEART RATE: 77 BPM | BODY MASS INDEX: 40.32 KG/M2 | OXYGEN SATURATION: 97 %

## 2025-01-27 DIAGNOSIS — S63.502A SPRAIN OF LEFT WRIST, INITIAL ENCOUNTER: Primary | ICD-10-CM

## 2025-01-27 PROCEDURE — 99203 OFFICE O/P NEW LOW 30 MIN: CPT | Performed by: STUDENT IN AN ORGANIZED HEALTH CARE EDUCATION/TRAINING PROGRAM

## 2025-01-27 PROCEDURE — 1159F MED LIST DOCD IN RCRD: CPT | Performed by: STUDENT IN AN ORGANIZED HEALTH CARE EDUCATION/TRAINING PROGRAM

## 2025-01-27 PROCEDURE — 1123F ACP DISCUSS/DSCN MKR DOCD: CPT | Performed by: STUDENT IN AN ORGANIZED HEALTH CARE EDUCATION/TRAINING PROGRAM

## 2025-01-27 PROCEDURE — 1125F AMNT PAIN NOTED PAIN PRSNT: CPT | Performed by: STUDENT IN AN ORGANIZED HEALTH CARE EDUCATION/TRAINING PROGRAM

## 2025-01-27 NOTE — PROGRESS NOTES
Subjective:      HPI:: Kay Douglas is a 74 y.o. female who presents today for evaluation of left wrist pain after a fall.  She reports she slipped landing on the left upper extremity.  Was seen in the emergency department subsequent to the fall where she had x-rays of the left hand which demonstrated questionable triquetral fracture.  She reports of some mild pain to the dorsal wrist.  She has been immobilized in a splint.  She denies specific traumatic event or injury to the wrist in the past.  She reports that the pain and swelling have improved significantly.  She denies any pain in the shoulder, elbow, forearm or remainder of the wrist.    Past Medical History:   Diagnosis Date    Arthritis     Cancer (HCC)     SKIN    Colon cancer (HCC)     DVT (deep venous thrombosis) (HCC) 12/2022    Hx antineoplastic chemo     Hyperlipidemia     Pulmonary embolism (HCC) 12/2022     Past Surgical History:   Procedure Laterality Date    CATARACT REMOVAL      CHOLECYSTECTOMY      COLECTOMY N/A 10/03/2022    LOW ANTERIOR RESECTION WITH LOOP ILEOSTOMY performed by Ajith Goldberg MD at [facility] OR    COLONOSCOPY N/A 09/16/2022    COLORECTAL CANCER SCREENING, NOT HIGH RISK performed by Darius Barrow MD at [facility] CENTER    COLONOSCOPY N/A 09/26/2022    FLEXIBLE SIGMOIDOSCOPY WITH SNARE POLYPECTOMY performed by Ajith Goldberg MD at [facility] OR    COLONOSCOPY N/A 07/12/2023    Colonoscopy performed by Ajith Goldberg MD at [facility] OR    PORT SURGERY N/A 11/03/2022    INFUSAPORT performed by Ajith Goldberg MD at [facility] OR    PORT SURGERY Left 07/12/2023    /Iaoudk-m-Njvf removal performed by Ajith Goldberg MD at [facility] OR    SKIN BIOPSY      R LOWER LEG    SMALL INTESTINE SURGERY N/A 07/26/2023    ileostomy reversal performed by Ajith Goldberg MD at [facility] OR    TUBAL LIGATION       Social History     Socioeconomic History    Marital status:      Spouse name: Not on file    Number of children: Not

## 2025-01-28 ENCOUNTER — OFFICE VISIT (OUTPATIENT)
Age: 75
End: 2025-01-28

## 2025-01-28 VITALS
DIASTOLIC BLOOD PRESSURE: 82 MMHG | WEIGHT: 203 LBS | OXYGEN SATURATION: 96 % | HEIGHT: 59 IN | HEART RATE: 79 BPM | TEMPERATURE: 97.6 F | SYSTOLIC BLOOD PRESSURE: 136 MMHG | BODY MASS INDEX: 40.92 KG/M2

## 2025-01-28 DIAGNOSIS — S63.502D SPRAIN OF LEFT WRIST, SUBSEQUENT ENCOUNTER: Primary | ICD-10-CM

## 2025-01-28 DIAGNOSIS — C20 RECTAL CANCER (HCC): ICD-10-CM

## 2025-01-28 DIAGNOSIS — R03.0 ELEVATED BLOOD PRESSURE READING: ICD-10-CM

## 2025-01-28 DIAGNOSIS — K46.9 ABDOMINAL HERNIA WITHOUT OBSTRUCTION AND WITHOUT GANGRENE, RECURRENCE NOT SPECIFIED, UNSPECIFIED HERNIA TYPE: ICD-10-CM

## 2025-01-28 DIAGNOSIS — Z00.00 MEDICARE ANNUAL WELLNESS VISIT, SUBSEQUENT: ICD-10-CM

## 2025-01-28 DIAGNOSIS — I26.02 ACUTE SADDLE PULMONARY EMBOLISM WITH ACUTE COR PULMONALE (HCC): ICD-10-CM

## 2025-01-28 DIAGNOSIS — E78.2 MIXED HYPERLIPIDEMIA: ICD-10-CM

## 2025-01-28 DIAGNOSIS — I50.22 CHRONIC SYSTOLIC (CONGESTIVE) HEART FAILURE (HCC): ICD-10-CM

## 2025-01-28 RX ORDER — ACETAMINOPHEN 160 MG
2000 TABLET,DISINTEGRATING ORAL DAILY
COMMUNITY

## 2025-01-28 RX ORDER — ASCORBIC ACID 500 MG
500 TABLET ORAL DAILY
COMMUNITY

## 2025-01-28 SDOH — ECONOMIC STABILITY: FOOD INSECURITY: WITHIN THE PAST 12 MONTHS, YOU WORRIED THAT YOUR FOOD WOULD RUN OUT BEFORE YOU GOT MONEY TO BUY MORE.: NEVER TRUE

## 2025-01-28 SDOH — ECONOMIC STABILITY: FOOD INSECURITY: WITHIN THE PAST 12 MONTHS, THE FOOD YOU BOUGHT JUST DIDN'T LAST AND YOU DIDN'T HAVE MONEY TO GET MORE.: NEVER TRUE

## 2025-01-28 ASSESSMENT — PATIENT HEALTH QUESTIONNAIRE - PHQ9
SUM OF ALL RESPONSES TO PHQ QUESTIONS 1-9: 0
SUM OF ALL RESPONSES TO PHQ9 QUESTIONS 1 & 2: 0
1. LITTLE INTEREST OR PLEASURE IN DOING THINGS: NOT AT ALL
SUM OF ALL RESPONSES TO PHQ QUESTIONS 1-9: 0
2. FEELING DOWN, DEPRESSED OR HOPELESS: NOT AT ALL
SUM OF ALL RESPONSES TO PHQ QUESTIONS 1-9: 0
SUM OF ALL RESPONSES TO PHQ QUESTIONS 1-9: 0

## 2025-01-28 NOTE — PROGRESS NOTES
Subjective  Kayalfred Douglas, 74 y.o. female presents today with:  Chief Complaint   Patient presents with    Follow-up     Went to the ER 1/25/25 s/p fall w/left wrist/hand injury. States she already had f/u w/ortho. Still has bruising & some pain, but is not to bad she reports. States she would like to discuss results of testing that was done in ER as there were some coincidental findings.     Medication Problem     States her Xarelto is no longer covered & needs to discuss what she should now.     Medicare AWV       History of Present Illness  The patient is a 74-year-old female who presents for an ER follow-up.    She has been prescribed Xarelto 15 mg twice daily, a deviation from her previous dosage of 20 mg. She was provided with a 10-day supply of the medication by the hospital, which cost over 200 dollars. She is currently on Medicare and does not have a co-pay for her other 2 medications. She reports that her initial co-pay for Xarelto was 42 dollars, but it increased to 137 dollars in October 2024. In November 2024, she went without the medication for a few days due to financial constraints, but her daughter was able to purchase it for her. The cost remained at 133 dollars in November 2024 and December 2024. She was informed by the pharmacy that the price would revert to 42 dollars in January 2025, but upon contacting [facility], she was told that she would be responsible for the full amount until her out-of-pocket expenses were covered. She has expressed concerns about the affordability of Xarelto, citing financial constraints due to her reliance on social security for income. She has been advised to explore alternative options for obtaining the medication. She has expressed a preference for avoiding frequent blood work.    She has been diagnosed with 3 hernias in her abdomen, which are currently asymptomatic. She has expressed a desire to avoid further surgical interventions.  She has no other concerns at

## 2025-02-04 ENCOUNTER — TELEPHONE (OUTPATIENT)
Age: 75
End: 2025-02-04

## 2025-02-04 NOTE — TELEPHONE ENCOUNTER
Patient called stating she spoke with provider at her appt about [facility]. She wants to know if the provider looked into it or was she suppose to do something on her end.      Thank you.

## 2025-02-04 NOTE — TELEPHONE ENCOUNTER
CARLOTA for pt to call back. We did not get any form from [facility]. She needs to start the process by calling [phone]  they will assist her on the next steps.

## 2025-02-04 NOTE — TELEPHONE ENCOUNTER
I think there was something that we received from [facility] for her- can you please check. Thanks

## 2025-02-05 ENCOUNTER — TELEPHONE (OUTPATIENT)
Age: 75
End: 2025-02-05

## 2025-02-05 DIAGNOSIS — I50.22 CHRONIC SYSTOLIC (CONGESTIVE) HEART FAILURE (HCC): ICD-10-CM

## 2025-02-05 DIAGNOSIS — I26.99 BILATERAL PULMONARY EMBOLISM (HCC): Primary | ICD-10-CM

## 2025-02-05 RX ORDER — METOPROLOL SUCCINATE 25 MG/1
25 TABLET, EXTENDED RELEASE ORAL DAILY
Qty: 90 TABLET | Refills: 3 | Status: SHIPPED | OUTPATIENT
Start: 2025-02-05

## 2025-02-05 NOTE — TELEPHONE ENCOUNTER
Patient requesting medication refill. Please approve or deny this request.    Rx requested:  Requested Prescriptions     Pending Prescriptions Disp Refills    metoprolol succinate (TOPROL XL) 25 MG extended release tablet 90 tablet 3     Sig: Take 1 tablet by mouth daily         Last Office Visit:   1/28/2025      Next Visit Date:  Future Appointments   Date Time Provider Department Center   4/11/2025  9:00 AM Samanta Prince DO [facility]

## 2025-02-05 NOTE — TELEPHONE ENCOUNTER
Patient requesting medication refill. Please approve or deny this request.    Rx requested:  Requested Prescriptions     Pending Prescriptions Disp Refills    rivaroxaban (XARELTO) 15 MG TABS tablet 60 tablet 0     Sig: Take 1 tablet by mouth 2 times daily (with meals)         Last Office Visit:   1/28/2025      Next Visit Date:  Future Appointments   Date Time Provider Department Center   4/11/2025  9:00 AM Samanta Prince DO [facility]

## 2025-02-05 NOTE — TELEPHONE ENCOUNTER
[facility] called in about the Xarelto that was prescribed for the patient.    States that the dose that was prescribed is only for active treatment of an event of clotting and the insurance will only pay for a starter pack of that dose.    Pharmacy requesting a clarification on the medication.

## 2025-02-07 NOTE — TELEPHONE ENCOUNTER
PT called checking on correction needed on RX    Dosage should be 20 mg- 1 tab - 1x per day-     Please make correction and send to pharmacy- PT has been out of medication for a week;

## 2025-02-07 NOTE — TELEPHONE ENCOUNTER
LMOVM informed Rx was sent to pharmacy & if they have not contacted her yet, she should contact them to inquire on Rx. Informed to call back with questions.

## 2025-04-11 ENCOUNTER — OFFICE VISIT (OUTPATIENT)
Age: 75
End: 2025-04-11
Payer: COMMERCIAL

## 2025-04-11 VITALS
SYSTOLIC BLOOD PRESSURE: 132 MMHG | BODY MASS INDEX: 40.12 KG/M2 | OXYGEN SATURATION: 98 % | HEIGHT: 59 IN | TEMPERATURE: 97.5 F | WEIGHT: 199 LBS | DIASTOLIC BLOOD PRESSURE: 70 MMHG | HEART RATE: 77 BPM

## 2025-04-11 DIAGNOSIS — Z12.31 SCREENING MAMMOGRAM FOR BREAST CANCER: ICD-10-CM

## 2025-04-11 DIAGNOSIS — I50.22 CHRONIC SYSTOLIC (CONGESTIVE) HEART FAILURE: Primary | ICD-10-CM

## 2025-04-11 DIAGNOSIS — I26.99 BILATERAL PULMONARY EMBOLISM (HCC): ICD-10-CM

## 2025-04-11 DIAGNOSIS — E78.2 MIXED HYPERLIPIDEMIA: ICD-10-CM

## 2025-04-11 PROCEDURE — 1159F MED LIST DOCD IN RCRD: CPT | Performed by: STUDENT IN AN ORGANIZED HEALTH CARE EDUCATION/TRAINING PROGRAM

## 2025-04-11 PROCEDURE — 1160F RVW MEDS BY RX/DR IN RCRD: CPT | Performed by: STUDENT IN AN ORGANIZED HEALTH CARE EDUCATION/TRAINING PROGRAM

## 2025-04-11 PROCEDURE — 1123F ACP DISCUSS/DSCN MKR DOCD: CPT | Performed by: STUDENT IN AN ORGANIZED HEALTH CARE EDUCATION/TRAINING PROGRAM

## 2025-04-11 PROCEDURE — 99214 OFFICE O/P EST MOD 30 MIN: CPT | Performed by: STUDENT IN AN ORGANIZED HEALTH CARE EDUCATION/TRAINING PROGRAM

## 2025-04-11 PROCEDURE — G2211 COMPLEX E/M VISIT ADD ON: HCPCS | Performed by: STUDENT IN AN ORGANIZED HEALTH CARE EDUCATION/TRAINING PROGRAM

## 2025-04-11 NOTE — PROGRESS NOTES
11/03/2022    INFUSAPORT performed by Ajith Goldberg MD at [facility]    PORT SURGERY Left 07/12/2023    /Jhuibf-o-Jzeo removal performed by Ajith Goldberg MD at [facility] OR    SKIN BIOPSY      R LOWER LEG    SMALL INTESTINE SURGERY N/A 07/26/2023    ileostomy reversal performed by Ajith Goldberg MD at [facility]    TUBAL LIGATION       Current Outpatient Medications   Medication Sig Dispense Refill    rivaroxaban (XARELTO) 20 MG TABS tablet Take 1 tablet by mouth daily (with breakfast) 30 tablet 5    metoprolol succinate (TOPROL XL) 25 MG extended release tablet Take 1 tablet by mouth daily 90 tablet 3    vitamin D (VITAMIN D3) 50 MCG (2000 UT) CAPS capsule Take 1 capsule by mouth daily      vitamin C (ASCORBIC ACID) 500 MG tablet Take 1 tablet by mouth daily      ZINC PO Take by mouth      Handicap Placard MISC by Does not apply route Start date: 10/9/2024  End date: 10/9/2029 2 each 0    simvastatin (ZOCOR) 40 MG tablet TAKE ONE TABLET BY MOUTH EVERY DAY IN THE EVENING 90 tablet 3    acetaminophen (TYLENOL) 500 MG tablet Take 1 tablet by mouth every 6 hours as needed for Pain      magnesium oxide (MAG-OX) 400 (240 Mg) MG tablet Take 1 tablet by mouth daily       No current facility-administered medications for this visit.     Allergies, PMH, Surgical Hx, Family Hx, and Social Hx reviewed and updated.  Health Maintenance reviewed.    Objective    Vitals:    04/11/25 0831   BP: 132/70   Pulse: 77   Temp: 97.5 °F (36.4 °C)   TempSrc: Temporal   SpO2: 98%   Weight: 90.3 kg (199 lb)   Height: 1.499 m (4' 11\")       Physical Exam  Vitals reviewed.   Constitutional:       General: She is not in acute distress.  HENT:      Head: Normocephalic and atraumatic.   Eyes:      Conjunctiva/sclera: Conjunctivae normal.   Neck:      Thyroid: No thyromegaly or thyroid tenderness.   Cardiovascular:      Rate and Rhythm: Normal rate and regular rhythm.      Heart sounds: No murmur heard.     No friction rub. No gallop.

## 2025-04-24 ENCOUNTER — HOSPITAL ENCOUNTER (OUTPATIENT)
Dept: WOMENS IMAGING | Age: 75
Discharge: HOME | End: 2025-04-26
Payer: COMMERCIAL

## 2025-04-24 DIAGNOSIS — Z12.31 SCREENING MAMMOGRAM FOR BREAST CANCER: ICD-10-CM

## 2025-04-24 PROCEDURE — 77063 BREAST TOMOSYNTHESIS BI: CPT

## 2025-04-25 ENCOUNTER — RESULTS FOLLOW-UP (OUTPATIENT)
Age: 75
End: 2025-04-25

## 2025-07-07 ENCOUNTER — TELEPHONE (OUTPATIENT)
Age: 75
End: 2025-07-07

## 2025-07-07 NOTE — TELEPHONE ENCOUNTER
Patient called wanting a note faxed stating she is not healthy enough for jury duty. Patient was last seen 4/25. Patient states the letter must be faxed to   [phone]  ASAP. Jury duty is scheduled for 8/11/25. Patient would like a call if the letter is going to be sent or if she needs to make an appointment to be seen to get the letter or if PCP will not sent the letter.

## 2025-07-30 DIAGNOSIS — I26.99 BILATERAL PULMONARY EMBOLISM (HCC): ICD-10-CM

## 2025-09-04 DIAGNOSIS — I26.99 BILATERAL PULMONARY EMBOLISM (HCC): ICD-10-CM

## 2025-09-04 RX ORDER — RIVAROXABAN 20 MG/1
TABLET, FILM COATED ORAL
Qty: 30 TABLET | Refills: 5 | OUTPATIENT
Start: 2025-09-04

## 2025-09-05 RX ORDER — RIVAROXABAN 20 MG/1
TABLET, FILM COATED ORAL
Qty: 30 TABLET | Refills: 0 | OUTPATIENT
Start: 2025-09-05

## (undated) DEVICE — ELECTRODE PT RET AD L9FT HI MOIST COND ADH HYDRGEL CORDED

## (undated) DEVICE — SINGLE PORT MANIFOLD: Brand: NEPTUNE 2

## (undated) DEVICE — NEPTUNE E-SEP SMOKE EVACUATION PENCIL, COATED, 70MM BLADE, PUSH BUTTON SWITCH: Brand: NEPTUNE E-SEP

## (undated) DEVICE — SYRINGE MED 10ML LUERLOCK TIP W/O SFTY DISP

## (undated) DEVICE — COVER LT HNDL BLU PLAS

## (undated) DEVICE — Device: Brand: ENDO SMARTCAP

## (undated) DEVICE — LABEL MED MINI W/ MARKER

## (undated) DEVICE — SUTURE VCRL SZ 2-0 L27IN ABSRB UD L26MM CT-2 1/2 CIR J269H

## (undated) DEVICE — TTL1LYR 16FR10ML 100%SIL TMPST TR: Brand: MEDLINE

## (undated) DEVICE — YANKAUER,POOLE TIP,STERILE,50/CS: Brand: MEDLINE

## (undated) DEVICE — COUNTER NDL 40 COUNT HLD 70 FOAM BLK ADH W/ MAG

## (undated) DEVICE — GOWN,AURORA,NONREINFORCED,LARGE: Brand: MEDLINE

## (undated) DEVICE — SPONGE GZ W4XL4IN COT 12 PLY TYP VII WVN C FLD DSGN STERILE

## (undated) DEVICE — BRUSH ENDO CLN L90.5IN SHTH DIA1.7MM BRIST DIA5-7MM 2-6MM

## (undated) DEVICE — APPLIER LIG CLP M L11IN TI STR RNG HNDL FOR 20 CLP DISP

## (undated) DEVICE — TOWEL,OR,DSP,ST,BLUE,STD,4/PK,20PK/CS: Brand: MEDLINE

## (undated) DEVICE — SPONGE,LAP,18"X18",DLX,XR,ST,5/PK,40/PK: Brand: MEDLINE

## (undated) DEVICE — SEALER ENDOSCP NANO COAT OPN DIV CRV L JAW LIGASURE IMPACT

## (undated) DEVICE — STAPLER INT L28CM DIA29MM CLS STPL H10-2.5MM OPN LEG L5.5MM

## (undated) DEVICE — GAUZE,SPONGE,FLUFF,6"X6.75",STRL,10/TRAY: Brand: MEDLINE

## (undated) DEVICE — SKIN PREP TRAY 4 COMPARTM TRAY: Brand: MEDLINE INDUSTRIES, INC.

## (undated) DEVICE — SPONGE GZ W4XL4IN COT 12 PLY TYP VII WVN C FLD DSGN

## (undated) DEVICE — TUBE SET 96 MM 64 MM H2O PERISTALTIC STD AUX CHANNEL

## (undated) DEVICE — GLOVE ORANGE PI 8 1/2   MSG9085

## (undated) DEVICE — YANKAUER,BULB TIP,W/O VENT,RIGID,STERILE: Brand: MEDLINE

## (undated) DEVICE — TOWEL,OR,DSP,ST,GREEN,DLX,XR,4/PK,20PK/C: Brand: MEDLINE

## (undated) DEVICE — GAUZE,SPONGE,4"X4",16PLY,XRAY,STRL,LF: Brand: MEDLINE

## (undated) DEVICE — GOWN,SIRUS,POLYRNF,BRTHSLV,XLN/XL,20/CS: Brand: MEDLINE

## (undated) DEVICE — INTENDED FOR TISSUE SEPARATION, AND OTHER PROCEDURES THAT REQUIRE A SHARP SURGICAL BLADE TO PUNCTURE OR CUT.: Brand: BARD-PARKER ® CARBON RIB-BACK BLADES

## (undated) DEVICE — GLOVE ORANGE PI 7 1/2   MSG9075

## (undated) DEVICE — CATHETER THOR 20FR L22IN PVC 4 EYELET STR ATRAUM

## (undated) DEVICE — PACK,LAPAROTOMY,NO GOWNS: Brand: MEDLINE

## (undated) DEVICE — GOWN,SIRUS,POLYRNF,BRTHSLV,LG,30/CS: Brand: MEDLINE

## (undated) DEVICE — RELOAD STPL 40MM H1.5-4.7MM WIRE 0.2MM REG TISS GRN CRV

## (undated) DEVICE — APPLICATOR MEDICATED 10.5 CC SOLUTION HI LT ORNG CHLORAPREP

## (undated) DEVICE — ENDO CARRY-ON PROCEDURE KIT INCLUDES LUBRICANT, DEFENDO OLYMPUS AIR, WATER, SUCTION, BIOPSY VALVE KIT, ENZYMATIC SPONGE, AND BASIN.: Brand: ENDO CARRY-ON PROCEDURE KIT

## (undated) DEVICE — TUBING, SUCTION, 9/32" X 12', STRAIGHT: Brand: MEDLINE INDUSTRIES, INC.

## (undated) DEVICE — E-Z CLEAN, NON-STICK, PTFE COATED, ELECTROSURGICAL BLADE ELECTRODE, 6.5 INCH (16.5 CM): Brand: MEGADYNE

## (undated) DEVICE — SUTURE ABSORBABLE BRAIDED 0 CT-1 8X27 IN UD VICRYL JJ41G

## (undated) DEVICE — STAPLER INT CUT LN 40MM STPL 51MM GRN CRV HD B FRM

## (undated) DEVICE — TUBING, SUCTION, 3/16" X 12', STRAIGHT: Brand: MEDLINE

## (undated) DEVICE — NEEDLE HYPO 25GA L1.5IN BLU POLYPR HUB S STL REG BVL STR

## (undated) DEVICE — RESERVOIR,SUCTION,100CC,SILICONE: Brand: MEDLINE

## (undated) DEVICE — SUTURE VCRL SZ 2-0 L27IN ABSRB UD L26MM SH 1/2 CIR J417H

## (undated) DEVICE — ENDO CARRY-ON PROCEDURE KIT: Brand: ENDO CARRY-ON PROCEDURE KIT

## (undated) DEVICE — SUTURE VCRL SZ 0 L36IN ABSRB UD L36MM CT-1 1/2 CIR J946H

## (undated) DEVICE — ADAPTER FLSH PMP FLD MGMT GI IRRIG OFP 2 DISPOSABLE

## (undated) DEVICE — SUTURE PROL SZ 0 L30IN NONABSORBABLE BLU L36MM CT-1 1/2 CIR 8424H

## (undated) DEVICE — STRIP,CLOSURE,WOUND,MEDI-STRIP,1/2X4: Brand: MEDLINE

## (undated) DEVICE — RELOAD STPL H1.5X3.6XL60MM REG TISS BLU B FRM AUTO RET PIN

## (undated) DEVICE — STAPLER INT STPL LN H1.8-4.8XL60MM THCK TISS 2 ROW 8 FIRING

## (undated) DEVICE — SUTURE PERMA-HAND SZ 3-0 L18IN NONABSORBABLE BLK SH-1 L22MM C003D

## (undated) DEVICE — SYRINGE IRRIG 60ML SFT PLIABLE BLB EZ TO GRP 1 HND USE W/

## (undated) DEVICE — JELLY,LUBE,STERILE,FLIP TOP,TUBE,2-OZ: Brand: MEDLINE

## (undated) DEVICE — KIT US PRB CVR POLYUR FLM SMOOTH FINISH SLGHT MATTE SHEEN W

## (undated) DEVICE — RELOAD STPL L75MM OPN STPL H4.5MM CLS STPL H2MM WIRE

## (undated) DEVICE — MARKER SURG SKIN GENTIAN VLT REG TIP W/ 6IN RUL DYNJSM01

## (undated) DEVICE — MARKER SURG SKIN GENTIAN VLT REG TIP W/ 6IN RUL

## (undated) DEVICE — STAPLER INT 75MM CUT LN L73MM STPL LN L77MM LNAR B-FORM

## (undated) DEVICE — SUTURE VCRL SZ 3-0 L27IN ABSRB UD L26MM SH 1/2 CIR J416H

## (undated) DEVICE — SHEET,DRAPE,53X77,STERILE: Brand: MEDLINE

## (undated) DEVICE — BLADE,CARBON-STEEL,10,STRL,DISPOSABLE,TB: Brand: MEDLINE

## (undated) DEVICE — GOWN,AURORA,NONRNF,XL,30/CS: Brand: MEDLINE

## (undated) DEVICE — DRAIN SURG PENROSE 0.25X12 IN CLOSED WND DRAINAGE PREM SIL

## (undated) DEVICE — Device

## (undated) DEVICE — SNARE HEX 2.4X13MM

## (undated) DEVICE — GLOVE ORANGE PI 8   MSG9080

## (undated) DEVICE — FORCEPS BX L240CM JAW DIA2.8MM L CAP W/ NDL MIC MESH TOOTH

## (undated) DEVICE — C-ARM: Brand: UNBRANDED

## (undated) DEVICE — DRAPE,MEDI-SLUSH,STERILE: Brand: MEDLINE

## (undated) DEVICE — RESTRAINT EXTREMITY LIMB HOLDER SFT FOAM SINGLE STRP DISP

## (undated) DEVICE — APPLICATOR MEDICATED 26 CC SOLUTION HI LT ORNG CHLORAPREP

## (undated) DEVICE — BANDAGE ADH W2XL4IN NITRL FAB STRP CURAD

## (undated) DEVICE — TUBING, SUCTION, 1/4" X 10', STRAIGHT: Brand: MEDLINE

## (undated) DEVICE — SUTURE MCRYL SZ 4-0 L27IN ABSRB UD L19MM PS-2 1/2 CIR PRIM Y426H

## (undated) DEVICE — BANDAGE ADH W0.75XL3IN UNIV WVN FAB NAT GEN USE STRP N ADH

## (undated) DEVICE — Device: Brand: SENSURA MIO

## (undated) DEVICE — DRAIN SURG 19FR 0.25IN SIL RND W/ TRCR INDIC DOT RADPQ FULL

## (undated) DEVICE — DRAPE EQUIP TRNSPRT CONTAINMENT FOR BK TAB

## (undated) DEVICE — PACK,BASIC: Brand: MEDLINE

## (undated) DEVICE — DRAPE, LAVH, STERILE: Brand: MEDLINE